# Patient Record
Sex: MALE | Race: WHITE | NOT HISPANIC OR LATINO | Employment: OTHER | URBAN - NONMETROPOLITAN AREA
[De-identification: names, ages, dates, MRNs, and addresses within clinical notes are randomized per-mention and may not be internally consistent; named-entity substitution may affect disease eponyms.]

---

## 2017-01-05 ENCOUNTER — HOSPITAL ENCOUNTER (OUTPATIENT)
Dept: MRI IMAGING | Facility: HOSPITAL | Age: 37
Discharge: HOME | End: 2017-01-05

## 2017-01-05 ENCOUNTER — HOSPITAL ENCOUNTER (OUTPATIENT)
Dept: MRI IMAGING | Facility: HOSPITAL | Age: 37
Discharge: HOME | End: 2017-01-05
Admitting: OPHTHALMOLOGY

## 2017-01-05 DIAGNOSIS — H47.10 OPTIC NERVE EDEMA: ICD-10-CM

## 2017-01-05 DIAGNOSIS — R51.9 GENERALIZED HEADACHES: ICD-10-CM

## 2017-01-05 LAB — CREAT BLDA-MCNC: 0.9 MG/DL (ref 0.6–1.3)

## 2017-01-05 PROCEDURE — 82565 ASSAY OF CREATININE: CPT

## 2017-01-05 PROCEDURE — 70553 MRI BRAIN STEM W/O & W/DYE: CPT

## 2017-01-05 PROCEDURE — 0 GADOBENATE DIMEGLUMINE 529 MG/ML SOLUTION: Performed by: OPHTHALMOLOGY

## 2017-01-05 PROCEDURE — A9577 INJ MULTIHANCE: HCPCS | Performed by: OPHTHALMOLOGY

## 2017-01-05 RX ADMIN — GADOBENATE DIMEGLUMINE 20 ML: 529 INJECTION, SOLUTION INTRAVENOUS at 08:30

## 2017-01-09 ENCOUNTER — TELEPHONE (OUTPATIENT)
Dept: NEUROSURGERY | Facility: CLINIC | Age: 37
End: 2017-01-09

## 2017-01-09 NOTE — TELEPHONE ENCOUNTER
I called to confirm patient's appt for 1/11/17 with Dr Ascencio; however, I didn't get an answer, got his voicemail but it was full and could not accept any new messages.      milagro onofre CMA

## 2017-01-11 ENCOUNTER — OFFICE VISIT (OUTPATIENT)
Dept: NEUROSURGERY | Facility: CLINIC | Age: 37
End: 2017-01-11

## 2017-01-11 VITALS
WEIGHT: 288 LBS | DIASTOLIC BLOOD PRESSURE: 84 MMHG | BODY MASS INDEX: 41.23 KG/M2 | SYSTOLIC BLOOD PRESSURE: 142 MMHG | HEIGHT: 70 IN

## 2017-01-11 DIAGNOSIS — G89.29 CHRONIC INTRACTABLE HEADACHE, UNSPECIFIED HEADACHE TYPE: ICD-10-CM

## 2017-01-11 DIAGNOSIS — H47.10 PAPILLEDEMA: ICD-10-CM

## 2017-01-11 DIAGNOSIS — G93.2 PSEUDOTUMOR CEREBRI: Primary | ICD-10-CM

## 2017-01-11 DIAGNOSIS — E23.7 PITUITARY ABNORMALITY (HCC): ICD-10-CM

## 2017-01-11 DIAGNOSIS — F17.210 CIGARETTE SMOKER: ICD-10-CM

## 2017-01-11 DIAGNOSIS — E66.01 OBESITY, CLASS III, BMI 40-49.9 (MORBID OBESITY) (HCC): ICD-10-CM

## 2017-01-11 DIAGNOSIS — R51.9 CHRONIC INTRACTABLE HEADACHE, UNSPECIFIED HEADACHE TYPE: ICD-10-CM

## 2017-01-11 PROBLEM — E66.813 OBESITY, CLASS III, BMI 40-49.9 (MORBID OBESITY): Status: ACTIVE | Noted: 2017-01-11

## 2017-01-11 PROCEDURE — 99203 OFFICE O/P NEW LOW 30 MIN: CPT | Performed by: NEUROLOGICAL SURGERY

## 2017-01-11 RX ORDER — BUSPIRONE HYDROCHLORIDE 15 MG/1
TABLET ORAL
COMMUNITY
End: 2017-01-11

## 2017-01-11 RX ORDER — BUPRENORPHINE HYDROCHLORIDE, NALOXONE HYDROCHLORIDE 8; 2 MG/1; MG/1
FILM, SOLUBLE BUCCAL; SUBLINGUAL
Refills: 2 | COMMUNITY
Start: 2016-12-12 | End: 2017-03-14

## 2017-01-11 RX ORDER — QUETIAPINE FUMARATE 25 MG/1
TABLET, FILM COATED ORAL
Refills: 2 | COMMUNITY
Start: 2017-01-06 | End: 2017-02-09

## 2017-01-11 RX ORDER — OLANZAPINE 5 MG/1
TABLET ORAL
COMMUNITY
Start: 2016-06-22 | End: 2017-01-11

## 2017-01-11 RX ORDER — LISINOPRIL AND HYDROCHLOROTHIAZIDE 25; 20 MG/1; MG/1
TABLET ORAL
COMMUNITY
End: 2017-01-11

## 2017-01-11 RX ORDER — ACETAZOLAMIDE 250 MG/1
250 TABLET ORAL 2 TIMES DAILY
Refills: 2 | COMMUNITY
Start: 2016-12-15 | End: 2019-03-11 | Stop reason: SDUPTHER

## 2017-01-11 RX ORDER — ALPRAZOLAM 0.5 MG/1
TABLET ORAL
Refills: 1 | COMMUNITY
Start: 2016-12-16 | End: 2017-07-27

## 2017-01-11 RX ORDER — POLYETHYLENE GLYCOL 3350 17 G/17G
17 POWDER, FOR SOLUTION ORAL DAILY PRN
Refills: 0 | COMMUNITY
Start: 2016-12-09 | End: 2019-11-08

## 2017-01-11 RX ORDER — DEXTROAMPHETAMINE SACCHARATE, AMPHETAMINE ASPARTATE, DEXTROAMPHETAMINE SULFATE AND AMPHETAMINE SULFATE 5; 5; 5; 5 MG/1; MG/1; MG/1; MG/1
20 TABLET ORAL 3 TIMES DAILY
Refills: 0 | COMMUNITY
Start: 2016-12-19 | End: 2019-08-30 | Stop reason: DRUGHIGH

## 2017-01-11 NOTE — MR AVS SNAPSHOT
Lincoln Ornelas   1/11/2017 10:00 AM   Office Visit    Dept Phone:  [phone]   Encounter #:  [id number]    Provider:  Zev Ascencio MD   Department:  [facility] NEUROSURGERY                Your Full Care Plan              Today's Medication Changes          These changes are accurate as of: 1/11/17 11:18 AM.  If you have any questions, ask your nurse or doctor.               Medication(s)that have changed:     busPIRone 15 MG tablet   Commonly known as:  BUSPAR   Take 15 mg by mouth 3 times daily   What changed:  Another medication with the same name was removed. Continue taking this medication, and follow the directions you see here.       lisinopril-hydrochlorothiazide 20-25 MG per tablet   Commonly known as:  PRINZIDE,ZESTORETIC   Take 1 tablet by mouth Daily.   What changed:  Another medication with the same name was removed. Continue taking this medication, and follow the directions you see here.       OLANZapine 15 MG tablet   Commonly known as:  zyPREXA   Take 15 mg by mouth every night.   What changed:  Another medication with the same name was removed. Continue taking this medication, and follow the directions you see here.       QUEtiapine 25 MG tablet   Commonly known as:  SEROquel   What changed:  Another medication with the same name was removed. Continue taking this medication, and follow the directions you see here.   Changed by:  Zev Ascencio MD       SUBOXONE 8-2 MG film film   Generic drug:  buprenorphine-naloxone   What changed:  Another medication with the same name was removed. Continue taking this medication, and follow the directions you see here.   Changed by:  Zev Ascencio MD                  Your Updated Medication List          This list is accurate as of: 1/11/17 11:18 AM.  Always use your most recent med list.                acetaZOLAMIDE 250 MG tablet   Commonly known as:  DIAMOX       * ADDERALL PO       *  amphetamine-dextroamphetamine 20 MG tablet   Commonly known as:  ADDERALL       ALPRAZolam 0.5 MG tablet   Commonly known as:  XANAX       busPIRone 15 MG tablet   Commonly known as:  BUSPAR       ketorolac 10 MG tablet   Commonly known as:  TORADOL   Take 1 tablet by mouth Every 6 (Six) Hours As Needed for moderate pain (4-6).       lisinopril-hydrochlorothiazide 20-25 MG per tablet   Commonly known as:  PRINZIDE,ZESTORETIC       OLANZapine 15 MG tablet   Commonly known as:  zyPREXA       polyethylene glycol powder   Commonly known as:  MIRALAX       QUEtiapine 25 MG tablet   Commonly known as:  SEROquel       SUBOXONE 8-2 MG film film   Generic drug:  buprenorphine-naloxone       * Notice:  This list has 2 medication(s) that are the same as other medications prescribed for you. Read the directions carefully, and ask your doctor or other care provider to review them with you.            You Were Diagnosed With        Codes Comments    Pseudotumor cerebri    -  Primary ICD-10-CM: G93.2  ICD-9-CM: 348.2     Papilledema     ICD-10-CM: H47.10  ICD-9-CM: 377.00     Chronic intractable headache, unspecified headache type     ICD-10-CM: R51  ICD-9-CM: 784.0     Pituitary abnormality     ICD-10-CM: E23.7  ICD-9-CM: 253.9     Obesity, Class III, BMI 40-49.9 (morbid obesity)     ICD-10-CM: E66.01  ICD-9-CM: 278.01     Cigarette smoker     ICD-10-CM: F17.210  ICD-9-CM: 305.1       Instructions    Steps to Quit Smoking   Smoking tobacco can be harmful to your health and can affect almost every organ in your body. Smoking puts you, and those around you, at risk for developing many serious chronic diseases. Quitting smoking is difficult, but it is one of the best things that you can do for your health. It is never too late to quit.  WHAT ARE THE BENEFITS OF QUITTING SMOKING?  When you quit smoking, you lower your risk of developing serious diseases and conditions, such as:  · Lung cancer or lung disease, such as COPD.  · Heart  "disease.  · Stroke.  · Heart attack.  · Infertility.  · Osteoporosis and bone fractures.  Additionally, symptoms such as coughing, wheezing, and shortness of breath may get better when you quit. You may also find that you get sick less often because your body is stronger at fighting off colds and infections. If you are pregnant, quitting smoking can help to reduce your chances of having a baby of low birth weight.  HOW DO I GET READY TO QUIT?  When you decide to quit smoking, create a plan to make sure that you are successful. Before you quit:  · Pick a date to quit. Set a date within the next two weeks to give you time to prepare.  · Write down the reasons why you are quitting. Keep this list in places where you will see it often, such as on your bathroom mirror or in your car or wallet.  · Identify the people, places, things, and activities that make you want to smoke (triggers) and avoid them. Make sure to take these actions:    Throw away all cigarettes at home, at work, and in your car.    Throw away smoking accessories, such as ashtrays and lighters.    Clean your car and make sure to empty the ashtray.    Clean your home, including curtains and carpets.  · Tell your family, friends, and coworkers that you are quitting. Support from your loved ones can make quitting easier.  · Talk with your health care provider about your options for quitting smoking.  · Find out what treatment options are covered by your health insurance.  WHAT STRATEGIES CAN I USE TO QUIT SMOKING?   Talk with your healthcare provider about different strategies to quit smoking. Some strategies include:  · Quitting smoking altogether instead of gradually lessening how much you smoke over a period of time. Research shows that quitting \"cold turkey\" is more successful than gradually quitting.  · Attending in-person counseling to help you build problem-solving skills. You are more likely to have success in quitting if you attend several " counseling sessions. Even short sessions of 10 minutes can be effective.  · Finding resources and support systems that can help you to quit smoking and remain smoke-free after you quit. These resources are most helpful when you use them often. They can include:    Online chats with a counselor.    Telephone quitlines.    Printed self-help materials.    Support groups or group counseling.    Text messaging programs.    Mobile phone applications.  · Taking medicines to help you quit smoking. (If you are pregnant or breastfeeding, talk with your health care provider first.) Some medicines contain nicotine and some do not. Both types of medicines help with cravings, but the medicines that include nicotine help to relieve withdrawal symptoms. Your health care provider may recommend:    Nicotine patches, gum, or lozenges.    Nicotine inhalers or sprays.    Non-nicotine medicine that is taken by mouth.  Talk with your health care provider about combining strategies, such as taking medicines while you are also receiving in-person counseling. Using these two strategies together makes you more likely to succeed in quitting than if you used either strategy on its own.  If you are pregnant or breastfeeding, talk with your health care provider about finding counseling or other support strategies to quit smoking. Do not take medicine to help you quit smoking unless told to do so by your health care provider.  WHAT THINGS CAN I DO TO MAKE IT EASIER TO QUIT?  Quitting smoking might feel overwhelming at first, but there is a lot that you can do to make it easier. Take these important actions:  · Reach out to your family and friends and ask that they support and encourage you during this time. Call telephone quitlines, reach out to support groups, or work with a counselor for support.  · Ask people who smoke to avoid smoking around you.  · Avoid places that trigger you to smoke, such as bars, parties, or smoke-break areas at  work.  · Spend time around people who do not smoke.  · Lessen stress in your life, because stress can be a smoking trigger for some people. To lessen stress, try:    Exercising regularly.    Deep-breathing exercises.    Yoga.    Meditating.    Performing a body scan. This involves closing your eyes, scanning your body from head to toe, and noticing which parts of your body are particularly tense. Purposefully relax the muscles in those areas.  · Download or purchase mobile phone or tablet apps (applications) that can help you stick to your quit plan by providing reminders, tips, and encouragement. There are many free apps, such as QuitGuide from the CDC (Centers for Disease Control and Prevention). You can find other support for quitting smoking (smoking cessation) through smokefree.gov and other websites.  HOW WILL I FEEL WHEN I QUIT SMOKING?  Within the first 24 hours of quitting smoking, you may start to feel some withdrawal symptoms. These symptoms are usually most noticeable 2-3 days after quitting, but they usually do not last beyond 2-3 weeks. Changes or symptoms that you might experience include:  · Mood swings.  · Restlessness, anxiety, or irritation.  · Difficulty concentrating.  · Dizziness.  · Strong cravings for sugary foods in addition to nicotine.  · Mild weight gain.  · Constipation.  · Nausea.  · Coughing or a sore throat.  · Changes in how your medicines work in your body.  · A depressed mood.  · Difficulty sleeping (insomnia).  After the first 2-3 weeks of quitting, you may start to notice more positive results, such as:  · Improved sense of smell and taste.  · Decreased coughing and sore throat.  · Slower heart rate.  · Lower blood pressure.  · Clearer skin.  · The ability to breathe more easily.  · Fewer sick days.  Quitting smoking is very challenging for most people. Do not get discouraged if you are not successful the first time. Some people need to make many attempts to quit before they  achieve long-term success. Do your best to stick to your quit plan, and talk with your health care provider if you have any questions or concerns.     This information is not intended to replace advice given to you by your health care provider. Make sure you discuss any questions you have with your health care provider.     Document Released: 12/12/2002 Document Revised: 05/03/2016 Document Reviewed: 05/03/2016  [employer] Interactive Patient Education ©2016 [employer]  BMI for Adults  Body mass index (BMI) is a number that is calculated from a person's weight and height. In most adults, the number is used to find how much of an adult's weight is made up of fat. BMI is not as accurate as a direct measure of body fat.  HOW IS BMI CALCULATED?  BMI is calculated by dividing weight in kilograms by height in meters squared. It can also be calculated by dividing weight in pounds by height in inches squared, then multiplying the resulting number by 703. Charts are available to help you find your BMI quickly and easily without doing this calculation.   HOW IS BMI INTERPRETED?  Health care professionals use BMI charts to identify whether an adult is underweight, at a normal weight, or overweight based on the following guidelines:  · Underweight: BMI less than 18.5.  · Normal weight: BMI between 18.5 and 24.9.  · Overweight: BMI between 25 and 29.9.  · Obese: BMI of 30 and above.  BMI is usually interpreted the same for males and females.  Weight includes both fat and muscle, so someone with a muscular build, such as an athlete, may have a BMI that is higher than 24.9. In cases like these, BMI may not accurately depict body fat. To determine if excess body fat is the cause of a BMI of 25 or higher, further assessments may need to be done by a health care provider.  WHY IS BMI A USEFUL TOOL?  BMI is used to identify a possible weight problem that may be related to a medical problem or may increase the risk for medical  "problems. BMI can also be used to promote changes to reach a healthy weight.     This information is not intended to replace advice given to you by your health care provider. Make sure you discuss any questions you have with your health care provider.     Document Released: 2005 Document Revised: 2016 Document Reviewed: 05/15/2015  [employer] Interactive Patient Education © [employer]       Patient Instructions History      Upcoming Appointments     Visit Type Date Time Department    NEW PATIENT 2017 10:00 AM [facility]    FOLLOW UP 2017  1:45 PM [facility]      MyChart Signup     [facility] [employer] allows you to send messages to your doctor, view your test results, renew your prescriptions, schedule appointments, and more. To sign up, go to [employer] and click on the Sign Up Now link in the New User? box. Enter your [employer] Activation Code exactly as it appears below along with the last four digits of your Social Security Number and your Date of Birth () to complete the sign-up process. If you do not sign up before the expiration date, you must request a new code.    [employer] Activation Code: [email or web address]  Expires: 2017 11:16 AM    If you have questions, you can email [email or web address] or call [phone] to talk to our [employer] staff. Remember, [employer] is NOT to be used for urgent needs. For medical emergencies, dial 911.               Other Info from Your Visit           Your Appointments     2017  1:45 PM CST   Follow Up with Zev Ascencio MD   [facility] (--)    [address] [place] [address] [address]  [facility] [id number] [phone]           Arrive 15 minutes prior to appointment.              Allergies     No Known Allergies      Reason for Visit     Headache IMAGING @ [facility]      Vital Signs     Blood Pressure Height Weight Body Mass Index Smoking Status       142/84 70\" (177.8 cm) 288 lb " (131 kg) 41.32 kg/m2 Current Every Day Smoker       Problems and Diagnoses Noted     Chronic headache    Cigarette smoker    Obesity, Class III, BMI 40-49.9 (morbid obesity)    Blurring of optic disc    Pituitary abnormality    Benign intracranial hypertension

## 2017-01-11 NOTE — PATIENT INSTRUCTIONS
Steps to Quit Smoking   Smoking tobacco can be harmful to your health and can affect almost every organ in your body. Smoking puts you, and those around you, at risk for developing many serious chronic diseases. Quitting smoking is difficult, but it is one of the best things that you can do for your health. It is never too late to quit.  WHAT ARE THE BENEFITS OF QUITTING SMOKING?  When you quit smoking, you lower your risk of developing serious diseases and conditions, such as:  · Lung cancer or lung disease, such as COPD.  · Heart disease.  · Stroke.  · Heart attack.  · Infertility.  · Osteoporosis and bone fractures.  Additionally, symptoms such as coughing, wheezing, and shortness of breath may get better when you quit. You may also find that you get sick less often because your body is stronger at fighting off colds and infections. If you are pregnant, quitting smoking can help to reduce your chances of having a baby of low birth weight.  HOW DO I GET READY TO QUIT?  When you decide to quit smoking, create a plan to make sure that you are successful. Before you quit:  · Pick a date to quit. Set a date within the next two weeks to give you time to prepare.  · Write down the reasons why you are quitting. Keep this list in places where you will see it often, such as on your bathroom mirror or in your car or wallet.  · Identify the people, places, things, and activities that make you want to smoke (triggers) and avoid them. Make sure to take these actions:    Throw away all cigarettes at home, at work, and in your car.    Throw away smoking accessories, such as ashtrays and lighters.    Clean your car and make sure to empty the ashtray.    Clean your home, including curtains and carpets.  · Tell your family, friends, and coworkers that you are quitting. Support from your loved ones can make quitting easier.  · Talk with your health care provider about your options for quitting smoking.  · Find out what treatment  "options are covered by your health insurance.  WHAT STRATEGIES CAN I USE TO QUIT SMOKING?   Talk with your healthcare provider about different strategies to quit smoking. Some strategies include:  · Quitting smoking altogether instead of gradually lessening how much you smoke over a period of time. Research shows that quitting \"cold turkey\" is more successful than gradually quitting.  · Attending in-person counseling to help you build problem-solving skills. You are more likely to have success in quitting if you attend several counseling sessions. Even short sessions of 10 minutes can be effective.  · Finding resources and support systems that can help you to quit smoking and remain smoke-free after you quit. These resources are most helpful when you use them often. They can include:    Online chats with a counselor.    Telephone quitlines.    Printed self-help materials.    Support groups or group counseling.    Text messaging programs.    Mobile phone applications.  · Taking medicines to help you quit smoking. (If you are pregnant or breastfeeding, talk with your health care provider first.) Some medicines contain nicotine and some do not. Both types of medicines help with cravings, but the medicines that include nicotine help to relieve withdrawal symptoms. Your health care provider may recommend:    Nicotine patches, gum, or lozenges.    Nicotine inhalers or sprays.    Non-nicotine medicine that is taken by mouth.  Talk with your health care provider about combining strategies, such as taking medicines while you are also receiving in-person counseling. Using these two strategies together makes you more likely to succeed in quitting than if you used either strategy on its own.  If you are pregnant or breastfeeding, talk with your health care provider about finding counseling or other support strategies to quit smoking. Do not take medicine to help you quit smoking unless told to do so by your health care " provider.  WHAT THINGS CAN I DO TO MAKE IT EASIER TO QUIT?  Quitting smoking might feel overwhelming at first, but there is a lot that you can do to make it easier. Take these important actions:  · Reach out to your family and friends and ask that they support and encourage you during this time. Call telephone quitlines, reach out to support groups, or work with a counselor for support.  · Ask people who smoke to avoid smoking around you.  · Avoid places that trigger you to smoke, such as bars, parties, or smoke-break areas at work.  · Spend time around people who do not smoke.  · Lessen stress in your life, because stress can be a smoking trigger for some people. To lessen stress, try:    Exercising regularly.    Deep-breathing exercises.    Yoga.    Meditating.    Performing a body scan. This involves closing your eyes, scanning your body from head to toe, and noticing which parts of your body are particularly tense. Purposefully relax the muscles in those areas.  · Download or purchase mobile phone or tablet apps (applications) that can help you stick to your quit plan by providing reminders, tips, and encouragement. There are many free apps, such as QuitGuide from the CDC (Centers for Disease Control and Prevention). You can find other support for quitting smoking (smoking cessation) through smokefree.gov and other websites.  HOW WILL I FEEL WHEN I QUIT SMOKING?  Within the first 24 hours of quitting smoking, you may start to feel some withdrawal symptoms. These symptoms are usually most noticeable 2-3 days after quitting, but they usually do not last beyond 2-3 weeks. Changes or symptoms that you might experience include:  · Mood swings.  · Restlessness, anxiety, or irritation.  · Difficulty concentrating.  · Dizziness.  · Strong cravings for sugary foods in addition to nicotine.  · Mild weight gain.  · Constipation.  · Nausea.  · Coughing or a sore throat.  · Changes in how your medicines work in your  body.  · A depressed mood.  · Difficulty sleeping (insomnia).  After the first 2-3 weeks of quitting, you may start to notice more positive results, such as:  · Improved sense of smell and taste.  · Decreased coughing and sore throat.  · Slower heart rate.  · Lower blood pressure.  · Clearer skin.  · The ability to breathe more easily.  · Fewer sick days.  Quitting smoking is very challenging for most people. Do not get discouraged if you are not successful the first time. Some people need to make many attempts to quit before they achieve long-term success. Do your best to stick to your quit plan, and talk with your health care provider if you have any questions or concerns.     This information is not intended to replace advice given to you by your health care provider. Make sure you discuss any questions you have with your health care provider.     Document Released: 12/12/2002 Document Revised: 05/03/2016 Document Reviewed: 05/03/2016  [employer] Interactive Patient Education ©2016 [employer]  BMI for Adults  Body mass index (BMI) is a number that is calculated from a person's weight and height. In most adults, the number is used to find how much of an adult's weight is made up of fat. BMI is not as accurate as a direct measure of body fat.  HOW IS BMI CALCULATED?  BMI is calculated by dividing weight in kilograms by height in meters squared. It can also be calculated by dividing weight in pounds by height in inches squared, then multiplying the resulting number by 703. Charts are available to help you find your BMI quickly and easily without doing this calculation.   HOW IS BMI INTERPRETED?  Health care professionals use BMI charts to identify whether an adult is underweight, at a normal weight, or overweight based on the following guidelines:  · Underweight: BMI less than 18.5.  · Normal weight: BMI between 18.5 and 24.9.  · Overweight: BMI between 25 and 29.9.  · Obese: BMI of 30 and above.  BMI is usually  interpreted the same for males and females.  Weight includes both fat and muscle, so someone with a muscular build, such as an athlete, may have a BMI that is higher than 24.9. In cases like these, BMI may not accurately depict body fat. To determine if excess body fat is the cause of a BMI of 25 or higher, further assessments may need to be done by a health care provider.  WHY IS BMI A USEFUL TOOL?  BMI is used to identify a possible weight problem that may be related to a medical problem or may increase the risk for medical problems. BMI can also be used to promote changes to reach a healthy weight.     This information is not intended to replace advice given to you by your health care provider. Make sure you discuss any questions you have with your health care provider.     Document Released: 08/29/2005 Document Revised: 01/08/2016 Document Reviewed: 05/15/2015  [employer] Interactive Patient Education ©2016 [employer]

## 2017-01-11 NOTE — LETTER
2017     Guy Zimmer MD  [address] [facility] [address]  [facility] [address]    Patient: Lincoln Ornelas   YOB: 1980   Date of Visit: 2017       Dear Dr. Goran MD:    Lincoln Ornelas was in my office today. Below is a copy of my note.    If you have questions, please do not hesitate to call me. I look forward to following Lincoln along with you.         Sincerely,        Zev Ascencio MD        CC: Zhen Miller MD    Patient: Lincoln Ornelas  : 1980    Primary Care Provider: Guy Zimmer MD    Requesting Provider: No ref. provider found        History    Chief Complaint: Headache  Chief Complaint   Patient presents with   • Headache     IMAGING @ [facility]       History of Present Illness: 36-year-old gentleman who developed daily constant headaches about 8 weeks ago and then also started to develop transient vision loss is were he says a black veil   Would pass over his eyes last seconds to minutes and then would be relieved but it was a daily problem.  Worse in the mornings.  He never really had any meaningful headache problems before this or any major vision changes.  He has been working with Dr. Darren Miller who is started him on an escalating doses of Diamox.  He says that his headaches and vision changes are 75% better since starting the Diamox.  He did have a lumbar puncture with an opening pressure of 23 which was taken under a common reasonable conditions.  And per report he does have papilledema and visual field deficits although it they are improving with reevaluation after starting Diamox.    Review of Systems   Constitutional: Positive for unexpected weight change.   Eyes: Positive for visual disturbance.   Neurological: Positive for headaches.   All other systems reviewed and are negative.      Past Medical History:   Past Medical History   Diagnosis Date   • Anxiety    • Depression    • Hypertension    • Mood disorder    • Patient denies medical  problems    • Spinal headache    • Stroke      tia        Past Surgical History:   Past Surgical History   Procedure Laterality Date   • No past surgeries         Family History: family history is not on file.    Social History:  reports that he has been smoking Cigarettes.  He has a 50.00 pack-year smoking history. He does not have any smokeless tobacco history on file. He reports that he does not drink alcohol or use illicit drugs.    Medications:    (Not in a hospital admission)    Allergies:  Review of patient's allergies indicates no known allergies.    Physical Exam:     Physical Exam   Constitutional: He is oriented to person, place, and time. He appears well-developed.   Cardiovascular: Normal rate, regular rhythm and normal heart sounds.    Pulmonary/Chest: Effort normal and breath sounds normal. No respiratory distress.   Abdominal: Soft. Bowel sounds are normal. He exhibits no distension.   Neurological: He is oriented to person, place, and time. He has normal strength. He has a normal Finger-Nose-Finger Test, a normal Romberg Test and a normal Tandem Gait Test. Gait normal.   Reflex Scores:       Tricep reflexes are 2+ on the right side and 2+ on the left side.       Bicep reflexes are 2+ on the right side and 2+ on the left side.       Brachioradialis reflexes are 2+ on the right side and 2+ on the left side.       Patellar reflexes are 2+ on the right side and 2+ on the left side.       Achilles reflexes are 2+ on the right side and 2+ on the left side.  Psychiatric: His speech is normal.       Neurologic Exam     Mental Status   Oriented to person, place, and time.   Attention: normal.   Speech: speech is normal   Level of consciousness: alert  Knowledge: good.     Cranial Nerves   Cranial nerves II through XII intact.        Bilateral papilledema     Motor Exam   Muscle bulk: normal  Overall muscle tone: normal  Right arm pronator drift: absent  Left arm pronator drift: absent    Strength   Strength  5/5 throughout.     Sensory Exam   Light touch normal.   Vibration normal.   Proprioception normal.   Pinprick normal.     Gait, Coordination, and Reflexes     Gait  Gait: normal    Coordination   Romberg: negative  Finger to nose coordination: normal  Tandem walking coordination: normal    Tremor   Resting tremor: absent  Action tremor: absent    Reflexes   Right brachioradialis: 2+  Left brachioradialis: 2+  Right biceps: 2+  Left biceps: 2+  Right triceps: 2+  Left triceps: 2+  Right patellar: 2+  Left patellar: 2+  Right achilles: 2+  Left achilles: 2+        Independent Review of Radiographic Studies:   MRI of the brain and MRI of the pituitary does suggest a hypointense area in the posterior portion of the pituitary.  However there is some tortuosity to the cavernous carotid which may be responsible for the appearance of the pituitary.  The overall quality of the image is poor    ASSESSMENT/PLAN: 1. Pseudotumor: Lincoln has papilledema vision changes and headaches consistent with pseudotumor clinically his opening pressure on the lumbar puncture was 23 which is not extremely high.  However his symptoms have responded very nicely to Diamox 500 mg 3 times a day.  Certainly at this point he is does not require surgical intervention as he has been managed medically very well.  We did discuss the importance of weight loss in this diagnosis as he is almost 100 pounds overweight.  2.  Pituitary abnormality: Mr. Ornelas does have a unexplained weight gain which may be related to a pituitary abnormality.  I am going to reimage his pituitary do the poor image quality and the tortuous Carotid artery with aour closed high quality MRI that we image tumors with.  This will provide us with a much more detailed imaging of this area of the brain in order to determine whether he has an adenoma or not.  If he does have a pituitary had an adenoma we will workup his pituitary axis for abnormalities.  1. Pseudotumor cerebri    2.  Papilledema    3. Chronic intractable headache, unspecified headache type    4. Pituitary abnormality    5. Obesity, Class III, BMI 40-49.9 (morbid obesity)    6. Cigarette smoker          Return in about 3 weeks (around 2/1/2017) for follow up w/scan - DR FATIMA.      Zev Fatima MD

## 2017-01-11 NOTE — PROGRESS NOTES
Patient: Lincoln Ornelas  : 1980    Primary Care Provider: Guy Zimmer MD    Requesting Provider: No ref. provider found        History    Chief Complaint: Headache  Chief Complaint   Patient presents with   • Headache     IMAGING @ [facility]       History of Present Illness: 36-year-old gentleman who developed daily constant headaches about 8 weeks ago and then also started to develop transient vision loss is were he says a black veil   Would pass over his eyes last seconds to minutes and then would be relieved but it was a daily problem.  Worse in the mornings.  He never really had any meaningful headache problems before this or any major vision changes.  He has been working with Dr. Darren Miller who is started him on an escalating doses of Diamox.  He says that his headaches and vision changes are 75% better since starting the Diamox.  He did have a lumbar puncture with an opening pressure of 23 which was taken under a common reasonable conditions.  And per report he does have papilledema and visual field deficits although it they are improving with reevaluation after starting Diamox.    Review of Systems   Constitutional: Positive for unexpected weight change.   Eyes: Positive for visual disturbance.   Neurological: Positive for headaches.   All other systems reviewed and are negative.      Past Medical History:   Past Medical History   Diagnosis Date   • Anxiety    • Depression    • Hypertension    • Mood disorder    • Patient denies medical problems    • Spinal headache    • Stroke      tia        Past Surgical History:   Past Surgical History   Procedure Laterality Date   • No past surgeries         Family History: family history is not on file.    Social History:  reports that he has been smoking Cigarettes.  He has a 50.00 pack-year smoking history. He does not have any smokeless tobacco history on file. He reports that he does not drink alcohol or use illicit drugs.    Medications:    (Not in a  hospital admission)    Allergies:  Review of patient's allergies indicates no known allergies.    Physical Exam:     Physical Exam   Constitutional: He is oriented to person, place, and time. He appears well-developed.   Cardiovascular: Normal rate, regular rhythm and normal heart sounds.    Pulmonary/Chest: Effort normal and breath sounds normal. No respiratory distress.   Abdominal: Soft. Bowel sounds are normal. He exhibits no distension.   Neurological: He is oriented to person, place, and time. He has normal strength. He has a normal Finger-Nose-Finger Test, a normal Romberg Test and a normal Tandem Gait Test. Gait normal.   Reflex Scores:       Tricep reflexes are 2+ on the right side and 2+ on the left side.       Bicep reflexes are 2+ on the right side and 2+ on the left side.       Brachioradialis reflexes are 2+ on the right side and 2+ on the left side.       Patellar reflexes are 2+ on the right side and 2+ on the left side.       Achilles reflexes are 2+ on the right side and 2+ on the left side.  Psychiatric: His speech is normal.       Neurologic Exam     Mental Status   Oriented to person, place, and time.   Attention: normal.   Speech: speech is normal   Level of consciousness: alert  Knowledge: good.     Cranial Nerves   Cranial nerves II through XII intact.        Bilateral papilledema     Motor Exam   Muscle bulk: normal  Overall muscle tone: normal  Right arm pronator drift: absent  Left arm pronator drift: absent    Strength   Strength 5/5 throughout.     Sensory Exam   Light touch normal.   Vibration normal.   Proprioception normal.   Pinprick normal.     Gait, Coordination, and Reflexes     Gait  Gait: normal    Coordination   Romberg: negative  Finger to nose coordination: normal  Tandem walking coordination: normal    Tremor   Resting tremor: absent  Action tremor: absent    Reflexes   Right brachioradialis: 2+  Left brachioradialis: 2+  Right biceps: 2+  Left biceps: 2+  Right triceps:  2+  Left triceps: 2+  Right patellar: 2+  Left patellar: 2+  Right achilles: 2+  Left achilles: 2+        Independent Review of Radiographic Studies:   MRI of the brain and MRI of the pituitary does suggest a hypointense area in the posterior portion of the pituitary.  However there is some tortuosity to the cavernous carotid which may be responsible for the appearance of the pituitary.  The overall quality of the image is poor    ASSESSMENT/PLAN: 1. Pseudotumor: Lincoln has papilledema vision changes and headaches consistent with pseudotumor clinically his opening pressure on the lumbar puncture was 23 which is not extremely high.  However his symptoms have responded very nicely to Diamox 500 mg 3 times a day.  Certainly at this point he is does not require surgical intervention as he has been managed medically very well.  We did discuss the importance of weight loss in this diagnosis as he is almost 100 pounds overweight.  2.  Pituitary abnormality: Mr. Ornelas does have a unexplained weight gain which may be related to a pituitary abnormality.  I am going to reimage his pituitary do the poor image quality and the tortuous Carotid artery with aour closed high quality MRI that we image tumors with.  This will provide us with a much more detailed imaging of this area of the brain in order to determine whether he has an adenoma or not.  If he does have a pituitary had an adenoma we will workup his pituitary axis for abnormalities.  1. Pseudotumor cerebri    2. Papilledema    3. Chronic intractable headache, unspecified headache type    4. Pituitary abnormality    5. Obesity, Class III, BMI 40-49.9 (morbid obesity)    6. Cigarette smoker          Return in about 3 weeks (around 2/1/2017) for follow up w/scan - DR FATIMA.      Zev Fatima MD

## 2017-01-18 ENCOUNTER — APPOINTMENT (OUTPATIENT)
Dept: MRI IMAGING | Facility: HOSPITAL | Age: 37
End: 2017-01-18
Attending: NEUROLOGICAL SURGERY

## 2017-01-26 ENCOUNTER — HOSPITAL ENCOUNTER (OUTPATIENT)
Dept: MRI IMAGING | Facility: HOSPITAL | Age: 37
Discharge: HOME | End: 2017-01-26
Attending: NEUROLOGICAL SURGERY | Admitting: NEUROLOGICAL SURGERY

## 2017-01-26 PROCEDURE — 70553 MRI BRAIN STEM W/O & W/DYE: CPT

## 2017-01-26 PROCEDURE — 0 GADOBENATE DIMEGLUMINE 529 MG/ML SOLUTION: Performed by: NEUROLOGICAL SURGERY

## 2017-01-26 PROCEDURE — A9577 INJ MULTIHANCE: HCPCS | Performed by: NEUROLOGICAL SURGERY

## 2017-01-26 RX ADMIN — GADOBENATE DIMEGLUMINE 20 ML: 529 INJECTION, SOLUTION INTRAVENOUS at 07:55

## 2017-02-09 ENCOUNTER — OFFICE VISIT (OUTPATIENT)
Dept: NEUROSURGERY | Facility: CLINIC | Age: 37
End: 2017-02-09

## 2017-02-09 ENCOUNTER — APPOINTMENT (OUTPATIENT)
Dept: MRI IMAGING | Facility: HOSPITAL | Age: 37
End: 2017-02-09
Attending: NEUROLOGICAL SURGERY

## 2017-02-09 VITALS
SYSTOLIC BLOOD PRESSURE: 124 MMHG | DIASTOLIC BLOOD PRESSURE: 80 MMHG | HEIGHT: 70 IN | BODY MASS INDEX: 40.09 KG/M2 | WEIGHT: 280 LBS

## 2017-02-09 DIAGNOSIS — D35.2 PITUITARY MICROADENOMA (HCC): Primary | ICD-10-CM

## 2017-02-09 DIAGNOSIS — F17.210 CIGARETTE SMOKER: ICD-10-CM

## 2017-02-09 DIAGNOSIS — H47.10 PAPILLEDEMA: ICD-10-CM

## 2017-02-09 DIAGNOSIS — E66.01 OBESITY, CLASS III, BMI 40-49.9 (MORBID OBESITY) (HCC): ICD-10-CM

## 2017-02-09 DIAGNOSIS — G93.2 PSEUDOTUMOR CEREBRI: ICD-10-CM

## 2017-02-09 PROCEDURE — 99213 OFFICE O/P EST LOW 20 MIN: CPT | Performed by: NEUROLOGICAL SURGERY

## 2017-02-09 RX ORDER — LURASIDONE HYDROCHLORIDE 20 MG/1
TABLET, FILM COATED ORAL
Refills: 2 | COMMUNITY
Start: 2017-01-19 | End: 2017-03-14

## 2017-02-09 NOTE — PATIENT INSTRUCTIONS
BMI for Adults  Body mass index (BMI) is a number that is calculated from a person's weight and height. In most adults, the number is used to find how much of an adult's weight is made up of fat. BMI is not as accurate as a direct measure of body fat.  HOW IS BMI CALCULATED?  BMI is calculated by dividing weight in kilograms by height in meters squared. It can also be calculated by dividing weight in pounds by height in inches squared, then multiplying the resulting number by 703. Charts are available to help you find your BMI quickly and easily without doing this calculation.   HOW IS BMI INTERPRETED?  Health care professionals use BMI charts to identify whether an adult is underweight, at a normal weight, or overweight based on the following guidelines:  · Underweight: BMI less than 18.5.  · Normal weight: BMI between 18.5 and 24.9.  · Overweight: BMI between 25 and 29.9.  · Obese: BMI of 30 and above.  BMI is usually interpreted the same for males and females.  Weight includes both fat and muscle, so someone with a muscular build, such as an athlete, may have a BMI that is higher than 24.9. In cases like these, BMI may not accurately depict body fat. To determine if excess body fat is the cause of a BMI of 25 or higher, further assessments may need to be done by a health care provider.  WHY IS BMI A USEFUL TOOL?  BMI is used to identify a possible weight problem that may be related to a medical problem or may increase the risk for medical problems. BMI can also be used to promote changes to reach a healthy weight.     This information is not intended to replace advice given to you by your health care provider. Make sure you discuss any questions you have with your health care provider.     Document Released: 08/29/2005 Document Revised: 01/08/2016 Document Reviewed: 05/15/2015  [employer] Interactive Patient Education ©2016 [employer]  Steps to Quit Smoking   Smoking tobacco can be harmful to your health and can  affect almost every organ in your body. Smoking puts you, and those around you, at risk for developing many serious chronic diseases. Quitting smoking is difficult, but it is one of the best things that you can do for your health. It is never too late to quit.  WHAT ARE THE BENEFITS OF QUITTING SMOKING?  When you quit smoking, you lower your risk of developing serious diseases and conditions, such as:  · Lung cancer or lung disease, such as COPD.  · Heart disease.  · Stroke.  · Heart attack.  · Infertility.  · Osteoporosis and bone fractures.  Additionally, symptoms such as coughing, wheezing, and shortness of breath may get better when you quit. You may also find that you get sick less often because your body is stronger at fighting off colds and infections. If you are pregnant, quitting smoking can help to reduce your chances of having a baby of low birth weight.  HOW DO I GET READY TO QUIT?  When you decide to quit smoking, create a plan to make sure that you are successful. Before you quit:  · Pick a date to quit. Set a date within the next two weeks to give you time to prepare.  · Write down the reasons why you are quitting. Keep this list in places where you will see it often, such as on your bathroom mirror or in your car or wallet.  · Identify the people, places, things, and activities that make you want to smoke (triggers) and avoid them. Make sure to take these actions:    Throw away all cigarettes at home, at work, and in your car.    Throw away smoking accessories, such as ashtrays and lighters.    Clean your car and make sure to empty the ashtray.    Clean your home, including curtains and carpets.  · Tell your family, friends, and coworkers that you are quitting. Support from your loved ones can make quitting easier.  · Talk with your health care provider about your options for quitting smoking.  · Find out what treatment options are covered by your health insurance.  WHAT STRATEGIES CAN I USE TO QUIT  "SMOKING?   Talk with your healthcare provider about different strategies to quit smoking. Some strategies include:  · Quitting smoking altogether instead of gradually lessening how much you smoke over a period of time. Research shows that quitting \"cold turkey\" is more successful than gradually quitting.  · Attending in-person counseling to help you build problem-solving skills. You are more likely to have success in quitting if you attend several counseling sessions. Even short sessions of 10 minutes can be effective.  · Finding resources and support systems that can help you to quit smoking and remain smoke-free after you quit. These resources are most helpful when you use them often. They can include:    Online chats with a counselor.    Telephone quitlines.    Printed self-help materials.    Support groups or group counseling.    Text messaging programs.    Mobile phone applications.  · Taking medicines to help you quit smoking. (If you are pregnant or breastfeeding, talk with your health care provider first.) Some medicines contain nicotine and some do not. Both types of medicines help with cravings, but the medicines that include nicotine help to relieve withdrawal symptoms. Your health care provider may recommend:    Nicotine patches, gum, or lozenges.    Nicotine inhalers or sprays.    Non-nicotine medicine that is taken by mouth.  Talk with your health care provider about combining strategies, such as taking medicines while you are also receiving in-person counseling. Using these two strategies together makes you more likely to succeed in quitting than if you used either strategy on its own.  If you are pregnant or breastfeeding, talk with your health care provider about finding counseling or other support strategies to quit smoking. Do not take medicine to help you quit smoking unless told to do so by your health care provider.  WHAT THINGS CAN I DO TO MAKE IT EASIER TO QUIT?  Quitting smoking might feel " overwhelming at first, but there is a lot that you can do to make it easier. Take these important actions:  · Reach out to your family and friends and ask that they support and encourage you during this time. Call telephone quitlines, reach out to support groups, or work with a counselor for support.  · Ask people who smoke to avoid smoking around you.  · Avoid places that trigger you to smoke, such as bars, parties, or smoke-break areas at work.  · Spend time around people who do not smoke.  · Lessen stress in your life, because stress can be a smoking trigger for some people. To lessen stress, try:    Exercising regularly.    Deep-breathing exercises.    Yoga.    Meditating.    Performing a body scan. This involves closing your eyes, scanning your body from head to toe, and noticing which parts of your body are particularly tense. Purposefully relax the muscles in those areas.  · Download or purchase mobile phone or tablet apps (applications) that can help you stick to your quit plan by providing reminders, tips, and encouragement. There are many free apps, such as QuitGuide from the CDC (Centers for Disease Control and Prevention). You can find other support for quitting smoking (smoking cessation) through smokefree.gov and other websites.  HOW WILL I FEEL WHEN I QUIT SMOKING?  Within the first 24 hours of quitting smoking, you may start to feel some withdrawal symptoms. These symptoms are usually most noticeable 2-3 days after quitting, but they usually do not last beyond 2-3 weeks. Changes or symptoms that you might experience include:  · Mood swings.  · Restlessness, anxiety, or irritation.  · Difficulty concentrating.  · Dizziness.  · Strong cravings for sugary foods in addition to nicotine.  · Mild weight gain.  · Constipation.  · Nausea.  · Coughing or a sore throat.  · Changes in how your medicines work in your body.  · A depressed mood.  · Difficulty sleeping (insomnia).  After the first 2-3 weeks of  quitting, you may start to notice more positive results, such as:  · Improved sense of smell and taste.  · Decreased coughing and sore throat.  · Slower heart rate.  · Lower blood pressure.  · Clearer skin.  · The ability to breathe more easily.  · Fewer sick days.  Quitting smoking is very challenging for most people. Do not get discouraged if you are not successful the first time. Some people need to make many attempts to quit before they achieve long-term success. Do your best to stick to your quit plan, and talk with your health care provider if you have any questions or concerns.     This information is not intended to replace advice given to you by your health care provider. Make sure you discuss any questions you have with your health care provider.     Document Released: 12/12/2002 Document Revised: 05/03/2016 Document Reviewed: 05/03/2016  [employer] Interactive Patient Education ©2016 [employer]

## 2017-02-09 NOTE — PROGRESS NOTES
SUBJECTIVE:  Patient ID: Lincoln Ornelas is a 36 y.o. male is here today for follow-up.    Chief Complaint: Vision changes  Chief Complaint   Patient presents with   • Results     MRI today @ [facility]       HPI  36-year-old male we are seeing for pseudotumor cerebri.  He has headaches and vision changes and papilledema which right now are being managed but very well by Juan and Dr. Miller.  He also had an incidental finding of a pituitary lesion we reimaged his pituitary with a more appropriate studies here to discuss the results.  The only notable finding on his pituitary review of systems was an unexpected recent weight gain    The following portions of the patient's history were reviewed and updated as appropriate: allergies, current medications, past family history, past medical history, past social history, past surgical history and problem list.    OBJECTIVE:    Review of Systems   Constitutional: Positive for unexpected weight change.          Physical Exam   Constitutional: He is oriented to person, place, and time. He appears well-developed and well-nourished.   HENT:   Head: Normocephalic and atraumatic.   Right Ear: Hearing normal.   Left Ear: Hearing normal.   Eyes: EOM are normal. Pupils are equal, round, and reactive to light.   Neck: Normal range of motion.   Neurological: He is alert and oriented to person, place, and time. He has normal strength and normal reflexes. No cranial nerve deficit or sensory deficit. He displays a negative Romberg sign. GCS eye subscore is 4. GCS verbal subscore is 5. GCS motor subscore is 6. He displays no Babinski's sign on the right side. He displays no Babinski's sign on the left side.   Psychiatric: His speech is normal. Judgment normal. Cognition and memory are normal.       Neurologic Exam     Mental Status   Oriented to person, place, and time.   Speech: speech is normal     Cranial Nerves     CN III, IV, VI   Pupils are equal, round, and reactive to  light.  Extraocular motions are normal.     Motor Exam     Strength   Strength 5/5 throughout.     bILateral papilledema  Independent Review of Radiographic Studies:   MRI the pituitary with and without contrast shows a area of hypointensity in the posterior portion of the pituitary gland consistent with either a microadenoma or a Rathke's cleft cyst    ASSESSMENT/PLAN:  Mr. Ornelas's pseudotumor is under good control he is to follow-up with Dr. Miller and certainly does not require shunting at this point.  Mr. Ornelas does have a small hypointensity in his posterior pituitary gland it would be appropriate at this point to check a pituitary function panel if that proves to be negative and we would watch this is serial imaging.      1. Pituitary microadenoma    2. Papilledema    3. Pseudotumor cerebri    4. Obesity, Class III, BMI 40-49.9 (morbid obesity)    5. Cigarette smoker            Return in about 4 weeks (around 3/9/2017) for test results w/DR FATIMA.      Zev Fatima MD

## 2017-02-14 ENCOUNTER — RESULTS ENCOUNTER (OUTPATIENT)
Dept: NEUROSURGERY | Facility: CLINIC | Age: 37
End: 2017-02-14

## 2017-02-14 DIAGNOSIS — D35.2 PITUITARY MICROADENOMA (HCC): ICD-10-CM

## 2017-03-12 ENCOUNTER — OFFICE VISIT (OUTPATIENT)
Dept: URGENT CARE | Age: 37
End: 2017-03-12
Payer: COMMERCIAL

## 2017-03-12 VITALS
TEMPERATURE: 98 F | HEART RATE: 109 BPM | OXYGEN SATURATION: 95 % | SYSTOLIC BLOOD PRESSURE: 101 MMHG | BODY MASS INDEX: 41.29 KG/M2 | RESPIRATION RATE: 20 BRPM | DIASTOLIC BLOOD PRESSURE: 67 MMHG | HEIGHT: 70 IN | WEIGHT: 288.4 LBS

## 2017-03-12 DIAGNOSIS — J02.9 SORE THROAT: Primary | ICD-10-CM

## 2017-03-12 DIAGNOSIS — K12.2 UVULITIS: ICD-10-CM

## 2017-03-12 DIAGNOSIS — J02.0 STREP THROAT: ICD-10-CM

## 2017-03-12 LAB — S PYO AG THROAT QL: POSITIVE

## 2017-03-12 PROCEDURE — 96372 THER/PROPH/DIAG INJ SC/IM: CPT | Performed by: FAMILY MEDICINE

## 2017-03-12 PROCEDURE — 87880 STREP A ASSAY W/OPTIC: CPT | Performed by: FAMILY MEDICINE

## 2017-03-12 PROCEDURE — 99213 OFFICE O/P EST LOW 20 MIN: CPT | Performed by: FAMILY MEDICINE

## 2017-03-12 RX ORDER — POLYETHYLENE GLYCOL 3350 17 G/17G
17 POWDER, FOR SOLUTION ORAL DAILY PRN
Refills: 0 | COMMUNITY
Start: 2016-12-09

## 2017-03-12 RX ORDER — OLANZAPINE 15 MG/1
TABLET ORAL
Refills: 1 | Status: ON HOLD | COMMUNITY
Start: 2017-01-19 | End: 2021-03-26 | Stop reason: HOSPADM

## 2017-03-12 RX ORDER — CEFTRIAXONE 500 MG/1
500 INJECTION, POWDER, FOR SOLUTION INTRAMUSCULAR; INTRAVENOUS ONCE
Status: COMPLETED | OUTPATIENT
Start: 2017-03-12 | End: 2017-03-12

## 2017-03-12 RX ORDER — ALPRAZOLAM 0.5 MG/1
2 TABLET ORAL 4 TIMES DAILY
Refills: 1 | COMMUNITY
Start: 2017-02-13

## 2017-03-12 RX ORDER — ACETAZOLAMIDE 250 MG/1
500 TABLET ORAL 3 TIMES DAILY
Refills: 4 | COMMUNITY
Start: 2017-03-09

## 2017-03-12 RX ORDER — AMOXICILLIN 875 MG/1
875 TABLET, COATED ORAL 2 TIMES DAILY
Qty: 20 TABLET | Refills: 0 | Status: SHIPPED | OUTPATIENT
Start: 2017-03-12 | End: 2017-03-22

## 2017-03-12 RX ORDER — LURASIDONE HYDROCHLORIDE 40 MG/1
120 TABLET, FILM COATED ORAL NIGHTLY
Refills: 0 | COMMUNITY
Start: 2017-03-09

## 2017-03-12 RX ORDER — KETOROLAC TROMETHAMINE 10 MG/1
10 TABLET, FILM COATED ORAL EVERY 6 HOURS PRN
Refills: 0 | COMMUNITY
Start: 2016-12-15

## 2017-03-12 RX ORDER — METHYLPREDNISOLONE 4 MG/1
TABLET ORAL
Qty: 1 KIT | Refills: 0 | Status: SHIPPED | OUTPATIENT
Start: 2017-03-12 | End: 2017-03-18

## 2017-03-12 RX ORDER — DEXAMETHASONE SODIUM PHOSPHATE 100 MG/10ML
10 INJECTION INTRAMUSCULAR; INTRAVENOUS ONCE
Status: COMPLETED | OUTPATIENT
Start: 2017-03-12 | End: 2017-03-12

## 2017-03-12 RX ADMIN — DEXAMETHASONE SODIUM PHOSPHATE 10 MG: 100 INJECTION INTRAMUSCULAR; INTRAVENOUS at 11:49

## 2017-03-12 RX ADMIN — CEFTRIAXONE 500 MG: 500 INJECTION, POWDER, FOR SOLUTION INTRAMUSCULAR; INTRAVENOUS at 11:48

## 2017-03-14 ENCOUNTER — OFFICE VISIT (OUTPATIENT)
Dept: NEUROSURGERY | Facility: CLINIC | Age: 37
End: 2017-03-14

## 2017-03-14 VITALS
BODY MASS INDEX: 40.09 KG/M2 | WEIGHT: 280 LBS | HEIGHT: 70 IN | DIASTOLIC BLOOD PRESSURE: 80 MMHG | SYSTOLIC BLOOD PRESSURE: 124 MMHG

## 2017-03-14 DIAGNOSIS — F17.210 CIGARETTE SMOKER: ICD-10-CM

## 2017-03-14 DIAGNOSIS — G89.29 CHRONIC INTRACTABLE HEADACHE, UNSPECIFIED HEADACHE TYPE: ICD-10-CM

## 2017-03-14 DIAGNOSIS — R51.9 CHRONIC INTRACTABLE HEADACHE, UNSPECIFIED HEADACHE TYPE: ICD-10-CM

## 2017-03-14 DIAGNOSIS — E66.01 OBESITY, CLASS III, BMI 40-49.9 (MORBID OBESITY) (HCC): ICD-10-CM

## 2017-03-14 DIAGNOSIS — G93.2 PSEUDOTUMOR CEREBRI: ICD-10-CM

## 2017-03-14 DIAGNOSIS — D35.2 PITUITARY MICROADENOMA (HCC): Primary | ICD-10-CM

## 2017-03-14 PROCEDURE — 99213 OFFICE O/P EST LOW 20 MIN: CPT | Performed by: NEUROLOGICAL SURGERY

## 2017-03-14 RX ORDER — BUPRENORPHINE HYDROCHLORIDE AND NALOXONE HYDROCHLORIDE DIHYDRATE 8; 2 MG/1; MG/1
1 TABLET SUBLINGUAL 3 TIMES DAILY PRN
Refills: 0 | COMMUNITY
Start: 2017-03-13 | End: 2022-02-08 | Stop reason: ALTCHOICE

## 2017-03-14 RX ORDER — METHYLPREDNISOLONE 4 MG/1
TABLET ORAL
Refills: 0 | COMMUNITY
Start: 2017-03-12 | End: 2017-07-27

## 2017-03-14 RX ORDER — AMOXICILLIN 875 MG/1
TABLET, COATED ORAL
Refills: 0 | COMMUNITY
Start: 2017-03-12 | End: 2018-05-01

## 2017-03-14 RX ORDER — OLANZAPINE 15 MG/1
TABLET ORAL
COMMUNITY
Start: 2017-01-19 | End: 2017-03-14

## 2017-03-14 RX ORDER — KETOROLAC TROMETHAMINE 10 MG/1
TABLET, FILM COATED ORAL
COMMUNITY
Start: 2016-12-15 | End: 2018-05-01

## 2017-03-14 RX ORDER — LURASIDONE HYDROCHLORIDE 40 MG/1
TABLET, FILM COATED ORAL
Refills: 1 | COMMUNITY
Start: 2017-03-09 | End: 2017-07-27

## 2017-03-14 NOTE — PROGRESS NOTES
SUBJECTIVE:  Patient ID: Lincoln Ornelas is a 36 y.o. male is here today for follow-up.    Chief Complaint: Headache  Chief Complaint   Patient presents with   • Results     lab results       HPI    This is a 36-year-old gentleman that was referred to us for a pituitary microadenoma versus Rathke's cleft cyst.  He was clinically asymptomatic but we sent him for a pituitary panel he is here to discuss the results.  Incidentally the patient also has pseudotumor cerebri which is managed by [facility] by the ophthalmology group.    The following portions of the patient's history were reviewed and updated as appropriate: allergies, current medications, past family history, past medical history, past social history, past surgical history and problem list.    OBJECTIVE:    Review of Systems   Neurological: Positive for headaches.          Physical Exam   Constitutional: He is oriented to person, place, and time. He appears well-developed and well-nourished.   HENT:   Head: Normocephalic and atraumatic.   Right Ear: Hearing normal.   Left Ear: Hearing normal.   Eyes: EOM are normal. Pupils are equal, round, and reactive to light.   Neck: Normal range of motion.   Neurological: He is alert and oriented to person, place, and time. He has normal strength and normal reflexes. No cranial nerve deficit or sensory deficit. He displays a negative Romberg sign. GCS eye subscore is 4. GCS verbal subscore is 5. GCS motor subscore is 6. He displays no Babinski's sign on the right side. He displays no Babinski's sign on the left side.   Psychiatric: His speech is normal. Judgment normal. Cognition and memory are normal.       Neurologic Exam     Mental Status   Oriented to person, place, and time.   Speech: speech is normal     Cranial Nerves     CN III, IV, VI   Pupils are equal, round, and reactive to light.  Extraocular motions are normal.     Motor Exam     Strength   Strength 5/5 throughout.       Independent Review of Radiographic  Studies:       ASSESSMENT/PLAN:  Lincoln's pituitary panel was notable for an elevated prolactin level at 38 with normal being 15.  We are and refer him to Dr. Gagnon from the endocrinology service to see if he feels that this is likely a prolactin producing tumor and if it needs medical treatment.  See him in follow-up in about 2 months and set up a repeat MRI of the pituitary for later this year      1. Pituitary microadenoma    2. Pseudotumor cerebri    3. Chronic intractable headache, unspecified headache type    4. Cigarette smoker    5. Obesity, Class III, BMI 40-49.9 (morbid obesity)            Return in about 10 weeks (around 5/23/2017) for follow up w/DR ASCENCIO.      Zev Ascencio MD

## 2017-03-14 NOTE — PATIENT INSTRUCTIONS
Steps to Quit Smoking   Smoking tobacco can be harmful to your health and can affect almost every organ in your body. Smoking puts you, and those around you, at risk for developing many serious chronic diseases. Quitting smoking is difficult, but it is one of the best things that you can do for your health. It is never too late to quit.  WHAT ARE THE BENEFITS OF QUITTING SMOKING?  When you quit smoking, you lower your risk of developing serious diseases and conditions, such as:  · Lung cancer or lung disease, such as COPD.  · Heart disease.  · Stroke.  · Heart attack.  · Infertility.  · Osteoporosis and bone fractures.  Additionally, symptoms such as coughing, wheezing, and shortness of breath may get better when you quit. You may also find that you get sick less often because your body is stronger at fighting off colds and infections. If you are pregnant, quitting smoking can help to reduce your chances of having a baby of low birth weight.  HOW DO I GET READY TO QUIT?  When you decide to quit smoking, create a plan to make sure that you are successful. Before you quit:  · Pick a date to quit. Set a date within the next two weeks to give you time to prepare.  · Write down the reasons why you are quitting. Keep this list in places where you will see it often, such as on your bathroom mirror or in your car or wallet.  · Identify the people, places, things, and activities that make you want to smoke (triggers) and avoid them. Make sure to take these actions:    Throw away all cigarettes at home, at work, and in your car.    Throw away smoking accessories, such as ashtrays and lighters.    Clean your car and make sure to empty the ashtray.    Clean your home, including curtains and carpets.  · Tell your family, friends, and coworkers that you are quitting. Support from your loved ones can make quitting easier.  · Talk with your health care provider about your options for quitting smoking.  · Find out what treatment  "options are covered by your health insurance.  WHAT STRATEGIES CAN I USE TO QUIT SMOKING?   Talk with your healthcare provider about different strategies to quit smoking. Some strategies include:  · Quitting smoking altogether instead of gradually lessening how much you smoke over a period of time. Research shows that quitting \"cold turkey\" is more successful than gradually quitting.  · Attending in-person counseling to help you build problem-solving skills. You are more likely to have success in quitting if you attend several counseling sessions. Even short sessions of 10 minutes can be effective.  · Finding resources and support systems that can help you to quit smoking and remain smoke-free after you quit. These resources are most helpful when you use them often. They can include:    Online chats with a counselor.    Telephone quitlines.    Printed self-help materials.    Support groups or group counseling.    Text messaging programs.    Mobile phone applications.  · Taking medicines to help you quit smoking. (If you are pregnant or breastfeeding, talk with your health care provider first.) Some medicines contain nicotine and some do not. Both types of medicines help with cravings, but the medicines that include nicotine help to relieve withdrawal symptoms. Your health care provider may recommend:    Nicotine patches, gum, or lozenges.    Nicotine inhalers or sprays.    Non-nicotine medicine that is taken by mouth.  Talk with your health care provider about combining strategies, such as taking medicines while you are also receiving in-person counseling. Using these two strategies together makes you more likely to succeed in quitting than if you used either strategy on its own.  If you are pregnant or breastfeeding, talk with your health care provider about finding counseling or other support strategies to quit smoking. Do not take medicine to help you quit smoking unless told to do so by your health care " provider.  WHAT THINGS CAN I DO TO MAKE IT EASIER TO QUIT?  Quitting smoking might feel overwhelming at first, but there is a lot that you can do to make it easier. Take these important actions:  · Reach out to your family and friends and ask that they support and encourage you during this time. Call telephone quitlines, reach out to support groups, or work with a counselor for support.  · Ask people who smoke to avoid smoking around you.  · Avoid places that trigger you to smoke, such as bars, parties, or smoke-break areas at work.  · Spend time around people who do not smoke.  · Lessen stress in your life, because stress can be a smoking trigger for some people. To lessen stress, try:    Exercising regularly.    Deep-breathing exercises.    Yoga.    Meditating.    Performing a body scan. This involves closing your eyes, scanning your body from head to toe, and noticing which parts of your body are particularly tense. Purposefully relax the muscles in those areas.  · Download or purchase mobile phone or tablet apps (applications) that can help you stick to your quit plan by providing reminders, tips, and encouragement. There are many free apps, such as QuitGuide from the CDC (Centers for Disease Control and Prevention). You can find other support for quitting smoking (smoking cessation) through smokefree.gov and other websites.  HOW WILL I FEEL WHEN I QUIT SMOKING?  Within the first 24 hours of quitting smoking, you may start to feel some withdrawal symptoms. These symptoms are usually most noticeable 2-3 days after quitting, but they usually do not last beyond 2-3 weeks. Changes or symptoms that you might experience include:  · Mood swings.  · Restlessness, anxiety, or irritation.  · Difficulty concentrating.  · Dizziness.  · Strong cravings for sugary foods in addition to nicotine.  · Mild weight gain.  · Constipation.  · Nausea.  · Coughing or a sore throat.  · Changes in how your medicines work in your  body.  · A depressed mood.  · Difficulty sleeping (insomnia).  After the first 2-3 weeks of quitting, you may start to notice more positive results, such as:  · Improved sense of smell and taste.  · Decreased coughing and sore throat.  · Slower heart rate.  · Lower blood pressure.  · Clearer skin.  · The ability to breathe more easily.  · Fewer sick days.  Quitting smoking is very challenging for most people. Do not get discouraged if you are not successful the first time. Some people need to make many attempts to quit before they achieve long-term success. Do your best to stick to your quit plan, and talk with your health care provider if you have any questions or concerns.     This information is not intended to replace advice given to you by your health care provider. Make sure you discuss any questions you have with your health care provider.     Document Released: 12/12/2002 Document Revised: 05/03/2016 Document Reviewed: 05/03/2016  [employer] Interactive Patient Education ©2016 [employer]  BMI for Adults  Body mass index (BMI) is a number that is calculated from a person's weight and height. In most adults, the number is used to find how much of an adult's weight is made up of fat. BMI is not as accurate as a direct measure of body fat.  HOW IS BMI CALCULATED?  BMI is calculated by dividing weight in kilograms by height in meters squared. It can also be calculated by dividing weight in pounds by height in inches squared, then multiplying the resulting number by 703. Charts are available to help you find your BMI quickly and easily without doing this calculation.   HOW IS BMI INTERPRETED?  Health care professionals use BMI charts to identify whether an adult is underweight, at a normal weight, or overweight based on the following guidelines:  · Underweight: BMI less than 18.5.  · Normal weight: BMI between 18.5 and 24.9.  · Overweight: BMI between 25 and 29.9.  · Obese: BMI of 30 and above.  BMI is usually  interpreted the same for males and females.  Weight includes both fat and muscle, so someone with a muscular build, such as an athlete, may have a BMI that is higher than 24.9. In cases like these, BMI may not accurately depict body fat. To determine if excess body fat is the cause of a BMI of 25 or higher, further assessments may need to be done by a health care provider.  WHY IS BMI A USEFUL TOOL?  BMI is used to identify a possible weight problem that may be related to a medical problem or may increase the risk for medical problems. BMI can also be used to promote changes to reach a healthy weight.     This information is not intended to replace advice given to you by your health care provider. Make sure you discuss any questions you have with your health care provider.     Document Released: 08/29/2005 Document Revised: 01/08/2016 Document Reviewed: 05/15/2015  [employer] Interactive Patient Education ©2016 [employer]

## 2017-06-05 ENCOUNTER — TELEPHONE (OUTPATIENT)
Dept: NEUROSURGERY | Facility: CLINIC | Age: 37
End: 2017-06-05

## 2017-06-05 NOTE — TELEPHONE ENCOUNTER
Tried calling the patient 3 times to let him know Dr Ascencio would not be in tomorrow morning due to helping Dr Jordan with surgery.  However, he doesn't answer and his voicemail is full so I cannot leave him a message.  I called his wife's # and left a message asking her to either call me with new day/time or have the patient call me.    milagro onofre CMA    **patient called back & appt moved to 6/9/17**    milagro onofre CMA

## 2017-07-27 ENCOUNTER — OFFICE VISIT (OUTPATIENT)
Dept: NEUROSURGERY | Facility: CLINIC | Age: 37
End: 2017-07-27

## 2017-07-27 VITALS
SYSTOLIC BLOOD PRESSURE: 150 MMHG | HEIGHT: 70 IN | BODY MASS INDEX: 40.09 KG/M2 | WEIGHT: 280 LBS | DIASTOLIC BLOOD PRESSURE: 86 MMHG

## 2017-07-27 DIAGNOSIS — D35.2 PITUITARY MICROADENOMA (HCC): Primary | ICD-10-CM

## 2017-07-27 DIAGNOSIS — F17.210 CIGARETTE SMOKER: ICD-10-CM

## 2017-07-27 DIAGNOSIS — G93.2 PSEUDOTUMOR CEREBRI: ICD-10-CM

## 2017-07-27 PROCEDURE — 99213 OFFICE O/P EST LOW 20 MIN: CPT | Performed by: NEUROLOGICAL SURGERY

## 2017-07-27 RX ORDER — LURASIDONE HYDROCHLORIDE 80 MG/1
TABLET, FILM COATED ORAL DAILY
Refills: 1 | COMMUNITY
Start: 2017-07-03 | End: 2019-08-30 | Stop reason: DRUGHIGH

## 2017-07-27 RX ORDER — BUPRENORPHINE HYDROCHLORIDE, NALOXONE HYDROCHLORIDE 8; 2 MG/1; MG/1
FILM, SOLUBLE BUCCAL; SUBLINGUAL
Refills: 0 | Status: ON HOLD | COMMUNITY
Start: 2017-05-08 | End: 2018-05-18

## 2017-07-27 RX ORDER — ALPRAZOLAM 1 MG/1
1 TABLET ORAL 2 TIMES DAILY
Refills: 1 | COMMUNITY
Start: 2017-07-03 | End: 2019-03-11 | Stop reason: SDUPTHER

## 2017-12-13 RX ORDER — ALPRAZOLAM 1 MG/1
TABLET ORAL
Qty: 2 TABLET | Refills: 0 | OUTPATIENT
Start: 2017-12-13 | End: 2018-05-01

## 2017-12-15 ENCOUNTER — HOSPITAL ENCOUNTER (OUTPATIENT)
Dept: MRI IMAGING | Facility: HOSPITAL | Age: 37
Discharge: HOME | End: 2017-12-15
Attending: NEUROLOGICAL SURGERY | Admitting: NEUROLOGICAL SURGERY

## 2017-12-15 DIAGNOSIS — D35.2 PITUITARY MICROADENOMA (HCC): ICD-10-CM

## 2017-12-15 DIAGNOSIS — G93.2 PSEUDOTUMOR CEREBRI: ICD-10-CM

## 2017-12-15 PROCEDURE — 70553 MRI BRAIN STEM W/O & W/DYE: CPT

## 2017-12-15 PROCEDURE — A9577 INJ MULTIHANCE: HCPCS | Performed by: NEUROLOGICAL SURGERY

## 2017-12-15 PROCEDURE — 0 GADOBENATE DIMEGLUMINE 529 MG/ML SOLUTION: Performed by: NEUROLOGICAL SURGERY

## 2017-12-15 RX ADMIN — GADOBENATE DIMEGLUMINE 15 ML: 529 INJECTION, SOLUTION INTRAVENOUS at 10:00

## 2017-12-19 ENCOUNTER — APPOINTMENT (OUTPATIENT)
Dept: MRI IMAGING | Facility: HOSPITAL | Age: 37
End: 2017-12-19
Attending: NEUROLOGICAL SURGERY

## 2017-12-21 ENCOUNTER — TELEPHONE (OUTPATIENT)
Dept: NEUROSURGERY | Facility: CLINIC | Age: 37
End: 2017-12-21

## 2017-12-21 DIAGNOSIS — D35.2 PITUITARY MICROADENOMA (HCC): Primary | ICD-10-CM

## 2017-12-22 NOTE — TELEPHONE ENCOUNTER
Patient had an appt on 12/19/17 for a follow up with MRI pituitary.  However, he changed insurances and his copay is now $100 and he just didn't have it to pay.  Dr Ascencio reviewed the MRI and said stable and wouldn't need to repeat until Dec 2018.  I have notified the patient by phone and the appt was made for Dec 2018.  I will place an order for repeat MRI to be done same day as the appt.   Patient agreed that he would call sooner if he started having symptoms and needed to be seen sooner.    milagro onofre CMA

## 2018-05-01 ENCOUNTER — OFFICE VISIT (OUTPATIENT)
Dept: UROLOGY | Facility: CLINIC | Age: 38
End: 2018-05-01

## 2018-05-01 VITALS
TEMPERATURE: 97.8 F | BODY MASS INDEX: 38.31 KG/M2 | SYSTOLIC BLOOD PRESSURE: 90 MMHG | WEIGHT: 267.6 LBS | HEIGHT: 70 IN | DIASTOLIC BLOOD PRESSURE: 52 MMHG

## 2018-05-01 DIAGNOSIS — Z30.09 VASECTOMY EVALUATION: Primary | ICD-10-CM

## 2018-05-01 PROCEDURE — 99213 OFFICE O/P EST LOW 20 MIN: CPT | Performed by: UROLOGY

## 2018-05-01 NOTE — PATIENT INSTRUCTIONS

## 2018-05-01 NOTE — PROGRESS NOTES
Mr. Ornelas is 37 y.o. male    Chief Complaint   Patient presents with   • Sterilization       History of Present Illness  The patient has been pondering the option of a vasectomy for Several years. With regard to context of the decision, he presently has 2 children. He is . Associated/Relevant symptoms/signs include None. He voices no additional questions about birth control options.     The following portions of the patient's history were reviewed and updated as appropriate: allergies, current medications, past family history, past medical history, past social history, past surgical history and problem list.    Review of Systems   Constitutional: Negative for chills and fever.   HENT: Negative for congestion and sore throat.    Eyes: Positive for pain and itching.   Respiratory: Negative for cough and shortness of breath.    Cardiovascular: Negative for chest pain.   Gastrointestinal: Negative for abdominal pain, anal bleeding, blood in stool and constipation.   Endocrine: Negative for cold intolerance and heat intolerance.   Genitourinary: Negative for difficulty urinating, dysuria, flank pain, frequency, hematuria and urgency.   Musculoskeletal: Negative for back pain and neck pain.   Skin: Negative for color change and rash.   Allergic/Immunologic: Negative for food allergies.   Neurological: Negative for dizziness and headaches.   Hematological: Does not bruise/bleed easily.   Psychiatric/Behavioral: Negative for confusion and sleep disturbance.         Current Outpatient Prescriptions:   •  acetaZOLAMIDE (DIAMOX) 250 MG tablet, , Disp: , Rfl: 2  •  ALPRAZolam (XANAX) 1 MG tablet, , Disp: , Rfl: 1  •  amphetamine-dextroamphetamine (ADDERALL) 20 MG tablet, , Disp: , Rfl: 0  •  buprenorphine-naloxone (SUBOXONE) 8-2 MG per SL tablet, , Disp: , Rfl: 0  •  LATUDA 80 MG tablet, , Disp: , Rfl: 1  •  lisinopril-hydrochlorothiazide (PRINZIDE,ZESTORETIC) 20-25 MG per tablet, Take 1 tablet by mouth Daily., Disp:  ", Rfl:   •  polyethylene glycol (MIRALAX) powder, , Disp: , Rfl: 0  •  SUBOXONE 8-2 MG film film, , Disp: , Rfl: 0    Past Medical History:   Diagnosis Date   • Anxiety    • Depression    • Hypertension    • Mood disorder    • Patient denies medical problems    • Spinal headache    • Stroke     tia        Past Surgical History:   Procedure Laterality Date   • NO PAST SURGERIES         Social History     Social History   • Marital status:      Social History Main Topics   • Smoking status: Current Every Day Smoker     Packs/day: 2.00     Years: 25.00     Types: Cigarettes   • Smokeless tobacco: Current User     Types: Chew   • Alcohol use No   • Drug use: No   • Sexual activity: Defer     Other Topics Concern   • Not on file       History reviewed. No pertinent family history.    Objective    BP 90/52   Temp 97.8 °F (36.6 °C)   Ht 177.8 cm (70\")   Wt 121 kg (267 lb 9.6 oz)   BMI 38.40 kg/m²     Physical Exam  Constitutional: Well nourished, Well developed; No apparent distress  Psychiatric: Appropriate affect; Alert and oriented  Eyes: Unremarkable  Musculoskeletal: Normal gait and station  GI: Abdomen is soft, non-tender  Respiratory: No distress; Unlabored movement; No accessory musculature needed with symmetric movements  Skin: No pallor or diaphoresis  ; scrotum and a large suprapubic fat pad.  Testicles appear normal, but I am unable to easily find the vas bilaterally.        Hospital Outpatient Visit on 01/05/2017   Component Date Value Ref Range Status   • Creatinine 01/05/2017 0.90  0.60 - 1.30 mg/dL Final       Results for orders placed or performed during the hospital encounter of 01/05/17   POC Creatinine   Result Value Ref Range    Creatinine 0.90 0.60 - 1.30 mg/dL     Assessment and Plan    Lincoln was seen today for sterilization.    Diagnoses and all orders for this visit:    Vasectomy evaluation  -     Case Request; Standing  -     ceFAZolin (ANCEF) 2 g in sodium chloride 0.9 % 100 mL " IVPB; Infuse 2 g into a venous catheter 1 (One) Time.  -     Case Request    Other orders  -     Follow Anesthesia Guidelines / Standing Orders; Future  -     Provide instructions to patient on NPO status  -     Obtain informed consent  -     Follow Anesthesia Guidelines / Standing Orders; Standing  -     Verify NPO Status; Standing  -     ZULMA hose- To be placed on patient in pre-op; Standing    Patient stands risk and benefits of vasectomy as outlined below.  This will need be done in the operating room due to his body habitus and difficulty finding the vas.  I will plan on doing this at his convenience.  He is on Suboxone and therefore has told me that he cannot get pain scripts afterwards.    Vasectomy Consult  Patient here for pre-vasectomy consultation. He denies previous genitourinary surgery. He was given the consent form, pre-vasectomy instruction sheet, and vasectomy booklet. I extensively reviewed with him the likely postoperative recuperative period as well as the need to continue to use contraception until he is notified by us of his sterility as confirmed with two negative post procedure semen analyses.  He understands the potential side effects of anesthesia, bleeding, scrotal hematoma, wound infection, epididymal orchitis, epididymal congestion, chronic testicular pain requiring further surgery, sperm granuloma, antisperm antibodies, early recanalization, spontaneous recanalization with pregnancy after demonstration of azoospermia. He is aware of alternatives to vasectomy. He has given this careful consideration and wishes to proceed with a vasectomy.

## 2018-05-11 ENCOUNTER — APPOINTMENT (OUTPATIENT)
Dept: PREADMISSION TESTING | Facility: HOSPITAL | Age: 38
End: 2018-05-11

## 2018-05-11 VITALS
OXYGEN SATURATION: 96 % | DIASTOLIC BLOOD PRESSURE: 59 MMHG | HEIGHT: 70 IN | WEIGHT: 270.06 LBS | RESPIRATION RATE: 16 BRPM | BODY MASS INDEX: 38.66 KG/M2 | SYSTOLIC BLOOD PRESSURE: 103 MMHG | HEART RATE: 105 BPM

## 2018-05-11 LAB
ANION GAP SERPL CALCULATED.3IONS-SCNC: 11 MMOL/L (ref 4–13)
BUN BLD-MCNC: 17 MG/DL (ref 5–21)
BUN/CREAT SERPL: 15.5 (ref 7–25)
CALCIUM SPEC-SCNC: 9.5 MG/DL (ref 8.4–10.4)
CHLORIDE SERPL-SCNC: 100 MMOL/L (ref 98–110)
CO2 SERPL-SCNC: 31 MMOL/L (ref 24–31)
CREAT BLD-MCNC: 1.1 MG/DL (ref 0.5–1.4)
DEPRECATED RDW RBC AUTO: 38.6 FL (ref 40–54)
ERYTHROCYTE [DISTWIDTH] IN BLOOD BY AUTOMATED COUNT: 12.1 % (ref 12–15)
GFR SERPL CREATININE-BSD FRML MDRD: 75 ML/MIN/1.73
GLUCOSE BLD-MCNC: 97 MG/DL (ref 70–100)
HCT VFR BLD AUTO: 42.6 % (ref 40–52)
HGB BLD-MCNC: 14.1 G/DL (ref 14–18)
MCH RBC QN AUTO: 28.8 PG (ref 28–32)
MCHC RBC AUTO-ENTMCNC: 33.1 G/DL (ref 33–36)
MCV RBC AUTO: 87.1 FL (ref 82–95)
PLATELET # BLD AUTO: 224 10*3/MM3 (ref 130–400)
PMV BLD AUTO: 11.8 FL (ref 6–12)
POTASSIUM BLD-SCNC: 2.9 MMOL/L (ref 3.5–5.3)
RBC # BLD AUTO: 4.89 10*6/MM3 (ref 4.8–5.9)
SODIUM BLD-SCNC: 142 MMOL/L (ref 135–145)
WBC NRBC COR # BLD: 7.71 10*3/MM3 (ref 4.8–10.8)

## 2018-05-11 PROCEDURE — 93005 ELECTROCARDIOGRAM TRACING: CPT

## 2018-05-11 PROCEDURE — 93010 ELECTROCARDIOGRAM REPORT: CPT | Performed by: INTERNAL MEDICINE

## 2018-05-11 PROCEDURE — 85027 COMPLETE CBC AUTOMATED: CPT | Performed by: PEDIATRICS

## 2018-05-11 PROCEDURE — 80048 BASIC METABOLIC PNL TOTAL CA: CPT | Performed by: PEDIATRICS

## 2018-05-11 PROCEDURE — 36415 COLL VENOUS BLD VENIPUNCTURE: CPT

## 2018-05-11 NOTE — DISCHARGE INSTRUCTIONS
DAY OF SURGERY INSTRUCTIONS        YOUR SURGEON: ***BENNY EPSTEIN    PROCEDURE: ***VASECTOMY    DATE OF SURGERY: ***MAY 18,2018    ARRIVAL TIME: AS DIRECTED BY OFFICE    DAY OF SURGERY TAKE ONLY THESE MEDICATIONS UNLESS OTHERWISE INSTRUCTED BY YOUR PHYSICIAN: ***SUBOXONE          MANAGING PAIN AFTER SURGERY    We know you are probably wondering what your pain will be like after surgery.  Following surgery it is unrealistic to expect you will not have pain.   Pain is how our bodies let us know that something is wrong or cautions us to be careful.  That said, our goal is to make your pain tolerable.    Methods we may use to treat your pain include (oral or IV medications, PCAs, epidurals, nerve blocks, etc.)   While some procedures require IV pain medications for a short time after surgery, transitioning to pain medications by mouth allows for better management of pain.   Your nurse will encourage you to take oral pain medications whenever possible.  IV medications work almost immediately, but only last a short while.  Taking medications by mouth allows for a more constant level of medication in your blood stream for a longer period of time.      Once your pain is out of control it is harder to get back under control.  It is important you are aware when your next dose of pain medication is due.  If you are admitted, your nurse may write the time of your next dose on the white board in your room to help you remember.      We are interested in your pain and encourage you to inform us about aggravating factors during your visit.   Many times a simple repositioning every few hours can make a big difference.    If your physician says it is okay, do not let your pain prevent you from getting out of bed. Be sure to call your nurse for assistance prior to getting up so you do not fall.      Before surgery, please decide your tolerable pain goal.  These faces help describe the pain ratings we use on a 0-10 scale.   Be prepared  to tell us your goal and whether or not you take pain or anxiety medications at home.            BEFORE YOU COME TO THE HOSPITAL  (Pre-op instructions)  • Do not eat, drink, smoke or chew gum after midnight the night before surgery.  This also includes no mints.  • Morning of surgery take only the medicines you have been instructed with a sip of water unless otherwise instructed  by your physician.  • Do not shave, wear makeup or dark nail polish.  • Remove all jewelry including rings.  • Leave anything you consider valuable at home.  • Leave your suitcase in the car until after your surgery.  • Bring the following with you if applicable:  o Picture ID and insurance, Medicare or Medicaid cards  o Co-pay/deductible required by insurance (cash, check, credit card)  o Copy of advance directive, living will or power-of- documents if not brought to PAT  o CPAP or BIPAP mask and tubing  o Relaxation aids (MP3 player, book, magazine)  • On the day of surgery check in at registration located at the main entrance of the hospital.       Outpatient Surgery Guidelines, Adult  Outpatient procedures are those for which the person having the procedure is allowed to go home the same day as the procedure. Various procedures are done on an outpatient basis. You should follow some general guidelines if you will be having an outpatient procedure.  LET YOUR HEALTH CARE PROVIDER KNOW ABOUT:  · Any allergies you have.  · All medicines you are taking, including vitamins, herbs, eye drops, creams, and over-the-counter medicines.  · Previous problems you or members of your family have had with the use of anesthetics.  · Any blood disorders you have.  · Previous surgeries you have had.  · Medical conditions you have.  RISKS AND COMPLICATIONS  Your health care provider will discuss possible risks and complications with you before surgery. Common risks and complications include:    · Problems due to the use of anesthetics.  · Blood loss  and replacement (does not apply to minor surgical procedures).  · Temporary increase in pain due to surgery.  · Uncorrected pain or problems that the surgery was meant to correct.  · Infection.  · New damage.  BEFORE THE PROCEDURE  · Ask your health care provider about changing or stopping your regular medicines. You may need to stop taking certain medicines in the days or weeks before the procedure.  · Stop smoking at least 2 weeks before surgery. This lowers your risk for complications during and after surgery. Ask your health care provider for help with this if needed.  · Eat your usual meals and a light supper the day before surgery. Continue fluid intake. Do not drink alcohol.  · Do not eat or drink after midnight the night before your surgery.   · Arrange for someone to take you home and to stay with you for 24 hours after the procedure. Medicine given for your procedure may affect your ability to drive or to care for yourself.  · Call your health care provider's office if you develop an illness or problem that may prevent you from safely having your procedure.  AFTER THE PROCEDURE  After surgery, you will be taken to a recovery area, where your progress will be monitored. If there are no complications, you will be allowed to go home when you are awake, stable, and taking fluids well. You may have numbness around the surgical site. Healing will take some time. You will have tenderness at the surgical site and may have some swelling and bruising. You may also have some nausea.  HOME CARE INSTRUCTIONS  · Do not drive for 24 hours, or as directed by your health care provider. Do not drive while taking prescription pain medicines.  · Do not drink alcohol for 24 hours.  · Do not make important decisions or sign legal documents for 24 hours.  · You may resume a normal diet and activities as directed.  · Do not lift anything heavier than 10 pounds (4.5 kg) or play contact sports until your health care provider says  it is okay.  · Change your bandages (dressings) as directed.  · Only take over-the-counter or prescription medicines as directed by your health care provider.  · Follow up with your health care provider as directed.  SEEK MEDICAL CARE IF:  · You have increased bleeding (more than a small spot) from the surgical site.  · You have redness, swelling, or increasing pain in the wound.  · You see pus coming from the wound.  · You have a fever.  · You notice a bad smell coming from the wound or dressing.  · You feel lightheaded or faint.  · You develop a rash.  · You have trouble breathing.  · You develop allergies.  MAKE SURE YOU:  · Understand these instructions.  · Will watch your condition.  · Will get help right away if you are not doing well or get worse.     This information is not intended to replace advice given to you by your health care provider. Make sure you discuss any questions you have with your health care provider.     Document Released: 09/12/2002 Document Revised: 05/03/2016 Document Reviewed: 05/22/2014  [employer] Interactive Patient Education ©2016 [employer]       Fall Prevention in Hospitals, Adult  As a hospital patient, your condition and the treatments you receive can increase your risk for falls. Some additional risk factors for falls in a hospital include:  · Being in an unfamiliar environment.  · Being on bed rest.  · Your surgery.  · Taking certain medicines.  · Your tubing requirements, such as intravenous (IV) therapy or catheters.  It is important that you learn how to decrease fall risks while at the hospital. Below are important tips that can help prevent falls.  SAFETY TIPS FOR PREVENTING FALLS  Talk about your risk of falling.  · Ask your health care provider why you are at risk for falling. Is it your medicine, illness, tubing placement, or something else?  · Make a plan with your health care provider to keep you safe from falls.  · Ask your health care provider or pharmacist about  side effects of your medicines. Some medicines can make you dizzy or affect your coordination.  Ask for help.  · Ask for help before getting out of bed. You may need to press your call button.  · Ask for assistance in getting safely to the toilet.  · Ask for a walker or cane to be put at your bedside. Ask that most of the side rails on your bed be placed up before your health care provider leaves the room.  · Ask family or friends to sit with you.  · Ask for things that are out of your reach, such as your glasses, hearing aids, telephone, bedside table, or call button.  Follow these tips to avoid falling:  · Stay lying or seated, rather than standing, while waiting for help.  · Wear rubber-soled slippers or shoes whenever you walk in the hospital.  · Avoid quick, sudden movements.  ¨ Change positions slowly.  ¨ Sit on the side of your bed before standing.  ¨ Stand up slowly and wait before you start to walk.  · Let your health care provider know if there is a spill on the floor.  · Pay careful attention to the medical equipment, electrical cords, and tubes around you.  · When you need help, use your call button by your bed or in the bathroom. Wait for one of your health care providers to help you.  · If you feel dizzy or unsure of your footing, return to bed and wait for assistance.  · Avoid being distracted by the TV, telephone, or another person in your room.  · Do not lean or support yourself on rolling objects, such as IV poles or bedside tables.     This information is not intended to replace advice given to you by your health care provider. Make sure you discuss any questions you have with your health care provider.     Document Released: 12/15/2001 Document Revised: 01/08/2016 Document Reviewed: 08/25/2013  [employer] Interactive Patient Education ©2016 [employer]       Surgical Site Infections FAQs  What is a Surgical Site Infection (SSI)?  A surgical site infection is an infection that occurs after surgery  in the part of the body where the surgery took place. Most patients who have surgery do not develop an infection. However, infections develop in about 1 to 3 out of every 100 patients who have surgery.  Some of the common symptoms of a surgical site infection are:  · Redness and pain around the area where you had surgery  · Drainage of cloudy fluid from your surgical wound  · Fever  Can SSIs be treated?  Yes. Most surgical site infections can be treated with antibiotics. The antibiotic given to you depends on the bacteria (germs) causing the infection. Sometimes patients with SSIs also need another surgery to treat the infection.  What are some of the things that hospitals are doing to prevent SSIs?  To prevent SSIs, doctors, nurses, and other healthcare providers:  · Clean their hands and arms up to their elbows with an antiseptic agent just before the surgery.  · Clean their hands with soap and water or an alcohol-based hand rub before and after caring for each patient.  · May remove some of your hair immediately before your surgery using electric clippers if the hair is in the same area where the procedure will occur. They should not shave you with a razor.  · Wear special hair covers, masks, gowns, and gloves during surgery to keep the surgery area clean.  · Give you antibiotics before your surgery starts. In most cases, you should get antibiotics within 60 minutes before the surgery starts and the antibiotics should be stopped within 24 hours after surgery.  · Clean the skin at the site of your surgery with a special soap that kills germs.  What can I do to help prevent SSIs?  Before your surgery:  · Tell your doctor about other medical problems you may have. Health problems such as allergies, diabetes, and obesity could affect your surgery and your treatment.  · Quit smoking. Patients who smoke get more infections. Talk to your doctor about how you can quit before your surgery.  · Do not shave near where you  will have surgery. Shaving with a razor can irritate your skin and make it easier to develop an infection.  At the time of your surgery:  · Speak up if someone tries to shave you with a razor before surgery. Ask why you need to be shaved and talk with your surgeon if you have any concerns.  · Ask if you will get antibiotics before surgery.  After your surgery:  · Make sure that your healthcare providers clean their hands before examining you, either with soap and water or an alcohol-based hand rub.  · If you do not see your providers clean their hands, please ask them to do so.  · Family and friends who visit you should not touch the surgical wound or dressings.  · Family and friends should clean their hands with soap and water or an alcohol-based hand rub before and after visiting you. If you do not see them clean their hands, ask them to clean their hands.  What do I need to do when I go home from the hospital?  · Before you go home, your doctor or nurse should explain everything you need to know about taking care of your wound. Make sure you understand how to care for your wound before you leave the hospital.  · Always clean your hands before and after caring for your wound.  · Before you go home, make sure you know who to contact if you have questions or problems after you get home.  · If you have any symptoms of an infection, such as redness and pain at the surgery site, drainage, or fever, call your doctor immediately.  If you have additional questions, please ask your doctor or nurse.  Developed and co-sponsored by The Society for Healthcare Epidemiology of [place] (SHEA); Infectious Diseases Society of [place] (IDSA); American Hospital Association; Association for Professionals in Infection Control and Epidemiology (APIC); Centers for Disease Control and Prevention (CDC); and The Joint Commission.     This information is not intended to replace advice given to you by your health care provider. Make sure you  discuss any questions you have with your health care provider.     Document Released: 12/23/2014 Document Revised: 01/08/2016 Document Reviewed: 03/02/2016  [employer] Interactive Patient Education ©2016 [employer]     PATIENT/FAMILY/RESPONSIBLE PARTY VERBALIZES UNDERSTANDING OF ABOVE EDUCATION.  COPY OF PAIN SCALE GIVEN AND REVIEWED WITH VERBALIZED UNDERSTANDING.

## 2018-05-14 ENCOUNTER — TELEPHONE (OUTPATIENT)
Dept: UROLOGY | Facility: CLINIC | Age: 38
End: 2018-05-14

## 2018-05-14 NOTE — TELEPHONE ENCOUNTER
----- Message from Ion Yanez MD sent at 5/13/2018  2:10 PM CDT -----  Please let him know that his potassium was low on his pre op work and that he needs to see his PCP prior to surgery.  Also, please forward a copy to his PCP

## 2018-05-14 NOTE — TELEPHONE ENCOUNTER
Called to let patient know that his potassium was abnormal on pre-op. He will need to follow up on this with his PCP, I asked him to call out office with the his PCP. (the one we have listed currently is a psychiatrist) Pt will have to see PCP prior to surgery in order to keep the surgery schd per Dr. Yanez.

## 2018-05-15 NOTE — TELEPHONE ENCOUNTER
Spoke with patient, I let him know that he will need to be seen by his PCP or a walk-in clinic to be evaluated for his low potassium. I told him he would need to be seen today or tomro in order to keep surgery on Friday. Pt confirmed understanding all info.

## 2018-05-17 ENCOUNTER — TELEPHONE (OUTPATIENT)
Dept: UROLOGY | Facility: CLINIC | Age: 38
End: 2018-05-17

## 2018-05-18 ENCOUNTER — HOSPITAL ENCOUNTER (OUTPATIENT)
Facility: HOSPITAL | Age: 38
Setting detail: HOSPITAL OUTPATIENT SURGERY
Discharge: HOME | End: 2018-05-18
Attending: UROLOGY | Admitting: UROLOGY

## 2018-05-18 ENCOUNTER — ANESTHESIA EVENT (OUTPATIENT)
Dept: PERIOP | Facility: HOSPITAL | Age: 38
End: 2018-05-18

## 2018-05-18 ENCOUNTER — ANESTHESIA (OUTPATIENT)
Dept: PERIOP | Facility: HOSPITAL | Age: 38
End: 2018-05-18

## 2018-05-18 VITALS
SYSTOLIC BLOOD PRESSURE: 98 MMHG | HEART RATE: 85 BPM | OXYGEN SATURATION: 92 % | DIASTOLIC BLOOD PRESSURE: 56 MMHG | TEMPERATURE: 98.2 F | RESPIRATION RATE: 16 BRPM

## 2018-05-18 DIAGNOSIS — Z30.09 VASECTOMY EVALUATION: ICD-10-CM

## 2018-05-18 LAB — POTASSIUM BLD-SCNC: 3.8 MMOL/L (ref 3.5–5.3)

## 2018-05-18 PROCEDURE — 25010000002 DEXAMETHASONE PER 1 MG: Performed by: NURSE ANESTHETIST, CERTIFIED REGISTERED

## 2018-05-18 PROCEDURE — 25010000002 ONDANSETRON PER 1 MG: Performed by: NURSE ANESTHETIST, CERTIFIED REGISTERED

## 2018-05-18 PROCEDURE — 25010000002 SUCCINYLCHOLINE PER 20 MG: Performed by: NURSE ANESTHETIST, CERTIFIED REGISTERED

## 2018-05-18 PROCEDURE — 84132 ASSAY OF SERUM POTASSIUM: CPT | Performed by: ANESTHESIOLOGY

## 2018-05-18 PROCEDURE — 25010000002 PROPOFOL 10 MG/ML EMULSION: Performed by: NURSE ANESTHETIST, CERTIFIED REGISTERED

## 2018-05-18 PROCEDURE — 88302 TISSUE EXAM BY PATHOLOGIST: CPT | Performed by: UROLOGY

## 2018-05-18 PROCEDURE — 55250 REMOVAL OF SPERM DUCT(S): CPT | Performed by: UROLOGY

## 2018-05-18 RX ORDER — LIDOCAINE HYDROCHLORIDE 40 MG/ML
SOLUTION TOPICAL AS NEEDED
Status: DISCONTINUED | OUTPATIENT
Start: 2018-05-18 | End: 2018-05-18 | Stop reason: SURG

## 2018-05-18 RX ORDER — MIDAZOLAM HYDROCHLORIDE 1 MG/ML
2 INJECTION INTRAMUSCULAR; INTRAVENOUS
Status: DISCONTINUED | OUTPATIENT
Start: 2018-05-18 | End: 2018-05-18 | Stop reason: HOSPADM

## 2018-05-18 RX ORDER — IBUPROFEN 600 MG/1
600 TABLET ORAL ONCE AS NEEDED
Status: DISCONTINUED | OUTPATIENT
Start: 2018-05-18 | End: 2018-05-18 | Stop reason: HOSPADM

## 2018-05-18 RX ORDER — SODIUM CHLORIDE, SODIUM LACTATE, POTASSIUM CHLORIDE, CALCIUM CHLORIDE 600; 310; 30; 20 MG/100ML; MG/100ML; MG/100ML; MG/100ML
1000 INJECTION, SOLUTION INTRAVENOUS CONTINUOUS
Status: DISCONTINUED | OUTPATIENT
Start: 2018-05-18 | End: 2018-05-18 | Stop reason: HOSPADM

## 2018-05-18 RX ORDER — ONDANSETRON 2 MG/ML
4 INJECTION INTRAMUSCULAR; INTRAVENOUS ONCE AS NEEDED
Status: DISCONTINUED | OUTPATIENT
Start: 2018-05-18 | End: 2018-05-18 | Stop reason: SDUPTHER

## 2018-05-18 RX ORDER — MEPERIDINE HYDROCHLORIDE 50 MG/ML
12.5 INJECTION INTRAMUSCULAR; INTRAVENOUS; SUBCUTANEOUS
Status: DISCONTINUED | OUTPATIENT
Start: 2018-05-18 | End: 2018-05-18 | Stop reason: HOSPADM

## 2018-05-18 RX ORDER — LABETALOL HYDROCHLORIDE 5 MG/ML
5 INJECTION, SOLUTION INTRAVENOUS
Status: DISCONTINUED | OUTPATIENT
Start: 2018-05-18 | End: 2018-05-18 | Stop reason: HOSPADM

## 2018-05-18 RX ORDER — FENTANYL CITRATE 50 UG/ML
25 INJECTION, SOLUTION INTRAMUSCULAR; INTRAVENOUS AS NEEDED
Status: DISCONTINUED | OUTPATIENT
Start: 2018-05-18 | End: 2018-05-18 | Stop reason: HOSPADM

## 2018-05-18 RX ORDER — DEXAMETHASONE SODIUM PHOSPHATE 4 MG/ML
INJECTION, SOLUTION INTRA-ARTICULAR; INTRALESIONAL; INTRAMUSCULAR; INTRAVENOUS; SOFT TISSUE AS NEEDED
Status: DISCONTINUED | OUTPATIENT
Start: 2018-05-18 | End: 2018-05-18 | Stop reason: SURG

## 2018-05-18 RX ORDER — SODIUM CHLORIDE 0.9 % (FLUSH) 0.9 %
3 SYRINGE (ML) INJECTION AS NEEDED
Status: DISCONTINUED | OUTPATIENT
Start: 2018-05-18 | End: 2018-05-18 | Stop reason: HOSPADM

## 2018-05-18 RX ORDER — METOCLOPRAMIDE HYDROCHLORIDE 5 MG/ML
5 INJECTION INTRAMUSCULAR; INTRAVENOUS
Status: DISCONTINUED | OUTPATIENT
Start: 2018-05-18 | End: 2018-05-18 | Stop reason: HOSPADM

## 2018-05-18 RX ORDER — ACETAMINOPHEN 500 MG
1000 TABLET ORAL ONCE
Status: COMPLETED | OUTPATIENT
Start: 2018-05-18 | End: 2018-05-18

## 2018-05-18 RX ORDER — PHENYLEPHRINE HCL IN 0.9% NACL 0.8MG/10ML
SYRINGE (ML) INTRAVENOUS AS NEEDED
Status: DISCONTINUED | OUTPATIENT
Start: 2018-05-18 | End: 2018-05-18 | Stop reason: SURG

## 2018-05-18 RX ORDER — MIDAZOLAM HYDROCHLORIDE 1 MG/ML
1 INJECTION INTRAMUSCULAR; INTRAVENOUS
Status: DISCONTINUED | OUTPATIENT
Start: 2018-05-18 | End: 2018-05-18 | Stop reason: HOSPADM

## 2018-05-18 RX ORDER — SODIUM CHLORIDE, SODIUM LACTATE, POTASSIUM CHLORIDE, CALCIUM CHLORIDE 600; 310; 30; 20 MG/100ML; MG/100ML; MG/100ML; MG/100ML
100 INJECTION, SOLUTION INTRAVENOUS CONTINUOUS
Status: DISCONTINUED | OUTPATIENT
Start: 2018-05-18 | End: 2018-05-18 | Stop reason: HOSPADM

## 2018-05-18 RX ORDER — ROCURONIUM BROMIDE 10 MG/ML
INJECTION, SOLUTION INTRAVENOUS AS NEEDED
Status: DISCONTINUED | OUTPATIENT
Start: 2018-05-18 | End: 2018-05-18 | Stop reason: SURG

## 2018-05-18 RX ORDER — SODIUM CHLORIDE 0.9 % (FLUSH) 0.9 %
1-10 SYRINGE (ML) INJECTION AS NEEDED
Status: DISCONTINUED | OUTPATIENT
Start: 2018-05-18 | End: 2018-05-18 | Stop reason: HOSPADM

## 2018-05-18 RX ORDER — POTASSIUM CHLORIDE 20 MEQ/1
20 TABLET, EXTENDED RELEASE ORAL 2 TIMES DAILY
COMMUNITY
End: 2019-08-30 | Stop reason: DRUGHIGH

## 2018-05-18 RX ORDER — NALOXONE HCL 0.4 MG/ML
0.4 VIAL (ML) INJECTION AS NEEDED
Status: DISCONTINUED | OUTPATIENT
Start: 2018-05-18 | End: 2018-05-18 | Stop reason: HOSPADM

## 2018-05-18 RX ORDER — SODIUM CHLORIDE 9 MG/ML
INJECTION, SOLUTION INTRAVENOUS AS NEEDED
Status: DISCONTINUED | OUTPATIENT
Start: 2018-05-18 | End: 2018-05-18 | Stop reason: HOSPADM

## 2018-05-18 RX ORDER — OXYCODONE HYDROCHLORIDE AND ACETAMINOPHEN 5; 325 MG/1; MG/1
1 TABLET ORAL ONCE AS NEEDED
Status: DISCONTINUED | OUTPATIENT
Start: 2018-05-18 | End: 2018-05-18 | Stop reason: HOSPADM

## 2018-05-18 RX ORDER — OXYCODONE AND ACETAMINOPHEN 10; 325 MG/1; MG/1
1 TABLET ORAL ONCE AS NEEDED
Status: DISCONTINUED | OUTPATIENT
Start: 2018-05-18 | End: 2018-05-18 | Stop reason: HOSPADM

## 2018-05-18 RX ORDER — PROPOFOL 10 MG/ML
VIAL (ML) INTRAVENOUS AS NEEDED
Status: DISCONTINUED | OUTPATIENT
Start: 2018-05-18 | End: 2018-05-18 | Stop reason: SURG

## 2018-05-18 RX ORDER — IPRATROPIUM BROMIDE AND ALBUTEROL SULFATE 2.5; .5 MG/3ML; MG/3ML
3 SOLUTION RESPIRATORY (INHALATION) ONCE AS NEEDED
Status: DISCONTINUED | OUTPATIENT
Start: 2018-05-18 | End: 2018-05-18 | Stop reason: HOSPADM

## 2018-05-18 RX ORDER — LIDOCAINE HYDROCHLORIDE 20 MG/ML
INJECTION, SOLUTION INFILTRATION; PERINEURAL AS NEEDED
Status: DISCONTINUED | OUTPATIENT
Start: 2018-05-18 | End: 2018-05-18 | Stop reason: SURG

## 2018-05-18 RX ORDER — ONDANSETRON 2 MG/ML
INJECTION INTRAMUSCULAR; INTRAVENOUS AS NEEDED
Status: DISCONTINUED | OUTPATIENT
Start: 2018-05-18 | End: 2018-05-18 | Stop reason: SURG

## 2018-05-18 RX ORDER — OXYCODONE AND ACETAMINOPHEN 7.5; 325 MG/1; MG/1
2 TABLET ORAL ONCE AS NEEDED
Status: DISCONTINUED | OUTPATIENT
Start: 2018-05-18 | End: 2018-05-18 | Stop reason: HOSPADM

## 2018-05-18 RX ORDER — SUCCINYLCHOLINE CHLORIDE 20 MG/ML
INJECTION INTRAMUSCULAR; INTRAVENOUS AS NEEDED
Status: DISCONTINUED | OUTPATIENT
Start: 2018-05-18 | End: 2018-05-18 | Stop reason: SURG

## 2018-05-18 RX ORDER — BACITRACIN ZINC 500 [USP'U]/G
OINTMENT TOPICAL AS NEEDED
Status: DISCONTINUED | OUTPATIENT
Start: 2018-05-18 | End: 2018-05-18 | Stop reason: HOSPADM

## 2018-05-18 RX ORDER — ONDANSETRON 2 MG/ML
4 INJECTION INTRAMUSCULAR; INTRAVENOUS ONCE AS NEEDED
Status: DISCONTINUED | OUTPATIENT
Start: 2018-05-18 | End: 2018-05-18 | Stop reason: HOSPADM

## 2018-05-18 RX ADMIN — PROPOFOL 200 MG: 10 INJECTION, EMULSION INTRAVENOUS at 07:24

## 2018-05-18 RX ADMIN — SUCCINYLCHOLINE CHLORIDE 140 MG: 20 INJECTION, SOLUTION INTRAMUSCULAR; INTRAVENOUS at 07:25

## 2018-05-18 RX ADMIN — LIDOCAINE HYDROCHLORIDE 100 MG: 20 INJECTION, SOLUTION INFILTRATION; PERINEURAL at 07:24

## 2018-05-18 RX ADMIN — PROPOFOL 50 MG: 10 INJECTION, EMULSION INTRAVENOUS at 07:26

## 2018-05-18 RX ADMIN — Medication 2 G: at 07:34

## 2018-05-18 RX ADMIN — ONDANSETRON HYDROCHLORIDE 4 MG: 2 SOLUTION INTRAMUSCULAR; INTRAVENOUS at 07:41

## 2018-05-18 RX ADMIN — DEXAMETHASONE SODIUM PHOSPHATE 4 MG: 4 INJECTION, SOLUTION INTRAMUSCULAR; INTRAVENOUS at 07:41

## 2018-05-18 RX ADMIN — ROCURONIUM BROMIDE 5 MG: 10 INJECTION INTRAVENOUS at 07:24

## 2018-05-18 RX ADMIN — LIDOCAINE HYDROCHLORIDE 0.5 ML: 10 INJECTION, SOLUTION EPIDURAL; INFILTRATION; INTRACAUDAL; PERINEURAL at 06:30

## 2018-05-18 RX ADMIN — ACETAMINOPHEN 1000 MG: 500 TABLET ORAL at 07:11

## 2018-05-18 RX ADMIN — Medication 80 MCG: at 07:39

## 2018-05-18 RX ADMIN — SODIUM CHLORIDE, POTASSIUM CHLORIDE, SODIUM LACTATE AND CALCIUM CHLORIDE 1000 ML: 600; 310; 30; 20 INJECTION, SOLUTION INTRAVENOUS at 06:30

## 2018-05-18 RX ADMIN — LIDOCAINE HYDROCHLORIDE 1 EACH: 40 SOLUTION TOPICAL at 07:26

## 2018-05-18 NOTE — OP NOTE
VASECTOMY  Procedure Note    Lincoln Ornelas  5/18/2018    Pre-op Diagnosis:   Vasectomy evaluation [Z30.09]    Post-op Diagnosis:     Post-Op Diagnosis Codes:     * Vasectomy evaluation [Z30.09]    Procedure/CPT® Codes:      Procedure(s):  VASECTOMY    Surgeon(s):  Ion Yanez MD    Anesthesia: General    Staff:   Circulator: Adán Jarvis RN  Scrub Person: Jean Claude ROBLES Moises; Giovanni Ambriz    Indications for procedure:  Requests sterility, difficult office exam    Procedure details:   His scrotum was prepped and sterilely draped in the normal fashion.  Anesthesia with 1% lidocaine.        The same technique was used on the right and left vas.  The vas was isolated with the thumb and forefinger.  Lidocaine was then infiltrated below the epithelium and around the vas.  A persagital incision was made using a scalpel and sharp hemostats were used to dissect the parivasal tissue away.  Vas clamps were then used to grab the vas deferens and pull this out through the incision.  Further isolation of the vas from the perivasal tissue was then performed using sharp dissection until a segment of vas was exposed.  Titanium clips were then placed on both the proximal and distal portions of the vas and a 3-0 chromic suture was placed under each clip.  The vas was then divided sharply, and the cut ends were cauterized.  Hemostasis was achieved using electrocautery.  The cut ends of the vas were then placed back into the scrotum and the incision was closed using an interrupted 3-0 chronic suture.  The vas appeared grossly normal and was sent for pathology.   The same technique was then repeated on the left side.             Estimated Blood Loss: minimal    Specimens:                ID Type Source Tests Collected by Time   A : Left Vas Deferens for permanent Tissue Vas Deferens, Left TISSUE PATHOLOGY EXAM Ion Yanez MD 5/18/2018 0752   B : Right Vas Deferens for permanent Tissue Vas Deferens, Right  TISSUE PATHOLOGY EXAM Ion Yanez MD 5/18/2018 0752         Drains:      Complications: none    Follow up:     Discharge instructions:  Patient advised to proceed with light activity for the next week.   No sit down baths, swimming pools or hot tubs for 2 weeks.  He was advised to continue with scrotal support for the next 24 hours.  He was advised to continue with current birth control methods until he has given a sample in 8 weeks confirmed by post vasectomy semen analysis.    Ion Yanez MD     Date: 5/18/2018  Time: 8:18 AM

## 2018-05-18 NOTE — ANESTHESIA PROCEDURE NOTES
Airway  Urgency: elective    Airway not difficult    General Information and Staff    Patient location during procedure: OR  CRNA: JEFF BORGES    Indications and Patient Condition  Indications for airway management: airway protection    Preoxygenated: yes  Mask difficulty assessment: 2 - vent by mask + OA or adjuvant +/- NMBA    Final Airway Details  Final airway type: endotracheal airway      Successful airway: ETT  Cuffed: yes   Successful intubation technique: direct laryngoscopy  Facilitating devices/methods: intubating stylet  Endotracheal tube insertion site: oral  Blade: Melia  Blade size: 3.5.  ETT size: 8.0 mm  Cormack-Lehane Classification: grade IIa - partial view of glottis  Placement verified by: chest auscultation and capnometry   Cuff volume (mL): 6  Measured from: lips  ETT to lips (cm): 23  Number of attempts at approach: 1    Additional Comments  Easy MV with OA. Easy, atraumatic intubation. Teeth same as preop exam.

## 2018-05-18 NOTE — DISCHARGE INSTRUCTIONS

## 2018-05-18 NOTE — ANESTHESIA POSTPROCEDURE EVALUATION
Patient: Lincoln Ornelas    Procedure Summary     Date:  05/18/18 Room / Location:   [facility] OR  /  [facility]    Anesthesia Start:  0720 Anesthesia Stop:  0810    Procedure:  VASECTOMY (N/A Scrotum) Diagnosis:       Vasectomy evaluation      (Vasectomy evaluation [Z30.09])    Surgeon:  Ion Yanez MD Provider:  Alycia Lan CRNA    Anesthesia Type:  general ASA Status:  3          Anesthesia Type: general  Last vitals  BP   100/57 (05/18/18 0904)   Temp   98.2 °F (36.8 °C) (05/18/18 0830)   Pulse   90 (05/18/18 0904)   Resp   16 (05/18/18 0904)     SpO2   93 % (05/18/18 0904)     Post Anesthesia Care and Evaluation    PONV Status: none  Comments: Patient d/c from PACU prior to anes eval based on Debora score.  Please see RN notes for details of d/c criteria.    Blood pressure 100/57, pulse 90, temperature 98.2 °F (36.8 °C), temperature source Temporal Artery , resp. rate 16, SpO2 93 %.

## 2018-05-18 NOTE — ANESTHESIA PREPROCEDURE EVALUATION
Anesthesia Evaluation     NPO Solid Status: > 8 hours  NPO Liquid Status: > 4 hours           Airway   Mallampati: II  TM distance: >3 FB  Neck ROM: full  No difficulty expected  Dental      Comment: Multiple chipped teeth      Pulmonary - normal exam   (+) a smoker Current,   Cardiovascular - normal exam    ECG reviewed    (+) hypertension,       Neuro/Psych  (+) CVA, psychiatric history Anxiety,       ROS Comment: Drug OD cleaned since 2010  Cerebri pseudotumor  Pituitary microadenoma  GI/Hepatic/Renal/Endo    (+) obesity,       Musculoskeletal (-) negative ROS    Abdominal  - normal exam   Substance History - negative use     OB/GYN negative ob/gyn ROS         Other          Other Comment: Pt drowsy states he took his suboxone and 2mg xanax                Anesthesia Plan    ASA 3     general     intravenous induction   Anesthetic plan and risks discussed with patient.

## 2018-05-21 LAB
CYTO UR: NORMAL
LAB AP CASE REPORT: NORMAL
Lab: NORMAL
PATH REPORT.FINAL DX SPEC: NORMAL
PATH REPORT.GROSS SPEC: NORMAL

## 2018-07-17 DIAGNOSIS — Z98.52 STATUS POST VASECTOMY: Primary | ICD-10-CM

## 2018-11-05 ENCOUNTER — TELEPHONE (OUTPATIENT)
Dept: NEUROSURGERY | Facility: CLINIC | Age: 38
End: 2018-11-05

## 2018-11-05 NOTE — TELEPHONE ENCOUNTER
Patient hasn't been seen since July 2017, has a follow up appt w/MRI in Dec (pituitary), and he calls today stating he needs Diamox.  He has been getting this from Dr Darren Miller but was told they wouldn't give it to him any more.  I explained to him that I would talk with Dr Miller' office and let him know what I find out.  Will discuss with Dr Ascencio.      milagro onofre CMA    Per Ketty with Dr Miller:  They gave him a script on 10/15/18 with 2 refills so he should have enough to get him through to his appt with us in December.    milagro onofre CMA

## 2018-12-20 ENCOUNTER — APPOINTMENT (OUTPATIENT)
Dept: MRI IMAGING | Facility: HOSPITAL | Age: 38
End: 2018-12-20
Attending: NEUROLOGICAL SURGERY

## 2019-03-11 ENCOUNTER — OFFICE VISIT (OUTPATIENT)
Dept: NEUROSURGERY | Facility: CLINIC | Age: 39
End: 2019-03-11

## 2019-03-11 ENCOUNTER — HOSPITAL ENCOUNTER (OUTPATIENT)
Dept: MRI IMAGING | Facility: HOSPITAL | Age: 39
Discharge: HOME | End: 2019-03-11
Attending: NEUROLOGICAL SURGERY | Admitting: NEUROLOGICAL SURGERY

## 2019-03-11 VITALS
DIASTOLIC BLOOD PRESSURE: 68 MMHG | WEIGHT: 270.6 LBS | SYSTOLIC BLOOD PRESSURE: 120 MMHG | BODY MASS INDEX: 38.74 KG/M2 | HEIGHT: 70 IN

## 2019-03-11 DIAGNOSIS — G89.29 CHRONIC INTRACTABLE HEADACHE, UNSPECIFIED HEADACHE TYPE: ICD-10-CM

## 2019-03-11 DIAGNOSIS — D35.2 PITUITARY MICROADENOMA (HCC): Primary | ICD-10-CM

## 2019-03-11 DIAGNOSIS — D35.2 PITUITARY MICROADENOMA (HCC): ICD-10-CM

## 2019-03-11 DIAGNOSIS — F17.210 CIGARETTE SMOKER: ICD-10-CM

## 2019-03-11 DIAGNOSIS — G93.2 PSEUDOTUMOR CEREBRI: ICD-10-CM

## 2019-03-11 DIAGNOSIS — R51.9 CHRONIC INTRACTABLE HEADACHE, UNSPECIFIED HEADACHE TYPE: ICD-10-CM

## 2019-03-11 LAB — CREAT BLDA-MCNC: 1.4 MG/DL (ref 0.6–1.3)

## 2019-03-11 PROCEDURE — 99213 OFFICE O/P EST LOW 20 MIN: CPT | Performed by: NEUROLOGICAL SURGERY

## 2019-03-11 PROCEDURE — 0 GADOBENATE DIMEGLUMINE 529 MG/ML SOLUTION: Performed by: NEUROLOGICAL SURGERY

## 2019-03-11 PROCEDURE — A9577 INJ MULTIHANCE: HCPCS | Performed by: NEUROLOGICAL SURGERY

## 2019-03-11 PROCEDURE — 82565 ASSAY OF CREATININE: CPT

## 2019-03-11 PROCEDURE — 70553 MRI BRAIN STEM W/O & W/DYE: CPT

## 2019-03-11 RX ORDER — OXCARBAZEPINE 150 MG/1
TABLET, FILM COATED ORAL
Refills: 0 | COMMUNITY
Start: 2019-03-07 | End: 2019-08-30 | Stop reason: DRUGHIGH

## 2019-03-11 RX ORDER — ALPRAZOLAM 2 MG/1
TABLET ORAL
Refills: 0 | COMMUNITY
Start: 2019-03-07 | End: 2023-01-13

## 2019-03-11 RX ORDER — ACETAZOLAMIDE 250 MG/1
250 TABLET ORAL 2 TIMES DAILY
Qty: 60 TABLET | Refills: 2 | Status: SHIPPED | OUTPATIENT
Start: 2019-03-11 | End: 2019-08-30 | Stop reason: SDUPTHER

## 2019-03-11 RX ADMIN — GADOBENATE DIMEGLUMINE 20 ML: 529 INJECTION, SOLUTION INTRAVENOUS at 13:23

## 2019-03-11 NOTE — PATIENT INSTRUCTIONS
PATIENT TO CONTINUE TO FOLLOW UP WITH HIS PRIMARY CARE PROVIDER FOR YEARLY PHYSICAL EXAMS TO ENSURE COMPLETE HEALTH MAINTENANCE      BMI for Adults  Body mass index (BMI) is a number that is calculated from a person's weight and height. In most adults, the number is used to find how much of an adult's weight is made up of fat. BMI is not as accurate as a direct measure of body fat.  How is BMI calculated?  BMI is calculated by dividing weight in kilograms by height in meters squared. It can also be calculated by dividing weight in pounds by height in inches squared, then multiplying the resulting number by 703. Charts are available to help you find your BMI quickly and easily without doing this calculation.  How is BMI interpreted?  Health care professionals use BMI charts to identify whether an adult is underweight, at a normal weight, or overweight based on the following guidelines:  · Underweight: BMI less than 18.5.  · Normal weight: BMI between 18.5 and 24.9.  · Overweight: BMI between 25 and 29.9.  · Obese: BMI of 30 and above.    BMI is usually interpreted the same for males and females.  Weight includes both fat and muscle, so someone with a muscular build, such as an athlete, may have a BMI that is higher than 24.9. In cases like these, BMI may not accurately depict body fat. To determine if excess body fat is the cause of a BMI of 25 or higher, further assessments may need to be done by a health care provider.  Why is BMI a useful tool?  BMI is used to identify a possible weight problem that may be related to a medical problem or may increase the risk for medical problems. BMI can also be used to promote changes to reach a healthy weight.  This information is not intended to replace advice given to you by your health care provider. Make sure you discuss any questions you have with your health care provider.  Document Released: 08/29/2005 Document Revised: 04/27/2017 Document Reviewed: 05/15/2015  Мар[employer]  Interactive Patient Education © 2018 [employer]

## 2019-04-16 ENCOUNTER — HOSPITAL ENCOUNTER (OUTPATIENT)
Dept: HOSPITAL 58 - LAB | Age: 39
Discharge: HOME | End: 2019-04-16
Attending: PSYCHIATRY & NEUROLOGY

## 2019-04-16 VITALS — BODY MASS INDEX: 41.1 KG/M2

## 2019-04-16 DIAGNOSIS — I10: ICD-10-CM

## 2019-04-16 DIAGNOSIS — E66.9: ICD-10-CM

## 2019-04-16 DIAGNOSIS — F31.9: Primary | ICD-10-CM

## 2019-04-16 DIAGNOSIS — F41.9: ICD-10-CM

## 2019-04-16 PROCEDURE — 84403 ASSAY OF TOTAL TESTOSTERONE: CPT

## 2019-04-16 PROCEDURE — 80048 BASIC METABOLIC PNL TOTAL CA: CPT

## 2019-04-16 PROCEDURE — 80178 ASSAY OF LITHIUM: CPT

## 2019-04-16 PROCEDURE — 82306 VITAMIN D 25 HYDROXY: CPT

## 2019-04-16 PROCEDURE — 84480 ASSAY TRIIODOTHYRONINE (T3): CPT

## 2019-04-16 PROCEDURE — 86140 C-REACTIVE PROTEIN: CPT

## 2019-04-16 PROCEDURE — 83036 HEMOGLOBIN GLYCOSYLATED A1C: CPT

## 2019-04-16 PROCEDURE — 82746 ASSAY OF FOLIC ACID SERUM: CPT

## 2019-04-16 PROCEDURE — 85025 COMPLETE CBC W/AUTO DIFF WBC: CPT

## 2019-04-16 PROCEDURE — 84479 ASSAY OF THYROID (T3 OR T4): CPT

## 2019-04-16 PROCEDURE — 84439 ASSAY OF FREE THYROXINE: CPT

## 2019-04-16 PROCEDURE — 84436 ASSAY OF TOTAL THYROXINE: CPT

## 2019-04-16 PROCEDURE — 82607 VITAMIN B-12: CPT

## 2019-04-16 PROCEDURE — 36415 COLL VENOUS BLD VENIPUNCTURE: CPT

## 2019-04-16 PROCEDURE — 83735 ASSAY OF MAGNESIUM: CPT

## 2019-08-30 ENCOUNTER — OFFICE VISIT (OUTPATIENT)
Dept: FAMILY MEDICINE CLINIC | Facility: CLINIC | Age: 39
End: 2019-08-30

## 2019-08-30 VITALS
DIASTOLIC BLOOD PRESSURE: 58 MMHG | RESPIRATION RATE: 18 BRPM | BODY MASS INDEX: 23.62 KG/M2 | HEART RATE: 122 BPM | HEIGHT: 70 IN | WEIGHT: 165 LBS | TEMPERATURE: 98.8 F | SYSTOLIC BLOOD PRESSURE: 96 MMHG | OXYGEN SATURATION: 95 %

## 2019-08-30 DIAGNOSIS — R51.9 CHRONIC INTRACTABLE HEADACHE, UNSPECIFIED HEADACHE TYPE: ICD-10-CM

## 2019-08-30 DIAGNOSIS — D35.2 PITUITARY MICROADENOMA (HCC): ICD-10-CM

## 2019-08-30 DIAGNOSIS — G89.29 CHRONIC INTRACTABLE HEADACHE, UNSPECIFIED HEADACHE TYPE: ICD-10-CM

## 2019-08-30 DIAGNOSIS — R79.89 LOW TESTOSTERONE: ICD-10-CM

## 2019-08-30 DIAGNOSIS — E66.01 OBESITY, CLASS III, BMI 40-49.9 (MORBID OBESITY) (HCC): Primary | ICD-10-CM

## 2019-08-30 DIAGNOSIS — I10 HYPERTENSION, UNSPECIFIED TYPE: ICD-10-CM

## 2019-08-30 DIAGNOSIS — N52.9 ERECTILE DYSFUNCTION, UNSPECIFIED ERECTILE DYSFUNCTION TYPE: ICD-10-CM

## 2019-08-30 DIAGNOSIS — F99 PSYCHIATRIC ILLNESS: ICD-10-CM

## 2019-08-30 DIAGNOSIS — F32.A DEPRESSION, UNSPECIFIED DEPRESSION TYPE: ICD-10-CM

## 2019-08-30 DIAGNOSIS — G93.2 PSEUDOTUMOR CEREBRI: ICD-10-CM

## 2019-08-30 DIAGNOSIS — E87.6 HYPOPOTASSEMIA: ICD-10-CM

## 2019-08-30 PROBLEM — Z00.00 WELLNESS EXAMINATION: Status: ACTIVE | Noted: 2019-08-30

## 2019-08-30 PROBLEM — Z01.89 LABORATORY TEST: Status: ACTIVE | Noted: 2019-08-30

## 2019-08-30 PROCEDURE — 99203 OFFICE O/P NEW LOW 30 MIN: CPT | Performed by: FAMILY MEDICINE

## 2019-08-30 RX ORDER — DEXTROAMPHETAMINE SACCHARATE, AMPHETAMINE ASPARTATE, DEXTROAMPHETAMINE SULFATE AND AMPHETAMINE SULFATE 2.5; 2.5; 2.5; 2.5 MG/1; MG/1; MG/1; MG/1
20 TABLET ORAL 3 TIMES DAILY
Refills: 0 | COMMUNITY
Start: 2019-08-14 | End: 2020-10-28 | Stop reason: DRUGHIGH

## 2019-08-30 RX ORDER — LURASIDONE HYDROCHLORIDE 120 MG/1
1 TABLET, FILM COATED ORAL NIGHTLY
Refills: 0 | COMMUNITY
Start: 2019-08-14 | End: 2019-11-08

## 2019-08-30 RX ORDER — LISINOPRIL AND HYDROCHLOROTHIAZIDE 25; 20 MG/1; MG/1
1 TABLET ORAL DAILY
Qty: 30 TABLET | Refills: 5 | Status: SHIPPED | OUTPATIENT
Start: 2019-08-30 | End: 2019-11-08

## 2019-08-30 RX ORDER — ACETAZOLAMIDE 250 MG/1
250 TABLET ORAL 2 TIMES DAILY
Qty: 60 TABLET | Refills: 5 | Status: SHIPPED | OUTPATIENT
Start: 2019-08-30 | End: 2019-09-04 | Stop reason: SDUPTHER

## 2019-08-30 RX ORDER — LAMOTRIGINE 25 MG/1
75 TABLET ORAL NIGHTLY
Refills: 0 | COMMUNITY
Start: 2019-08-14 | End: 2019-11-08 | Stop reason: SDDI

## 2019-08-30 RX ORDER — LITHIUM CARBONATE 450 MG
1 TABLET, EXTENDED RELEASE ORAL 2 TIMES DAILY
Refills: 0 | COMMUNITY
Start: 2019-08-14 | End: 2019-11-08 | Stop reason: SDDI

## 2019-08-30 RX ORDER — POTASSIUM CHLORIDE 600 MG/1
8 CAPSULE, EXTENDED RELEASE ORAL DAILY
Qty: 30 CAPSULE | Refills: 5 | Status: SHIPPED | OUTPATIENT
Start: 2019-08-30 | End: 2019-11-08

## 2019-08-30 RX ORDER — POTASSIUM CHLORIDE 600 MG/1
8 CAPSULE, EXTENDED RELEASE ORAL DAILY
COMMUNITY
End: 2019-08-30 | Stop reason: SDUPTHER

## 2019-09-03 DIAGNOSIS — G89.29 CHRONIC INTRACTABLE HEADACHE, UNSPECIFIED HEADACHE TYPE: ICD-10-CM

## 2019-09-03 DIAGNOSIS — E66.01 OBESITY, CLASS III, BMI 40-49.9 (MORBID OBESITY) (HCC): ICD-10-CM

## 2019-09-03 DIAGNOSIS — E87.6 HYPOPOTASSEMIA: ICD-10-CM

## 2019-09-03 DIAGNOSIS — F99 PSYCHIATRIC ILLNESS: ICD-10-CM

## 2019-09-03 DIAGNOSIS — E23.7 PITUITARY ABNORMALITY (HCC): ICD-10-CM

## 2019-09-03 DIAGNOSIS — F32.A DEPRESSION, UNSPECIFIED DEPRESSION TYPE: ICD-10-CM

## 2019-09-03 DIAGNOSIS — H47.10 PAPILLEDEMA: ICD-10-CM

## 2019-09-03 DIAGNOSIS — N52.9 ERECTILE DYSFUNCTION, UNSPECIFIED ERECTILE DYSFUNCTION TYPE: ICD-10-CM

## 2019-09-03 DIAGNOSIS — F17.210 CIGARETTE SMOKER: ICD-10-CM

## 2019-09-03 DIAGNOSIS — Z00.00 WELLNESS EXAMINATION: Primary | ICD-10-CM

## 2019-09-03 DIAGNOSIS — I10 HYPERTENSION, UNSPECIFIED TYPE: ICD-10-CM

## 2019-09-03 DIAGNOSIS — D35.2 PITUITARY MICROADENOMA (HCC): ICD-10-CM

## 2019-09-03 DIAGNOSIS — R79.89 LOW TESTOSTERONE: ICD-10-CM

## 2019-09-03 DIAGNOSIS — R51.9 CHRONIC INTRACTABLE HEADACHE, UNSPECIFIED HEADACHE TYPE: ICD-10-CM

## 2019-09-03 DIAGNOSIS — G93.2 PSEUDOTUMOR CEREBRI: ICD-10-CM

## 2019-09-04 ENCOUNTER — TELEPHONE (OUTPATIENT)
Dept: FAMILY MEDICINE CLINIC | Facility: CLINIC | Age: 39
End: 2019-09-04

## 2019-09-04 DIAGNOSIS — G89.29 CHRONIC INTRACTABLE HEADACHE, UNSPECIFIED HEADACHE TYPE: ICD-10-CM

## 2019-09-04 DIAGNOSIS — D35.2 PITUITARY MICROADENOMA (HCC): ICD-10-CM

## 2019-09-04 DIAGNOSIS — G93.2 PSEUDOTUMOR CEREBRI: ICD-10-CM

## 2019-09-04 DIAGNOSIS — R51.9 CHRONIC INTRACTABLE HEADACHE, UNSPECIFIED HEADACHE TYPE: ICD-10-CM

## 2019-09-04 DIAGNOSIS — F99 PSYCHIATRIC ILLNESS: ICD-10-CM

## 2019-09-04 RX ORDER — ACETAZOLAMIDE 250 MG/1
500 TABLET ORAL 3 TIMES DAILY
Qty: 180 TABLET | Refills: 5 | Status: SHIPPED | OUTPATIENT
Start: 2019-09-04 | End: 2020-03-03 | Stop reason: SDUPTHER

## 2019-09-04 NOTE — TELEPHONE ENCOUNTER
"Per patient \"I take 500 mg diamox three times a day and need sent to pharmacy\"  order corrected and clarified with Dr Ya chart notes, corrected Rx to pharmacy  "

## 2019-09-05 PROBLEM — R73.01 ELEVATED FASTING GLUCOSE: Status: ACTIVE | Noted: 2019-09-05

## 2019-09-05 PROBLEM — E22.1 HYPERPROLACTINEMIA (HCC): Status: ACTIVE | Noted: 2019-09-05

## 2019-09-05 PROBLEM — E55.9 VITAMIN D DEFICIENCY: Status: ACTIVE | Noted: 2019-09-05

## 2019-09-05 LAB
25(OH)D3+25(OH)D2 SERPL-MCNC: 16.9 NG/ML (ref 30–100)
ALBUMIN SERPL-MCNC: 4.4 G/DL (ref 3.5–5.5)
ALBUMIN/GLOB SERPL: 1.8 {RATIO} (ref 1.2–2.2)
ALP SERPL-CCNC: 58 IU/L (ref 39–117)
ALT SERPL-CCNC: 14 IU/L (ref 0–44)
AST SERPL-CCNC: 10 IU/L (ref 0–40)
BASOPHILS # BLD AUTO: 0 X10E3/UL (ref 0–0.2)
BASOPHILS NFR BLD AUTO: 1 %
BILIRUB DIRECT SERPL-MCNC: 0.08 MG/DL (ref 0–0.4)
BILIRUB SERPL-MCNC: <0.2 MG/DL (ref 0–1.2)
BUN SERPL-MCNC: 17 MG/DL (ref 6–20)
BUN/CREAT SERPL: 14 (ref 9–20)
CALCIUM SERPL-MCNC: 9.3 MG/DL (ref 8.7–10.2)
CHLORIDE SERPL-SCNC: 102 MMOL/L (ref 96–106)
CHOLEST SERPL-MCNC: 185 MG/DL (ref 100–199)
CO2 SERPL-SCNC: 23 MMOL/L (ref 20–29)
CREAT SERPL-MCNC: 1.19 MG/DL (ref 0.76–1.27)
EOSINOPHIL # BLD AUTO: 0.2 X10E3/UL (ref 0–0.4)
EOSINOPHIL NFR BLD AUTO: 3 %
ERYTHROCYTE [DISTWIDTH] IN BLOOD BY AUTOMATED COUNT: 12.6 % (ref 12.3–15.4)
FERRITIN SERPL-MCNC: 154 NG/ML (ref 30–400)
FSH SERPL-ACNC: 1.3 MIU/ML (ref 1.5–12.4)
GGT SERPL-CCNC: 15 IU/L (ref 0–65)
GLOBULIN SER CALC-MCNC: 2.5 G/DL (ref 1.5–4.5)
GLUCOSE SERPL-MCNC: 103 MG/DL (ref 65–99)
HAV IGM SERPL QL IA: NEGATIVE
HBA1C MFR BLD: 5.6 % (ref 4.8–5.6)
HBV CORE AB SERPL QL IA: NEGATIVE
HBV CORE IGM SERPL QL IA: NEGATIVE
HBV E AB SERPL QL IA: NEGATIVE
HBV E AG SERPL QL IA: NEGATIVE
HBV SURFACE AB SER QL: NON REACTIVE
HBV SURFACE AG SERPL QL IA: NEGATIVE
HCT VFR BLD AUTO: 42.5 % (ref 37.5–51)
HCV AB S/CO SERPL IA: <0.1 S/CO RATIO (ref 0–0.9)
HCV AB S/CO SERPL IA: <0.1 S/CO RATIO (ref 0–0.9)
HDLC SERPL-MCNC: 26 MG/DL
HGB BLD-MCNC: 14.6 G/DL (ref 13–17.7)
IMM GRANULOCYTES # BLD AUTO: 0 X10E3/UL (ref 0–0.1)
IMM GRANULOCYTES NFR BLD AUTO: 0 %
IRON SATN MFR SERPL: 37 % (ref 15–55)
IRON SERPL-MCNC: 90 UG/DL (ref 38–169)
LABORATORY COMMENT REPORT: NORMAL
LDLC SERPL CALC-MCNC: ABNORMAL MG/DL (ref 0–99)
LH SERPL-ACNC: 1.4 MIU/ML (ref 1.7–8.6)
LYMPHOCYTES # BLD AUTO: 3.2 X10E3/UL (ref 0.7–3.1)
LYMPHOCYTES NFR BLD AUTO: 48 %
MCH RBC QN AUTO: 29.5 PG (ref 26.6–33)
MCHC RBC AUTO-ENTMCNC: 34.4 G/DL (ref 31.5–35.7)
MCV RBC AUTO: 86 FL (ref 79–97)
MONOCYTES # BLD AUTO: 0.6 X10E3/UL (ref 0.1–0.9)
MONOCYTES NFR BLD AUTO: 9 %
NEUTROPHILS # BLD AUTO: 2.6 X10E3/UL (ref 1.4–7)
NEUTROPHILS NFR BLD AUTO: 39 %
PLATELET # BLD AUTO: 220 X10E3/UL (ref 150–450)
POTASSIUM SERPL-SCNC: 3.5 MMOL/L (ref 3.5–5.2)
PROLACTIN SERPL-MCNC: 25.4 NG/ML (ref 4–15.2)
PROT SERPL-MCNC: 6.9 G/DL (ref 6–8.5)
RBC # BLD AUTO: 4.95 X10E6/UL (ref 4.14–5.8)
SODIUM SERPL-SCNC: 141 MMOL/L (ref 134–144)
T4 FREE SERPL-MCNC: 1.18 NG/DL (ref 0.82–1.77)
TESTOST FREE SERPL-MCNC: 1.1 PG/ML (ref 8.7–25.1)
TESTOST SERPL-MCNC: 18 NG/DL (ref 264–916)
TESTOST SERPL-MCNC: 20.1 NG/DL (ref 264–916)
TIBC SERPL-MCNC: 241 UG/DL (ref 250–450)
TRIGL SERPL-MCNC: 430 MG/DL (ref 0–149)
TSH SERPL DL<=0.005 MIU/L-ACNC: 3.6 UIU/ML (ref 0.45–4.5)
UIBC SERPL-MCNC: 151 UG/DL (ref 111–343)
VLDLC SERPL CALC-MCNC: ABNORMAL MG/DL (ref 5–40)
WBC # BLD AUTO: 6.5 X10E3/UL (ref 3.4–10.8)

## 2019-09-10 ENCOUNTER — TELEPHONE (OUTPATIENT)
Dept: FAMILY MEDICINE CLINIC | Facility: CLINIC | Age: 39
End: 2019-09-10

## 2019-09-10 DIAGNOSIS — R79.89 LOW TESTOSTERONE: ICD-10-CM

## 2019-09-10 DIAGNOSIS — E22.1 HYPERPROLACTINEMIA (HCC): Primary | ICD-10-CM

## 2019-09-10 NOTE — TELEPHONE ENCOUNTER
Josafat Ascencio MD  [facility]             He needs to see Dr Ascencio as planned in December; status of pititutary tumor important   He needs referral to Sarabjit/srinivas (review of chart shows [facility] review 2017 and he said testosterone replacement no indicated) and his testosterone is low/prolactin is high   Needs low fat diet/weight loss (willing to refer to dietary, to weight loss clinic if he would allow); his triglycerides are too high   BS is prediabetic; needs diet, weight loss and expect it to get worse with time; labs at least 6m right now; start Metformin 500 XR HS   Vitamin D is low; 1000 IU bid

## 2019-09-10 NOTE — TELEPHONE ENCOUNTER
Patient did agree to going to [place], [place] for Endocrinologist. He wants to get this all taken care of.

## 2019-09-26 ENCOUNTER — OFFICE VISIT (OUTPATIENT)
Dept: FAMILY MEDICINE CLINIC | Facility: CLINIC | Age: 39
End: 2019-09-26

## 2019-09-26 VITALS
HEART RATE: 104 BPM | TEMPERATURE: 98 F | BODY MASS INDEX: 38.37 KG/M2 | RESPIRATION RATE: 18 BRPM | DIASTOLIC BLOOD PRESSURE: 84 MMHG | SYSTOLIC BLOOD PRESSURE: 128 MMHG | OXYGEN SATURATION: 99 % | HEIGHT: 70 IN | WEIGHT: 268 LBS

## 2019-09-26 DIAGNOSIS — L40.9 PSORIASIS: Primary | ICD-10-CM

## 2019-09-26 DIAGNOSIS — E29.1 HYPOGONADISM MALE: ICD-10-CM

## 2019-09-26 DIAGNOSIS — E23.7 PITUITARY ABNORMALITY (HCC): ICD-10-CM

## 2019-09-26 PROCEDURE — 99213 OFFICE O/P EST LOW 20 MIN: CPT | Performed by: NURSE PRACTITIONER

## 2019-09-26 RX ORDER — TRIAMCINOLONE ACETONIDE 5 MG/G
OINTMENT TOPICAL 2 TIMES DAILY
Qty: 15 G | Refills: 2 | Status: SHIPPED | OUTPATIENT
Start: 2019-09-26 | End: 2019-10-06

## 2019-09-26 NOTE — PROGRESS NOTES
SUBJECTIVE    Chief Complaint:  Talk about low testosterone, red spots    History of Present Illness:  This 38 y.o. male was seen in the office today for spots on bilateral hands and right ankle.  Also was talked about low testosterone results.  He has red flaky areas classic of psoriasis to both hands into the right ankle.  Reviewed results from 9/3/2019 low testosterone.  He already has an endocrinologist appointment set up.  Discussed that it would be in his best interest to go ahead with especially consultation prior to starting any kind of replacement due to the history of pituitary adenoma and the large side effect profile of testosterone.    Past Medical, Surgical, Social, and Family History:  Past Medical History:   Diagnosis Date   • Anxiety    • Depression    • Hypertension    • Mood disorder (CMS/HCC)    • Patient denies medical problems    • Pituitary mass (CMS/HCC)    • PTSD (post-traumatic stress disorder)    • Spinal headache    • Stroke (CMS/HCC)     tia      Past Surgical History:   Procedure Laterality Date   • NO PAST SURGERIES     • VASECTOMY N/A 5/18/2018    Procedure: VASECTOMY;  Surgeon: Ion Yanez MD;  Location: [facility];  Service: Urology     Social History     Socioeconomic History   • Marital status:      Spouse name: Keely   • Number of children: 2   • Years of education: 12   • Highest education level: Not on file   Occupational History   • Occupation: disabled Marine     Comment: working with [employer]   Tobacco Use   • Smoking status: Current Every Day Smoker     Packs/day: 2.00     Years: 25.00     Pack years: 50.00     Types: Cigarettes     Start date: 11/30/1989   • Smokeless tobacco: Current User     Types: Chew   Substance and Sexual Activity   • Alcohol use: No   • Drug use: No   • Sexual activity: Not Currently     Partners: Male     No family history on file.  Review of Systems   Constitutional: Positive for fatigue. Negative for chills and diaphoresis.   HENT:  "Negative for congestion, ear discharge, ear pain, rhinorrhea and sinus pressure.    Eyes: Negative for pain, discharge and itching.   Respiratory: Negative for cough, chest tightness, shortness of breath and wheezing.    Cardiovascular: Negative for chest pain and palpitations.   Gastrointestinal: Negative for abdominal distention and abdominal pain.   Endocrine: Negative for cold intolerance and heat intolerance.   Genitourinary: Negative for dysuria, flank pain, hematuria and urgency.   Musculoskeletal: Negative for arthralgias and myalgias.   Skin: Positive for dry skin and rash. Negative for skin lesions and bruise.        Positive for plaques bilateral hands and right ankle   Allergic/Immunologic: Negative for environmental allergies and food allergies.   Neurological: Negative for dizziness, speech difficulty and numbness.   Hematological: Negative for adenopathy. Does not bruise/bleed easily.   Psychiatric/Behavioral: Negative for agitation, behavioral problems, self-injury, suicidal ideas, depressed mood and stress. The patient is not nervous/anxious.      OBJECTIVE    Vital Signs:  /84   Pulse 104   Temp 98 °F (36.7 °C) (Oral)   Resp 18   Ht 176.5 cm (69.5\")   Wt 122 kg (268 lb)   SpO2 99%   BMI 39.01 kg/m²   Physical Exam   Constitutional: He is oriented to person, place, and time. No distress.   HENT:   Head: Normocephalic and atraumatic.   Mouth/Throat: No oropharyngeal exudate.   Eyes: Conjunctivae and EOM are normal. Pupils are equal, round, and reactive to light.   Neck: Normal range of motion. Neck supple. No JVD present. No tracheal deviation present. No thyromegaly present.   Cardiovascular: Normal rate, regular rhythm, normal heart sounds and intact distal pulses. Exam reveals no gallop and no friction rub.   No murmur heard.  Pulmonary/Chest: Effort normal and breath sounds normal. No respiratory distress. He has no rales.   Abdominal: Soft. Bowel sounds are normal. He exhibits no " distension and no mass. There is no tenderness. There is no rebound and no guarding.   Musculoskeletal: Normal range of motion. He exhibits no edema, tenderness or deformity.   Lymphadenopathy:     He has no cervical adenopathy.   Neurological: He is alert and oriented to person, place, and time.   Skin: Skin is warm and dry. Capillary refill takes less than 2 seconds. No rash noted. He is not diaphoretic.   Psoriasis plaques bilateral palms of the hand, right ankle.   Psychiatric: He has a normal mood and affect. His behavior is normal. Judgment and thought content normal.   Nursing note and vitals reviewed.    ASSESSMENT/PLAN    Diagnoses and all orders for this visit:    1. Psoriasis (Primary)  -     triamcinolone (KENALOG) 0.5 % ointment; Apply  topically to the appropriate area as directed 2 (Two) Times a Day for 10 days. May use daily after 10 days to maintain  Dispense: 15 g; Refill: 2    2. Pituitary abnormality (CMS/HCC)    3. Hypogonadism male    Discussion:  Advised and educated plan of care.  Advised it is in his best interest to talk to the specialist as planned in December about testosterone replacement due to the complications and contributing factors including the pituitary adenoma, prolactinoma, obesity and the large side effect profile of the medication itself.  Advised how to use steroid ointment and maintain for flareups.    Note e-Signed by HI Bird on 09/26/2019 at 4:32 PM

## 2019-11-08 ENCOUNTER — OFFICE VISIT (OUTPATIENT)
Dept: ENDOCRINOLOGY | Facility: CLINIC | Age: 39
End: 2019-11-08

## 2019-11-08 ENCOUNTER — APPOINTMENT (OUTPATIENT)
Dept: LAB | Facility: HOSPITAL | Age: 39
End: 2019-11-08

## 2019-11-08 VITALS
DIASTOLIC BLOOD PRESSURE: 78 MMHG | SYSTOLIC BLOOD PRESSURE: 126 MMHG | BODY MASS INDEX: 38.98 KG/M2 | WEIGHT: 272.3 LBS | HEIGHT: 70 IN | HEART RATE: 120 BPM | OXYGEN SATURATION: 98 %

## 2019-11-08 DIAGNOSIS — E22.1 HYPERPROLACTINEMIA (HCC): ICD-10-CM

## 2019-11-08 DIAGNOSIS — E23.0 HYPOGONADOTROPIC HYPOGONADISM (HCC): ICD-10-CM

## 2019-11-08 DIAGNOSIS — D35.2 PITUITARY MICROADENOMA (HCC): Primary | ICD-10-CM

## 2019-11-08 DIAGNOSIS — M81.8 OTHER OSTEOPOROSIS WITHOUT CURRENT PATHOLOGICAL FRACTURE: ICD-10-CM

## 2019-11-08 LAB
CORTIS AM PEAK SERPL-MCNC: 6.77 MCG/DL
FSH SERPL-ACNC: 2.58 MIU/ML
LH SERPL-ACNC: 2.4 MIU/ML
PROLACTIN SERPL-MCNC: 25.3 NG/ML (ref 4.04–15.2)
T4 FREE SERPL-MCNC: 1.06 NG/DL (ref 0.93–1.7)
TESTOST SERPL-MCNC: 84 NG/DL (ref 249–836)
TSH SERPL DL<=0.05 MIU/L-ACNC: 3.41 UIU/ML (ref 0.27–4.2)

## 2019-11-08 PROCEDURE — 99205 OFFICE O/P NEW HI 60 MIN: CPT | Performed by: INTERNAL MEDICINE

## 2019-11-08 PROCEDURE — 83002 ASSAY OF GONADOTROPIN (LH): CPT | Performed by: INTERNAL MEDICINE

## 2019-11-08 PROCEDURE — 84410 TESTOSTERONE BIOAVAILABLE: CPT | Performed by: INTERNAL MEDICINE

## 2019-11-08 PROCEDURE — 84439 ASSAY OF FREE THYROXINE: CPT | Performed by: INTERNAL MEDICINE

## 2019-11-08 PROCEDURE — 82024 ASSAY OF ACTH: CPT | Performed by: INTERNAL MEDICINE

## 2019-11-08 PROCEDURE — 84403 ASSAY OF TOTAL TESTOSTERONE: CPT | Performed by: INTERNAL MEDICINE

## 2019-11-08 PROCEDURE — 84146 ASSAY OF PROLACTIN: CPT | Performed by: INTERNAL MEDICINE

## 2019-11-08 PROCEDURE — 83001 ASSAY OF GONADOTROPIN (FSH): CPT | Performed by: INTERNAL MEDICINE

## 2019-11-08 PROCEDURE — 84305 ASSAY OF SOMATOMEDIN: CPT | Performed by: INTERNAL MEDICINE

## 2019-11-08 PROCEDURE — 36415 COLL VENOUS BLD VENIPUNCTURE: CPT | Performed by: INTERNAL MEDICINE

## 2019-11-08 PROCEDURE — 82627 DEHYDROEPIANDROSTERONE: CPT | Performed by: INTERNAL MEDICINE

## 2019-11-08 PROCEDURE — 82533 TOTAL CORTISOL: CPT | Performed by: INTERNAL MEDICINE

## 2019-11-08 PROCEDURE — 84443 ASSAY THYROID STIM HORMONE: CPT | Performed by: INTERNAL MEDICINE

## 2019-11-08 PROCEDURE — 84270 ASSAY OF SEX HORMONE GLOBUL: CPT | Performed by: INTERNAL MEDICINE

## 2019-11-08 RX ORDER — OXCARBAZEPINE 150 MG/1
TABLET, FILM COATED ORAL
Refills: 0 | COMMUNITY
Start: 2019-11-06 | End: 2019-11-08

## 2019-11-08 RX ORDER — DEXAMETHASONE 1 MG
1 TABLET ORAL ONCE
Qty: 1 TABLET | Refills: 0 | Status: SHIPPED | OUTPATIENT
Start: 2019-11-08 | End: 2019-11-08

## 2019-11-08 RX ORDER — SYRINGE W-NEEDLE,DISPOSAB,3 ML 25GX5/8"
SYRINGE, EMPTY DISPOSABLE MISCELLANEOUS
Qty: 4 EACH | Refills: 11 | Status: SHIPPED | OUTPATIENT
Start: 2019-11-08 | End: 2020-03-03 | Stop reason: SDUPTHER

## 2019-11-08 RX ORDER — TESTOSTERONE CYPIONATE 200 MG/ML
INJECTION, SOLUTION INTRAMUSCULAR
Qty: 2 ML | Refills: 5 | Status: SHIPPED | OUTPATIENT
Start: 2019-11-08 | End: 2020-03-03 | Stop reason: SDUPTHER

## 2019-11-08 RX ORDER — BUPROPION HYDROCHLORIDE 75 MG/1
TABLET ORAL
Refills: 0 | COMMUNITY
Start: 2019-11-06 | End: 2019-11-08

## 2019-11-08 NOTE — PATIENT INSTRUCTIONS
To replace testosterone    You will do half a ml ( half a vial ) once weekly in the muscle of the gluteal area    Draw it out from the vial with the 18 g needle and then switch to the 21 g needle and apply     -------------------------    Do labs for all the hormones today     ---------------------------    The adrenal requires special testing    A. Collect saliva cortisol at 8 am and 11 pm on 2 separate day ( Saturday Morning and Night, Sunday Morning and Night )    B. Sunday Night take 1 mg of decadron at 11 pm     C. Monday morning go to [facility] in [place] and drop off the saliva kit and do bloodwork one more time to check cortisol in the blood     =============    2 months please come back again to see Masood  with labs the week before

## 2019-11-08 NOTE — PROGRESS NOTES
Lincoln Ornelas is a 38 y.o. male who presents for  evaluation of   Chief Complaint   Patient presents with   • Low Testosterone       Referring provider    Josafat Ascencio MD  [address] [place] [address]    Primary Care Provider    Josafat Ascencio MD    39 yo male comes for consultation    Reason , Pituitary Microadenoma    Duration , since Dec 2016    Timing, Constant - stable -- Repeat MRI from 3-19 is stable    Quality, - patient has associated hypogonadism ( central ) and mild prolactin elevation     Aggravating - In addition to hyperprolactinemia patient also chronic pain - on suboxone. History of drug use but hasn't used since age 29 years.   Also Intracranial Hypertension on Diamox     Symptoms - Fatigue / weakness / pain     Important context - patient has 2 children one conceived at age 28 and one at age 33           Past Medical History:   Diagnosis Date   • Anxiety    • Depression    • Hypertension    • Intracranial hypertension    • Mood disorder (CMS/HCC)    • Pituitary mass (CMS/HCC)    • PTSD (post-traumatic stress disorder)    • Spinal headache      Family History   Problem Relation Age of Onset   • Gonadal disorder Neg Hx      Social History     Tobacco Use   • Smoking status: Current Every Day Smoker     Packs/day: 2.00     Years: 25.00     Pack years: 50.00     Types: Cigarettes     Start date: 11/30/1989   • Smokeless tobacco: Current User     Types: Chew   Substance Use Topics   • Alcohol use: No   • Drug use: No         Current Outpatient Medications:   •  acetaZOLAMIDE (DIAMOX) 250 MG tablet, Take 2 tablets by mouth 3 (Three) Times a Day., Disp: 180 tablet, Rfl: 5  •  ALPRAZolam (XANAX) 2 MG tablet, TK 1 T PO  TID, Disp: , Rfl: 0  •  amphetamine-dextroamphetamine (ADDERALL) 10 MG tablet, Take 20 mg by mouth 3 (Three) Times a Day., Disp: , Rfl: 0  •  buprenorphine-naloxone (SUBOXONE) 8-2 MG per SL tablet, Place 1 tablet under the tongue 4 (Four) Times a Day., Disp: ,  "Rfl: 0  •  Cholecalciferol (VITAMIN D) 1000 units tablet, Take 1 tablet by mouth 2 (Two) Times a Day., Disp: 60 tablet, Rfl: 5  •  lisinopril 10 MG tablet 10 mg, hydrochlorothiazide 12.5 MG 12.5 mg, Take 1 dose by mouth Daily., Disp: , Rfl:   •  Needle, Disp, 18G X 1\" misc, Use weekly as indicated, Disp: 4 each, Rfl: 11  •  Syringe 21G X 1\" 3 ML misc, Use weekly, Disp: 4 each, Rfl: 11  •  Testosterone Cypionate (DEPOTESTOTERONE CYPIONATE) 200 MG/ML injection, 100mg weekly, Provide(#4)18G,(#4)21Gneedles (#4)3mlsyringes q month, Disp: 2 mL, Rfl: 5    Review of Systems    Review of Systems   Constitutional: Positive for fatigue and unexpected weight change. Negative for activity change, appetite change, chills, diaphoresis and fever.   HENT: Negative for congestion, dental problem, drooling, ear discharge, ear pain, facial swelling, mouth sores, postnasal drip, rhinorrhea, sinus pressure, sore throat, tinnitus, trouble swallowing and voice change.    Eyes: Negative for photophobia, pain, discharge, redness, itching and visual disturbance.   Respiratory: Negative for apnea, cough, choking, chest tightness, shortness of breath, wheezing and stridor.    Cardiovascular: Negative for chest pain, palpitations and leg swelling.   Gastrointestinal: Negative for abdominal distention, abdominal pain, constipation, diarrhea, nausea and vomiting.   Endocrine: Negative for cold intolerance, heat intolerance, polydipsia, polyphagia and polyuria.   Genitourinary: Negative for decreased urine volume, difficulty urinating, dysuria, flank pain, frequency, hematuria and urgency.   Musculoskeletal: Positive for arthralgias and myalgias. Negative for back pain, gait problem, joint swelling, neck pain and neck stiffness.   Skin: Negative for color change, pallor, rash and wound.   Allergic/Immunologic: Negative for immunocompromised state.   Neurological: Positive for weakness. Negative for dizziness, tremors, seizures, syncope, facial " "asymmetry, speech difficulty, light-headedness, numbness and headaches.   Hematological: Negative for adenopathy.   Psychiatric/Behavioral: Negative for agitation, behavioral problems, confusion, decreased concentration, dysphoric mood, hallucinations, self-injury, sleep disturbance and suicidal ideas. The patient is not nervous/anxious and is not hyperactive.         Objective:   /78   Pulse 120   Ht 176.5 cm (69.5\")   Wt 124 kg (272 lb 4.8 oz)   SpO2 98%   BMI 39.64 kg/m²     Physical Exam   Constitutional: He is oriented to person, place, and time. He is cooperative.   Obese constitution   Facial fullness    Pseudogynecomastia     Increase abdominal fat     Decreased muscular Mass   HENT:   Head: Normocephalic and atraumatic.   Right Ear: External ear normal.   Left Ear: External ear normal.   Nose: Nose normal.   Mouth/Throat: Oropharynx is clear and moist. No oropharyngeal exudate.   Eyes: Conjunctivae and EOM are normal. Pupils are equal, round, and reactive to light. No scleral icterus. Right eye exhibits normal extraocular motion. Left eye exhibits normal extraocular motion.   Neck: Neck supple. No JVD present. No muscular tenderness present. No tracheal deviation, no edema and no erythema present. No thyromegaly present.   Cardiovascular: Normal rate, regular rhythm, normal heart sounds and intact distal pulses. Exam reveals no gallop and no friction rub.   No murmur heard.  Pulmonary/Chest: Effort normal and breath sounds normal. No stridor. No respiratory distress. He has no decreased breath sounds. He has no wheezes. He has no rhonchi. He has no rales. He exhibits no tenderness.   Abdominal: Soft. Bowel sounds are normal. He exhibits no distension and no mass. There is no hepatomegaly. There is no tenderness. There is no rebound and no guarding. No hernia.   Genitourinary:   Genitourinary Comments: Testes palpable , approx 10 cc    Musculoskeletal: Normal range of motion. He exhibits no edema, " tenderness or deformity.   Decreased muscular mass   Lymphadenopathy:     He has no cervical adenopathy.   Neurological: He is alert and oriented to person, place, and time. He has normal reflexes. No cranial nerve deficit. He exhibits normal muscle tone. Coordination normal.   Skin: Skin is warm. No rash noted. No erythema. No pallor.   Psychiatric: He has a normal mood and affect. His behavior is normal. Judgment and thought content normal.   Nursing note and vitals reviewed.      Lab Review    Results for orders placed or performed in visit on 11/08/19   Cortisol - AM   Result Value Ref Range    Cortisol - AM 6.77 mcg/dL   DHEA-Sulfate   Result Value Ref Range    DHEA-Sulfate 340.7 102.6 - 416.3 ug/dL   FSH & LH   Result Value Ref Range    FSH 2.58 mIU/mL    LH 2.40 mIU/mL   Insulin-like Growth Factor   Result Value Ref Range    Insulin-Like Growth Factor-1 129 83 - 233 ng/mL   Prolactin   Result Value Ref Range    Prolactin 25.30 (H) 4.04 - 15.20 ng/mL   T4, Free   Result Value Ref Range    Free T4 1.06 0.93 - 1.70 ng/dL   TSH   Result Value Ref Range    TSH 3.410 0.270 - 4.200 uIU/mL   Testosterone   Result Value Ref Range    Testosterone, Total 84.00 (L) 249.00 - 836.00 ng/dL   Sex Horm Binding Globulin   Result Value Ref Range    Sex Hormone Binding Globulin 31.2 16.5 - 55.9 nmol/L         Assessment/Plan       ICD-10-CM ICD-9-CM   1. Pituitary microadenoma (CMS/[facility]) D35.2 227.3   2. Hyperprolactinemia (CMS/[facility]) E22.1 253.1   3. Hypogonadotropic hypogonadism (CMS/[facility]) E23.0 253.4         I reviewed and summarized records from Josafat Ascencio MD from current year  and I reviewed / ordered labs.   From review of records :    Patient has Male Hypogonadism - Hyperprolactinemia     Significant Hypogonadism ( Tot Testosterone = 80 from Nov 2019 with low Free Androgen Index of 9.3 ( normal is more than 30 ), central ( low FSH and LH )  Growth Axis - normal ( Nov 2019 )  Random am cortisol - normal ( I have  requested am and pm salivar cortisol and will do LDDST ) . Also normal DHEAS  Thyroid Axis - nl TSH and Free T4 ( last Nov 2019 )  Prolactin - mild elevation x 2  ( 20s / 30 s) too mild to be causing this profound hypogonadism so I am not giving dostinex    I believe hypogonadism is due to chronic pain and suboxone but I do need to rule out Cushing's     Testicular volume is approx 10 ml and he fathered 2 children with last one at age 33     Pituitary tumor 4 mm ( stable between 2016 and 2019 ) Repeat in 2020 around March     Start Testosterone 100 mg IM weekly or subcutaneously     Obtain DXA, ensure nl vit D and calcium intake    Orders Placed This Encounter   Procedures   • ACTH   • Cortisol - AM   • DHEA-Sulfate   • FSH & LH   • Insulin-like Growth Factor   • Prolactin   • T4, Free   • TSH   • Testosterone, Bioavailable (M)   • Testosterone   • Sex Horm Binding Globulin   • Salivary Cortisol X4, Timed - Saliva, Oral Cavity     Scheduling Instructions:      Collect at 8 a.m. And 11 p.m. On 2 consecutive days   • ACTH     Measure after taking dexamethasone 1 mg at 11 p.m. The night before      Standing Status:   Future     Standing Expiration Date:   11/8/2020   • Cortisol - AM     Measure after taking dexamethasone 1 mg at 11 p.m. The night before      Standing Status:   Future     Standing Expiration Date:   11/8/2020   • Dexamethasone, Serum     Measure after taking dexamethasone 1 mg at 11 p.m. The night before      Standing Status:   Future     Standing Expiration Date:   11/7/2020         A copy of my note was sent to Josafat Ascencio MD    Please see my above opinion and suggestions.

## 2019-11-09 LAB
DHEA-S SERPL-MCNC: 340.7 UG/DL (ref 102.6–416.3)
SHBG SERPL-SCNC: 31.2 NMOL/L (ref 16.5–55.9)

## 2019-11-10 PROBLEM — E87.6 HYPOPOTASSEMIA: Status: RESOLVED | Noted: 2019-08-30 | Resolved: 2019-11-10

## 2019-11-10 PROBLEM — H47.10 PAPILLEDEMA: Status: RESOLVED | Noted: 2017-01-11 | Resolved: 2019-11-10

## 2019-11-10 PROBLEM — E66.813 OBESITY, CLASS III, BMI 40-49.9 (MORBID OBESITY): Status: RESOLVED | Noted: 2017-01-11 | Resolved: 2019-11-10

## 2019-11-10 PROBLEM — E23.7 PITUITARY ABNORMALITY: Status: RESOLVED | Noted: 2017-01-11 | Resolved: 2019-11-10

## 2019-11-10 PROBLEM — Z00.00 WELLNESS EXAMINATION: Status: RESOLVED | Noted: 2019-08-30 | Resolved: 2019-11-10

## 2019-11-10 PROBLEM — R79.89 LOW TESTOSTERONE: Status: RESOLVED | Noted: 2019-08-30 | Resolved: 2019-11-10

## 2019-11-10 PROBLEM — Z01.89 LABORATORY TEST: Status: RESOLVED | Noted: 2019-08-30 | Resolved: 2019-11-10

## 2019-11-10 PROBLEM — E66.01 OBESITY, CLASS III, BMI 40-49.9 (MORBID OBESITY) (HCC): Status: RESOLVED | Noted: 2017-01-11 | Resolved: 2019-11-10

## 2019-11-10 LAB — IGF-I SERPL-MCNC: 129 NG/ML (ref 83–233)

## 2019-11-11 LAB — ACTH PLAS-MCNC: 44.4 PG/ML (ref 7.2–63.3)

## 2019-11-14 LAB
BIOAVAILABLE TESTOSTERONE, %: 40.8 %
BIOAVAILABLE TESTOSTERONE, S: 45 NG/DL
TESTOST SERPL-MCNC: 111 NG/DL

## 2019-12-02 ENCOUNTER — HOSPITAL ENCOUNTER (OUTPATIENT)
Dept: BONE DENSITY | Facility: HOSPITAL | Age: 39
Discharge: HOME | End: 2019-12-02
Admitting: INTERNAL MEDICINE

## 2019-12-02 PROCEDURE — 77080 DXA BONE DENSITY AXIAL: CPT

## 2020-01-03 ENCOUNTER — TELEPHONE (OUTPATIENT)
Dept: FAMILY MEDICINE CLINIC | Facility: CLINIC | Age: 40
End: 2020-01-03

## 2020-01-03 NOTE — TELEPHONE ENCOUNTER
PT CALLED TO GET LABS SENT TO [facility] IN [place]. HE STATES HE DOES NOT HAVE A FAX NUMBER BUT THE PHONE NUMBER TO THIS HOSPITAL -*[phone]    PLEASE CALL PATIENT WHEN SENT SO HE KNOWS WHEN TO GET LABS DONE FOR HIS APPT

## 2020-01-07 DIAGNOSIS — E23.0 HYPOGONADOTROPIC HYPOGONADISM (HCC): ICD-10-CM

## 2020-01-07 DIAGNOSIS — D35.2 PITUITARY MICROADENOMA (HCC): ICD-10-CM

## 2020-01-08 ENCOUNTER — OFFICE VISIT (OUTPATIENT)
Dept: ENDOCRINOLOGY | Facility: CLINIC | Age: 40
End: 2020-01-08

## 2020-01-08 VITALS
HEART RATE: 109 BPM | DIASTOLIC BLOOD PRESSURE: 74 MMHG | OXYGEN SATURATION: 98 % | HEIGHT: 70 IN | WEIGHT: 279.7 LBS | BODY MASS INDEX: 40.04 KG/M2 | SYSTOLIC BLOOD PRESSURE: 130 MMHG

## 2020-01-08 DIAGNOSIS — E23.0 HYPOGONADOTROPIC HYPOGONADISM (HCC): ICD-10-CM

## 2020-01-08 DIAGNOSIS — D35.2 PITUITARY MICROADENOMA (HCC): Primary | ICD-10-CM

## 2020-01-08 DIAGNOSIS — D35.2 PITUITARY MICROADENOMA (HCC): ICD-10-CM

## 2020-01-08 DIAGNOSIS — E55.9 VITAMIN D DEFICIENCY: ICD-10-CM

## 2020-01-08 PROCEDURE — 99214 OFFICE O/P EST MOD 30 MIN: CPT | Performed by: NURSE PRACTITIONER

## 2020-01-08 RX ORDER — DEXAMETHASONE 1 MG
1 TABLET ORAL 2 TIMES DAILY WITH MEALS
Qty: 1 TABLET | Refills: 0 | Status: SHIPPED | OUTPATIENT
Start: 2020-01-08 | End: 2020-03-03

## 2020-01-08 NOTE — PROGRESS NOTES
Subjective    Lincoln Ornelas is a 39 y.o. male. he is here today for follow-up.    History of Present Illness         Primary Care Provider     Josafat Ascencio MD     37 yo male comes for follow up      Reason , Pituitary Microadenoma., hypogonadism      Duration , since Dec 2016     Timing, Constant - stable -- Repeat MRI from 3-19 is stable     Quality, - patient has associated hypogonadism ( central ) and mild prolactin elevation      Aggravating - In addition to hyperprolactinemia patient also chronic pain - on suboxone.    History of drug use but hasn't used since age 29 years.   Also Intracranial Hypertension on Diamox      Symptoms - Fatigue / weakness / pain      Important context - patient has 2 children one conceived at age 29 and one at age 34       Jan. 2020     Prolactin 14                     The following portions of the patient's history were reviewed and updated as appropriate:   Past Medical History:   Diagnosis Date   • Anxiety    • Depression    • Hypertension    • Intracranial hypertension    • Mood disorder (CMS/HCC)    • Pituitary mass (CMS/HCC)    • PTSD (post-traumatic stress disorder)    • Spinal headache      Past Surgical History:   Procedure Laterality Date   • NO PAST SURGERIES     • VASECTOMY N/A 5/18/2018    Procedure: VASECTOMY;  Surgeon: Ion Yanez MD;  Location: [facility];  Service: Urology     Family History   Problem Relation Age of Onset   • Gonadal disorder Neg Hx        Current Outpatient Medications   Medication Sig Dispense Refill   • acetaZOLAMIDE (DIAMOX) 250 MG tablet Take 2 tablets by mouth 3 (Three) Times a Day. 180 tablet 5   • ALPRAZolam (XANAX) 2 MG tablet TK 1 T PO  TID  0   • amphetamine-dextroamphetamine (ADDERALL) 10 MG tablet Take 20 mg by mouth 3 (Three) Times a Day.  0   • buprenorphine-naloxone (SUBOXONE) 8-2 MG per SL tablet Place 1 tablet under the tongue 4 (Four) Times a Day.  0   • Cholecalciferol (VITAMIN D) 1000 units tablet Take 1  "tablet by mouth 2 (Two) Times a Day. 60 tablet 5   • lisinopril 10 MG tablet 10 mg, hydrochlorothiazide 12.5 MG 12.5 mg Take 1 dose by mouth Daily.     • Needle, Disp, 18G X 1\" misc Use weekly as indicated 4 each 11   • Syringe 21G X 1\" 3 ML misc Use weekly 4 each 11   • Testosterone Cypionate (DEPOTESTOTERONE CYPIONATE) 200 MG/ML injection 100mg weekly, Provide(#4)18G,(#4)21Gneedles (#4)3mlsyringes q month 2 mL 5   • dexamethasone (DECADRON) 1 MG tablet Take 1 tablet by mouth 2 (Two) Times a Day With Meals. Take at 11 pm and do lab at 8 am 1 tablet 0     No current facility-administered medications for this visit.      No Known Allergies  Social History     Socioeconomic History   • Marital status:      Spouse name: Keely   • Number of children: 2   • Years of education: 12   • Highest education level: Not on file   Occupational History   • Occupation: disabled Marine     Comment: working with [employer]   Tobacco Use   • Smoking status: Current Every Day Smoker     Packs/day: 2.00     Years: 25.00     Pack years: 50.00     Types: Cigarettes     Start date: 11/30/1989   • Smokeless tobacco: Current User     Types: Chew   Substance and Sexual Activity   • Alcohol use: No   • Drug use: No   • Sexual activity: Not Currently     Partners: Male       Review of Systems  Review of Systems   Constitutional: Negative for activity change, appetite change, diaphoresis and fatigue.   HENT: Negative for facial swelling, sneezing, sore throat, tinnitus, trouble swallowing and voice change.    Eyes: Negative for photophobia, pain, discharge, redness, itching and visual disturbance.   Respiratory: Negative for apnea, cough, choking, chest tightness and shortness of breath.    Cardiovascular: Negative for chest pain, palpitations and leg swelling.   Gastrointestinal: Negative for abdominal distention, abdominal pain, constipation, diarrhea, nausea and vomiting.   Endocrine: Negative for cold intolerance, heat intolerance, " "polydipsia, polyphagia and polyuria.   Genitourinary: Negative for difficulty urinating, dysuria, frequency, hematuria and urgency.   Musculoskeletal: Negative for arthralgias, back pain, gait problem, joint swelling, myalgias, neck pain and neck stiffness.   Skin: Negative for color change, pallor, rash and wound.   Neurological: Negative for dizziness, tremors, weakness, light-headedness, numbness and headaches.   Hematological: Negative for adenopathy. Does not bruise/bleed easily.   Psychiatric/Behavioral: Negative for behavioral problems, confusion and sleep disturbance.        Objective    /74   Pulse 109   Ht 176.5 cm (69.5\")   Wt 127 kg (279 lb 11.2 oz)   SpO2 98%   BMI 40.71 kg/m²   Physical Exam   Constitutional: He is oriented to person, place, and time. He appears well-developed and well-nourished. No distress.   HENT:   Head: Normocephalic and atraumatic.   Right Ear: External ear normal.   Left Ear: External ear normal.   Nose: Nose normal.   Eyes: Pupils are equal, round, and reactive to light. Conjunctivae and EOM are normal.   Neck: Normal range of motion. Neck supple. No tracheal deviation present. No thyromegaly present.   Cardiovascular: Normal rate, regular rhythm and normal heart sounds.   No murmur heard.  Pulmonary/Chest: Effort normal and breath sounds normal. No respiratory distress. He has no wheezes.   Abdominal: Soft. Bowel sounds are normal. There is no tenderness. There is no rebound and no guarding.   Musculoskeletal: Normal range of motion. He exhibits no edema, tenderness or deformity.   Neurological: He is alert and oriented to person, place, and time. No cranial nerve deficit.   Skin: Skin is warm and dry. No rash noted.   Psychiatric: He has a normal mood and affect. His behavior is normal. Judgment and thought content normal.       Lab Review  Glucose (mg/dL)   Date Value   05/11/2018 97   09/21/2016 91     Sodium (mmol/L)   Date Value   09/03/2019 141   05/11/2018 " "142   09/21/2016 140     Potassium (mmol/L)   Date Value   09/03/2019 3.5   05/18/2018 3.8   05/11/2018 2.9 (C)   09/21/2016 3.9     Chloride (mmol/L)   Date Value   09/03/2019 102   05/11/2018 100   09/21/2016 99     CO2 (mmol/L)   Date Value   05/11/2018 31.0   09/21/2016 33.0 (H)     Total CO2 (mmol/L)   Date Value   09/03/2019 23     BUN (mg/dL)   Date Value   09/03/2019 17   05/11/2018 17   09/21/2016 17     Creatinine (mg/dL)   Date Value   09/03/2019 1.19   03/11/2019 1.40 (H)   05/11/2018 1.10   01/05/2017 0.90   12/27/2016 0.90     Hemoglobin A1C (%)   Date Value   09/03/2019 5.6     Triglycerides (mg/dL)   Date Value   09/03/2019 430 (H)     LDL Cholesterol  (mg/dL)   Date Value   09/03/2019 Comment       Assessment/Plan      1. Pituitary microadenoma (CMS/[facility])    2. Hypogonadotropic hypogonadism (CMS/[facility])    3. Vitamin D deficiency    .    Medications prescribed:  Outpatient Encounter Medications as of 1/8/2020   Medication Sig Dispense Refill   • acetaZOLAMIDE (DIAMOX) 250 MG tablet Take 2 tablets by mouth 3 (Three) Times a Day. 180 tablet 5   • ALPRAZolam (XANAX) 2 MG tablet TK 1 T PO  TID  0   • amphetamine-dextroamphetamine (ADDERALL) 10 MG tablet Take 20 mg by mouth 3 (Three) Times a Day.  0   • buprenorphine-naloxone (SUBOXONE) 8-2 MG per SL tablet Place 1 tablet under the tongue 4 (Four) Times a Day.  0   • Cholecalciferol (VITAMIN D) 1000 units tablet Take 1 tablet by mouth 2 (Two) Times a Day. 60 tablet 5   • lisinopril 10 MG tablet 10 mg, hydrochlorothiazide 12.5 MG 12.5 mg Take 1 dose by mouth Daily.     • Needle, Disp, 18G X 1\" misc Use weekly as indicated 4 each 11   • Syringe 21G X 1\" 3 ML misc Use weekly 4 each 11   • Testosterone Cypionate (DEPOTESTOTERONE CYPIONATE) 200 MG/ML injection 100mg weekly, Provide(#4)18G,(#4)21Gneedles (#4)3mlsyringes q month 2 mL 5   • dexamethasone (DECADRON) 1 MG tablet Take 1 tablet by mouth 2 (Two) Times a Day With Meals. Take at 11 pm and do lab at 8 am 1 " tablet 0     No facility-administered encounter medications on file as of 1/8/2020.        Orders placed during this encounter include:  Orders Placed This Encounter   Procedures   • Cortisol - AM     Measure after taking dexamethasone 1 mg at 11 p.m. The night before    • Dexamethasone, Serum     Measure after taking dexamethasone 1 mg at 11 p.m. The night before      Male Hypogonadism - Hyperprolactinemia      Significant Hypogonadism ( Tot Testosterone = 80 from Nov 2019 with low Free Androgen Index of 9.3 ( normal is more than 30 ), central ( low FSH and LH )  Growth Axis - normal ( Nov 2019 )  Random am cortisol - normal ( I have requested am and pm salivar cortisol and will do LDDST ) . Also normal DHEAS  Thyroid Axis - nl TSH and Free T4 ( last Nov 2019 )  Prolactin - mild elevation x 2  ( 20s / 30 s) too mild to be causing this profound hypogonadism so I am not giving dostinex      Component      Latest Ref Rng & Units 11/8/2019 11/8/2019          10:37 AM 10:37 AM   Testosterone, Total      249.00 - 836.00 ng/dL 84.00 (L) 111 (L)   Testosterone, Bioavailable      ng/dL  45 (L)   Testosterone, % Bioavailable      %  40.8   FSH      mIU/mL 2.58    LH      mIU/mL 2.40    ACTH      7.2 - 63.3 pg/mL 44.4    Cortisol - AM      mcg/dL 6.77    DHEA-Sulfate      102.6 - 416.3 ug/dL 340.7    Insulin-Like Growth Factor-1      83 - 233 ng/mL 129    Prolactin      4.04 - 15.20 ng/mL 25.30 (H)    Free T4      0.93 - 1.70 ng/dL 1.06    TSH Baseline      0.270 - 4.200 uIU/mL 3.410    Sex Hormone Binding Globulin      16.5 - 55.9 nmol/L 31.2               I believe hypogonadism is due to chronic pain and suboxone but I do need to rule out Cushing's       Salivary cortisol results normal       Need dex. Stim test            Pituitary tumor 4 mm ( stable between 2016 and 2019 ) Repeat in 2020 around March      Start Testosterone 100 mg IM weekly or subcutaneously       Jan. 2020    Testosterone 807     Bone Cactus            Component      Latest Ref Rng & Units 9/3/2019   25 Hydroxy, Vitamin D      30.0 - 100.0 ng/mL 16.9 (L)     Start vitamin d 50,000 units     4. Follow-up: Return in about 2 months (around 3/8/2020) for Recheck.

## 2020-01-08 NOTE — PROGRESS NOTES
SUBJECTIVE:  Patient ID: Lincoln Ornelas is a 38 y.o. male is here today for follow-up.    Chief Complaint: Pituitary adenoma  Chief Complaint   Patient presents with   • Pituitary Adenoma     patient is here for a follow up (yearly, but has rescheduled several times); he had an MRI today @ [facility] and is here to discuss results       HPI  38-year-old gentleman that we follow for a pituitary adenoma found on MRI.  He also has pseudotumor cerebri.  He takes Diamox 500  mg 3 times a day with good effect.  He has no headaches.  He is been following with Dr. Miller from the ophthalmology office is very satisfied with his vision.  The following portions of the patient's history were reviewed and updated as appropriate: allergies, current medications, past family history, past medical history, past social history, past surgical history and problem list.    OBJECTIVE:    Review of Systems   All other systems reviewed and are negative.         Physical Exam   Constitutional: He is oriented to person, place, and time. He appears well-developed and well-nourished.   HENT:   Head: Normocephalic and atraumatic.   Right Ear: Hearing normal.   Left Ear: Hearing normal.   Eyes: EOM are normal. Pupils are equal, round, and reactive to light.   Neck: Normal range of motion.   Neurological: He is alert and oriented to person, place, and time. He has normal strength and normal reflexes. No cranial nerve deficit or sensory deficit. He displays a negative Romberg sign. GCS eye subscore is 4. GCS verbal subscore is 5. GCS motor subscore is 6. He displays no Babinski's sign on the right side. He displays no Babinski's sign on the left side.   Psychiatric: His speech is normal. Judgment normal. Cognition and memory are normal.       Neurologic Exam     Mental Status   Oriented to person, place, and time.   Speech: speech is normal     Cranial Nerves     CN III, IV, VI   Pupils are equal, round, and reactive to light.  Extraocular motions are  normal.     Motor Exam     Strength   Strength 5/5 throughout.       Independent Review of Radiographic Studies:   MRI of the pituitary is stable compared to December 2017.    ASSESSMENT/PLAN:  Lincoln is doing very well.  Radiographically his pituitary abnormality is stable.  We are to renew his Diamox today and make a referral to primary care so they can continue to prescribe this medication to him.  We will see him in follow-up in a year with repeat imaging.  He is scheduled to see Dr. Miller tomorrow      1. Pituitary microadenoma (CMS/HCC)    2. Pseudotumor cerebri    3. Chronic intractable headache, unspecified headache type    4. Cigarette smoker    5. BMI 39.0-39.9,adult            No Follow-up on file.      Zev Ascencio MD           Epidural

## 2020-01-09 ENCOUNTER — TELEPHONE (OUTPATIENT)
Dept: ENDOCRINOLOGY | Facility: CLINIC | Age: 40
End: 2020-01-09

## 2020-01-09 NOTE — TELEPHONE ENCOUNTER
----- Message from HI Elliott sent at 1/8/2020 12:39 PM CST -----  Let him know salivary cortisol did not show Cushing    We don't have the pill test he needs to do it again    Take the pill at 11 and go to [facility] and do lab at 8 am

## 2020-01-13 ENCOUNTER — TELEPHONE (OUTPATIENT)
Dept: ENDOCRINOLOGY | Facility: CLINIC | Age: 40
End: 2020-01-13

## 2020-01-31 ENCOUNTER — TELEPHONE (OUTPATIENT)
Dept: ENDOCRINOLOGY | Facility: CLINIC | Age: 40
End: 2020-01-31

## 2020-02-03 NOTE — TELEPHONE ENCOUNTER
Do you have any test results for him? And the dexamethasone test is the one with the pill correct?

## 2020-02-04 ENCOUNTER — TELEPHONE (OUTPATIENT)
Dept: ENDOCRINOLOGY | Facility: CLINIC | Age: 40
End: 2020-02-04

## 2020-02-04 NOTE — TELEPHONE ENCOUNTER
I called and talked with him and told him we need all those labs done so you can let him know whats going on. He is going tomorrow to his the other test done.

## 2020-02-04 NOTE — TELEPHONE ENCOUNTER
Pt is waiting for his test results and wants to speak to Masood about this said he has done all except the steroid one and said he would do that one this week but wants to know about his other tests results. Says he has questions also [phone]    Wants to know if tests will show if he has cushings disease .      Also he will be unavailable, in a meeting between 2:00 and 3:00 pm today     Thank You

## 2020-02-19 DIAGNOSIS — D35.2 PITUITARY MICROADENOMA (HCC): ICD-10-CM

## 2020-02-19 DIAGNOSIS — E23.0 HYPOGONADOTROPIC HYPOGONADISM (HCC): ICD-10-CM

## 2020-02-19 DIAGNOSIS — E22.1 HYPERPROLACTINEMIA (HCC): ICD-10-CM

## 2020-02-25 DIAGNOSIS — E22.1 HYPERPROLACTINEMIA (HCC): ICD-10-CM

## 2020-02-25 DIAGNOSIS — E23.0 HYPOGONADOTROPIC HYPOGONADISM (HCC): ICD-10-CM

## 2020-02-25 DIAGNOSIS — D35.2 PITUITARY MICROADENOMA (HCC): ICD-10-CM

## 2020-03-03 ENCOUNTER — OFFICE VISIT (OUTPATIENT)
Dept: FAMILY MEDICINE CLINIC | Facility: CLINIC | Age: 40
End: 2020-03-03

## 2020-03-03 VITALS
SYSTOLIC BLOOD PRESSURE: 118 MMHG | OXYGEN SATURATION: 97 % | TEMPERATURE: 97.5 F | RESPIRATION RATE: 16 BRPM | WEIGHT: 274 LBS | BODY MASS INDEX: 39.22 KG/M2 | DIASTOLIC BLOOD PRESSURE: 80 MMHG | HEART RATE: 115 BPM | HEIGHT: 70 IN

## 2020-03-03 DIAGNOSIS — D35.2 PITUITARY MICROADENOMA (HCC): ICD-10-CM

## 2020-03-03 DIAGNOSIS — G93.2 PSEUDOTUMOR CEREBRI: ICD-10-CM

## 2020-03-03 DIAGNOSIS — F32.A DEPRESSION, UNSPECIFIED DEPRESSION TYPE: ICD-10-CM

## 2020-03-03 DIAGNOSIS — R73.01 ELEVATED FASTING GLUCOSE: ICD-10-CM

## 2020-03-03 DIAGNOSIS — R79.89 LOW SERUM TESTOSTERONE: Primary | ICD-10-CM

## 2020-03-03 DIAGNOSIS — E22.1 HYPERPROLACTINEMIA (HCC): ICD-10-CM

## 2020-03-03 DIAGNOSIS — R79.89 LOW TESTOSTERONE: ICD-10-CM

## 2020-03-03 DIAGNOSIS — G89.29 CHRONIC INTRACTABLE HEADACHE, UNSPECIFIED HEADACHE TYPE: ICD-10-CM

## 2020-03-03 DIAGNOSIS — F99 PSYCHIATRIC ILLNESS: ICD-10-CM

## 2020-03-03 DIAGNOSIS — E23.0 HYPOGONADOTROPIC HYPOGONADISM (HCC): ICD-10-CM

## 2020-03-03 DIAGNOSIS — I10 ESSENTIAL HYPERTENSION: ICD-10-CM

## 2020-03-03 DIAGNOSIS — I10 HYPERTENSION, UNSPECIFIED TYPE: ICD-10-CM

## 2020-03-03 DIAGNOSIS — R51.9 CHRONIC INTRACTABLE HEADACHE, UNSPECIFIED HEADACHE TYPE: ICD-10-CM

## 2020-03-03 PROBLEM — Z30.09 VASECTOMY EVALUATION: Status: RESOLVED | Noted: 2018-05-01 | Resolved: 2020-03-03

## 2020-03-03 PROCEDURE — 99396 PREV VISIT EST AGE 40-64: CPT | Performed by: FAMILY MEDICINE

## 2020-03-03 RX ORDER — BUPROPION HYDROCHLORIDE 75 MG/1
150 TABLET ORAL EVERY MORNING
COMMUNITY
End: 2023-02-23 | Stop reason: SDUPTHER

## 2020-03-03 RX ORDER — POTASSIUM CHLORIDE 600 MG/1
8 TABLET, FILM COATED, EXTENDED RELEASE ORAL DAILY
Qty: 30 TABLET | Refills: 3 | Status: SHIPPED | OUTPATIENT
Start: 2020-03-03 | End: 2020-04-17 | Stop reason: DRUGHIGH

## 2020-03-03 RX ORDER — ACETAZOLAMIDE 250 MG/1
500 TABLET ORAL 3 TIMES DAILY
Qty: 180 TABLET | Refills: 3 | Status: SHIPPED | OUTPATIENT
Start: 2020-03-03 | End: 2020-09-22

## 2020-03-03 RX ORDER — POTASSIUM CHLORIDE 600 MG/1
8 TABLET, FILM COATED, EXTENDED RELEASE ORAL 2 TIMES DAILY
COMMUNITY
End: 2020-03-03 | Stop reason: SDUPTHER

## 2020-03-03 RX ORDER — LURASIDONE HYDROCHLORIDE 120 MG/1
1 TABLET, FILM COATED ORAL DAILY
COMMUNITY

## 2020-03-03 RX ORDER — TESTOSTERONE CYPIONATE 200 MG/ML
200 INJECTION, SOLUTION INTRAMUSCULAR WEEKLY
Qty: 4 ML | Refills: 1 | Status: SHIPPED | OUTPATIENT
Start: 2020-03-03 | End: 2020-03-09 | Stop reason: SDUPTHER

## 2020-03-03 RX ORDER — SYRINGE W-NEEDLE,DISPOSAB,3 ML 25GX5/8"
SYRINGE, EMPTY DISPOSABLE MISCELLANEOUS
Qty: 4 EACH | Refills: 6 | Status: SHIPPED | OUTPATIENT
Start: 2020-03-03 | End: 2021-07-12

## 2020-03-03 RX ORDER — LISINOPRIL AND HYDROCHLOROTHIAZIDE 25; 20 MG/1; MG/1
1 TABLET ORAL DAILY
Qty: 30 TABLET | Refills: 3 | Status: SHIPPED | OUTPATIENT
Start: 2020-03-03 | End: 2020-09-22

## 2020-03-03 RX ORDER — ANASTROZOLE 1 MG/1
0.5 TABLET ORAL DAILY
Qty: 15 TABLET | Refills: 3 | Status: SHIPPED | OUTPATIENT
Start: 2020-03-03 | End: 2020-03-09

## 2020-03-03 RX ORDER — ANASTROZOLE 1 MG/1
0.5 TABLET ORAL DAILY
COMMUNITY
End: 2020-03-03 | Stop reason: SDUPTHER

## 2020-03-03 NOTE — PROGRESS NOTES
Subjective   Lincoln Ornelas is a 39 y.o. male presenting with chief complaint of:   Chief Complaint   Patient presents with   • Annual Exam       History of Present Illness :  Alone.       Other MDs  [facility] (suboxone)  Ana-neurosurgery  Paul-eyes  Has been to [facility] (was not aware had apt 3.6 AND then when reminded said he was not sure he could make apt).  Says endo said we were to do Rx.      Pharmacies:   [facility]-psych  [facility]    Has multiple chronic problems to consider that might have a bearing on today's issues; somewhat an interval appointment.       Chronic/acute problems reviewed today:   1. Essential hypertension: Chronic/stable. Stable here/if home blood pressures.  No significant chest pain, SOB, LE edema, orthopnea, near syncope, dizziness/light headness.      2. Elevated fasting glucose: Chronic/stable last checked; no home monitoring.  No problem/pattern hypoglycemia/hyperglycemia manifest by poly- dypsia, phagia, uria, or sweats, diaphoretic episodes, syncope/near.     3. Depression, unspecified depression type: chronic/variable currently not significant  mood swings, down moods, nervousness, difficulty with concentration to function home/work.  Others close have not been concerned.  No suicide ideation/intent.  Rx helps.  Has PTSD.      4. Pituitary microadenoma (CMS/HCC) ana: chronic/has apt with neurosurgery this/next week with MRI.    5. Hypogonadotropic hypogonadism (CMS/HCC): chronic/stable.  Said had vasectomy and testicles are small.  Says out of testosterone and has to have refilled.    6. Hyperprolactinemia (CMS/HCC): chronic/uncertain.  Upcoming re-eval.    7. Pseudotumor cerebri-ana: chronic/uncertain; upcoming re-eval.    8. Low testosterone: chronic/uncertain; see lab below.      Has an/another acute issue today: none.    The following portions of the patient's history were reviewed and updated as appropriate: allergies, current  "medications, past family history, past medical history, past social history, past surgical history and problem list.      Current Outpatient Medications:   •  acetaZOLAMIDE (DIAMOX) 250 MG tablet, Take 2 tablets by mouth 3 (Three) Times a Day., Disp: 180 tablet, Rfl: 5  •  ALPRAZolam (XANAX) 2 MG tablet, TK 1 T PO  TID, Disp: , Rfl: 0  •  amphetamine-dextroamphetamine (ADDERALL) 10 MG tablet, Take 20 mg by mouth 3 (Three) Times a Day. And takes 10mg in evening, Disp: , Rfl: 0  •  anastrozole (ARIMIDEX) 1 MG tablet, Take 0.5 mg by mouth Daily., Disp: , Rfl:   •  buprenorphine-naloxone (SUBOXONE) 8-2 MG per SL tablet, Place 1 tablet under the tongue 3 (Three) Times a Day As Needed., Disp: , Rfl: 0  •  Cholecalciferol (VITAMIN D) 1000 units tablet, Take 1 tablet by mouth 2 (Two) Times a Day., Disp: 60 tablet, Rfl: 5  •  Chorionic Gonadotropin (HCG IJ), Inject 500 Units as directed 2 (Two) Times a Week., Disp: , Rfl:   •  lisinopril 10 MG tablet 10 mg, hydrochlorothiazide 12.5 MG 12.5 mg, Take 1 dose by mouth Daily., Disp: , Rfl:   •  Needle, Disp, 18G X 1\" misc, Use weekly as indicated, Disp: 4 each, Rfl: 11  •  NON FORMULARY, Inject 200 mg as directed 2 (Two) Times a Week. Aromataste Inhibitor, Disp: , Rfl:   •  potassium chloride (KLOR-CON) 8 MEQ CR tablet, Take 8 mEq by mouth 2 (Two) Times a Day., Disp: , Rfl:   •  Syringe 21G X 1\" 3 ML misc, Use weekly, Disp: 4 each, Rfl: 11  •  Testosterone Cypionate (DEPOTESTOTERONE CYPIONATE) 200 MG/ML injection, 100mg weekly, Provide(#4)18G,(#4)21Gneedles (#4)3mlsyringes q month (Patient taking differently: Inject 200 mg into the appropriate muscle as directed by prescriber 1 (One) Time Per Week. 100mg weekly, Provide(#4)18G,(#4)21Gneedles (#4)3mlsyringes q month), Disp: 2 mL, Rfl: 5    No problems with medications.  Refills if needed done    No Known Allergies    Review of Systems  GENERAL:  Inactive/slower with limits, speed, stamina for age and fatigue/desire. Sleep is ok; " apnea denied. No fever now.  SKIN: No rash/skin lesion of concern; tatoos.   ENDO:  No syncope, near or diaphoretic sweaty spells.  BS Ok to this point. .  HEENT: No recent head injury; daily headache.  No vision change.  No significant hearing loss.  Ears without pain/drainage.  No sore throat.  No significant nasal/sinus congestion/drainage. No epistaxis.  CHEST: No chest wall tenderness or mass. No significant cough,  without wheeze.  No SOB; no hemoptysis.  CV: No exertional chest pain, palpitations; mild end of day equal LE/ankle edema.  GI: No heartburn, dysphagia.  No abdominal pain, diarrhea, constipation.  No rectal bleeding, or melena.    :  Voids without dysuria, or incontinence to completion.  ORTHO: No painful/swollen joints but various on /off sore.  Daily some sore neck or back.  No acute neck or back pain without recent injury.  NEURO: No dizziness, weakness of extremities.  No numbness/paresthesias.   PSYCH: No memory loss.  Mood variable; often anxious, depressed but/and not suicidal.  Tries to tolerate stress .   Screening:  Mammogram: NA  Bone density: N  Low dose CT chest:   Tobacco-smoker/age 8/2 ppd/  Tobacco-2nd handed/life  Tobacco-chew/age 8  GI: NA  Prostate: NA  Usual lab order  6m CBC, CMP, A1c, TSH, fT4  12m CBC, CMP, A1c, LIPID, TSH, fT4, Vit D, testosterone    Lab Results:  Results for orders placed or performed in visit on 11/08/19   ACTH   Result Value Ref Range    ACTH 44.4 7.2 - 63.3 pg/mL   Cortisol - AM   Result Value Ref Range    Cortisol - AM 6.77 mcg/dL   DHEA-Sulfate   Result Value Ref Range    DHEA-Sulfate 340.7 102.6 - 416.3 ug/dL   FSH & LH   Result Value Ref Range    FSH 2.58 mIU/mL    LH 2.40 mIU/mL   Insulin-like Growth Factor   Result Value Ref Range    Insulin-Like Growth Factor-1 129 83 - 233 ng/mL   Prolactin   Result Value Ref Range    Prolactin 25.30 (H) 4.04 - 15.20 ng/mL   T4, Free   Result Value Ref Range    Free T4 1.06 0.93 - 1.70 ng/dL   TSH   Result  "Value Ref Range    TSH 3.410 0.270 - 4.200 uIU/mL   Testosterone, Bioavailable (M)   Result Value Ref Range    Testosterone, Total 111 (L) ng/dL    Testosterone, Bioavailable 45 (L) ng/dL    Testosterone, % Bioavailable 40.8 %   Testosterone   Result Value Ref Range    Testosterone, Total 84.00 (L) 249.00 - 836.00 ng/dL   Sex Horm Binding Globulin   Result Value Ref Range    Sex Hormone Binding Globulin 31.2 16.5 - 55.9 nmol/L       A1C:  Lab Results - Last 18 Months   Lab Units 09/03/19  0823   HEMOGLOBIN A1C % 5.6     PSA:No results for input(s): PSA in the last 32255 hours.  CBC:  Lab Results - Last 18 Months   Lab Units 09/03/19  0823   WBC x10E3/uL 6.5   HEMOGLOBIN g/dL 14.6   HEMATOCRIT % 42.5   PLATELETS x10E3/uL 220   IRON ug/dL 90      BMP/CMP:  Lab Results - Last 18 Months   Lab Units 09/03/19  0823 03/11/19  1323   SODIUM mmol/L 141  --    POTASSIUM mmol/L 3.5  --    CHLORIDE mmol/L 102  --    TOTAL CO2 mmol/L 23  --    GLUCOSE mg/dL 103*  --    BUN mg/dL 17  --    CREATININE mg/dL 1.19 1.40*   EGFR IF NONAFRICN AM mL/min/1.73 76  --    EGFR IF AFRICN AM mL/min/1.73 88  --    CALCIUM mg/dL 9.3  --      HEPATIC:  Lab Results - Last 18 Months   Lab Units 09/03/19  0823   ALT (SGPT) IU/L 14   AST (SGOT) IU/L 10   ALK PHOS IU/L 58     THYROID:  Lab Results - Last 18 Months   Lab Units 11/08/19  1037 09/03/19  0823   TSH uIU/mL 3.410 3.600       Objective   /80   Pulse 115   Temp 97.5 °F (36.4 °C)   Resp 16   Ht 176.5 cm (69.5\")   Wt 124 kg (274 lb)   SpO2 97%   BMI 39.88 kg/m²   Body mass index is 39.88 kg/m².    Physical Exam  GENERAL:  Well nourished/developed in no acute distress.   SKIN: Turgor excellent, without wound, rash, lesion.  HEENT: Normal cephalic without trauma.  Pupils equal round reactive to light. Extraocular motions full without nystagmus.   External canals nonobstructive nontender without reddness. Tymphatic membranes vania with talia structures intact.   Oral cavity without " growths, exudates, and moist.  Posterior pharynx without mass, obstruction, redness.  No thyromegaly, mass, tenderness, lymphadenopathy and supple.  CV: Regular rhythm.  No murmur, gallop, edema. Posterior pulses intact.  No carotid bruits.  CHEST: No chest wall tenderness or mass.   LUNGS: Symmetric motion with clear to auscultation.   ABD: Soft, nontender without mass.   ORTHO: Symmetric extremities without swelling/point tenderness.  Full gross range of motion.  NEURO: CN 2-12 grossly intact.  Symmetric facies and UE/LE. 3-4/5 strength throughout. 1/4 x bicep equal reflexes.  Nonfocal use extremities. Speech clear.  Intact light touch with monofilament, vibratory sensation with tuning fork; equal toes/distal feet.    PSYCH: Oriented x 3.  Pleasant calm, well kept.  Purposeful/directed conservation with intact short/long gross memory.      Assessment/Plan     1. Essential hypertension    2. Elevated fasting glucose    3. Depression, unspecified depression type    4. Pituitary microadenoma (CMS/HCC) ana    5. Hypogonadotropic hypogonadism (CMS/HCC)    6. Hyperprolactinemia (CMS/HCC)    7. Pseudotumor cerebri-ana    8. Low testosterone        Rx: reviewed/changes:  No orders of the defined types were placed in this encounter.    LAB/Testing/Referrals: reviewed/orders:   Today:   No orders of the defined types were placed in this encounter.    Chronic/recurrent labs above or change to:   ?     Discussions:   Has to keep apt with endo this week; they are managing endo   Testosterone Rx today considering his perceived urgency; ? Will allow this to be continued  Message to Captain Cook; ? Need additional labs even before visit?   Body mass index is 39.88 kg/m².  Patient's Body mass index is 39.88 kg/m². BMI is above normal parameters. Recommendations include: reminded too heavy.      Tobacco use reviewed:    Smoker  Lincoln Ornelas  reports that he has been smoking cigarettes. He started smoking about 30 years  ago. He has a 50.00 pack-year smoking history. His smokeless tobacco use includes chew.. I have educated him on the risk of diseases from using tobacco products such as cancer, COPD and heart diease.     I advised him to quit and he is not willing to quit.    I spent 3  minutes counseling the patient.       Annual/wellness visit  Annual exam:   Counseling/Anticipatory Guidance: Patient was counseled on:  Nutrition, physical activity, healthy weight, injury prevention, misuse of tobacco, alcohol and drugs, sexual behavior and STDs, contraception, dental health, mental health, immunizations, screenings as appropriate.     There are no Patient Instructions on file for this visit.    Follow up: Return for Dr Ascencio-, 6 m;.  Future Appointments   Date Time Provider Department Center   3/6/2020  2:45 PM Masood Rios APRN [facility] None   3/11/2020 12:30 PM PAD MRI 1 [facility] PAD MRI PAD   3/12/2020  1:00 PM Zev Ascencio MD [facility] PAD   9/1/2020 11:00 AM Josafat Ascencio MD [facility] None

## 2020-03-03 NOTE — TELEPHONE ENCOUNTER
Sonu, MD Sami Box, Trudy PEREZ LPN             Refill Rx needed (if a psych Rx see if being written by [facility])      On check out pt requested refill of   Lisinopril 25/20mg daily (Dr Valdivia)-done  Testosterone 200mg/ml (Dr Ankit jolley)-done  HCG 500U two times weekly ( no pharmacy has filled?)  Diamox 250 mg two tablets TID (Dr Sonu jolley)-done  Potassium 8 meq daily (Dr Sonu jolley)-done  Aromotaste Inhibitor 200 mg(class of drug)   AI(Arimidex 1 mg) filled 0.5 mg samreen    Called [facility]reen spoke to Diane and advised that he has gotten his testosterone filled by Dr Pham 100 mg/ml weekly and advised to call [facility] pharmacy as pt wanted his prescriptions moved from there as they were to expensive.    Called [facility] pharmacy and was advised that he gets medications from Dr Deluca Latuda 120mg daily last filled 2-17-19, Wellbutrin 75 mg two in am filled 1-29-19 and added to med list

## 2020-03-06 ENCOUNTER — OFFICE VISIT (OUTPATIENT)
Dept: ENDOCRINOLOGY | Facility: CLINIC | Age: 40
End: 2020-03-06

## 2020-03-06 VITALS
SYSTOLIC BLOOD PRESSURE: 128 MMHG | WEIGHT: 272.3 LBS | HEART RATE: 112 BPM | HEIGHT: 70 IN | DIASTOLIC BLOOD PRESSURE: 76 MMHG | OXYGEN SATURATION: 98 % | BODY MASS INDEX: 38.98 KG/M2

## 2020-03-06 DIAGNOSIS — R79.89 LOW TESTOSTERONE: ICD-10-CM

## 2020-03-06 DIAGNOSIS — D35.2 PITUITARY MICROADENOMA (HCC): Primary | ICD-10-CM

## 2020-03-06 DIAGNOSIS — D35.2 PITUITARY MICROADENOMA (HCC): ICD-10-CM

## 2020-03-06 DIAGNOSIS — E22.1 HYPERPROLACTINEMIA (HCC): ICD-10-CM

## 2020-03-06 DIAGNOSIS — E23.0 HYPOGONADOTROPIC HYPOGONADISM (HCC): Primary | ICD-10-CM

## 2020-03-06 DIAGNOSIS — E23.0 HYPOGONADOTROPIC HYPOGONADISM (HCC): ICD-10-CM

## 2020-03-06 DIAGNOSIS — E29.1 HYPOGONADISM MALE: ICD-10-CM

## 2020-03-06 DIAGNOSIS — R79.89 LOW SERUM TESTOSTERONE: ICD-10-CM

## 2020-03-06 PROCEDURE — 99212 OFFICE O/P EST SF 10 MIN: CPT | Performed by: NURSE PRACTITIONER

## 2020-03-06 RX ORDER — TESTOSTERONE CYPIONATE 200 MG/ML
200 INJECTION, SOLUTION INTRAMUSCULAR WEEKLY
Qty: 4 ML | Refills: 1 | OUTPATIENT
Start: 2020-03-06

## 2020-03-06 NOTE — PROGRESS NOTES
Subjective    Lincoln Ornelas is a 39 y.o. male. he is here today for follow-up.    History of Present Illness         Primary Care Provider     Josafat Ascencio MD     37 yo male comes for follow up      Reason , Pituitary Microadenoma., hypogonadism      Duration , since Dec 2016     Timing, Constant - stable -- Repeat MRI from 3-19 is stable     Quality, - patient has associated hypogonadism ( central ) and mild prolactin elevation      Aggravating - In addition to hyperprolactinemia patient also has chronic pain and is on suboxone.     History of drug use but hasn't used since age 29 years.   Also Intracranial Hypertension on Diamox     Treatment, now on testosterone  mg weekly      Symptoms - Fatigue / weakness / pain / ED / Low libido that persist despite adequate testosterone levels.           Jan. 2020      Prolactin 14     Initial testosterone was 80 but now on 100 mg of IM weekly of testosterone cypionate he has a testosterone of 807 4 days post injection     PSA of 0.4 and HT 51.5     Feb 2020 , we did a LDDST and cortisol suppressed to 0.5 making the diagnosis of Cushing's less likely     Patient states that he should have his testosterone increased to 200 mg IM weekly plus add anastrazole and HCG                  The following portions of the patient's history were reviewed and updated as appropriate:   Past Medical History:   Diagnosis Date   • Anxiety    • Depression    • Hypertension    • Intracranial hypertension    • Mood disorder (CMS/HCC)    • Pituitary mass (CMS/HCC)    • PTSD (post-traumatic stress disorder)    • Spinal headache      Past Surgical History:   Procedure Laterality Date   • NO PAST SURGERIES     • VASECTOMY N/A 5/18/2018    Procedure: VASECTOMY;  Surgeon: Ion Yanez MD;  Location: [facility];  Service: Urology     Family History   Problem Relation Age of Onset   • Gonadal disorder Neg Hx        Current Outpatient Medications   Medication Sig Dispense  "Refill   • acetaZOLAMIDE (DIAMOX) 250 MG tablet Take 2 tablets by mouth 3 (Three) Times a Day. 180 tablet 3   • ALPRAZolam (XANAX) 2 MG tablet TK 1 T PO  TID  0   • amphetamine-dextroamphetamine (ADDERALL) 10 MG tablet Take 20 mg by mouth 3 (Three) Times a Day. And takes 10mg in evening  0   • anastrozole (ARIMIDEX) 1 MG tablet Take 0.5 tablets by mouth Daily. 15 tablet 3   • buprenorphine-naloxone (SUBOXONE) 8-2 MG per SL tablet Place 1 tablet under the tongue 3 (Three) Times a Day As Needed.  0   • buPROPion (WELLBUTRIN) 75 MG tablet Take 150 mg by mouth Every Morning. Dr Deluca     • Cholecalciferol (VITAMIN D) 1000 units tablet Take 1 tablet by mouth 2 (Two) Times a Day. 60 tablet 5   • Chorionic Gonadotropin (HCG IJ) Inject 500 Units as directed 2 (Two) Times a Week.     • lisinopril-hydrochlorothiazide (PRINZIDE,ZESTORETIC) 20-25 MG per tablet Take 1 tablet by mouth Daily. 30 tablet 3   • Lurasidone HCl 120 MG tablet Take 1 tablet by mouth Daily. Dr Deluca     • Needle, Disp, 18G X 1\" misc Use weekly as indicated 4 each 11   • NON FORMULARY Inject 200 mg as directed 2 (Two) Times a Week. Aromataste Inhibitor     • potassium chloride (KLOR-CON) 8 MEQ CR tablet Take 1 tablet by mouth Daily. 30 tablet 3   • Syringe 21G X 1\" 3 ML misc Use weekly 4 each 6   • Testosterone Cypionate (DEPOTESTOTERONE CYPIONATE) 200 MG/ML injection Inject 1 mL into the appropriate muscle as directed by prescriber 1 (One) Time Per Week. 100mg weekly, Provide(#4)18G,(#4)21Gneedles (#4)3mlsyringes q month 4 mL 1     No current facility-administered medications for this visit.      No Known Allergies  Social History     Socioeconomic History   • Marital status:      Spouse name: Keely   • Number of children: 2   • Years of education: 12   • Highest education level: Not on file   Occupational History   • Occupation: disabled Marine     Comment: working with [employer]   Tobacco Use   • Smoking status: Current Every Day Smoker     Packs/day: " "2.00     Years: 25.00     Pack years: 50.00     Types: Cigarettes     Start date: 11/30/1989   • Smokeless tobacco: Current User     Types: Chew   Substance and Sexual Activity   • Alcohol use: No   • Drug use: No   • Sexual activity: Not Currently     Partners: Male       Review of Systems  Review of Systems     Patient expresses constant fatigue, inability to lose weight, low libido, Erectile Dysfunction     Objective    /76   Pulse 112   Ht 176.5 cm (69.5\")   Wt 124 kg (272 lb 4.8 oz)   SpO2 98%   BMI 39.64 kg/m²   Physical Exam    Couldn't be conducted since patient was argumentative     Jan 2020 labs detailed above         Assessment/Plan      1. Hypogonadotropic hypogonadism (CMS/HCC)    .    Medications prescribed:  Outpatient Encounter Medications as of 3/6/2020   Medication Sig Dispense Refill   • acetaZOLAMIDE (DIAMOX) 250 MG tablet Take 2 tablets by mouth 3 (Three) Times a Day. 180 tablet 3   • ALPRAZolam (XANAX) 2 MG tablet TK 1 T PO  TID  0   • amphetamine-dextroamphetamine (ADDERALL) 10 MG tablet Take 20 mg by mouth 3 (Three) Times a Day. And takes 10mg in evening  0   • anastrozole (ARIMIDEX) 1 MG tablet Take 0.5 tablets by mouth Daily. 15 tablet 3   • buprenorphine-naloxone (SUBOXONE) 8-2 MG per SL tablet Place 1 tablet under the tongue 3 (Three) Times a Day As Needed.  0   • buPROPion (WELLBUTRIN) 75 MG tablet Take 150 mg by mouth Every Morning. Dr Deluca     • Cholecalciferol (VITAMIN D) 1000 units tablet Take 1 tablet by mouth 2 (Two) Times a Day. 60 tablet 5   • Chorionic Gonadotropin (HCG IJ) Inject 500 Units as directed 2 (Two) Times a Week.     • lisinopril-hydrochlorothiazide (PRINZIDE,ZESTORETIC) 20-25 MG per tablet Take 1 tablet by mouth Daily. 30 tablet 3   • Lurasidone HCl 120 MG tablet Take 1 tablet by mouth Daily. Dr Deluca     • Needle, Disp, 18G X 1\" misc Use weekly as indicated 4 each 11   • NON FORMULARY Inject 200 mg as directed 2 (Two) Times a Week. Aromataste Inhibitor   " "  • potassium chloride (KLOR-CON) 8 MEQ CR tablet Take 1 tablet by mouth Daily. 30 tablet 3   • Syringe 21G X 1\" 3 ML misc Use weekly 4 each 6   • Testosterone Cypionate (DEPOTESTOTERONE CYPIONATE) 200 MG/ML injection Inject 1 mL into the appropriate muscle as directed by prescriber 1 (One) Time Per Week. 100mg weekly, Provide(#4)18G,(#4)21Gneedles (#4)3mlsyringes q month 4 mL 1     No facility-administered encounter medications on file as of 3/6/2020.        Orders placed during this encounter include:  No orders of the defined types were placed in this encounter.        ICD-10-CM ICD-9-CM   1. Hypogonadotropic hypogonadism (CMS/[facility]) E23.0 253.4       Patient is presently being treated with testosterone cypionate 100 mg intramuscular weekly.    With this dose he is testosterone levels 4 days post injection were 807 with an upper limit of normal hematocrit of 51.5 and a normal PSA of 0.4.    Patient states that he does not feel any benefit from this treatment and demanded that his dose of testosterone  should be increased to 200 mg of testosterone intramuscular and states I should add armidex and HCG    I explained to patient that according to the endocrine Society guidelines achieving a testosterone of 800 is sufficient for the management of male hypogonadism and increasing the dose would be unreasonable given that if normal levels are not achieving resolution of symptoms then higher than normal levels will most likely not improve his symptoms in addition to introducing the risk of side effects.  Also estrogen is vital for normal libido and bone health so blocking its production is not appropriate   In addition, Higher than normal testosterone level could lead to polycythemia/worsening of obstructive sleep apnea/prostatism/fluid retention and possibly heart failure/possible coronary artery disease.    In addition adding Arimidex is not endorsed by the endocrine Society guidelines.  Arimidex also could lead to a low " HDL which could possibly translate into increased coronary artery disease and in a patient that is has metabolic syndrome, is obese, has dyslipidemia and is hypertensive  this would not be prudent. I want to also point out that hsi baseline HDL is already low.   Also Arimidex has been associated with worsening of depression and irritability as well as gastrointestinal side effects.  All of these side effects are not worth it and this is what I tried to explain to Mr. Fuller but at that point he became extremely argumentative and aggressive stating that I did not care about his treatment.    He also wants hCG therapy which is only approved for infertility.  To be adding hCG to an already normal testosterone level to prevent testicular atrophy wouldn't help his symptoms. There is no evidence based literature of its benefit or safety so  its use is unreasonable.     When I told him that my only suggestion was to continue 100 mg of intramuscular testosterone he insulted me.  I then left the office and he followed me to the next patient's exam room. When I asked him to leave he confronted me and challenged me to get him out of the room. At that point we unfortunately had to rely on calling the police which fortunately escorted him out of the building without any further incident aside from verbal aggression.     Unfortunately he as well as many other patients have been victims of direct consumer advertising in which patients are made to  believe that higher than normal levels of testosterone will lead to resolution of symptoms that most likely are not even related to testosterone deficiency in the first place.  More importantly there is no data of safety of use of Arimidex or hCG or supranormal levels of testosterone.    We will no longer see patient, and my only hope is that no other physician does the mistake of adding these agents.     4. Follow-up: No follow-ups on file.                This document has been  electronically signed by Matteo Dykes MD on March 9, 2020 9:16 AM

## 2020-03-06 NOTE — TELEPHONE ENCOUNTER
Patient called because he had an altercation with Endocrinologist, and he needs a referral to a different endocrinologist and needs a refill on his Testosterone Cypionate (DEPOTESTOTERONE CYPIONATE) 200 MG/ML injection    Please call patient: [phone]    Pharmacy: [id number] [place] [address] - [phone] [facility] [phone]   [phone]

## 2020-03-09 DIAGNOSIS — D35.2 PITUITARY MICROADENOMA (HCC): ICD-10-CM

## 2020-03-09 DIAGNOSIS — R79.89 LOW SERUM TESTOSTERONE: ICD-10-CM

## 2020-03-09 RX ORDER — TESTOSTERONE CYPIONATE 200 MG/ML
100 INJECTION, SOLUTION INTRAMUSCULAR WEEKLY
Qty: 4 ML | Refills: 1
Start: 2020-03-09 | End: 2023-02-08

## 2020-03-09 NOTE — TELEPHONE ENCOUNTER
Per Pharmacy, pt did not  the Testosterone script, but did  the Anastrozole.  They have cancelled all refills of the script and the Testosterone is correct now .5ml once weekly.

## 2020-03-09 NOTE — TELEPHONE ENCOUNTER
----- Message from Josafat Ascencio MD sent at 3/9/2020  9:54 AM CDT -----    Nursing to note  Testosterone is 100  No HCG  No armidex     Demarcus to note  Need to be aware of his apparent ability to become aggressive    ----- Message -----  From: Matteo Herrera MD  Sent: 3/9/2020   9:17 AM CDT  To: Josafat Ascencio MD

## 2020-03-09 NOTE — PROGRESS NOTES
Nursing to note  Testosterone is 100  No HCG  No armidex    Demarcus to note  Need to be aware of his apparent ability to become aggressive

## 2020-03-11 ENCOUNTER — HOSPITAL ENCOUNTER (OUTPATIENT)
Dept: MRI IMAGING | Facility: HOSPITAL | Age: 40
Discharge: HOME | End: 2020-03-11
Admitting: NEUROLOGICAL SURGERY

## 2020-03-11 DIAGNOSIS — G93.2 PSEUDOTUMOR CEREBRI: ICD-10-CM

## 2020-03-11 DIAGNOSIS — R51.9 CHRONIC INTRACTABLE HEADACHE, UNSPECIFIED HEADACHE TYPE: ICD-10-CM

## 2020-03-11 DIAGNOSIS — G89.29 CHRONIC INTRACTABLE HEADACHE, UNSPECIFIED HEADACHE TYPE: ICD-10-CM

## 2020-03-11 DIAGNOSIS — D35.2 PITUITARY MICROADENOMA (HCC): ICD-10-CM

## 2020-03-11 LAB — CREAT BLDA-MCNC: 1.1 MG/DL (ref 0.6–1.3)

## 2020-03-11 PROCEDURE — 0 GADOBENATE DIMEGLUMINE 529 MG/ML SOLUTION: Performed by: NEUROLOGICAL SURGERY

## 2020-03-11 PROCEDURE — 70553 MRI BRAIN STEM W/O & W/DYE: CPT

## 2020-03-11 PROCEDURE — A9577 INJ MULTIHANCE: HCPCS | Performed by: NEUROLOGICAL SURGERY

## 2020-03-11 PROCEDURE — 82565 ASSAY OF CREATININE: CPT

## 2020-03-11 RX ADMIN — GADOBENATE DIMEGLUMINE 20 ML: 529 INJECTION, SOLUTION INTRAVENOUS at 12:50

## 2020-03-12 ENCOUNTER — OFFICE VISIT (OUTPATIENT)
Dept: NEUROSURGERY | Facility: CLINIC | Age: 40
End: 2020-03-12

## 2020-03-12 VITALS
SYSTOLIC BLOOD PRESSURE: 130 MMHG | HEIGHT: 70 IN | BODY MASS INDEX: 38.54 KG/M2 | DIASTOLIC BLOOD PRESSURE: 78 MMHG | WEIGHT: 269.2 LBS

## 2020-03-12 DIAGNOSIS — G89.29 CHRONIC INTRACTABLE HEADACHE, UNSPECIFIED HEADACHE TYPE: ICD-10-CM

## 2020-03-12 DIAGNOSIS — F17.210 CIGARETTE SMOKER: ICD-10-CM

## 2020-03-12 DIAGNOSIS — Z72.0 CHEWING TOBACCO USE: ICD-10-CM

## 2020-03-12 DIAGNOSIS — R51.9 CHRONIC INTRACTABLE HEADACHE, UNSPECIFIED HEADACHE TYPE: ICD-10-CM

## 2020-03-12 DIAGNOSIS — D35.2 PITUITARY MICROADENOMA (HCC): Primary | ICD-10-CM

## 2020-03-12 PROCEDURE — 99213 OFFICE O/P EST LOW 20 MIN: CPT | Performed by: NEUROLOGICAL SURGERY

## 2020-03-12 NOTE — PROGRESS NOTES
SUBJECTIVE:  Patient ID: Lincoln Ornelas is a 39 y.o. male is here today for follow-up.    Chief Complaint:pituitary tumor  Chief Complaint   Patient presents with   • Pituitary Adenoma     patient is here for his yearly follow up; patient had an MRI yesterday @ [facility].  Patient states he hasn't seen Dr Miller in a while.         HPI  39-year-old gentleman that we follow for an incidental pituitary microadenoma.  He has no complaints.  He is still being treated for hypo-testosterone.  He denies any headaches no vision changes.  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    The following portions of the patient's history were reviewed and updated as appropriate: allergies, current medications, past family history, past medical history, past social history, past surgical history and problem list.    OBJECTIVE:    Review of Systems   All other systems reviewed and are negative.         Physical Exam   Constitutional: He is oriented to person, place, and time. He appears well-developed and well-nourished.   HENT:   Head: Normocephalic and atraumatic.   Right Ear: Hearing normal.   Left Ear: Hearing normal.   Eyes: Pupils are equal, round, and reactive to light. EOM are normal.   Neck: Normal range of motion.   Neurological: He is alert and oriented to person, place, and time. He has normal strength and normal reflexes. No cranial nerve deficit or sensory deficit. He displays a negative Romberg sign. GCS eye subscore is 4. GCS verbal subscore is 5. GCS motor subscore is 6. He displays no Babinski's sign on the right side. He displays no Babinski's sign on the left side.   Psychiatric: His speech is normal. Judgment normal. Cognition and memory are normal.       Neurologic Exam     Mental Status   Oriented to person, place, and time.   Speech: speech is normal     Cranial Nerves     CN III, IV, VI   Pupils are equal, round, and reactive to light.  Extraocular motions are normal.     Motor Exam      Strength   Strength 5/5 throughout.       Independent Review of Radiographic Studies:   MRI of the pituitary shows a stable area of hypointensity in the posterior pituitary gland compared to 2017.    ASSESSMENT/PLAN:  The patient's imaging is stable.  At this point I would recommend reimaging in 2 years.  He would like a referral to Dr. Negron for his low testosterone.  We will facilitate that referral.      1. Pituitary microadenoma (CMS/HCC) ana    2. Chronic intractable headache, unspecified headache type    3. Cigarette smoker    4. Chewing tobacco use    5. BMI 39.0-39.9,adult            Return for 2 yr f/u - [place] to take care of.      Zev Ascencio MD

## 2020-03-12 NOTE — PATIENT INSTRUCTIONS
"PATIENT TO CONTINUE TO FOLLOW UP WITH HIS PRIMARY CARE PROVIDER FOR YEARLY PHYSICAL EXAMS TO ENSURE COMPLETE HEALTH MAINTENANCE        BMI for Adults    Body mass index (BMI) is a number that is calculated from a person's weight and height. BMI may help to estimate how much of a person's weight is composed of fat. BMI can help identify those who may be at higher risk for certain medical problems.  How is BMI used with adults?  BMI is used as a screening tool to identify possible weight problems. It is used to check whether a person is obese, overweight, healthy weight, or underweight.  How is BMI calculated?  BMI measures your weight and compares it to your height. This can be done either in English ([place] or metric measurements. Note that charts are available to help you find your BMI quickly and easily without having to do these calculations yourself.  To calculate your BMI in English ([place] measurements, your health care provider will:  1. Measure your weight in pounds (lb).  2. Multiply the number of pounds by 703.  ? For example, for a person who weighs 180 lb, multiply that number by 703, which equals 126,540.  3. Measure your height in inches (in). Then multiply that number by itself to get a measurement called \"inches squared.\"  ? For example, for a person who is 70 in tall, the \"inches squared\" measurement is 70 in x 70 in, which equals 4900 inches squared.  4. Divide the total from Step 2 (number of lb x 703) by the total from Step 3 (inches squared): 126,540 ÷ 4900 = 25.8. This is your BMI.  To calculate your BMI in metric measurements, your health care provider will:  1. Measure your weight in kilograms (kg).  2. Measure your height in meters (m). Then multiply that number by itself to get a measurement called \"meters squared.\"  ? For example, for a person who is 1.75 m tall, the \"meters squared\" measurement is 1.75 m x 1.75 m, which is equal to 3.1 meters squared.  3. Divide the number of kilograms " (your weight) by the meters squared number. In this example: 70 ÷ 3.1 = 22.6. This is your BMI.  How is BMI interpreted?  To interpret your results, your health care provider will use BMI charts to identify whether you are underweight, normal weight, overweight, or obese. The following guidelines will be used:  · Underweight: BMI less than 18.5.  · Normal weight: BMI between 18.5 and 24.9.  · Overweight: BMI between 25 and 29.9.  · Obese: BMI of 30 and above.  Please note:  · Weight includes both fat and muscle, so someone with a muscular build, such as an athlete, may have a BMI that is higher than 24.9. In cases like these, BMI is not an accurate measure of body fat.  · To determine if excess body fat is the cause of a BMI of 25 or higher, further assessments may need to be done by a health care provider.  · BMI is usually interpreted in the same way for men and women.  Why is BMI a useful tool?  BMI is useful in two ways:  · Identifying a weight problem that may be related to a medical condition, or that may increase the risk for medical problems.  · Promoting lifestyle and diet changes in order to reach a healthy weight.  Summary  · Body mass index (BMI) is a number that is calculated from a person's weight and height.  · BMI may help to estimate how much of a person's weight is composed of fat. BMI can help identify those who may be at higher risk for certain medical problems.  · BMI can be measured using English measurements or metric measurements.  · To interpret your results, your health care provider will use BMI charts to identify whether you are underweight, normal weight, overweight, or obese.  This information is not intended to replace advice given to you by your health care provider. Make sure you discuss any questions you have with your health care provider.  Document Released: 08/29/2005 Document Revised: 10/31/2018 Document Reviewed: 10/31/2018  Elsevier Interactive Patient Education © 2020  [employer]        Smokeless Tobacco Information, Adult  Tobacco use is one of the leading causes of cancer and other chronic health problems. Smokeless tobacco is tobacco that is put directly into the mouth instead of being smoked. It may also be called chewing tobacco or snuff. Smokeless tobacco is made from the leaves of tobacco plants and it comes in several forms:  · Loose, dry leaves, plugs, or twists.  · Moist pouches.  · Dissolving lozenges or strips.  Chewing, sucking, or holding the tobacco in your mouth causes your mouth to make more saliva. The saliva mixes with the tobacco to make “tobacco juice” that is swallowed or spit out.  How can smokeless tobacco affect me?  Using smokeless tobacco:  · Increases your risk of developing cancer. Smokeless tobacco contains at least 28 different types of cancer-causing chemicals (carcinogens).  · Increases your chances of developing other long-term health problems, including high blood pressure, heart disease, stroke, and dental problems.  · Can make you become addicted. Nicotine is one of the chemicals in tobacco. When you chew tobacco, you absorb nicotine from the tobacco juice. This can make you feel more alert than usual.  · Can cause problems with pregnancy. Pregnant women who use smokeless tobacco are more likely to miscarry or deliver a baby too early (premature delivery).  · Can affect the appearance and health of your mouth. Using smokeless tobacco may cause bad breath, yellow-brown teeth, mouth sores, cracking and bleeding lips, gum recession, and lesions on the soft tissues of your mouth (leukoplakia).  What are the benefits of not using smokeless tobacco?  The benefits of not using smokeless tobacco include:  · A healthy mind because:  ? You avoid addiction.  · A healthy body because:  ? You avoid dental problems.  ? You promote healthy pregnancy.  ? You avoid long-term health problems.  · A healthy wallet because:  ? You avoid costs of buying  tobacco.  ? You avoid health care costs in the future.  · A healthy family because:  ? You avoid accidental poisoning of children in your household.  What can happen if I continue to use smokeless tobacco?  If you continue to use smokeless tobacco, you will increase your risk for developing certain cancers. These include:  · Tongue.  · Lips, mouth, and gums.  · Throat (esophagus) and voice box (larynx).  · Stomach.  · Pancreas.  · Bladder.  · Colon.  Long-term use of smokeless tobacco can also lead to:  · High blood pressure, heart disease, and stroke.  · Gum disease, gum recession, and bone loss around the teeth.  · Tooth decay.  How do I quit using smokeless tobacco?  Quitting the use of smokeless tobacco can be hard, but it can be done. Follow these steps:  · Pick a date to quit. Set a date within the next two weeks. This gives you time to prepare.  · Write down the reasons why you are quitting. Keep this list in places where you will see it often, such as on your bathroom mirror or in your car or wallet.  · Identify the people, places, things, and activities that make you want to use tobacco (triggers) and avoid them.  · Get rid of any tobacco you have and remove any tobacco smells. To do this:  ? Throw away all containers of tobacco at home, at work, and in your car.  ? Throw away any other items that you use regularly when you chew tobacco.  ? Clean your car and make sure to remove all tobacco-related items.  ? Clean your home, including curtains and carpets.  · Tell your family, friends, and coworkers that you are quitting. This can make quitting easier.  · Ask your health care provider for help quitting smokeless tobacco. This may involve treatment. Find out what treatment options are covered by your health insurance.  · Keep track of how many days have passed since you quit. Remembering how long and hard you have worked to quit can help you avoid using tobacco again.  Where can I get support?  Ask your  health care provider if there is a local support group for quitting smokeless tobacco.  Where can I get more information?  You can learn more about the risks of using smokeless tobacco and the benefits of quitting from these sources:  · National Cancer [place]: www.cancer.gov  · American Cancer Society: www.cancer.org  When should I seek medical care?  Seek medical care if you have:  · White or other discolored patches in your mouth.  · Difficulty swallowing.  · A change in your voice.  · Unexplained weight loss.  · Stomach pain, nausea, or vomiting.  Summary  · Smokeless tobacco contains at least 28 different chemicals that are known to cause cancer (carcinogen).  · Nicotine is an addictive chemical in smokeless tobacco.  · When you quit using smokeless tobacco, you lower your risk of developing cancer.  This information is not intended to replace advice given to you by your health care provider. Make sure you discuss any questions you have with your health care provider.  Document Released: 05/21/2012 Document Revised: 08/03/2018 Document Reviewed: 07/29/2016  [employer] Interactive Patient Education © 2020 [employer]          Steps to Quit Smoking    Smoking tobacco can be harmful to your health and can affect almost every organ in your body. Smoking puts you, and those around you, at risk for developing many serious chronic diseases. Quitting smoking is difficult, but it is one of the best things that you can do for your health. It is never too late to quit.  What are the benefits of quitting smoking?  When you quit smoking, you lower your risk of developing serious diseases and conditions, such as:  · Lung cancer or lung disease, such as COPD.  · Heart disease.  · Stroke.  · Heart attack.  · Infertility.  · Osteoporosis and bone fractures.  Additionally, symptoms such as coughing, wheezing, and shortness of breath may get better when you quit. You may also find that you get sick less often because your body  is stronger at fighting off colds and infections. If you are pregnant, quitting smoking can help to reduce your chances of having a baby of low birth weight.  How do I get ready to quit?  When you decide to quit smoking, create a plan to make sure that you are successful. Before you quit:  · Pick a date to quit. Set a date within the next two weeks to give you time to prepare.  · Write down the reasons why you are quitting. Keep this list in places where you will see it often, such as on your bathroom mirror or in your car or wallet.  · Identify the people, places, things, and activities that make you want to smoke (triggers) and avoid them. Make sure to take these actions:  ? Throw away all cigarettes at home, at work, and in your car.  ? Throw away smoking accessories, such as ashtrays and lighters.  ? Clean your car and make sure to empty the ashtray.  ? Clean your home, including curtains and carpets.  · Tell your family, friends, and coworkers that you are quitting. Support from your loved ones can make quitting easier.  · Talk with your health care provider about your options for quitting smoking.  · Find out what treatment options are covered by your health insurance.  What strategies can I use to quit smoking?  Talk with your healthcare provider about different strategies to quit smoking. Some strategies include:  · Quitting smoking altogether instead of gradually lessening how much you smoke over a period of time. Research shows that quitting “cold turkey” is more successful than gradually quitting.  · Attending in-person counseling to help you build problem-solving skills. You are more likely to have success in quitting if you attend several counseling sessions. Even short sessions of 10 minutes can be effective.  · Finding resources and support systems that can help you to quit smoking and remain smoke-free after you quit. These resources are most helpful when you use them often. They can  include:  ? Online chats with a counselor.  ? Telephone quitlines.  ? Printed self-help materials.  ? Support groups or group counseling.  ? Text messaging programs.  ? Mobile phone applications.  · Taking medicines to help you quit smoking. (If you are pregnant or breastfeeding, talk with your health care provider first.) Some medicines contain nicotine and some do not. Both types of medicines help with cravings, but the medicines that include nicotine help to relieve withdrawal symptoms. Your health care provider may recommend:  ? Nicotine patches, gum, or lozenges.  ? Nicotine inhalers or sprays.  ? Non-nicotine medicine that is taken by mouth.  Talk with your health care provider about combining strategies, such as taking medicines while you are also receiving in-person counseling. Using these two strategies together makes you more likely to succeed in quitting than if you used either strategy on its own.  If you are pregnant or breastfeeding, talk with your health care provider about finding counseling or other support strategies to quit smoking. Do not take medicine to help you quit smoking unless told to do so by your health care provider.  What things can I do to make it easier to quit?  Quitting smoking might feel overwhelming at first, but there is a lot that you can do to make it easier. Take these important actions:  · Reach out to your family and friends and ask that they support and encourage you during this time. Call telephone quitlines, reach out to support groups, or work with a counselor for support.  · Ask people who smoke to avoid smoking around you.  · Avoid places that trigger you to smoke, such as bars, parties, or smoke-break areas at work.  · Spend time around people who do not smoke.  · Lessen stress in your life, because stress can be a smoking trigger for some people. To lessen stress, try:  ? Exercising regularly.  ? Deep-breathing exercises.  ? Yoga.  ? Meditating.  ? Performing a body  scan. This involves closing your eyes, scanning your body from head to toe, and noticing which parts of your body are particularly tense. Purposefully relax the muscles in those areas.  · Download or purchase mobile phone or tablet apps (applications) that can help you stick to your quit plan by providing reminders, tips, and encouragement. There are many free apps, such as QuitGuide from the CDC (Centers for Disease Control and Prevention). You can find other support for quitting smoking (smoking cessation) through smokefree.gov and other websites.  How will I feel when I quit smoking?  Within the first 24 hours of quitting smoking, you may start to feel some withdrawal symptoms. These symptoms are usually most noticeable 2-3 days after quitting, but they usually do not last beyond 2-3 weeks. Changes or symptoms that you might experience include:  · Mood swings.  · Restlessness, anxiety, or irritation.  · Difficulty concentrating.  · Dizziness.  · Strong cravings for sugary foods in addition to nicotine.  · Mild weight gain.  · Constipation.  · Nausea.  · Coughing or a sore throat.  · Changes in how your medicines work in your body.  · A depressed mood.  · Difficulty sleeping (insomnia).  After the first 2-3 weeks of quitting, you may start to notice more positive results, such as:  · Improved sense of smell and taste.  · Decreased coughing and sore throat.  · Slower heart rate.  · Lower blood pressure.  · Clearer skin.  · The ability to breathe more easily.  · Fewer sick days.  Quitting smoking is very challenging for most people. Do not get discouraged if you are not successful the first time. Some people need to make many attempts to quit before they achieve long-term success. Do your best to stick to your quit plan, and talk with your health care provider if you have any questions or concerns.  This information is not intended to replace advice given to you by your health care provider. Make sure you discuss  any questions you have with your health care provider.  Document Released: 12/12/2002 Document Revised: 07/24/2018 Document Reviewed: 05/03/2016  [employer] Interactive Patient Education © 2020 [employer]

## 2020-04-17 DIAGNOSIS — E87.6 HYPOKALEMIA: Primary | ICD-10-CM

## 2020-04-17 RX ORDER — POTASSIUM CHLORIDE 20 MEQ/1
20 TABLET, EXTENDED RELEASE ORAL DAILY
Qty: 90 TABLET | Refills: 1 | Status: SHIPPED | OUTPATIENT
Start: 2020-04-17 | End: 2020-07-23 | Stop reason: SDUPTHER

## 2020-04-24 ENCOUNTER — RESULTS ENCOUNTER (OUTPATIENT)
Dept: FAMILY MEDICINE CLINIC | Facility: CLINIC | Age: 40
End: 2020-04-24

## 2020-04-24 DIAGNOSIS — E87.6 HYPOKALEMIA: ICD-10-CM

## 2020-04-25 LAB
BUN SERPL-MCNC: 14 MG/DL (ref 6–20)
BUN/CREAT SERPL: 12.7 (ref 7–25)
CALCIUM SERPL-MCNC: 9.1 MG/DL (ref 8.6–10.5)
CHLORIDE SERPL-SCNC: 102 MMOL/L (ref 98–107)
CO2 SERPL-SCNC: 26.6 MMOL/L (ref 22–29)
CREAT SERPL-MCNC: 1.1 MG/DL (ref 0.76–1.27)
GLUCOSE SERPL-MCNC: 128 MG/DL (ref 65–99)
POTASSIUM SERPL-SCNC: 3.5 MMOL/L (ref 3.5–5.2)
SODIUM SERPL-SCNC: 140 MMOL/L (ref 136–145)

## 2020-07-23 DIAGNOSIS — E87.6 HYPOPOTASSEMIA: Primary | ICD-10-CM

## 2020-07-23 RX ORDER — POTASSIUM CHLORIDE 20 MEQ/1
20 TABLET, EXTENDED RELEASE ORAL 2 TIMES DAILY
Qty: 180 TABLET | Refills: 1 | Status: SHIPPED | OUTPATIENT
Start: 2020-07-23 | End: 2021-01-15

## 2020-07-28 ENCOUNTER — LAB (OUTPATIENT)
Dept: FAMILY MEDICINE CLINIC | Facility: CLINIC | Age: 40
End: 2020-07-28

## 2020-07-28 ENCOUNTER — RESULTS ENCOUNTER (OUTPATIENT)
Dept: FAMILY MEDICINE CLINIC | Facility: CLINIC | Age: 40
End: 2020-07-28

## 2020-07-28 DIAGNOSIS — E87.6 HYPOPOTASSEMIA: ICD-10-CM

## 2020-07-29 LAB
BUN SERPL-MCNC: 17 MG/DL (ref 6–20)
BUN/CREAT SERPL: 14.4 (ref 7–25)
CALCIUM SERPL-MCNC: 9.4 MG/DL (ref 8.6–10.5)
CHLORIDE SERPL-SCNC: 102 MMOL/L (ref 98–107)
CO2 SERPL-SCNC: 26.7 MMOL/L (ref 22–29)
CREAT SERPL-MCNC: 1.18 MG/DL (ref 0.76–1.27)
GLUCOSE SERPL-MCNC: 112 MG/DL (ref 65–99)
POTASSIUM SERPL-SCNC: 3.5 MMOL/L (ref 3.5–5.2)
SODIUM SERPL-SCNC: 138 MMOL/L (ref 136–145)

## 2020-09-22 DIAGNOSIS — R51.9 CHRONIC INTRACTABLE HEADACHE, UNSPECIFIED HEADACHE TYPE: ICD-10-CM

## 2020-09-22 DIAGNOSIS — I10 HYPERTENSION, UNSPECIFIED TYPE: ICD-10-CM

## 2020-09-22 DIAGNOSIS — G93.2 PSEUDOTUMOR CEREBRI: ICD-10-CM

## 2020-09-22 DIAGNOSIS — G89.29 CHRONIC INTRACTABLE HEADACHE, UNSPECIFIED HEADACHE TYPE: ICD-10-CM

## 2020-09-22 DIAGNOSIS — D35.2 PITUITARY MICROADENOMA (HCC): ICD-10-CM

## 2020-09-22 DIAGNOSIS — F99 PSYCHIATRIC ILLNESS: ICD-10-CM

## 2020-09-22 RX ORDER — LISINOPRIL AND HYDROCHLOROTHIAZIDE 25; 20 MG/1; MG/1
1 TABLET ORAL DAILY
Qty: 90 TABLET | Refills: 1 | Status: SHIPPED | OUTPATIENT
Start: 2020-09-22 | End: 2021-03-16

## 2020-09-22 RX ORDER — ACETAZOLAMIDE 250 MG/1
TABLET ORAL
Qty: 180 TABLET | Refills: 3 | Status: SHIPPED | OUTPATIENT
Start: 2020-09-22 | End: 2021-04-01 | Stop reason: SDUPTHER

## 2020-10-28 ENCOUNTER — OFFICE VISIT (OUTPATIENT)
Dept: FAMILY MEDICINE CLINIC | Facility: CLINIC | Age: 40
End: 2020-10-28

## 2020-10-28 VITALS
BODY MASS INDEX: 35.5 KG/M2 | OXYGEN SATURATION: 98 % | HEART RATE: 98 BPM | WEIGHT: 248 LBS | RESPIRATION RATE: 18 BRPM | TEMPERATURE: 97.8 F | SYSTOLIC BLOOD PRESSURE: 110 MMHG | DIASTOLIC BLOOD PRESSURE: 76 MMHG | HEIGHT: 70 IN

## 2020-10-28 DIAGNOSIS — R73.01 ELEVATED FASTING GLUCOSE: ICD-10-CM

## 2020-10-28 DIAGNOSIS — I10 ESSENTIAL HYPERTENSION: ICD-10-CM

## 2020-10-28 DIAGNOSIS — F99 PSYCHIATRIC ILLNESS: ICD-10-CM

## 2020-10-28 DIAGNOSIS — H01.004 BLEPHARITIS OF LEFT UPPER EYELID, UNSPECIFIED TYPE: ICD-10-CM

## 2020-10-28 PROCEDURE — 99213 OFFICE O/P EST LOW 20 MIN: CPT | Performed by: FAMILY MEDICINE

## 2020-10-28 RX ORDER — OXCARBAZEPINE 300 MG/1
2 TABLET, FILM COATED ORAL 3 TIMES DAILY
COMMUNITY
Start: 2020-10-12 | End: 2023-02-23 | Stop reason: SDUPTHER

## 2020-10-28 RX ORDER — ARIPIPRAZOLE 5 MG/1
1 TABLET ORAL DAILY
COMMUNITY
Start: 2020-10-01 | End: 2022-02-08

## 2020-10-28 RX ORDER — DEXTROAMPHETAMINE SACCHARATE, AMPHETAMINE ASPARTATE, DEXTROAMPHETAMINE SULFATE AND AMPHETAMINE SULFATE 5; 5; 5; 5 MG/1; MG/1; MG/1; MG/1
1 TABLET ORAL 3 TIMES DAILY
COMMUNITY
Start: 2020-10-07

## 2020-10-28 RX ORDER — CARIPRAZINE 3 MG/1
1 CAPSULE, GELATIN COATED ORAL DAILY
COMMUNITY
Start: 2020-10-12 | End: 2022-02-08

## 2020-10-28 RX ORDER — ASENAPINE MALEATE 10 MG/1
1 TABLET SUBLINGUAL NIGHTLY
COMMUNITY
Start: 2020-10-15

## 2020-10-28 NOTE — PROGRESS NOTES
"Subjective   Lincoln Ornelas is a 39 y.o. male presenting with chief complaint of:   Chief Complaint   Patient presents with   • Eye Problem     \"my left eye is swollen\"     History of Present Illness :  Alone.  Here for primarily an acute issue today; 2 days swelling/sore upper L eyelid.  Vision ok.       Has multiple chronic problems to consider that might have a bearing on today's issues; not an interval appointment.       Chronic/acute problems reviewed today:   1. Blepharitis of left upper eyelid, unspecified type Acute/above.     2. Essential hypertension Chronic/stable. Stable here past/no recent home blood pressures.  No significant chest pain, SOB, LE edema, orthopnea, near syncope, dizziness/light headness.   Recent Vitals       3/6/2020 3/12/2020 10/28/2020       BP:  128/76  130/78  110/76     Pulse:  112  --  98     Temp:  --  --  97.8 °F (36.6 °C)     Weight:  124 kg (272 lb 4.8 oz)  122 kg (269 lb 3.2 oz)  112 kg (248 lb)     BMI (Calculated):  39.6  39.2  36.1            3. Psychiatric illness chronic variable psychiatric disorder.  Sees psych.  Components of depression anxiety and may be psychosis.   4. Elevated fasting glucose Chronic/stable past.  No problem/pattern hypoglycemia/hyperglycemia manifest by poly- dypsia, phagia, uria, or sweats, diaphoretic episodes, syncope/near.       Has an/another acute issue today: none.    The following portions of the patient's history were reviewed and updated as appropriate: allergies, current medications, past family history, past medical history, past social history, past surgical history and problem list.      Current Outpatient Medications:   •  acetaZOLAMIDE (DIAMOX) 250 MG tablet, TAKE 2 TABLETS BY MOUTH THREE TIMES DAILY, Disp: 180 tablet, Rfl: 3  •  ALPRAZolam (XANAX) 2 MG tablet, TK 1 T PO  TID, Disp: , Rfl: 0  •  amphetamine-dextroamphetamine (ADDERALL) 20 MG tablet, Take 1 tablet by mouth 3 (Three) Times a Day., Disp: , Rfl:   •  ARIPiprazole " "(ABILIFY) 5 MG tablet, Take 1 tablet by mouth Daily., Disp: , Rfl:   •  buprenorphine-naloxone (SUBOXONE) 8-2 MG per SL tablet, Place 1 tablet under the tongue 3 (Three) Times a Day As Needed., Disp: , Rfl: 0  •  buPROPion (WELLBUTRIN) 75 MG tablet, Take 150 mg by mouth Every Morning. Dr Deluca, Disp: , Rfl:   •  Cholecalciferol (VITAMIN D) 1000 units tablet, Take 1 tablet by mouth 2 (Two) Times a Day., Disp: 60 tablet, Rfl: 5  •  Chorionic Gonadotropin (HCG IJ), Inject 500 Units as directed 2 (Two) Times a Week., Disp: , Rfl:   •  lisinopril-hydrochlorothiazide (PRINZIDE,ZESTORETIC) 20-25 MG per tablet, TAKE 1 TABLET BY MOUTH DAILY, Disp: 90 tablet, Rfl: 1  •  Lurasidone HCl 120 MG tablet, Take 1 tablet by mouth Daily. Dr Deluca, Disp: , Rfl:   •  Needle, Disp, 18G X 1\" misc, Use weekly as indicated, Disp: 4 each, Rfl: 11  •  OXcarbazepine (TRILEPTAL) 300 MG tablet, Take 2 tablets by mouth 3 (Three) Times a Day., Disp: , Rfl:   •  potassium chloride (K-DUR,KLOR-CON) 20 MEQ CR tablet, Take 1 tablet by mouth 2 (Two) Times a Day., Disp: 180 tablet, Rfl: 1  •  Saphris 10 MG sublingual tablet sublingual tablet, Place 1 tablet under the tongue Every Night., Disp: , Rfl:   •  Syringe 21G X 1\" 3 ML misc, Use weekly, Disp: 4 each, Rfl: 6  •  Testosterone Cypionate (DEPOTESTOTERONE CYPIONATE) 200 MG/ML injection, Inject 0.5 mL into the appropriate muscle as directed by prescriber 1 (One) Time Per Week. 100mg weekly, Provide(#4)18G,(#4)21Gneedles (#4)3mlsyringes q month, Disp: 4 mL, Rfl: 1  •  Vraylar 3 MG capsule capsule, Take 1 capsule by mouth Daily., Disp: , Rfl:   •  NON FORMULARY, Inject 200 mg as directed 2 (Two) Times a Week. Aromataste Inhibitor, Disp: , Rfl:     No problems with medications.  Refills if needed done    No Known Allergies    Review of Systems   Constitutional: Negative for activity change, appetite change and fever.   HENT: Negative.    Eyes: Negative for blurred vision, double vision, photophobia, " pain, discharge, redness, itching and visual disturbance.        L:upper eyelid swollen/sore   Respiratory: Negative for cough.    Cardiovascular: Negative for chest pain.   Gastrointestinal: Negative for abdominal pain.   Skin: Negative for rash.   Psychiatric/Behavioral: Negative for agitation, hallucinations and depressed mood. The patient is not nervous/anxious.        Lab Results:  Results for orders placed or performed in visit on 07/28/20   Basic metabolic panel    Specimen: Blood   Result Value Ref Range    Glucose 112 (H) 65 - 99 mg/dL    BUN 17 6 - 20 mg/dL    Creatinine 1.18 0.76 - 1.27 mg/dL    eGFR Non African Am 68 >60 mL/min/1.73    eGFR African Am 83 >60 mL/min/1.73    BUN/Creatinine Ratio 14.4 7.0 - 25.0    Sodium 138 136 - 145 mmol/L    Potassium 3.5 3.5 - 5.2 mmol/L    Chloride 102 98 - 107 mmol/L    Total CO2 26.7 22.0 - 29.0 mmol/L    Calcium 9.4 8.6 - 10.5 mg/dL       A1C:  Lab Results - Last 18 Months   Lab Units 09/03/19  0823   HEMOGLOBIN A1C % 5.6     PSA:No results for input(s): PSA in the last 97353 hours.  CBC:  Lab Results - Last 18 Months   Lab Units 09/03/19  0823   WBC x10E3/uL 6.5   HEMOGLOBIN g/dL 14.6   HEMATOCRIT % 42.5   PLATELETS x10E3/uL 220   IRON ug/dL 90      BMP/CMP:  Lab Results - Last 18 Months   Lab Units 07/28/20  1229 04/24/20  0901 03/11/20  1246 09/03/19  0823   SODIUM mmol/L 138 140  --  141   POTASSIUM mmol/L 3.5 3.5  --  3.5   CHLORIDE mmol/L 102 102  --  102   TOTAL CO2 mmol/L 26.7 26.6  --  23   GLUCOSE mg/dL 112* 128*  --  103*   BUN mg/dL 17 14  --  17   CREATININE mg/dL 1.18 1.10 1.10 1.19   EGFR IF NONAFRICN AM mL/min/1.73 68 74  --  76   EGFR IF AFRICN AM mL/min/1.73 83 90  --  88   CALCIUM mg/dL 9.4 9.1  --  9.3     HEPATIC:  Lab Results - Last 18 Months   Lab Units 09/03/19  0823   ALT (SGPT) IU/L 14   AST (SGOT) IU/L 10   ALK PHOS IU/L 58     THYROID:  Lab Results - Last 18 Months   Lab Units 11/08/19  1037 09/03/19  0823   TSH uIU/mL 3.410 3.600  "      Objective   /76 (BP Location: Left arm, Patient Position: Sitting, Cuff Size: Large Adult)   Pulse 98   Temp 97.8 °F (36.6 °C) (Infrared)   Resp 18   Ht 176.5 cm (69.5\")   Wt 112 kg (248 lb)   SpO2 98%   BMI 36.10 kg/m²   Body mass index is 36.1 kg/m².    Physical Exam  Vitals signs and nursing note reviewed.   Constitutional:       Appearance: Normal appearance. He is obese.   HENT:      Head: Normocephalic and atraumatic.      Nose: Nose normal.      Mouth/Throat:      Mouth: Mucous membranes are moist.   Eyes:      General:         Right eye: No discharge.         Left eye: No discharge.      Extraocular Movements: Extraocular movements intact.      Conjunctiva/sclera: Conjunctivae normal.      Pupils: Pupils are equal, round, and reactive to light.      Comments: Mid L upper eyelid small firmness   Neck:      Musculoskeletal: Normal range of motion and neck supple. No neck rigidity or muscular tenderness.   Cardiovascular:      Rate and Rhythm: Normal rate and regular rhythm.      Heart sounds: Normal heart sounds. No murmur.   Pulmonary:      Effort: Pulmonary effort is normal.      Breath sounds: Normal breath sounds.   Abdominal:      Palpations: Abdomen is soft.   Musculoskeletal: Normal range of motion.   Neurological:      General: No focal deficit present.      Mental Status: He is alert.      Gait: Gait normal.   Psychiatric:         Mood and Affect: Mood normal.      Comments: Flat affect         Assessment/Plan     1. Blepharitis of left upper eyelid, unspecified type    2. Essential hypertension    3. Psychiatric illness    4. Elevated fasting glucose        Rx: reviewed/changes:  New Medications Ordered This Visit   Medications   • neomycin-polymyxin-dexamethasone (MAXITROL) 0.1 % ophthalmic suspension     Sig: Administer 1 drop into the left eye 4 (Four) Times a Day.     Dispense:  5 mL     Refill:  0     Tell patient use only one week; if no better to let us know "     LAB/Testing/Referrals: reviewed/orders:   Today:   No orders of the defined types were placed in this encounter.    Chronic/recurrent labs above or change to:   Same; due here     Discussions:   Try eye drops one week; no better refer to ophthalmology (CURTIS bass)  Body mass index is 36.1 kg/m².  Patient's Body mass index is 36.1 kg/m². BMI is above normal parameters. Recommendations include: exercise counseling, nutrition counseling and reminded.      Tobacco use reviewed:    Lincoln Ornelas  reports that he quit smoking 7 days ago. His smoking use included cigarettes. He started smoking about 30 years ago. He has a 50.00 pack-year smoking history. His smokeless tobacco use includes chew.. I have educated him on the risk of diseases from using tobacco products such as cancer, COPD and heart disease.         There are no Patient Instructions on file for this visit.    Follow up: Return for lab;, Dr Ascencio-at least 6m.  Future Appointments   Date Time Provider Department Center   11/10/2020  8:30 AM LAB [facility] None   4/29/2021  1:00 PM Josafat Ascencio MD [facility] None     none

## 2020-11-10 DIAGNOSIS — I10 ESSENTIAL HYPERTENSION: Primary | ICD-10-CM

## 2020-11-10 DIAGNOSIS — E55.9 VITAMIN D DEFICIENCY: ICD-10-CM

## 2020-11-10 DIAGNOSIS — R73.01 ELEVATED FASTING GLUCOSE: ICD-10-CM

## 2020-11-10 DIAGNOSIS — Z00.00 WELLNESS EXAMINATION: ICD-10-CM

## 2020-12-07 ENCOUNTER — OFFICE VISIT (OUTPATIENT)
Dept: FAMILY MEDICINE CLINIC | Facility: CLINIC | Age: 40
End: 2020-12-07

## 2020-12-07 VITALS
HEIGHT: 70 IN | OXYGEN SATURATION: 99 % | HEART RATE: 78 BPM | SYSTOLIC BLOOD PRESSURE: 112 MMHG | WEIGHT: 243 LBS | DIASTOLIC BLOOD PRESSURE: 78 MMHG | BODY MASS INDEX: 34.79 KG/M2 | RESPIRATION RATE: 16 BRPM | TEMPERATURE: 97.6 F

## 2020-12-07 DIAGNOSIS — K45.8 OTHER SPECIFIED ABDOMINAL HERNIA WITHOUT OBSTRUCTION OR GANGRENE: Primary | ICD-10-CM

## 2020-12-07 PROCEDURE — 99213 OFFICE O/P EST LOW 20 MIN: CPT | Performed by: NURSE PRACTITIONER

## 2020-12-07 NOTE — PROGRESS NOTES
"Subjective   Chief Complaint:  Evaluation abdominal hernia    History of Present Illness:  This 40 y.o. male was seen in the office today. Ports 2 days ago a nonpainful bulge started in his mid abdomen. He presents today with what appears to be an abdominal hernia.    No Known Allergies   Current Outpatient Medications on File Prior to Visit   Medication Sig   • acetaZOLAMIDE (DIAMOX) 250 MG tablet TAKE 2 TABLETS BY MOUTH THREE TIMES DAILY   • ALPRAZolam (XANAX) 2 MG tablet TK 1 T PO  TID   • amphetamine-dextroamphetamine (ADDERALL) 20 MG tablet Take 1 tablet by mouth 3 (Three) Times a Day.   • ARIPiprazole (ABILIFY) 5 MG tablet Take 1 tablet by mouth Daily.   • buprenorphine-naloxone (SUBOXONE) 8-2 MG per SL tablet Place 1 tablet under the tongue 3 (Three) Times a Day As Needed.   • buPROPion (WELLBUTRIN) 75 MG tablet Take 150 mg by mouth Every Morning. Dr Deluca   • Cholecalciferol (VITAMIN D) 1000 units tablet Take 1 tablet by mouth 2 (Two) Times a Day.   • Chorionic Gonadotropin (HCG IJ) Inject 500 Units as directed 2 (Two) Times a Week.   • lisinopril-hydrochlorothiazide (PRINZIDE,ZESTORETIC) 20-25 MG per tablet TAKE 1 TABLET BY MOUTH DAILY   • Lurasidone HCl 120 MG tablet Take 1 tablet by mouth Daily. Dr Deluca   • Needle, Disp, 18G X 1\" misc Use weekly as indicated   • NON FORMULARY Inject 200 mg as directed 2 (Two) Times a Week. Aromataste Inhibitor   • OXcarbazepine (TRILEPTAL) 300 MG tablet Take 2 tablets by mouth 3 (Three) Times a Day.   • potassium chloride (K-DUR,KLOR-CON) 20 MEQ CR tablet Take 1 tablet by mouth 2 (Two) Times a Day.   • Saphris 10 MG sublingual tablet sublingual tablet Place 1 tablet under the tongue Every Night.   • Syringe 21G X 1\" 3 ML misc Use weekly   • Testosterone Cypionate (DEPOTESTOTERONE CYPIONATE) 200 MG/ML injection Inject 0.5 mL into the appropriate muscle as directed by prescriber 1 (One) Time Per Week. 100mg weekly, Provide(#4)18G,(#4)21Gneedles (#4)3mlsyringes q month   • " Vraylar 3 MG capsule capsule Take 1 capsule by mouth Daily.   • [DISCONTINUED] neomycin-polymyxin-dexamethasone (MAXITROL) 0.1 % ophthalmic suspension Administer 1 drop into the left eye 4 (Four) Times a Day.     No current facility-administered medications on file prior to visit.       Past Medical, Surgical, Social, and Family History:  Past Medical History:   Diagnosis Date   • Anxiety    • Depression    • Hypertension    • Intracranial hypertension    • Mood disorder (CMS/HCC)    • Pituitary mass (CMS/HCC)    • PTSD (post-traumatic stress disorder)    • Spinal headache      Past Surgical History:   Procedure Laterality Date   • NO PAST SURGERIES     • VASECTOMY N/A 2018    Procedure: VASECTOMY;  Surgeon: Ion Yanez MD;  Location: [facility];  Service: Urology     Social History     Socioeconomic History   • Marital status:      Spouse name: Keely   • Number of children: 2   • Years of education: 12   • Highest education level: Not on file   Occupational History   • Occupation: disabled Marine     Comment: working with [employer]   Tobacco Use   • Smoking status: Former Smoker     Packs/day: 2.00     Years: 25.00     Pack years: 50.00     Types: Cigarettes     Start date: 1989     Quit date: 10/21/2020     Years since quittin.1   • Smokeless tobacco: Current User     Types: Chew   Substance and Sexual Activity   • Alcohol use: No   • Drug use: No   • Sexual activity: Not Currently     Partners: Male     Family History   Problem Relation Age of Onset   • Gonadal disorder Neg Hx      Review of Systems   Constitutional: Negative for chills and fever.   Respiratory: Negative for cough and shortness of breath.    Cardiovascular: Negative for chest pain and palpitations.   Gastrointestinal: Negative for constipation, diarrhea and vomiting.   Musculoskeletal: Negative for arthralgias and myalgias.     Objective   Physical Exam  Vitals signs and nursing note reviewed.   Constitutional:        "General: He is not in acute distress.     Appearance: He is not diaphoretic.   HENT:      Head: Normocephalic and atraumatic.      Nose: Nose normal.   Eyes:      Conjunctiva/sclera: Conjunctivae normal.      Pupils: Pupils are equal, round, and reactive to light.   Neck:      Musculoskeletal: Normal range of motion and neck supple.      Thyroid: No thyromegaly.      Vascular: No JVD.      Trachea: No tracheal deviation.   Cardiovascular:      Rate and Rhythm: Normal rate and regular rhythm.      Heart sounds: Normal heart sounds. No murmur. No friction rub. No gallop.    Pulmonary:      Effort: Pulmonary effort is normal. No respiratory distress.      Breath sounds: Normal breath sounds. No wheezing.   Abdominal:      General: Bowel sounds are normal.      Palpations: Abdomen is soft.      Tenderness: There is no abdominal tenderness. There is no guarding.      Hernia: A hernia (mid line superior to umbilicus) is present. There is no hernia in the left inguinal area or right inguinal area.   Musculoskeletal: Normal range of motion.         General: No tenderness or deformity.   Lymphadenopathy:      Cervical: No cervical adenopathy.   Skin:     General: Skin is warm and dry.      Capillary Refill: Capillary refill takes less than 2 seconds.   Neurological:      Mental Status: He is alert and oriented to person, place, and time.   Psychiatric:         Behavior: Behavior normal.         Thought Content: Thought content normal.         Judgment: Judgment normal.     /78 (BP Location: Left arm)   Pulse 78   Temp 97.6 °F (36.4 °C) (Temporal)   Resp 16   Ht 176.5 cm (69.5\")   Wt 110 kg (243 lb)   SpO2 99%   BMI 35.37 kg/m²     Assessment/Plan   Diagnoses and all orders for this visit:    1. Other specified abdominal hernia without obstruction or gangrene (Primary)  -     Ambulatory Referral to General Surgery    Discussion:  Advised and educated plan of care. Surgical consultation, advised no lifting over 10 " pounds until seen by the surgeon. I do need to get him back on track with primary care labs and follows. He is agreeable to come in for 6-month appointment.    Follow-up:  Return for Follow-up in 6 Months - Lab + HI Thomas.    Note e-Signed by HI Bird on 12/07/2020 at 11:53 CST

## 2020-12-15 ENCOUNTER — TRANSCRIBE ORDERS (OUTPATIENT)
Dept: PHYSICAL THERAPY | Facility: CLINIC | Age: 40
End: 2020-12-15

## 2020-12-15 DIAGNOSIS — M62.00 SEPARATION OF MUSCLE (NONTRAUMATIC), UNSPECIFIED SITE: Primary | ICD-10-CM

## 2021-01-15 RX ORDER — POTASSIUM CHLORIDE 20 MEQ/1
TABLET, EXTENDED RELEASE ORAL
Qty: 180 TABLET | Refills: 1 | Status: SHIPPED | OUTPATIENT
Start: 2021-01-15 | End: 2022-01-10

## 2021-03-16 DIAGNOSIS — I10 HYPERTENSION, UNSPECIFIED TYPE: ICD-10-CM

## 2021-03-16 RX ORDER — LISINOPRIL AND HYDROCHLOROTHIAZIDE 25; 20 MG/1; MG/1
1 TABLET ORAL DAILY
Qty: 90 TABLET | Refills: 1 | Status: SHIPPED | OUTPATIENT
Start: 2021-03-16 | End: 2021-12-13

## 2021-03-16 NOTE — TELEPHONE ENCOUNTER
Requested Prescriptions     Pending Prescriptions Disp Refills   • lisinopril-hydrochlorothiazide (PRINZIDE,ZESTORETIC) 20-25 MG per tablet [Pharmacy Med Name: LISINOPRIL-HCTZ 20/25MG TABLETS] 90 tablet 1     Sig: TAKE 1 TABLET BY MOUTH DAILY

## 2021-03-23 ENCOUNTER — HOSPITAL ENCOUNTER (INPATIENT)
Age: 41
LOS: 1 days | Discharge: PSYCHIATRIC OR SUBSTANCE USE FACILITY | DRG: 683 | End: 2021-03-24
Attending: EMERGENCY MEDICINE
Payer: COMMERCIAL

## 2021-03-23 DIAGNOSIS — F23 ACUTE PSYCHOSIS (HCC): Primary | ICD-10-CM

## 2021-03-23 DIAGNOSIS — F19.10 POLYSUBSTANCE ABUSE (HCC): ICD-10-CM

## 2021-03-23 DIAGNOSIS — N17.9 AKI (ACUTE KIDNEY INJURY) (HCC): ICD-10-CM

## 2021-03-23 PROBLEM — F31.13 BIPOLAR DISORDER WITH SEVERE MANIA (HCC): Status: ACTIVE | Noted: 2021-03-23

## 2021-03-23 PROBLEM — G93.2 PSEUDOTUMOR CEREBRI: Status: ACTIVE | Noted: 2017-01-11

## 2021-03-23 PROBLEM — I10 HYPERTENSION: Status: ACTIVE | Noted: 2021-03-23

## 2021-03-23 LAB
ACETAMINOPHEN LEVEL: <15 UG/ML
ALBUMIN SERPL-MCNC: 4.6 G/DL (ref 3.5–5.2)
ALP BLD-CCNC: 63 U/L (ref 40–130)
ALT SERPL-CCNC: 16 U/L (ref 5–41)
AMPHETAMINE SCREEN, URINE: POSITIVE
ANION GAP SERPL CALCULATED.3IONS-SCNC: 23 MMOL/L (ref 7–19)
AST SERPL-CCNC: 15 U/L (ref 5–40)
BACTERIA: NEGATIVE /HPF
BARBITURATE SCREEN URINE: NEGATIVE
BASOPHILS ABSOLUTE: 0.1 K/UL (ref 0–0.2)
BASOPHILS RELATIVE PERCENT: 0.6 % (ref 0–1)
BENZODIAZEPINE SCREEN, URINE: POSITIVE
BILIRUB SERPL-MCNC: <0.2 MG/DL (ref 0.2–1.2)
BILIRUBIN URINE: NEGATIVE
BLOOD, URINE: NEGATIVE
BUN BLDV-MCNC: 21 MG/DL (ref 6–20)
CALCIUM SERPL-MCNC: 9.6 MG/DL (ref 8.6–10)
CANNABINOID SCREEN URINE: POSITIVE
CHLORIDE BLD-SCNC: 103 MMOL/L (ref 98–111)
CLARITY: CLEAR
CO2: 18 MMOL/L (ref 22–29)
COCAINE METABOLITE SCREEN URINE: NEGATIVE
COLOR: YELLOW
CREAT SERPL-MCNC: 1.6 MG/DL (ref 0.5–1.2)
CRYSTALS, UA: ABNORMAL /HPF
EOSINOPHILS ABSOLUTE: 0.2 K/UL (ref 0–0.6)
EOSINOPHILS RELATIVE PERCENT: 1 % (ref 0–5)
EPITHELIAL CELLS, UA: 1 /HPF (ref 0–5)
ETHANOL: <10 MG/DL (ref 0–0.08)
GFR AFRICAN AMERICAN: 58
GFR NON-AFRICAN AMERICAN: 48
GLUCOSE BLD-MCNC: 212 MG/DL (ref 74–109)
GLUCOSE URINE: NEGATIVE MG/DL
HCT VFR BLD CALC: 55.1 % (ref 42–52)
HEMOGLOBIN: 17.7 G/DL (ref 14–18)
HYALINE CASTS: 6 /HPF (ref 0–8)
IMMATURE GRANULOCYTES #: 0.1 K/UL
KETONES, URINE: NEGATIVE MG/DL
LEUKOCYTE ESTERASE, URINE: NEGATIVE
LYMPHOCYTES ABSOLUTE: 3.5 K/UL (ref 1.1–4.5)
LYMPHOCYTES RELATIVE PERCENT: 22.4 % (ref 20–40)
Lab: ABNORMAL
MCH RBC QN AUTO: 29.7 PG (ref 27–31)
MCHC RBC AUTO-ENTMCNC: 32.1 G/DL (ref 33–37)
MCV RBC AUTO: 92.6 FL (ref 80–94)
MONOCYTES ABSOLUTE: 1.1 K/UL (ref 0–0.9)
MONOCYTES RELATIVE PERCENT: 7 % (ref 0–10)
NEUTROPHILS ABSOLUTE: 10.6 K/UL (ref 1.5–7.5)
NEUTROPHILS RELATIVE PERCENT: 68.3 % (ref 50–65)
NITRITE, URINE: NEGATIVE
OPIATE SCREEN URINE: NEGATIVE
PDW BLD-RTO: 12.3 % (ref 11.5–14.5)
PH UA: 6.5 (ref 5–8)
PLATELET # BLD: 263 K/UL (ref 130–400)
PMV BLD AUTO: 11.4 FL (ref 9.4–12.4)
POTASSIUM SERPL-SCNC: 4.2 MMOL/L (ref 3.5–5)
PROTEIN UA: NEGATIVE MG/DL
RBC # BLD: 5.95 M/UL (ref 4.7–6.1)
RBC UA: 6 /HPF (ref 0–4)
SALICYLATE, SERUM: <3 MG/DL (ref 3–10)
SARS-COV-2, NAAT: NOT DETECTED
SODIUM BLD-SCNC: 144 MMOL/L (ref 136–145)
SPECIFIC GRAVITY UA: 1.01 (ref 1–1.03)
TOTAL CK: 197 U/L (ref 39–308)
TOTAL PROTEIN: 7 G/DL (ref 6.6–8.7)
UROBILINOGEN, URINE: 0.2 E.U./DL
WBC # BLD: 15.5 K/UL (ref 4.8–10.8)
WBC UA: 3 /HPF (ref 0–5)

## 2021-03-23 PROCEDURE — 1210000000 HC MED SURG R&B

## 2021-03-23 PROCEDURE — 80143 DRUG ASSAY ACETAMINOPHEN: CPT

## 2021-03-23 PROCEDURE — 81001 URINALYSIS AUTO W/SCOPE: CPT

## 2021-03-23 PROCEDURE — 99283 EMERGENCY DEPT VISIT LOW MDM: CPT

## 2021-03-23 PROCEDURE — 87635 SARS-COV-2 COVID-19 AMP PRB: CPT

## 2021-03-23 PROCEDURE — 2500000003 HC RX 250 WO HCPCS: Performed by: PHYSICIAN ASSISTANT

## 2021-03-23 PROCEDURE — 80053 COMPREHEN METABOLIC PANEL: CPT

## 2021-03-23 PROCEDURE — 85025 COMPLETE CBC W/AUTO DIFF WBC: CPT

## 2021-03-23 PROCEDURE — 6360000002 HC RX W HCPCS: Performed by: EMERGENCY MEDICINE

## 2021-03-23 PROCEDURE — 96372 THER/PROPH/DIAG INJ SC/IM: CPT

## 2021-03-23 PROCEDURE — 82077 ASSAY SPEC XCP UR&BREATH IA: CPT

## 2021-03-23 PROCEDURE — 82550 ASSAY OF CK (CPK): CPT

## 2021-03-23 PROCEDURE — 2580000003 HC RX 258: Performed by: EMERGENCY MEDICINE

## 2021-03-23 PROCEDURE — 80307 DRUG TEST PRSMV CHEM ANLYZR: CPT

## 2021-03-23 PROCEDURE — 80179 DRUG ASSAY SALICYLATE: CPT

## 2021-03-23 PROCEDURE — 2580000003 HC RX 258: Performed by: PHYSICIAN ASSISTANT

## 2021-03-23 PROCEDURE — 36415 COLL VENOUS BLD VENIPUNCTURE: CPT

## 2021-03-23 RX ORDER — ONDANSETRON 2 MG/ML
4 INJECTION INTRAMUSCULAR; INTRAVENOUS EVERY 6 HOURS PRN
Status: DISCONTINUED | OUTPATIENT
Start: 2021-03-23 | End: 2021-03-24 | Stop reason: HOSPADM

## 2021-03-23 RX ORDER — 0.9 % SODIUM CHLORIDE 0.9 %
2000 INTRAVENOUS SOLUTION INTRAVENOUS ONCE
Status: COMPLETED | OUTPATIENT
Start: 2021-03-23 | End: 2021-03-23

## 2021-03-23 RX ORDER — OXCARBAZEPINE 300 MG/1
300 TABLET, FILM COATED ORAL 3 TIMES DAILY
Status: ON HOLD | COMMUNITY
End: 2021-03-26 | Stop reason: HOSPADM

## 2021-03-23 RX ORDER — ACETAMINOPHEN 325 MG/1
650 TABLET ORAL EVERY 6 HOURS PRN
Status: DISCONTINUED | OUTPATIENT
Start: 2021-03-23 | End: 2021-03-24 | Stop reason: HOSPADM

## 2021-03-23 RX ORDER — ZIPRASIDONE MESYLATE 20 MG/ML
20 INJECTION, POWDER, LYOPHILIZED, FOR SOLUTION INTRAMUSCULAR ONCE
Status: COMPLETED | OUTPATIENT
Start: 2021-03-23 | End: 2021-03-23

## 2021-03-23 RX ORDER — ASENAPINE 10 MG/1
20 TABLET SUBLINGUAL NIGHTLY
COMMUNITY

## 2021-03-23 RX ORDER — OXCARBAZEPINE 600 MG/1
600 TABLET, FILM COATED ORAL NIGHTLY
Status: ON HOLD | COMMUNITY
End: 2021-03-26 | Stop reason: HOSPADM

## 2021-03-23 RX ORDER — PROMETHAZINE HYDROCHLORIDE 12.5 MG/1
12.5 TABLET ORAL EVERY 6 HOURS PRN
Status: DISCONTINUED | OUTPATIENT
Start: 2021-03-23 | End: 2021-03-24 | Stop reason: HOSPADM

## 2021-03-23 RX ORDER — SODIUM CHLORIDE 0.9 % (FLUSH) 0.9 %
10 SYRINGE (ML) INJECTION EVERY 12 HOURS SCHEDULED
Status: DISCONTINUED | OUTPATIENT
Start: 2021-03-23 | End: 2021-03-24 | Stop reason: HOSPADM

## 2021-03-23 RX ORDER — ACETAMINOPHEN 650 MG/1
650 SUPPOSITORY RECTAL EVERY 6 HOURS PRN
Status: DISCONTINUED | OUTPATIENT
Start: 2021-03-23 | End: 2021-03-24 | Stop reason: HOSPADM

## 2021-03-23 RX ORDER — 0.9 % SODIUM CHLORIDE 0.9 %
1000 INTRAVENOUS SOLUTION INTRAVENOUS ONCE
Status: DISCONTINUED | OUTPATIENT
Start: 2021-03-23 | End: 2021-03-23

## 2021-03-23 RX ORDER — HEPARIN SODIUM 5000 [USP'U]/ML
5000 INJECTION, SOLUTION INTRAVENOUS; SUBCUTANEOUS EVERY 8 HOURS SCHEDULED
Status: DISCONTINUED | OUTPATIENT
Start: 2021-03-23 | End: 2021-03-24 | Stop reason: HOSPADM

## 2021-03-23 RX ORDER — SODIUM CHLORIDE 0.9 % (FLUSH) 0.9 %
10 SYRINGE (ML) INJECTION PRN
Status: DISCONTINUED | OUTPATIENT
Start: 2021-03-23 | End: 2021-03-24 | Stop reason: HOSPADM

## 2021-03-23 RX ORDER — HYDRALAZINE HYDROCHLORIDE 25 MG/1
25 TABLET, FILM COATED ORAL EVERY 8 HOURS PRN
Status: DISCONTINUED | OUTPATIENT
Start: 2021-03-23 | End: 2021-03-24 | Stop reason: HOSPADM

## 2021-03-23 RX ORDER — TESTOSTERONE CYPIONATE 200 MG/ML
100 INJECTION INTRAMUSCULAR WEEKLY
COMMUNITY

## 2021-03-23 RX ADMIN — ZIPRASIDONE MESYLATE 20 MG: 20 INJECTION, POWDER, LYOPHILIZED, FOR SOLUTION INTRAMUSCULAR at 11:55

## 2021-03-23 RX ADMIN — SODIUM CHLORIDE 2000 ML: 9 INJECTION, SOLUTION INTRAVENOUS at 13:29

## 2021-03-23 RX ADMIN — SODIUM CHLORIDE, PRESERVATIVE FREE 10 ML: 5 INJECTION INTRAVENOUS at 21:17

## 2021-03-23 RX ADMIN — SODIUM BICARBONATE: 84 INJECTION INTRAVENOUS at 17:23

## 2021-03-23 NOTE — ED NOTES
Bed: 16  Expected date:   Expected time:   Means of arrival:   Comments:  [place] combative patient     Cisco Miller RN  03/23/21 [id number]

## 2021-03-23 NOTE — H&P
[facility]  Hospitalist - History & Physical      PCP: Jessica Maciel MD    Date of Admission: 3/23/2021    Date of Service: 3/23/2021    Chief Complaint:  Manic episode     History Of Present Illness: The patient is a 38 y.o. male with PMH bipolar disorder, HTN, PTSD, antisocial personality disorder who presented to [facility] ED via police and in handcuffs. He was found running from police near a school, reported to have taken his clothes off and ran from authorities. Patient's spouse states he has had increased mood swings over the past few weeks. Has taken his normal medications with exception of Haldol. Patient reported to have been agitated in ED and received Geodon. Currently he is drowsy, no complaints and cooperative. Past Medical History:        Diagnosis Date    Acute kidney injury ([facility] [address] 3/23/2021    Adult ADHD     Antisocial personality disorder (CODE)     Bipolar 1 disorder, depressed ([facility] [address]     Hypertension     PTSD (post-traumatic stress disorder)      Past Surgical History:    No past surgical history on file. Home Medications:  Prior to Admission medications    Medication Sig Start Date End Date Taking? Authorizing Provider   OLANZapine (ZYPREXA) 15 MG tablet  1/19/17   Historical Provider, MD   polyethylene glycol (GLYCOLAX) powder  12/9/16   Historical Provider, MD   acetaZOLAMIDE (DIAMOX) 250 MG tablet  3/9/17   Historical Provider, MD   ALPRAZolam Kalli Senior) 0.5 MG tablet  2/13/17   Historical Provider, MD   LATUDA 40 MG TABS tablet  3/9/17   Historical Provider, MD   ketorolac (TORADOL) 10 MG tablet  12/15/16   Historical Provider, MD   busPIRone (BUSPAR) 15 MG tablet Take 15 mg by mouth 3 times daily    Historical Provider, MD   amphetamine-dextroamphetamine (ADDERALL) 20 MG tablet Take 20 mg by mouth daily .     Historical Provider, MD   buprenorphine-naloxone (SUBOXONE) 8-2 MG SUBL SL tablet Place 1 tablet under the tongue 2 times daily    Historical Provider, MD intact. Ears: normal external ears and nose, throat without exudate  Neck: no adenopathy, no carotid bruit, no JVD, supple, symmetrical, trachea midline  Lungs: clear to auscultation bilaterally,no rales or wheezes   Heart: regular rate and rhythm, S1, S2 normal, no murmur  Abdomen:soft, non-tender; non-distended, normal bowel sounds no masses, no organomegaly  Extremities:No lower extremity edema,  No erythema, no tenderness to palpation  Skin: Scattered abrasions/scratches to head, neck, torso, upper extremities  Lymphatic: No palpable lymph node enlargment  Neurologic: Awake, oriented x 3, normal strength and tone. Normal symmetric reflexes. Mental status: Alert, oriented, thought content appropriate  Cranial nerves:II-XII Grossly intact Sensory: normal Motor:grossly normal  Psychiatric: Drowsy, flat affect, calm     Diagnostic Data:  CBC:  Recent Labs     03/23/21  1208   WBC 15.5*   HGB 17.7   HCT 55.1*        BMP:  Recent Labs     03/23/21  1208      K 4.2      CO2 18*   BUN 21*   CREATININE 1.6*   CALCIUM 9.6     Recent Labs     03/23/21  1208   AST 15   ALT 16   BILITOT <0.2   ALKPHOS 63     Cardiac Enzymes:   Recent Labs     03/23/21  1208   CKTOTAL 197     Assessment/Plan:  Principal Problem:    Bipolar disorder with severe giacomo (HCC)-received Geodon in ED, Psychiatry consulted. Patient spouse states he has been taking medications as prescribed. Has not had to use IM Haldol recently.  Hold oral meds for now and verify dosages with patient's pharmacy    Active Problems:    Acute kidney injury (HCC)-suspect 2/2 dehydration, hold Lisinopril/HCTZ, continue IVF resuscitation      Pseudotumor cerebri-noted, hold Diamox for now      Hypertension-monitor, prn hydralazine, hold Lisinopril/HTCZ    Further orders per clinical course/attending    Signed:  Cliff Perry PA-C

## 2021-03-23 NOTE — ED PROVIDER NOTES
daily .    BUPRENORPHINE-NALOXONE (SUBOXONE) 8-2 MG SUBL SL TABLET    Place 1 tablet under the tongue 2 times daily    BUSPIRONE (BUSPAR) 15 MG TABLET    Take 15 mg by mouth 3 times daily    KETOROLAC (TORADOL) 10 MG TABLET        LATUDA 40 MG TABS TABLET        LISINOPRIL-HYDROCHLOROTHIAZIDE (PRINZIDE;ZESTORETIC) 20-25 MG PER TABLET    Take 1 tablet by mouth daily    OLANZAPINE (ZYPREXA) 15 MG TABLET        POLYETHYLENE GLYCOL (GLYCOLAX) POWDER           ALLERGIES     Patient has no known allergies. FAMILY HISTORY     No family history on file.        SOCIAL HISTORY       Social History     Socioeconomic History    Marital status:      Spouse name: Not on file    Number of children: Not on file    Years of education: Not on file    Highest education level: Not on file   Occupational History    Not on file   Social Needs    Financial resource strain: Not on file    Food insecurity     Worry: Not on file     Inability: Not on file    Transportation needs     Medical: Not on file     Non-medical: Not on file   Tobacco Use    Smoking status: Current Every Day Smoker     Packs/day: 2.00     Types: Cigarettes   Substance and Sexual Activity    Alcohol use: No    Drug use: Yes     Comment: May 4th used meth; first time in 5 1/2 years    Sexual activity: Not on file   Lifestyle    Physical activity     Days per week: Not on file     Minutes per session: Not on file    Stress: Not on file   Relationships    Social connections     Talks on phone: Not on file     Gets together: Not on file     Attends Sikhism service: Not on file     Active member of club or organization: Not on file     Attends meetings of clubs or organizations: Not on file     Relationship status: Not on file    Intimate partner violence     Fear of current or ex partner: Not on file     Emotionally abused: Not on file     Physically abused: Not on file     Forced sexual activity: Not on file   Other Topics Concern    Not on Neutrophils Absolute 10.6 (*)     Monocytes Absolute 1.10 (*)     All other components within normal limits   COMPREHENSIVE METABOLIC PANEL - Abnormal; Notable for the following components:    CO2 18 (*)     Anion Gap 23 (*)     Glucose 212 (*)     BUN 21 (*)     CREATININE 1.6 (*)     GFR Non- 48 (*)     GFR  58 (*)     All other components within normal limits   URINE DRUG SCREEN - Abnormal; Notable for the following components:    Amphetamine Screen, Urine Positive (*)     Benzodiazepine Screen, Urine Positive (*)     Cannabinoid Scrn, Ur Positive (*)     All other components within normal limits   COVID-19, RAPID   ACETAMINOPHEN LEVEL   ETHANOL   SALICYLATE LEVEL   CK       All other labs were within normal range or not returned as of this dictation. EMERGENCY DEPARTMENT COURSE and DIFFERENTIAL DIAGNOSIS/MDM:   Vitals:    Vitals:    03/23/21 1215   BP: 90/64   Pulse: 118   Resp: 20   Temp: 97.9 °F (36.6 °C)   TempSrc: Oral   SpO2: 96%   Weight: 250 lb (113.4 kg)   Height: 5' 10\" (1.778 m)       MDM  Number of Diagnoses or Management Options     Amount and/or Complexity of Data Reviewed  Clinical lab tests: ordered and reviewed  Discuss the patient with other providers: yes    Critical Care  Total time providing critical care: 30-74 minutes      Pt diaphoretic, paranoid, psychotic, likely drug induced given his hx, given geodon 20mg as handcuffed and was combative with police somewhat, has no sign of any trauma though, handcuffs have been removed, placed in restraints for now, police still present monitoring as well 91 21 06    Pt calm and cooperative out of restraints now, TEMO otherwise labs ok, polysubstance abuse, will d/w hospitalist for admission 1253    Pt remains calm, aox3 now, denies si/hi, d/w Dr. Rhonda Mcclendon for admission        CRITICAL CARE TIME   Total Critical Care time was 35 minutes, excluding separately reportable procedures.   There was a high probability of clinically significant/life threatening deterioration in the patient's condition which required my urgent intervention. CONSULTS:  IP CONSULT TO PSYCHIATRY    PROCEDURES:  Unless otherwise noted below, none     Procedures    FINAL IMPRESSION      1. Acute psychosis ([facility]    2. TEMO (acute kidney injury) ([facility]    3. Polysubstance abuse ([facility]          DISPOSITION/PLAN   DISPOSITION        PATIENT REFERRED TO:  No follow-up provider specified.     DISCHARGE MEDICATIONS:  New Prescriptions    No medications on file          (Please note that portions of this note were completed with a voice recognition program.  Efforts were made to edit thedictations but occasionally words are mis-transcribed.)    Tomasz Shannon MD (electronically signed)  Attending Emergency Physician        Hakan Harman MD  03/23/21 6784

## 2021-03-23 NOTE — PROGRESS NOTES
Pt. states in conversation he relapsed today on drugs. When asked what he had taken, he admitted to taking \"methanfetamines and other types of stuff. \" Will continue to monitor.

## 2021-03-24 ENCOUNTER — HOSPITAL ENCOUNTER (INPATIENT)
Age: 41
LOS: 2 days | Discharge: HOME | DRG: 897 | End: 2021-03-26
Attending: PSYCHIATRY & NEUROLOGY | Admitting: PSYCHIATRY & NEUROLOGY
Payer: COMMERCIAL

## 2021-03-24 VITALS
BODY MASS INDEX: 35.79 KG/M2 | RESPIRATION RATE: 20 BRPM | TEMPERATURE: 97.2 F | WEIGHT: 250 LBS | HEIGHT: 70 IN | DIASTOLIC BLOOD PRESSURE: 72 MMHG | OXYGEN SATURATION: 96 % | HEART RATE: 84 BPM | SYSTOLIC BLOOD PRESSURE: 110 MMHG

## 2021-03-24 PROBLEM — F39 PSYCHOSIS, AFFECTIVE (HCC): Status: ACTIVE | Noted: 2021-03-24

## 2021-03-24 LAB
ALBUMIN SERPL-MCNC: 4.2 G/DL (ref 3.5–5.2)
ALP BLD-CCNC: 57 U/L (ref 40–130)
ALT SERPL-CCNC: 13 U/L (ref 5–41)
ANION GAP SERPL CALCULATED.3IONS-SCNC: 11 MMOL/L (ref 7–19)
AST SERPL-CCNC: 15 U/L (ref 5–40)
BILIRUB SERPL-MCNC: 0.3 MG/DL (ref 0.2–1.2)
BUN BLDV-MCNC: 12 MG/DL (ref 6–20)
CALCIUM SERPL-MCNC: 9 MG/DL (ref 8.6–10)
CHLORIDE BLD-SCNC: 103 MMOL/L (ref 98–111)
CO2: 24 MMOL/L (ref 22–29)
CREAT SERPL-MCNC: 1 MG/DL (ref 0.5–1.2)
GFR AFRICAN AMERICAN: >59
GFR NON-AFRICAN AMERICAN: >60
GLUCOSE BLD-MCNC: 105 MG/DL (ref 74–109)
HCT VFR BLD CALC: 50 % (ref 42–52)
HEMOGLOBIN: 16.2 G/DL (ref 14–18)
MCH RBC QN AUTO: 29.2 PG (ref 27–31)
MCHC RBC AUTO-ENTMCNC: 32.4 G/DL (ref 33–37)
MCV RBC AUTO: 90.3 FL (ref 80–94)
PDW BLD-RTO: 12.6 % (ref 11.5–14.5)
PLATELET # BLD: 189 K/UL (ref 130–400)
PMV BLD AUTO: 11 FL (ref 9.4–12.4)
POTASSIUM REFLEX MAGNESIUM: 3.6 MMOL/L (ref 3.5–5)
RBC # BLD: 5.54 M/UL (ref 4.7–6.1)
SODIUM BLD-SCNC: 138 MMOL/L (ref 136–145)
TOTAL PROTEIN: 6 G/DL (ref 6.6–8.7)
WBC # BLD: 9.9 K/UL (ref 4.8–10.8)

## 2021-03-24 PROCEDURE — 80053 COMPREHEN METABOLIC PANEL: CPT

## 2021-03-24 PROCEDURE — 1240000000 HC EMOTIONAL WELLNESS R&B

## 2021-03-24 PROCEDURE — 2580000003 HC RX 258: Performed by: PHYSICIAN ASSISTANT

## 2021-03-24 PROCEDURE — 6360000002 HC RX W HCPCS

## 2021-03-24 PROCEDURE — 6370000000 HC RX 637 (ALT 250 FOR IP): Performed by: NURSE PRACTITIONER

## 2021-03-24 PROCEDURE — 6360000002 HC RX W HCPCS: Performed by: PSYCHIATRY & NEUROLOGY

## 2021-03-24 PROCEDURE — 85027 COMPLETE CBC AUTOMATED: CPT

## 2021-03-24 PROCEDURE — 36415 COLL VENOUS BLD VENIPUNCTURE: CPT

## 2021-03-24 PROCEDURE — 6370000000 HC RX 637 (ALT 250 FOR IP): Performed by: HOSPITALIST

## 2021-03-24 PROCEDURE — 2500000003 HC RX 250 WO HCPCS: Performed by: PHYSICIAN ASSISTANT

## 2021-03-24 PROCEDURE — 99252 IP/OBS CONSLTJ NEW/EST SF 35: CPT | Performed by: PSYCHIATRY & NEUROLOGY

## 2021-03-24 RX ORDER — HYDRALAZINE HYDROCHLORIDE 25 MG/1
25 TABLET, FILM COATED ORAL EVERY 8 HOURS PRN
Status: CANCELLED | OUTPATIENT
Start: 2021-03-24

## 2021-03-24 RX ORDER — LORAZEPAM 2 MG/ML
2 INJECTION INTRAMUSCULAR EVERY 6 HOURS PRN
Status: DISCONTINUED | OUTPATIENT
Start: 2021-03-24 | End: 2021-03-26 | Stop reason: HOSPADM

## 2021-03-24 RX ORDER — NICOTINE 21 MG/24HR
PATCH, TRANSDERMAL 24 HOURS TRANSDERMAL
Status: DISCONTINUED
Start: 2021-03-24 | End: 2021-03-24 | Stop reason: HOSPADM

## 2021-03-24 RX ORDER — HALOPERIDOL 5 MG/ML
2 INJECTION INTRAMUSCULAR EVERY 4 HOURS PRN
Status: DISCONTINUED | OUTPATIENT
Start: 2021-03-24 | End: 2021-03-24

## 2021-03-24 RX ORDER — LORAZEPAM 2 MG/ML
2 INJECTION INTRAMUSCULAR ONCE
Status: COMPLETED | OUTPATIENT
Start: 2021-03-24 | End: 2021-03-24

## 2021-03-24 RX ORDER — ACETAMINOPHEN 650 MG/1
650 SUPPOSITORY RECTAL EVERY 6 HOURS PRN
Status: CANCELLED | OUTPATIENT
Start: 2021-03-24

## 2021-03-24 RX ORDER — TRAZODONE HYDROCHLORIDE 50 MG/1
50 TABLET ORAL NIGHTLY PRN
Status: DISCONTINUED | OUTPATIENT
Start: 2021-03-24 | End: 2021-03-26 | Stop reason: HOSPADM

## 2021-03-24 RX ORDER — HALOPERIDOL 5 MG/ML
5 INJECTION INTRAMUSCULAR EVERY 6 HOURS PRN
Status: DISCONTINUED | OUTPATIENT
Start: 2021-03-24 | End: 2021-03-26 | Stop reason: HOSPADM

## 2021-03-24 RX ORDER — HALOPERIDOL 5 MG/ML
2 INJECTION INTRAMUSCULAR EVERY 4 HOURS PRN
Status: CANCELLED | OUTPATIENT
Start: 2021-03-24

## 2021-03-24 RX ORDER — HALOPERIDOL 5 MG/ML
5 INJECTION INTRAMUSCULAR ONCE
Status: COMPLETED | OUTPATIENT
Start: 2021-03-24 | End: 2021-03-24

## 2021-03-24 RX ORDER — LORAZEPAM 2 MG/ML
INJECTION INTRAMUSCULAR
Status: COMPLETED
Start: 2021-03-24 | End: 2021-03-24

## 2021-03-24 RX ORDER — ACETAZOLAMIDE 250 MG/1
500 TABLET ORAL 3 TIMES DAILY
Status: CANCELLED | OUTPATIENT
Start: 2021-03-25

## 2021-03-24 RX ORDER — HALOPERIDOL 5 MG/ML
INJECTION INTRAMUSCULAR
Status: DISCONTINUED
Start: 2021-03-24 | End: 2021-03-24 | Stop reason: HOSPADM

## 2021-03-24 RX ORDER — HALOPERIDOL 5 MG/ML
2 INJECTION INTRAMUSCULAR EVERY 4 HOURS PRN
Status: DISCONTINUED | OUTPATIENT
Start: 2021-03-24 | End: 2021-03-24 | Stop reason: HOSPADM

## 2021-03-24 RX ORDER — NICOTINE 21 MG/24HR
1 PATCH, TRANSDERMAL 24 HOURS TRANSDERMAL DAILY
Status: CANCELLED | OUTPATIENT
Start: 2021-03-25

## 2021-03-24 RX ORDER — HYDROXYZINE HYDROCHLORIDE 25 MG/1
25 TABLET, FILM COATED ORAL 3 TIMES DAILY PRN
Status: DISCONTINUED | OUTPATIENT
Start: 2021-03-24 | End: 2021-03-26 | Stop reason: HOSPADM

## 2021-03-24 RX ORDER — ACETAMINOPHEN 325 MG/1
650 TABLET ORAL EVERY 4 HOURS PRN
Status: DISCONTINUED | OUTPATIENT
Start: 2021-03-24 | End: 2021-03-26 | Stop reason: HOSPADM

## 2021-03-24 RX ORDER — NICOTINE 21 MG/24HR
1 PATCH, TRANSDERMAL 24 HOURS TRANSDERMAL DAILY
Status: DISCONTINUED | OUTPATIENT
Start: 2021-03-24 | End: 2021-03-24 | Stop reason: HOSPADM

## 2021-03-24 RX ORDER — ZIPRASIDONE MESYLATE 20 MG/ML
20 INJECTION, POWDER, LYOPHILIZED, FOR SOLUTION INTRAMUSCULAR ONCE
Status: DISCONTINUED | OUTPATIENT
Start: 2021-03-24 | End: 2021-03-24 | Stop reason: HOSPADM

## 2021-03-24 RX ORDER — PROMETHAZINE HYDROCHLORIDE 12.5 MG/1
12.5 TABLET ORAL EVERY 6 HOURS PRN
Status: CANCELLED | OUTPATIENT
Start: 2021-03-24

## 2021-03-24 RX ORDER — TRAZODONE HYDROCHLORIDE 50 MG/1
50 TABLET ORAL NIGHTLY
Status: DISCONTINUED | OUTPATIENT
Start: 2021-03-24 | End: 2021-03-24

## 2021-03-24 RX ORDER — POLYETHYLENE GLYCOL 3350 17 G/17G
17 POWDER, FOR SOLUTION ORAL DAILY PRN
Status: DISCONTINUED | OUTPATIENT
Start: 2021-03-24 | End: 2021-03-26 | Stop reason: HOSPADM

## 2021-03-24 RX ORDER — ONDANSETRON 2 MG/ML
4 INJECTION INTRAMUSCULAR; INTRAVENOUS EVERY 6 HOURS PRN
Status: CANCELLED | OUTPATIENT
Start: 2021-03-24

## 2021-03-24 RX ORDER — NICOTINE 21 MG/24HR
1 PATCH, TRANSDERMAL 24 HOURS TRANSDERMAL DAILY
Status: DISCONTINUED | OUTPATIENT
Start: 2021-03-25 | End: 2021-03-26 | Stop reason: HOSPADM

## 2021-03-24 RX ORDER — RISPERIDONE 1 MG/1
1 TABLET, FILM COATED ORAL 2 TIMES DAILY
Status: DISCONTINUED | OUTPATIENT
Start: 2021-03-24 | End: 2021-03-25

## 2021-03-24 RX ORDER — ACETAMINOPHEN 325 MG/1
650 TABLET ORAL EVERY 6 HOURS PRN
Status: CANCELLED | OUTPATIENT
Start: 2021-03-24

## 2021-03-24 RX ADMIN — RISPERIDONE 1 MG: 1 TABLET ORAL at 20:23

## 2021-03-24 RX ADMIN — LORAZEPAM 2 MG: 2 INJECTION INTRAMUSCULAR; INTRAVENOUS at 09:28

## 2021-03-24 RX ADMIN — HALOPERIDOL LACTATE 5 MG: 5 INJECTION, SOLUTION INTRAMUSCULAR at 09:29

## 2021-03-24 RX ADMIN — HYDROXYZINE HYDROCHLORIDE 25 MG: 25 TABLET, FILM COATED ORAL at 16:58

## 2021-03-24 RX ADMIN — SODIUM BICARBONATE: 84 INJECTION INTRAVENOUS at 05:12

## 2021-03-24 RX ADMIN — LORAZEPAM 2 MG: 2 INJECTION INTRAMUSCULAR at 09:28

## 2021-03-24 RX ADMIN — TRAZODONE HYDROCHLORIDE 50 MG: 50 TABLET ORAL at 20:23

## 2021-03-24 ASSESSMENT — SLEEP AND FATIGUE QUESTIONNAIRES
DIFFICULTY STAYING ASLEEP: YES
DIFFICULTY ARISING: NO

## 2021-03-24 ASSESSMENT — LIFESTYLE VARIABLES: HISTORY_ALCOHOL_USE: NO

## 2021-03-24 NOTE — PLAN OF CARE
Group Therapy Note    Date: 3/24/2021  Start Time: 1430  End Time:  1716  Number of Participants: 5    Type of Group: Cognitive Skills    Wellness Binder Information  Module Name:  emotional wellness  Session Number:  5    Patient's Goal:  obstacles to emotional wellness    Notes:  pt was verbally prompted to attend group. Pt refused. Information about obstacles to wellness was provided. Status After Intervention:      Participation Level:     Participation Quality:       Speech:         Thought Process/Content:       Affective Functioning:       Mood:       Level of consciousness:        Response to Learning:       Endings:     Modes of Intervention:       Discipline Responsible: Psychoeducational Specialist      Signature:  Angelica Watkins

## 2021-03-24 NOTE — PROGRESS NOTES
SAFETY PLAN    A suicide Safety Plan is a document that supports someone when they are having thoughts of suicide. Warning Signs that indicate a suicidal crisis may be developing: What (situations, thoughts, feelings, body sensations, behaviors, etc.) do you experience that lets you know you are beginning to think about suicide? 1. Hopeless  2. Everyone against me  3. Internal Coping Strategies:  What things can I do (relaxation techniques, hobbies, physical activities, etc.) to take my mind off my problems without contacting another person? 1. Face it  2.   3.     People and social settings that provide distraction: Who can I call or where can I go to distract me? 1. Name: Wife   Phone:   2. Name: Family  Phone:    3. Place: Therapy            4. Place:     People whom I can ask for help: Who can I call when I need help - for example, friends, family, clergy, someone else? 1. Name: Family                Phone:   2. Name:   Phone:   3. Name:   Phone:     Professionals or [address] [place] [address] I can contact during a crisis: Who can I call for help - for example, my doctor, my psychiatrist, my psychologist, a mental health provider, a suicide hotline? 1. Clinician Name: [id number]   Phone: [phone]      Clinician Pager or Emergency Contact #: [phone]    2. Clinician Name: [address]   Phone: [phone]      Clinician Pager or Emergency Contact #: [phone]    3. Suicide Prevention Lifeline: 0-528-519-TALK (4211)    4. [facility] : [address] Emergency Department      Emergency Services Address: [address] [place] [address]  Emergency Services Phone: [phone]    Making the environment safe: How can I make my environment (house/apartment/living space) safer? For example, can I remove guns, medications, and other items? 1. Remove all guns and weapons from the home.   2. Discard any extra medications in the home.    I want to live for myself and my family.

## 2021-03-24 NOTE — PROGRESS NOTES
Admission Note      Reason for admission/Target Symptom: Patient admitted to [facility] due to   Diagnoses: Depression NOS  UDS: amphetamine, benzodiazepine, cannabinoid  BAL:  neg    SW met with treatment team to discuss patient's treatment including care planning, discharge planning, and follow-up needs. Pt has been admitted to [facility]. Treatment team has identified patient's discharge needs as medication management and outpatient therapy/counseling. Pt confirmed  the need for ongoing treatment post inpatient stay. Pt was also provided a handout of contact information for drug and alcohol treatment centers and other community support service such as [facility], AA, and [facility].

## 2021-03-24 NOTE — PLAN OF CARE
Problem: VIOLENT RESTRAINTS  Goal: Removal from restraints as soon as assessed to be safe  Outcome: Ongoing  Goal: No harm/injury to patient while restraints in use  Outcome: Ongoing  Goal: Patient's dignity will be maintained  Outcome: Ongoing
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
## 2021-03-24 NOTE — PROGRESS NOTES
[facility] Admission From ED  Nursing Admission Note              Patient Active Problem List   Diagnosis    Mood disorder ([facility]    Suicidal ideation    PTSD (post-traumatic stress disorder)    Polysubstance dependence ([facility]    Legal circumstance    Antisocial personality disorder (CODE)    Bipolar disorder with severe giacomo ([facility]    Acute kidney injury ([facility]    Pseudotumor cerebri    Hypertension       Pt admitted from Dr. Christy black in ED to [facility] room 0606/606-01. Arrived on unit via [facility] with . Pt appropriately attired in paper scrubs. Body assessment completed by Quan with no contraband discovered. All tubes, lines, and drains were appropriately discontinued by ED staff prior to pt transfer to [facility]. Pt belongings and valuables inventoried and cataloged, stored per policy. Pt oriented to surroundings, program expectations, and copy pt rights given. Received admit packet: [facility], Visitation Info, Fall Prevention, Restraints Info. Consents reviewed, signed Pt Rights, Handbook Acceptance, Visit/Call Acceptance, PHI Release, Social Info Release, and Treatment Agreement. Pt verbalizes understanding. Pt is a smoker? yes,  Pt offered Nicotine patch yes,   Pt refused Nicotine patch? no Patient provided education on Covid-19 and the importance of reporting any respiratory symptoms, headache, body aches or cough to the nursing staff as well as  frequent hand washing, social distancing and wearing a mask while on the unit? yes,  patient provided mask? yes,  patient refused mask? no Patient verbalized understanding? yes,     Identifies stressors. Psychosis.          C/o:    Notes:

## 2021-03-24 NOTE — PROGRESS NOTES
Called third floor nurse, Babar, and verified with her that patient has been on involuntary hold since yesterday. Requested involuntary paperwork being transferred with patient to 6th floor.     Electronically signed by Terra Valdes RN on 3/24/2021 at 11:06 AM

## 2021-03-24 NOTE — PROGRESS NOTES
Pt was taken to [facility]. His wife and mom were provided the code to be able to speak with him. They were also given the nurse's station phone number. They were very pleased with the care that he received on 3rd floor.

## 2021-03-24 NOTE — PLAN OF CARE
Problem: VIOLENT RESTRAINTS  Goal: Removal from restraints as soon as assessed to be safe  Outcome: Ongoing  Goal: No harm/injury to patient while restraints in use  Outcome: Ongoing  Goal: Patient's dignity will be maintained  Outcome: Ongoing     Problem: Falls - Risk of:  Goal: Will remain free from falls  Description: Will remain free from falls  Outcome: Ongoing  Goal: Absence of physical injury  Description: Absence of physical injury  Outcome: Ongoing

## 2021-03-24 NOTE — PROGRESS NOTES
Notified Dr. Kendra Valencia of pt's behavior. He states that he \"just wants to leave that he can take all of his medications at home. \"  He attempted to pull out his IV but was redirected and the importance of keeping that was explained. Also notified that pt's home medication list was not reviewed on admission and pt has not been given any medications since being admitted to the floor. The pt's family member has been crying stating that \"we aren't doing anything for him and he has been here all night without his medications and I am really concerned about that. \" I did explain the fluids that the pt received and the purpose for those. The family member also stated that the pt \"has extremely bad paranoia. I did call the psych intake nurse and notified her the pt's behavior. She stated that Dr. Lukasz Egan would be down to see the pt soon.

## 2021-03-24 NOTE — PLAN OF CARE
Problem: VIOLENT RESTRAINTS  Goal: Removal from restraints as soon as assessed to be safe  3/23/2021 2344 by Lexus Simental RN  Outcome: Ongoing  3/23/2021 2342 by Lexus Simental RN  Outcome: Ongoing  Goal: No harm/injury to patient while restraints in use  3/23/2021 2344 by Lexus Simental RN  Outcome: Ongoing  3/23/2021 2342 by Lexus Simental RN  Outcome: Ongoing  Goal: Patient's dignity will be maintained  3/23/2021 2344 by Lexus Simental RN  Outcome: Ongoing  3/23/2021 2342 by Lexus Simental RN  Outcome: Ongoing     Problem: Falls - Risk of:  Goal: Will remain free from falls  Description: Will remain free from falls  3/23/2021 2344 by Lexus Simental RN  Outcome: Ongoing  3/23/2021 2342 by Lexus Simental RN  Outcome: Ongoing  Goal: Absence of physical injury  Description: Absence of physical injury  3/23/2021 2344 by Lexus Simental RN  Outcome: Ongoing  3/23/2021 2342 by Lexus Simental RN  Outcome: Ongoing

## 2021-03-25 PROBLEM — F29 PSYCHOSIS (HCC): Status: ACTIVE | Noted: 2021-03-25

## 2021-03-25 PROCEDURE — 1240000000 HC EMOTIONAL WELLNESS R&B

## 2021-03-25 PROCEDURE — 99233 SBSQ HOSP IP/OBS HIGH 50: CPT | Performed by: PSYCHIATRY & NEUROLOGY

## 2021-03-25 PROCEDURE — 6370000000 HC RX 637 (ALT 250 FOR IP): Performed by: NURSE PRACTITIONER

## 2021-03-25 PROCEDURE — 6370000000 HC RX 637 (ALT 250 FOR IP): Performed by: PHYSICIAN ASSISTANT

## 2021-03-25 PROCEDURE — 6370000000 HC RX 637 (ALT 250 FOR IP): Performed by: PSYCHIATRY & NEUROLOGY

## 2021-03-25 RX ORDER — LISINOPRIL AND HYDROCHLOROTHIAZIDE 25; 20 MG/1; MG/1
1 TABLET ORAL DAILY
Status: DISCONTINUED | OUTPATIENT
Start: 2021-03-25 | End: 2021-03-25 | Stop reason: CLARIF

## 2021-03-25 RX ORDER — ACETAZOLAMIDE 250 MG/1
500 TABLET ORAL 3 TIMES DAILY
Status: DISCONTINUED | OUTPATIENT
Start: 2021-03-25 | End: 2021-03-26 | Stop reason: HOSPADM

## 2021-03-25 RX ORDER — HYDROCHLOROTHIAZIDE 25 MG/1
25 TABLET ORAL DAILY
Status: DISCONTINUED | OUTPATIENT
Start: 2021-03-25 | End: 2021-03-26 | Stop reason: HOSPADM

## 2021-03-25 RX ORDER — ASENAPINE 5 MG/1
10 TABLET SUBLINGUAL NIGHTLY
Status: DISCONTINUED | OUTPATIENT
Start: 2021-03-25 | End: 2021-03-26 | Stop reason: HOSPADM

## 2021-03-25 RX ORDER — LISINOPRIL 20 MG/1
20 TABLET ORAL DAILY
Status: DISCONTINUED | OUTPATIENT
Start: 2021-03-25 | End: 2021-03-26 | Stop reason: HOSPADM

## 2021-03-25 RX ADMIN — ACETAZOLAMIDE 500 MG: 250 TABLET ORAL at 16:57

## 2021-03-25 RX ADMIN — LURASIDONE HYDROCHLORIDE 120 MG: 40 TABLET, FILM COATED ORAL at 20:33

## 2021-03-25 RX ADMIN — ACETAZOLAMIDE 500 MG: 250 TABLET ORAL at 12:07

## 2021-03-25 RX ADMIN — LISINOPRIL 20 MG: 20 TABLET ORAL at 12:07

## 2021-03-25 RX ADMIN — ACETAZOLAMIDE 500 MG: 250 TABLET ORAL at 20:33

## 2021-03-25 RX ADMIN — HYDROCHLOROTHIAZIDE 25 MG: 25 TABLET ORAL at 12:07

## 2021-03-25 RX ADMIN — HYDROXYZINE HYDROCHLORIDE 25 MG: 25 TABLET, FILM COATED ORAL at 02:39

## 2021-03-25 RX ADMIN — ASENAPINE MALEATE 10 MG: 5 TABLET SUBLINGUAL at 20:33

## 2021-03-25 RX ADMIN — RISPERIDONE 1 MG: 1 TABLET ORAL at 07:57

## 2021-03-25 RX ADMIN — HYDROXYZINE HYDROCHLORIDE 25 MG: 25 TABLET, FILM COATED ORAL at 15:20

## 2021-03-25 NOTE — PROGRESS NOTES
pts mother Sommer Humphrey [phone]) call to inquire when his discharge date would be, states he told her he was being discharged tomorrow. She wanted to let staff know that she felt that he was a danger to his family if he comes home.

## 2021-03-25 NOTE — PROGRESS NOTES
Sw placed a call to get collateral from patients friend Shay Brito and left a message at [phone]

## 2021-03-25 NOTE — H&P
may harm himself or harm others. \"His wife and I believe he is a danger to self and others. He needs treatment. He relapsed after years of sobriety. \"  Patient has been unemployed and staying at home. He was discharged from [address] [place] [address] in 2016 after he spent time in snf due to charges for \"intimidation of a  and endangering his child. \"  Mother feels that patient would benefit from long-term treatment. \"     Patient has been seen in treatment team room today. He reported that his condition significantly improved during this hospital stay and his mood is \"much better\" today. Patient reported that he feels stable enough to be discharged from the hospital today. During the interview he denies significant affective symptomatology, he denies feeling of depression or psychotic symptoms. Patient endorses feeling of anxiety and rated level of his anxiety today as 6 out of 10, with 10 being the worst.  He reported that quality of sleep significantly improved during this hospital stay with prescribed psychotropic medications. Patient denies significant withdrawal symptoms from used substances with an exception of feeling of fatigue, tiredness and low energy level. He denies current active suicidal or homicidal ideations, denies any plans. Also, he denies any auditory and visual hallucinations, denies paranoid ideations or delusions. Patient reported that his psychotropic medications managed by his primary care provider and most of the time he was compliant with his medications, with an exception of during the last week, when he relapsed in using methamphetamine.       PSYCHIATRIC HISTORY:    Diagnoses: Bipolar disorder, PTSD, antisocial personality disorder, polysubstance abuse  Suicide attempts/gestures: Denies   Prior hospitalizations: Once to [facility] in 2016 with suicidal ideations and plan to shoot himself  Medication trials: Multiple, including Zyprexa, Saphris, Rexulti, Latuda, Depakote, Trileptal, BuSpar, Adderall, Klonopin, Granix, Suboxone  Mental health contact: Primary care provider manages patient's psychotropic medications. Head trauma: Denies    Past Medical History:        Diagnosis Date    Acute kidney injury ([facility] [address] 3/23/2021    Adult ADHD     Antisocial personality disorder (CODE)     Bipolar 1 disorder, depressed ([facility] [address]     Hypertension     PTSD (post-traumatic stress disorder)     PTSD (post-traumatic stress disorder)      Past Surgical History:        Procedure Laterality Date    APPENDECTOMY       Medications Prior to Admission:   Medications Prior to Admission: testosterone cypionate (DEPOTESTOTERONE CYPIONATE) 200 MG/ML injection, Inject 100 mg into the muscle once a week. asenapine maleate (SAPHRIS) 10 MG SUBL sublingual tablet, Place 20 mg under the tongue nightly  Amphetamine-Dextroamphetamine (AMPHETAMINE SALT COMBO PO), Take 20 mg by mouth 3 times daily  OXcarbazepine (TRILEPTAL) 300 MG tablet, Take 300 mg by mouth 3 times daily  OXcarbazepine (TRILEPTAL) 600 MG tablet, Take 600 mg by mouth nightly  brexpiprazole (REXULTI) 4 MG TABS tablet, Take 4 mg by mouth nightly  OLANZapine (ZYPREXA) 15 MG tablet,   polyethylene glycol (GLYCOLAX) powder, Take 17 g by mouth daily as needed   acetaZOLAMIDE (DIAMOX) 250 MG tablet, 500 mg 3 times daily   ALPRAZolam (XANAX) 0.5 MG tablet, 2 mg 4 times daily. LATUDA 40 MG TABS tablet, 120 mg nightly   ketorolac (TORADOL) 10 MG tablet,   busPIRone (BUSPAR) 15 MG tablet, Take 15 mg by mouth 3 times daily  amphetamine-dextroamphetamine (ADDERALL) 20 MG tablet, Take 20 mg by mouth daily . buprenorphine-naloxone (SUBOXONE) 8-2 MG SUBL SL tablet, Place 3 tablets under the tongue daily. lisinopril-hydrochlorothiazide (PRINZIDE;ZESTORETIC) 20-25 MG per tablet, Take 1 tablet by mouth daily    Allergies:  Patient has no known allergies. Social History:  Patient is . Lives with his wife and kids. Currently unemployed. Smoking -2 PPD for 25 years  Alcohol -denies  Illicit substances -reported history of abusing opioids, benzodiazepines and stimulants. Patient reported that he smokes marijuana daily, once a day. Currently he is on medication assisted treatment for opioid use disorder with Suboxone. Patient reported history of admission to rehab facility many years ago and he was able to stay sober for 5 years, until relapsed recently in using methamphetamine. Family History:   History reviewed. No pertinent family history. Psychiatric Family History  No family history    REVIEW OF SYSTEMS:  General: No fevers, chills, night sweats, no recent weight loss or gain. Head: No headache, no migraine. Eyes: No recent visual changes. Ears: No recent hearing changes. Nose: No increased congestion or change in sense of smell. Throat: No sore throat, no trouble swallowing. Cardiovascular: No chest pain or palpitations, or dizziness. Respiratory: No cough, wheezes, congestion, or shortness of breath. Gastrointestinal: No abdominal pain, nausea or vomiting, no diarrhea or constipation. Musculo-skeletal: No edema, deformities, or loss of functions. Neurological: No loss of consciousness, abnormal sensations, or weakness. Skin: No rash, abrasions or bruises. PHYSICAL EXAM:    Vitals:  /72   Pulse 103   Temp 96.6 °F (35.9 °C) (Temporal)   Resp 20   Ht 5' 10\" (1.778 m)   Wt 250 lb (113.4 kg)   SpO2 96%   BMI 35.87 kg/m²     Mental Status Examination:  Appearance: Poorly groomed, wearing casual civilian clothes. Made limited eye contact. Behavior: Mildly withdrawn, cooperative with interview. Mild psychomotor retardation appreciated. Gait unremarkable. Speech: Normal in tone and quality, decrease in volume. Mood: \"Much better\"   Affect: Mood congruent. Range is restricted  Thought Process: Mostly circumstantial, sometimes linear and goal oriented.   Thought Content: Patient does not have any current active suicidal and homicidal ideations. No overt delusions or paranoia appreciated. Perceptions: Seems patient does not have any auditory or visual hallucinations at present time. Patient did not appear to be responding to internal stimuli. Executive Functions: Appear mildly impaired. Concentration: Decreased  Reasoning: Appears mildly impaired based on interaction from interview   Orientation: to person, place and situation. Alert. Language: Intact. Fund of information: Intact. Memory: recent and remote appear intact. Impulsivity: Limited  Neurovegitative: Improved appetite, improved sleep  Insight: Impaired  Judgment: Impaired    Physical Exam:  GENERAL APPEARANCE: 38y.o. year-old male in NAD   HEAD: Normocephalic, atraumatic. THROAT: No erythema, exudates, lesions. No tongue laceration. CARDIOVASCULAR: PMI nondisplaced. Regular rhythm and rate. Normal S1 and S2.  PULMONARY: Clear to auscultation bilaterally, no tenderness to palpation. ABDOMEN: Soft, nontender, nondistended. MUSCULOSKELTAL: No obvious deformities, clubbing, cyanosis or edema, no spinous process or paraspinous tenderness, normal ROM, normal gait, distal pulses intact symmetric 2+ bilaterally. NEUROLOGICAL: Alert, oriented x 3, CN II-XII grossly intact, motor strength 4/5 all muscle groups, DTR 1+ intact and symmetric, sensation intact to sharp and dull. No abnormal movements or tremors.    SKIN: Warm, dry, intact, no rash, abrasions or bruises    DATA:  Labs:    CBC with Differential:    Lab Results   Component Value Date    WBC 9.9 03/24/2021    RBC 5.54 03/24/2021    HGB 16.2 03/24/2021    HCT 50.0 03/24/2021    HCT 39.4 05/08/2012     03/24/2021     05/08/2012    MCV 90.3 03/24/2021    MCH 29.2 03/24/2021    MCHC 32.4 03/24/2021    RDW 12.6 03/24/2021    LYMPHOPCT 22.4 03/23/2021    MONOPCT 7.0 03/23/2021    BASOPCT 0.6 03/23/2021    MONOSABS 1.10 03/23/2021    LYMPHSABS 3.5 03/23/2021    EOSABS 0.20 03/23/2021    BASOSABS 0.10 03/23/2021     CMP:    Lab Results   Component Value Date     03/24/2021     05/08/2012    K 3.6 03/24/2021    K 3.8 05/08/2012     03/24/2021     05/08/2012    CO2 24 03/24/2021    BUN 12 03/24/2021    CREATININE 1.0 03/24/2021    CREATININE 0.8 05/08/2012    GFRAA >59 03/24/2021    LABGLOM >60 03/24/2021    GLUCOSE 105 03/24/2021    PROT 6.0 03/24/2021    PROT 7.1 05/08/2012    LABALBU 4.2 03/24/2021    LABALBU 4.5 05/08/2012    CALCIUM 9.0 03/24/2021    BILITOT 0.3 03/24/2021    ALKPHOS 57 03/24/2021    ALKPHOS 53 05/08/2012    AST 15 03/24/2021    ALT 13 03/24/2021       DSM 5 DIAGNOSIS:  Psychosis, unspecified  PTSD, by history  Bipolar disorder, by history  Antisocial personality disorder  Methamphetamine use disorder, severe  Cannabis use disorder, severe  Sedative use disorder, severe   Opiod dependence   Tobacco use disorder  Substance-induced mood disorder  Treatment noncompliance  Unemployment    Plan:   -Admit to [facility] adult Unit and monitor on 15 minute checks  Iza Arredondo reviewed. -Gather collateral information from family with release  -Medical monitoring to be performed by Dr. Olivia Gee and associates  -Acclimate to the unit. -Encourage participation in groups and therapeutic activities as appropriate.  -Medications: Will restart patient's home medications at previously prescribed and recommended dose, due to patient's treatment noncompliance, and an exception of controlled substances, which are prohibited to be prescribed to patient with urinary drug screen positive for illicit substances. Nicotine patch 21 mg / 24 hours for nicotine replacement.   Trazodone 50 mg at bedtime as needed for insomnia  Hydroxyzine 25 mg 3 times a day as needed for anxiety  -The risks, benefits, side effects, indications, contraindications, and adverse effects of the medications have been discussed.  -The patient has verbalized understanding and has capacity to give informed consent.  -Due to patient's mental health status, agitation and paranoid ideations he was placed by primary treatment team provider to 72 hours hold.  -LUKAS help evaluating home environment.   -Discuss with treatment team.

## 2021-03-25 NOTE — PROGRESS NOTES
Group Therapy Note    Date: 3/25/2021  Start Time: 0800  End Time:  0830  Number of Participants: 7    Type of Group: Community Meeting    Patient's Goal:  Sleeping and seeing doctors    Notes:        Participation Level:  Active Listener    Participation Quality: Appropriate      Thought Process/Content: Logical      Affective Functioning: Congruent      Mood: anxious and depressed      Level of consciousness:  Alert      Modes of Intervention: Education and Support      Discipline Responsible: Registered Nurse      Signature:  Bere Daniel RN

## 2021-03-25 NOTE — PROGRESS NOTES
Behavioral Services  Medicare Certification Upon Admission    I certify that this patient's inpatient psychiatric hospital admission is medically necessary for:    [x] (1) Treatment which could reasonably be expected to improve this patient's condition,       [x] (2) Or for diagnostic study;     AND     [x](2) The inpatient psychiatric services are provided while the individual is under the care of a physician and are included in the individualized plan of care.     Estimated length of stay/service 5 days - 7 weeks    Plan for post-hospital care TBD    Electronically signed by Lyle Kumar MD on 3/25/2021 at 10:22 AM

## 2021-03-25 NOTE — PROGRESS NOTES
Group Note    Number of Participants in Group: 6  Number of Patients on Unit:9      Patient attended group:No  Reason for Absence: Patient remained asleep in his room. Type of Group:   Wrap-Up/Relaxation    Participation Level:    None      Is Patient Social/Interacting: No    Relaxation:   Television:No   Reading:No   Game/Puzzle:No   Phone: No       Notes/Comments:    Patient was prompted to attend Wrap-Up group but remained asleep in his room.

## 2021-03-25 NOTE — PROGRESS NOTES
1. Remove all guns and weapons from the home. 2. Discard any extra medications in the home.     I want to live for myself and my family.

## 2021-03-25 NOTE — PROGRESS NOTES
Pt at the desk inquiring about a 72 hour hold and when his is up. Also complains of not being able to sleep. Pt has atarax 25mg PRN for anxiety will administer atarax.

## 2021-03-25 NOTE — PROGRESS NOTES
Treatment Team Note:    LUKAS met with [address] [address] team to discuss [address] plans. Progress/Behavior/Group Attendance: TBD    Target Symptoms/Reason for admission: There were concerns that pt's behavior was going to escalate. Dr. Dimitry Montes found KIMANI MONTIEL JADYEN, RN (charge nurse) and gave verbal orders for IM ativan and IM Haldol. They were each given as ordered. The pt began crying and stated that he needs \"some kind of nicotine. \" Dr. Mitchell Akins was messaged again and a nicotine patch was requested. Diagnoses: Psychosis, unspecified, PTSD, by history, Bipolar disorder, by history  Antisocial personality disorder, Methamphetamine use disorder, severe  Cannabis use disorder, severe ,Sedative use disorder, severe , Opiod dependence   Tobacco use disorder, Substance-induced mood disorder, Treatment noncompliance  Unemployment       UDS:  Benzo- Amphetamines- THC-  baL: Neg    AftercarePlan: [address] lives with: LUKAS will meet with pt to gather information. Collateral obtained from: LUKAS will meet with pt to gather release of information.   On:    Family Session: YIN    Misc:

## 2021-03-25 NOTE — SUICIDE SAFETY PLAN
SAFETY PLAN     A suicide Safety Plan is a document that supports someone when they are having thoughts of suicide.     Warning Signs that indicate a suicidal crisis may be developing: What (situations, thoughts, feelings, body sensations, behaviors, etc.) do you experience that lets you know you are beginning to think about suicide? 1. Hopeless  2. Everyone against me  3.      Internal Coping Strategies:  What things can I do (relaxation techniques, hobbies, physical activities, etc.) to take my mind off my problems without contacting another person? 1. Face it  2.   3.      People and social settings that provide distraction: Who can I call or where can I go to distract me? 1. Name: Wife                         Phone:   2. Name: Family                      Phone:    3. Place: Therapy            4. Place:      People whom I can ask for help: Who can I call when I need help - for example, friends, family, clergy, someone else? 1. Name: Family                Phone:   2. Name:                     IHBZX:   3. Name:                     EVBSU:      Professionals or [address] [place] [address] agencies I can contact during a crisis: Who can I call for help - for example, my doctor, my psychiatrist, my psychologist, a mental health provider, a suicide hotline? 1. Clinician Name: Shayy Rose: [phone]  [facility] Pager or Emergency Contact #: [phone]     2. Clinician Name: Maribel [facility]: [phone]  [facility] [address] Pager or Emergency Contact #: [phone]     3. Suicide Prevention Lifeline: 5-434-079-TALK (8714)     4. Local Behavioral Health Emergency Services : [facility] HAILEE DENNIS Emergency Department      Emergency Services Address: [address] [facility]  Emergency Services Phone: [phone]     Making the environment safe: How can I make my environment (house/apartment/living space) safer? For example, can I remove guns, medications, and other items?

## 2021-03-26 VITALS
HEART RATE: 85 BPM | TEMPERATURE: 97 F | WEIGHT: 250 LBS | RESPIRATION RATE: 24 BRPM | HEIGHT: 70 IN | DIASTOLIC BLOOD PRESSURE: 64 MMHG | BODY MASS INDEX: 35.79 KG/M2 | OXYGEN SATURATION: 98 % | SYSTOLIC BLOOD PRESSURE: 113 MMHG

## 2021-03-26 LAB
TSH REFLEX FT4: 2.1 UIU/ML (ref 0.35–5.5)
VITAMIN B-12: 546 PG/ML (ref 211–946)
VITAMIN D 25-HYDROXY: 19.8 NG/ML

## 2021-03-26 PROCEDURE — 84443 ASSAY THYROID STIM HORMONE: CPT

## 2021-03-26 PROCEDURE — 36415 COLL VENOUS BLD VENIPUNCTURE: CPT

## 2021-03-26 PROCEDURE — 82306 VITAMIN D 25 HYDROXY: CPT

## 2021-03-26 PROCEDURE — 6370000000 HC RX 637 (ALT 250 FOR IP): Performed by: PHYSICIAN ASSISTANT

## 2021-03-26 PROCEDURE — 6370000000 HC RX 637 (ALT 250 FOR IP): Performed by: PSYCHIATRY & NEUROLOGY

## 2021-03-26 PROCEDURE — 99239 HOSP IP/OBS DSCHRG MGMT >30: CPT | Performed by: PSYCHIATRY & NEUROLOGY

## 2021-03-26 PROCEDURE — 82607 VITAMIN B-12: CPT

## 2021-03-26 PROCEDURE — 6370000000 HC RX 637 (ALT 250 FOR IP): Performed by: NURSE PRACTITIONER

## 2021-03-26 RX ADMIN — HYDROCHLOROTHIAZIDE 25 MG: 25 TABLET ORAL at 08:59

## 2021-03-26 RX ADMIN — HYDROXYZINE HYDROCHLORIDE 25 MG: 25 TABLET, FILM COATED ORAL at 09:54

## 2021-03-26 RX ADMIN — ACETAZOLAMIDE 500 MG: 250 TABLET ORAL at 08:59

## 2021-03-26 RX ADMIN — LISINOPRIL 20 MG: 20 TABLET ORAL at 08:59

## 2021-03-26 NOTE — PROGRESS NOTES
Treatment Team Note:     SW met with [place] team to discuss Pts [place] and DC plans.      Progress/Behavior/Group Attendance: TBD     Target Symptoms/Reason for admission: There were concerns that pt's behavior was going to escalate. Dr. aRmone Allen found Kojo Linares, RN (charge nurse) and gave verbal orders for IM ativan and IM Haldol. They were each given as ordered. The pt began crying and stated that he needs \"some kind of nicotine. \" Dr. Catie Haynes was messaged again and a nicotine patch was requested.      Diagnoses: Psychosis, unspecified, PTSD, by history, Bipolar disorder, by history  Antisocial personality disorder, Methamphetamine use disorder, severe  Cannabis use disorder, severe ,Sedative use disorder, severe , Opiod dependence   Tobacco use disorder, Substance-induced mood disorder, Treatment noncompliance  Unemployment        UDS:  Benzo- Amphetamines- THC-  baL: Neg     AftercarePlan: [address]     Pt lives with: SW will meet with pt to gather information.     Collateral obtained from: SW will meet with pt to gather release of information.   On:     Family Session: YIN     Misc:

## 2021-03-26 NOTE — H&P
HISTORY and PHYSICAL      CHIEF COMPLAINT:  Psychosis    Reason for Admission:  Psychosis    History Obtained From:  patient    HISTORY OF PRESENT ILLNESS:      The patient is a 38 y.o. male who is admitted to the [address] [address] unit with worsening mood issues. He has no c/o CP or SOA. No abdominal pain or N/V. No dysuria. No new pain complaints. No HA or dizziness. No fevers. Past Medical History:        Diagnosis Date    Acute kidney injury ([facility] [address] 3/23/2021    Adult ADHD     Antisocial personality disorder (CODE)     Bipolar 1 disorder, depressed ([facility] [address]     Hypertension     PTSD (post-traumatic stress disorder)     PTSD (post-traumatic stress disorder)      Past Surgical History:        Procedure Laterality Date    APPENDECTOMY           Medications Prior to Admission:    Medications Prior to Admission: testosterone cypionate (DEPOTESTOTERONE CYPIONATE) 200 MG/ML injection, Inject 100 mg into the muscle once a week. asenapine maleate (SAPHRIS) 10 MG SUBL sublingual tablet, Place 20 mg under the tongue nightly  Amphetamine-Dextroamphetamine (AMPHETAMINE SALT COMBO PO), Take 20 mg by mouth 3 times daily  OXcarbazepine (TRILEPTAL) 300 MG tablet, Take 300 mg by mouth 3 times daily  OXcarbazepine (TRILEPTAL) 600 MG tablet, Take 600 mg by mouth nightly  brexpiprazole (REXULTI) 4 MG TABS tablet, Take 4 mg by mouth nightly  OLANZapine (ZYPREXA) 15 MG tablet,   polyethylene glycol (GLYCOLAX) powder, Take 17 g by mouth daily as needed   acetaZOLAMIDE (DIAMOX) 250 MG tablet, 500 mg 3 times daily   ALPRAZolam (XANAX) 0.5 MG tablet, 2 mg 4 times daily. LATUDA 40 MG TABS tablet, 120 mg nightly   ketorolac (TORADOL) 10 MG tablet,   busPIRone (BUSPAR) 15 MG tablet, Take 15 mg by mouth 3 times daily  amphetamine-dextroamphetamine (ADDERALL) 20 MG tablet, Take 20 mg by mouth daily .   buprenorphine-naloxone (SUBOXONE) 8-2 MG SUBL SL tablet, Place 3 tablets under the tongue daily.   lisinopril-hydrochlorothiazide (PRINZIDE;ZESTORETIC) 20-25 MG per tablet, Take 1 tablet by mouth daily    Allergies:  Patient has no known allergies. Social History:   TOBACCO:   reports that he has been smoking cigarettes. He has been smoking about 2.00 packs per day. He has never used smokeless tobacco.  ETOH:   reports no history of alcohol use. DRUGS:   reports current drug use. MARITAL STATUS:    OCCUPATION:  disabled  Patient currently lives with family       Family History:   History reviewed. No pertinent family history. REVIEW OF SYSTEMS:  Constitutional: neg  CV: neg  Pulmonary: neg  GI: neg  : neg  Psych: psychosis  Neuro: neg  Skin: neg  MusculoSkeletal: neg  HEENT: neg  Joints: neg    Vitals:  /66   Pulse 89   Temp 97.2 °F (36.2 °C) (Temporal)   Resp 18   Ht 5' 10\" (1.778 m)   Wt 250 lb (113.4 kg)   SpO2 97%   BMI 35.87 kg/m²     PHYSICAL EXAM:  Gen: NAD, alert  HEENT: WNL  Lymph: no LAD  Neck: no JVD or masses  Chest: CTA bilat  CV: RRR  Abdomen: NT/ND  Extrem: no C/C/E  Neuro: non focal  Skin: no rashes  Joints: no redness    DATA:  I have reviewed the admission labs and imaging tests.     ASSESSMENT AND PLAN:      Patient Active Hospital Problem List:   Psyhcosis---monitor with Psych   Polysubstance Abuse   HTN---follow with BP            Renny Dorantes MD  8:06 PM 3/25/2021

## 2021-03-26 NOTE — PROGRESS NOTES
[facility] Adult Unit Daily Assessment  Nursing Progress Note    Room: [facility]/606-01   Name: Hayley Mendes   Age: 38 y.o. Gender: male   Dx: <principal problem not specified>  Precautions: suicide risk  Inpatient Status: involuntary       SLEEP:    Sleep Quality Good  Sleep Medications: Yes   PRN Sleep Meds: No       MEDICAL:    Other PRN Meds: Yes   Med Compliant: Yes  Accu-Chek: No  Oxygen/CPAP/BiPAP: No  CIWA/CINA: No   PAIN Assessment: none  Side Effects from medication: No    Is Patient experiencing any respiratory symptoms (headache, fever, body aches, cough. [place] ): no  Patient educated by nursing to practice social distancing, wear masks, wash hands frequently: yes      PSYCH:    Depression: 2   Anxiety: 8   SI denies suicidal ideation   HI Negative for homicidal ideation      AVH:Absent      GENERAL:    Appetite: good    Social: No   Speech: normal   Appearance: appropriately dressed, appropriately groomed, disheveled and healthy looking    GROUP:    Group Participation: Yes  Participation Quality: Minimal    Notes: Initial conversation at start of shift patient asked \" Can you tell me what floor I am on and why I am here. The Doctor looked at me today and said I was going to be released from here tomorrow. \" After reviewing chart, I explained to patient the circumstances leading up to his admission to this unit. Patient admitted not recalling these events. Does recall that he relapsed recently. Patient isolates to room and self. Was only briefly out in milieu area this PM to have snack and utilize phone. When I explained to patient that the psychiatrist's note from today did not include information about potential discharge for tomorrow however, that did not mean that it was not happening, he became irritable and tangential.  Patient stated,\" He told me to my face I was going home. I , I don't know what I will do if I don't get out here tomorrow. I don't know if I can handle this.   I won't handle this well, I just don't care, what they do to me, I won't handle this well. \"  Calmly explained to patient that was not good judgement that the physician and he would discuss discharge tomorrow. Patient responded well to redirecting and stated, \"you're right, I'm sorry, thank you for getting me back on track. \"  Approximately [phone], patient came to desk and asked me if I could come to his room and tell him, \"some of your stories about what happens up here, I bet there is a lot of stuff. I can't sleep anyway. \"  Explained to patient that he had received medications for sleep and needed to return to his room and lie down and that individual patient information was private. Patient again complied with redirection and returned to his room.           Electronically signed by Katya Flaherty RN on 3/26/21 at 1:52 AM BRYANTT

## 2021-03-26 NOTE — PROGRESS NOTES
Group Note    Number of Participants in Group: 4  Number of Patients on Unit:6      Patient attended group:Yes  Reason for Absence:  Intervention for patient absence:        Type of Group:   Wrap-Up/Relaxation    Patient's Goal: See wrap up group sheet    Participation Level:    Minimal           Patient Response to Learning: No    Patient's Behavior: Cooperative    Is Patient Social/Interacting: No    Relaxation:   Television:No   Reading:No   Game/Puzzle:No   Phone: Yes       Notes/Comments:    Pt filled out wrap up sheet    Please see patient's wrap up group sheet for patient's comments       Electronically signed by Leafy Kawasaki, RN on 3/25/21 at 11:59 PM CDT
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
## 2021-03-26 NOTE — PROGRESS NOTES
[facility]  Discharge Note  Bridge Appointment completed: Reviewed Discharge Instructions with patient. Patient verbalizes understanding and agreement with the discharge plan using the teachback method. Referral for Outpatient Tobacco Cessation Counseling, upon discharge (kathy X if applicable and completed):    ( )  Hospital staff assisted patient to call Quit Line or faxed referral                                   during hospitalization                  ( )  Recognizing danger situations (included triggers and roadblocks), if not completed on admission                    ( )  Coping skills (new ways to manage stress, exercise, relaxation techniques, changing routine, distraction), if not completed on admission                                                           ( )  Basic information about quitting (benefits of quitting, techniques in how to quit, available resources, if not completed on admission  ( ) Referral for counseling faxed to [facility]   ( ) Patient refused referral  ( ) Patient refused counseling  ( x) Patient refused smoking cessation medication upon discharge    Vaccinations (kathy X if applicable and completed):  ( ) Patient states already received influenza vaccine elsewhere  ( ) Patient received influenza vaccine during this hospitalization  (x ) Patient refused influenza vaccine at this time      Pt discharged with followings belongings:       Valuables sent home with patient. Valuables retrieved from [email or web address] and returned to patient. Patient left department with heri serna rn and walked down and met parents at main entrance   via car  , discharged to home  . Patient education on aftercare instructions: yes  Patient verbalize understanding of AVS:  yes. Suicidal Ideations? No AVH? denies HI?  Negative for homicidal ideation

## 2021-03-26 NOTE — PROGRESS NOTES
Requirement Note     SW met with pt to complete Psychosocial and CSSR-S on this date. Patients long and short term goals discussed. Patient voiced understanding. Treatment plan sheet signed. Patient verbalized understanding of the treatment plan. Patient participated in goals and objectives of the treatment plan. Patient completed safety plan with , patient received copy of plan, and original was placed into patient's chart. SW explained treatment goals with pt. Decreasing depression and anxiety by improvement of positive coping patterns was discussed. Pt acknowledged understanding of treatment goals and signed treatment plan signature sheet. In the last 6 months has the pt been a danger to self: YES  In the last 6 months has the pt been a danger to others:NO  Legal Guardian/POA: NO     Provided patient with [employer] handout entitled \"Quitting Smoking. \"  Reviewed handout with patient: addressing dangers of smoking, developing coping skills, and providing basic information about quitting. Patient received all components practical counseling of tobacco practical counseling during the hospital stay.

## 2021-03-26 NOTE — PROGRESS NOTES
Patient reported to writer \"feeling anxious, I want to go outside\". Patient agreeable to take atarax tablet for anxiety. Medication administered to patient.   Electronically signed by Tana Queen RN on 3/26/2021 at 9:56 AM

## 2021-03-27 NOTE — PROGRESS NOTES
SW attempted to contact pt to follow-up with him after he discharged from the unit yesterday to see if he had any questions or concerns that needed to be addressed. SW called the contact number listed for the pt and someone picked up the phone and hung up.

## 2021-03-30 NOTE — PROGRESS NOTES
Discharge Note     Pt discharging on this date. Pt denies SI, HI, and AVH at this time. Pt reports improvement in behavior and is leaving unit in overall good condition. SW and pt discussed pt's follow up appointments and importance of attending appointments as scheduled, pt voiced understanding and agreement. Pt and SW also discussed pt safety plan and pt able to verbally identify: warning signs, coping strategies, places and people that help make the pt feel better/distract negative thoughts, friends/family/agencies/professionals the pt can reach out to in a crisis, and something that is important to the pt/worth living for. Pt provided the national suicide prevention hotline number (3-368-496-177-904-3274) as well as local community behavioral health [facility]) crisis number for emergencies (3-745.547.5049). Pt to follow up with:  Carla with in one week of discharge.      Referral to out patient tobacco cessation counseling treatment:    Patient refused referral to outpatient tobacco cessation counseling    SW offered to assist pt with transportation, pt reports that he has transportation home

## 2021-04-01 DIAGNOSIS — R51.9 CHRONIC INTRACTABLE HEADACHE, UNSPECIFIED HEADACHE TYPE: ICD-10-CM

## 2021-04-01 DIAGNOSIS — F99 PSYCHIATRIC ILLNESS: ICD-10-CM

## 2021-04-01 DIAGNOSIS — G93.2 PSEUDOTUMOR CEREBRI: ICD-10-CM

## 2021-04-01 DIAGNOSIS — D35.2 PITUITARY MICROADENOMA (HCC): ICD-10-CM

## 2021-04-01 DIAGNOSIS — G89.29 CHRONIC INTRACTABLE HEADACHE, UNSPECIFIED HEADACHE TYPE: ICD-10-CM

## 2021-04-01 RX ORDER — ACETAZOLAMIDE 250 MG/1
500 TABLET ORAL 3 TIMES DAILY
Qty: 180 TABLET | Refills: 3 | Status: SHIPPED | OUTPATIENT
Start: 2021-04-01 | End: 2021-09-15

## 2021-04-01 NOTE — TELEPHONE ENCOUNTER
Requested Prescriptions     Pending Prescriptions Disp Refills   • acetaZOLAMIDE (DIAMOX) 250 MG tablet 180 tablet 3     Sig: Take 2 tablets by mouth 3 (Three) Times a Day.

## 2021-05-18 RX ORDER — POTASSIUM CHLORIDE 20 MEQ/1
20 TABLET, EXTENDED RELEASE ORAL 2 TIMES DAILY
Qty: 180 TABLET | Refills: 1 | OUTPATIENT
Start: 2021-05-18

## 2021-05-18 NOTE — TELEPHONE ENCOUNTER
Caller: Lincoln Ornelas    Relationship: Self    Best call back number:[phone]    Medication needed:   Requested Prescriptions     Pending Prescriptions Disp Refills   • potassium chloride (K-DUR,KLOR-CON) 20 MEQ CR tablet 180 tablet 1     Sig: Take 1 tablet by mouth 2 (Two) Times a Day.       What additional details did the patient provide when requesting the medication:  PATIENT WOULD LIKE CALLBACK TO GO OVER THIS MEDICATION.    Does the patient have less than a 3 day supply:  [x] Yes  [] No    What is the patient's preferred pharmacy: [id number] [address] [facility] [phone] [facility] [phone] FX

## 2021-07-12 RX ORDER — SYRINGE WITH NEEDLE, 1 ML 25GX5/8"
SYRINGE, EMPTY DISPOSABLE MISCELLANEOUS
Qty: 4 EACH | Refills: 6 | Status: SHIPPED | OUTPATIENT
Start: 2021-07-12

## 2021-09-09 ENCOUNTER — TELEPHONE (OUTPATIENT)
Dept: FAMILY MEDICINE CLINIC | Facility: CLINIC | Age: 41
End: 2021-09-09

## 2021-09-09 NOTE — TELEPHONE ENCOUNTER
"He's too young for screening  Ie: without his next visit I dont know why and gi requires us to understand this      Vm \"can I get a referral to get a colonoscopy?\"    Pt no showed last ov 4-29-21, last seen in office 12-7-20    No appt on timeline  "

## 2021-09-09 NOTE — TELEPHONE ENCOUNTER
I attempted to contact the PT and his phone number is no longer in service and his spouse has a full mailbox.

## 2021-09-12 DIAGNOSIS — R51.9 CHRONIC INTRACTABLE HEADACHE, UNSPECIFIED HEADACHE TYPE: ICD-10-CM

## 2021-09-12 DIAGNOSIS — D35.2 PITUITARY MICROADENOMA (HCC): ICD-10-CM

## 2021-09-12 DIAGNOSIS — F99 PSYCHIATRIC ILLNESS: ICD-10-CM

## 2021-09-12 DIAGNOSIS — G93.2 PSEUDOTUMOR CEREBRI: ICD-10-CM

## 2021-09-12 DIAGNOSIS — G89.29 CHRONIC INTRACTABLE HEADACHE, UNSPECIFIED HEADACHE TYPE: ICD-10-CM

## 2021-09-13 ENCOUNTER — OFFICE VISIT (OUTPATIENT)
Dept: FAMILY MEDICINE CLINIC | Facility: CLINIC | Age: 41
End: 2021-09-13

## 2021-09-13 VITALS
BODY MASS INDEX: 34.07 KG/M2 | WEIGHT: 238 LBS | HEIGHT: 70 IN | HEART RATE: 127 BPM | RESPIRATION RATE: 16 BRPM | SYSTOLIC BLOOD PRESSURE: 124 MMHG | OXYGEN SATURATION: 98 % | DIASTOLIC BLOOD PRESSURE: 64 MMHG | TEMPERATURE: 97.1 F

## 2021-09-13 DIAGNOSIS — F99 PSYCHIATRIC ILLNESS: ICD-10-CM

## 2021-09-13 DIAGNOSIS — M54.50 CHRONIC LOW BACK PAIN, UNSPECIFIED BACK PAIN LATERALITY, UNSPECIFIED WHETHER SCIATICA PRESENT: ICD-10-CM

## 2021-09-13 DIAGNOSIS — G89.29 CHRONIC LOW BACK PAIN, UNSPECIFIED BACK PAIN LATERALITY, UNSPECIFIED WHETHER SCIATICA PRESENT: ICD-10-CM

## 2021-09-13 DIAGNOSIS — R73.01 ELEVATED FASTING GLUCOSE: ICD-10-CM

## 2021-09-13 DIAGNOSIS — D35.2 PITUITARY MICROADENOMA (HCC): Primary | ICD-10-CM

## 2021-09-13 DIAGNOSIS — D35.2 PITUITARY MICROADENOMA (HCC): ICD-10-CM

## 2021-09-13 DIAGNOSIS — E23.0 HYPOGONADOTROPIC HYPOGONADISM (HCC): ICD-10-CM

## 2021-09-13 DIAGNOSIS — I10 ESSENTIAL HYPERTENSION: ICD-10-CM

## 2021-09-13 DIAGNOSIS — E29.1 HYPOGONADISM MALE: ICD-10-CM

## 2021-09-13 DIAGNOSIS — T07.XXXA MULTIPLE CONTUSIONS: ICD-10-CM

## 2021-09-13 DIAGNOSIS — Z00.00 ANNUAL PHYSICAL EXAM: ICD-10-CM

## 2021-09-13 DIAGNOSIS — N28.9 RENAL INSUFFICIENCY: ICD-10-CM

## 2021-09-13 PROCEDURE — 99213 OFFICE O/P EST LOW 20 MIN: CPT | Performed by: FAMILY MEDICINE

## 2021-09-13 PROCEDURE — 99396 PREV VISIT EST AGE 40-64: CPT | Performed by: FAMILY MEDICINE

## 2021-09-13 NOTE — PROGRESS NOTES
Subjective   Lincoln Ornelas is a 40 y.o. male presenting with chief complaint of:   Chief Complaint   Patient presents with   • Annual Exam   • Back Pain       History of Present Illness :  Alone.      Has multiple chronic problems to consider that might have a bearing on today's issues;  an interval appointment.       Chronic/acute problems reviewed today:   1. Hypogonadotropic hypogonadism (CMS/HCC) chronic stable by review of charting from neurosurgery-Dr. Calzada   2. Hypogonadism male same   3. Pituitary microadenoma (CMS/HCC) ana same   4. Essential hypertension Chronic/stable. Stable here past/no recent home blood pressures.  No significant chest pain, SOB, LE edema, orthopnea, near syncope, dizziness/light headness.   Recent Vitals       10/28/2020 12/7/2020 9/13/2021       BP:  110/76  112/78  124/64     Pulse:  98  78  (!) 127     Temp:  97.8 °F (36.6 °C)  97.6 °F (36.4 °C)  97.1 °F (36.2 °C)     Weight:  112 kg (248 lb)  110 kg (243 lb)  108 kg (238 lb)     BMI (Calculated):  36.1  35.4  34.7            5. Elevated fasting glucose Chronic/stable past so far.  No problem/pattern hypoglycemia/hyperglycemia manifest by poly- dypsia, phagia, uria, or sweats, diaphoretic episodes, syncope/near.     6. Psychiatric illness chronic continued care from Dr. Del[facility] for posttraumatic stress, bipolar, and ADHD   7. Chronic low back pain, unspecified back pain laterality, unspecified whether sciatica present chronic variable; recent altercation with the police leading to a fall and various abrasions.  He says his chronic back pain is worse.   8. Renal insufficiency acute or chronic issues; last labs showed a drop in GFR.  Advised follow-up but did not get it yet   9. Annual physical exam Chronic ongoing over time screening or advice for general health care/wellness.      10. Multiple contusions acute with above.  He played a video showing the start of please talking to him laying on the floor     Has  "an/another acute issue today: none.    The following portions of the patient's history were reviewed and updated as appropriate: allergies, current medications, past family history, past medical history, past social history, past surgical history and problem list.      Current Outpatient Medications:   •  acetaZOLAMIDE (DIAMOX) 250 MG tablet, Take 2 tablets by mouth 3 (Three) Times a Day., Disp: 180 tablet, Rfl: 3  •  ALPRAZolam (XANAX) 2 MG tablet, TK 1 T PO  TID, Disp: , Rfl: 0  •  amphetamine-dextroamphetamine (ADDERALL) 20 MG tablet, Take 1 tablet by mouth 3 (Three) Times a Day., Disp: , Rfl:   •  ARIPiprazole (ABILIFY) 5 MG tablet, Take 1 tablet by mouth Daily., Disp: , Rfl:   •  buprenorphine-naloxone (SUBOXONE) 8-2 MG per SL tablet, Place 1 tablet under the tongue 3 (Three) Times a Day As Needed., Disp: , Rfl: 0  •  buPROPion (WELLBUTRIN) 75 MG tablet, Take 150 mg by mouth Every Morning. Dr Deluca, Disp: , Rfl:   •  Cholecalciferol (VITAMIN D) 1000 units tablet, Take 1 tablet by mouth 2 (Two) Times a Day., Disp: 60 tablet, Rfl: 5  •  Chorionic Gonadotropin (HCG IJ), Inject 500 Units as directed 2 (Two) Times a Week., Disp: , Rfl:   •  lisinopril-hydrochlorothiazide (PRINZIDE,ZESTORETIC) 20-25 MG per tablet, TAKE 1 TABLET BY MOUTH DAILY, Disp: 90 tablet, Rfl: 1  •  Lurasidone HCl 120 MG tablet, Take 1 tablet by mouth Daily. Dr Deluca, Disp: , Rfl:   •  NON FORMULARY, Inject 200 mg as directed 2 (Two) Times a Week. Aromataste Inhibitor, Disp: , Rfl:   •  OXcarbazepine (TRILEPTAL) 300 MG tablet, Take 2 tablets by mouth 3 (Three) Times a Day., Disp: , Rfl:   •  potassium chloride (K-DUR,KLOR-CON) 20 MEQ CR tablet, TAKE 1 TABLET BY MOUTH TWICE DAILY, Disp: 180 tablet, Rfl: 1  •  Saphris 10 MG sublingual tablet sublingual tablet, Place 1 tablet under the tongue Every Night., Disp: , Rfl:   •  Syringe/Needle, Disp, (B-D 3CC LUER-COBY SYR 21GX1\") 21G X 1\" 3 ML misc, USE ONCE WEEKLY AS DIRECTED, Disp: 4 each, Rfl: 6  •  " "Testosterone Cypionate (DEPOTESTOTERONE CYPIONATE) 200 MG/ML injection, Inject 0.5 mL into the appropriate muscle as directed by prescriber 1 (One) Time Per Week. 100mg weekly, Provide(#4)18G,(#4)21Gneedles (#4)3mlsyringes q month, Disp: 4 mL, Rfl: 1  •  Vraylar 3 MG capsule capsule, Take 1 capsule by mouth Daily., Disp: , Rfl:   •  Brexpiprazole 4 MG tablet, Take 4 mg by mouth., Disp: , Rfl:   •  Needle, Disp, 18G X 1\" misc, Use weekly as indicated, Disp: 4 each, Rfl: 11    No problems with medications.    No Known Allergies    Review of Systems  GENERAL:  Inactive/slower with limits, speed, stamina for age and fatigue/desire. Sleep is ok; apnea denied. No fever now.  SKIN: No rash/skin lesion of concern; tatoos.   ENDO:  No syncope, near or diaphoretic sweaty spells.  BS Ok to this point. .  HEENT: No recent head injury; daily headache.  No vision change.  No significant hearing loss.  Ears without pain/drainage.  No sore throat.  No significant nasal/sinus congestion/drainage. No epistaxis.  CHEST: No chest wall tenderness or mass. No significant cough,  without wheeze.  No SOB; no hemoptysis.  CV: No exertional chest pain, palpitations; mild end of day equal LE/ankle edema.  GI: No heartburn, dysphagia.  No abdominal pain, diarrhea, constipation.  No rectal bleeding, or melena.    :  Voids without dysuria, or incontinence to completion.  ORTHO: No painful/swollen joints but various on /off sore.  Daily some sore neck or back.  No acute neck or back pain without recent injury.  NEURO: No dizziness, weakness of extremities.  No numbness/paresthesias.   PSYCH: No memory loss.  Mood variable; often anxious, depressed but/and not suicidal.  Tries to tolerate stress .   Screening:  Mammogram: NA  Bone density: N  Low dose CT chest:   Tobacco-smoker/age 9/2 ppd/  Tobacco-2nd handed/life  Tobacco-chew/age 8  GI: NA  Prostate: NA  Usual lab order  6m CBC, CMP, A1c, TSH, fT4  12m CBC, CMP, A1c, LIPID, TSH, fT4, Vit D, " testosterone    Data reviewed:   Recent admit/ER/MD visits: ana visit  Last cardiac testing:   Echo:   Results for orders placed during the hospital encounter of 09/23/16    Adult Stress Echo Only    Interpretation Summary  · Below average functional capacity.  · Clinically and electrically negative for ischemia.  · There is normal contractility at rest with appropriate increase with stress    Exercise stress echocardiogram is low risk for ischemia.    Lab Results:  Results for orders placed or performed in visit on 07/28/20   Basic metabolic panel    Specimen: Blood   Result Value Ref Range    Glucose 112 (H) 65 - 99 mg/dL    BUN 17 6 - 20 mg/dL    Creatinine 1.18 0.76 - 1.27 mg/dL    eGFR Non African Am 68 >60 mL/min/1.73    eGFR African Am 83 >60 mL/min/1.73    BUN/Creatinine Ratio 14.4 7.0 - 25.0    Sodium 138 136 - 145 mmol/L    Potassium 3.5 3.5 - 5.2 mmol/L    Chloride 102 98 - 107 mmol/L    Total CO2 26.7 22.0 - 29.0 mmol/L    Calcium 9.4 8.6 - 10.5 mg/dL       A1C:No results for input(s): HGBA1C in the last 83652 hours.  LIPID:No results for input(s): CHLPL, LDL, HDL, TRIG in the last 33132 hours.  PSA:No results for input(s): PSA in the last 63248 hours.  CBC:  Lab Results - Last 18 Months   Lab Units 03/24/21  0311 03/23/21  1208   WBC K/uL 9.9 15.5*   HEMOGLOBIN g/dL 16.2 17.7   HEMATOCRIT % 50.0 55.1*   PLATELETS K/uL 189 263      BMP/CMP:  Lab Results - Last 18 Months   Lab Units 03/23/21  1208 07/28/20  1229 04/24/20  0901   SODIUM mmol/L 144 138 140   POTASSIUM mmol/L 4.2 3.5 3.5   CHLORIDE mmol/L 103 102 102   TOTAL CO2 mmol/L 18* 26.7 26.6   GLUCOSE mg/dL  --  112* 128*   BUN mg/dL 21* 17 14   CREATININE mg/dL 1.6* 1.18 1.10   EGFR IF NONAFRICN AM  48* 68 74   EGFR IF AFRICN AM  58* 83 90   CALCIUM mg/dL 9.6 9.4 9.1     HEPATIC:  Lab Results - Last 18 Months   Lab Units 03/23/21  1208   ALT (SGPT) U/L 16   AST (SGOT) U/L 15   ALK PHOS U/L 63     THYROID:No results for input(s): TSH, FREET4, FTI  "in the last 82071 hours.    Invalid input(s): FREET3, T3, T4, TEUP,  TOTALT4    Objective   /64   Pulse (!) 127   Temp 97.1 °F (36.2 °C) (Infrared)   Resp 16   Ht 176.5 cm (69.5\")   Wt 108 kg (238 lb)   SpO2 98%   BMI 34.64 kg/m²   Body mass index is 34.64 kg/m².    Recent Vitals       10/28/2020 12/7/2020 9/13/2021       BP:  110/76  112/78  124/64     Pulse:  98  78  (!) 127     Temp:  97.8 °F (36.6 °C)  97.6 °F (36.4 °C)  97.1 °F (36.2 °C)     Weight:  112 kg (248 lb)  110 kg (243 lb)  108 kg (238 lb)     BMI (Calculated):  36.1  35.4  34.7           Physical Exam  GENERAL:  Older state age; AMEYA-developed in no acute distress. Obese.   SKIN: Turgor excellent, without wound, rash, lesion other than UE/LE multiple 10c/smaller scabs..  HEENT: Normal cephalic without trauma.  Pupils equal round reactive to light. Extraocular motions full without nystagmus.   External canals nonobstructive nontender without reddness. Tymphatic membranes vania with talia structures intact.   Oral cavity without growths, exudates, and moist.  Posterior pharynx without mass, obstruction, redness.  No thyromegaly, mass, tenderness, lymphadenopathy and supple.  CV: Regular rhythm.  No murmur, gallop, edema. Posterior pulses intact.  No carotid bruits.  CHEST: No chest wall tenderness or mass.   LUNGS: Symmetric motion with clear to auscultation.   ABD: Soft, nontender without mass.   ORTHO: Symmetric extremities without swelling/point tenderness.  Full gross range of motion.  NEURO: CN 2-12 grossly intact.  Symmetric facies and UE/LE. 3-4/5 strength throughout. 1/4 x bicep equal reflexes.  Nonfocal use extremities. Speech clear.  Intact light touch with monofilament, vibratory sensation with tuning fork; equal toes/distal feet.    PSYCH: Oriented x 3.  Pleasant calm, well kept.  Purposeful/directed conservation with intact short/long gross memory but somewhat shallow.      Assessment/Plan     1. Hypogonadotropic hypogonadism " (CMS/[facility])    2. Hypogonadism male    3. Pituitary microadenoma (CMS/[facility]) ana    4. Essential hypertension    5. Elevated fasting glucose    6. Psychiatric illness    7. Chronic low back pain, unspecified back pain laterality, unspecified whether sciatica present    8. Renal insufficiency    9. Annual physical exam    10. Multiple contusions      Discussion:  Start labs; he is behind and hard to know where we stand with BS/renal/others  Work with Dr Deluca  Labs for Dr Deluca ordered today  Local wound care; report if wounds change  Xray; see if ana if needed would review back     Medical decision issues:   Data review above:   Rx: reviewed and decisions:   Same Rx for now     Visit today involved chronic significant medical problems or differentials and/or intensive drug monitoring: ie potential to cause serious morbidity or death more long term.   No orders of the defined types were placed in this encounter.      Orders placed:   LAB/Testing/Referrals: reviewed/orders:   Today:   Orders Placed This Encounter   Procedures   • MRI Brain With & Without Contrast   • MRI Lumbar Spine Without Contrast   • Lithium level   • T4, free   • T4   • T3, free   • T3, Uptake   • TSH   • T4, Free   • TSH   • T3, Free   • Hemoglobin A1c   • Comprehensive Metabolic Panel   • T4   • T3   • T3, Uptake   • Lithium level   • CBC & Differential     Chronic/recurrent labs above or change to:   Same    Health maintenance:   Body mass index is 34.64 kg/m².  Patient's Body mass index is 34.64 kg/m². indicating that he is obese (BMI >30). Obesity-related health conditions include the following: hypertension. Obesity is unchanged. BMI is is above average; BMI management plan is completed. We discussed portion control and increasing exercise..    Tobacco use reviewed:   Lincoln Ornelas  reports that he quit smoking about 10 months ago. His smoking use included cigarettes. He started smoking about 31 years ago. He has a 50.00 pack-year  smoking history. His smokeless tobacco use includes chew..     Annual/wellness visit: also/today: Counseling/Anticipatory Guidance: Patient was counseled on:  Nutrition, physical activity, healthy weight, injury prevention, misuse of tobacco, alcohol and drugs, sexual behavior and STDs, contraception, dental health, mental health, immunizations, screenings as appropriate.       There are no Patient Instructions on file for this visit.    Follow up: Return for lab today/, THEN, lab;, Dr Ascencio-, 6 m;.  Future Appointments   Date Time Provider Department Center   9/16/2021  8:25 AM LAB [facility] [place] [facility]   3/15/2022 11:00 AM Zev Ascencio MD [facility]

## 2021-09-15 RX ORDER — ACETAZOLAMIDE 250 MG/1
TABLET ORAL
Qty: 180 TABLET | Refills: 3 | Status: SHIPPED | OUTPATIENT
Start: 2021-09-15 | End: 2022-04-11

## 2021-09-21 ENCOUNTER — TELEPHONE (OUTPATIENT)
Dept: FAMILY MEDICINE CLINIC | Facility: CLINIC | Age: 41
End: 2021-09-21

## 2021-09-21 ENCOUNTER — TELEPHONE (OUTPATIENT)
Dept: NEUROLOGY | Age: 41
End: 2021-09-21

## 2021-09-21 NOTE — TELEPHONE ENCOUNTER
Caller: Lincoln Ornelas    Relationship: Self    Best call back number: [phone]    What orders are you requesting (i.e. lab or imaging): IMAGING    In what timeframe would the patient need to come in: SCHEDULED OCT 1ST    Where will you receive your lab/imaging services: [facility]    Additional notes: NEEDED XRAY OF NOSE

## 2021-10-01 ENCOUNTER — HOSPITAL ENCOUNTER (OUTPATIENT)
Dept: MRI IMAGING | Facility: HOSPITAL | Age: 41
Discharge: HOME | End: 2021-10-01

## 2021-10-01 DIAGNOSIS — E29.1 HYPOGONADISM MALE: ICD-10-CM

## 2021-10-01 DIAGNOSIS — G89.29 CHRONIC LOW BACK PAIN, UNSPECIFIED BACK PAIN LATERALITY, UNSPECIFIED WHETHER SCIATICA PRESENT: ICD-10-CM

## 2021-10-01 DIAGNOSIS — D35.2 PITUITARY MICROADENOMA (HCC): ICD-10-CM

## 2021-10-01 DIAGNOSIS — N28.9 RENAL INSUFFICIENCY: ICD-10-CM

## 2021-10-01 DIAGNOSIS — M54.50 CHRONIC LOW BACK PAIN, UNSPECIFIED BACK PAIN LATERALITY, UNSPECIFIED WHETHER SCIATICA PRESENT: ICD-10-CM

## 2021-10-01 DIAGNOSIS — E23.0 HYPOGONADOTROPIC HYPOGONADISM (HCC): ICD-10-CM

## 2021-10-01 DIAGNOSIS — R73.01 ELEVATED FASTING GLUCOSE: ICD-10-CM

## 2021-10-01 DIAGNOSIS — F99 PSYCHIATRIC ILLNESS: ICD-10-CM

## 2021-10-01 DIAGNOSIS — I10 ESSENTIAL HYPERTENSION: ICD-10-CM

## 2021-10-01 LAB — CREAT BLDA-MCNC: 1.2 MG/DL (ref 0.6–1.3)

## 2021-10-01 PROCEDURE — 82565 ASSAY OF CREATININE: CPT

## 2021-10-01 PROCEDURE — A9577 INJ MULTIHANCE: HCPCS | Performed by: FAMILY MEDICINE

## 2021-10-01 PROCEDURE — 72148 MRI LUMBAR SPINE W/O DYE: CPT

## 2021-10-01 PROCEDURE — 70553 MRI BRAIN STEM W/O & W/DYE: CPT

## 2021-10-01 PROCEDURE — 0 GADOBENATE DIMEGLUMINE 529 MG/ML SOLUTION: Performed by: FAMILY MEDICINE

## 2021-10-01 RX ADMIN — GADOBENATE DIMEGLUMINE 20 ML: 529 INJECTION, SOLUTION INTRAVENOUS at 15:15

## 2021-10-07 NOTE — TELEPHONE ENCOUNTER
"Peds Outpatient BP  1) Rested for 5 minutes, BP taken on bare arm, patient sitting (or supine for infants) w/ legs uncrossed?   Yes  2) Right arm used?      Yes  3) Arm circumference of largest part of upper arm (in cm): 26.4 cm  4) BP cuff sized used: Adult (25-32cm)   If used different size cuff then what was recommended why? N/A  5) First BP reading:machine   BP Readings from Last 1 Encounters:   03/05/20 104/64 (31 %, Z = -0.51 /  41 %, Z = -0.23)*     *BP percentiles are based on the 2017 AAP Clinical Practice Guideline for girls      Is reading >90%?No   (90% for <1 years is 90/50)  (90% for >18 years is 140/90)  *If a machine BP is at or above 90% take manual BP  6) Manual BP reading: N/A  7) Other comments: None    Kristy Melendez [facility].    173 cm, 173.3 cm, 173.5 cm, Ave: 173.3cm      [facility] [[id number]]  Chief Complaint   Patient presents with     RECHECK     Follow-up on Thyroid.     Initial /66 (BP Location: Right arm, Patient Position: Sitting, Cuff Size: Adult Regular)   Pulse 97   Ht 5' 8.23\" (173.3 cm)   Wt 126 lb 1.7 oz (57.2 kg)   BMI 19.05 kg/m   Estimated body mass index is 19.05 kg/m  as calculated from the following:    Height as of this encounter: 5' 8.23\" (173.3 cm).    Weight as of this encounter: 126 lb 1.7 oz (57.2 kg).  Medication Reconciliation: complete    " Called [facility] and advised they have a prescription from Jan and rx refused

## 2021-10-15 ENCOUNTER — TELEPHONE (OUTPATIENT)
Dept: FAMILY MEDICINE CLINIC | Facility: CLINIC | Age: 41
End: 2021-10-15

## 2021-10-15 NOTE — TELEPHONE ENCOUNTER
Patient would like a callback from Trudy. This is regarding lab results.    Callback: [phone]

## 2021-11-11 ENCOUNTER — OFFICE VISIT (OUTPATIENT)
Dept: FAMILY MEDICINE CLINIC | Facility: CLINIC | Age: 41
End: 2021-11-11

## 2021-11-11 VITALS
WEIGHT: 243 LBS | SYSTOLIC BLOOD PRESSURE: 148 MMHG | HEART RATE: 114 BPM | OXYGEN SATURATION: 96 % | BODY MASS INDEX: 34.79 KG/M2 | DIASTOLIC BLOOD PRESSURE: 84 MMHG | HEIGHT: 70 IN | TEMPERATURE: 96.4 F | RESPIRATION RATE: 16 BRPM

## 2021-11-11 DIAGNOSIS — R10.9 LEFT FLANK PAIN: Primary | ICD-10-CM

## 2021-11-11 DIAGNOSIS — E66.09 CLASS 2 OBESITY DUE TO EXCESS CALORIES WITH BODY MASS INDEX (BMI) OF 35.0 TO 35.9 IN ADULT, UNSPECIFIED WHETHER SERIOUS COMORBIDITY PRESENT: ICD-10-CM

## 2021-11-11 PROCEDURE — 99213 OFFICE O/P EST LOW 20 MIN: CPT | Performed by: NURSE PRACTITIONER

## 2021-11-11 RX ORDER — KETOROLAC TROMETHAMINE 10 MG/1
10 TABLET, FILM COATED ORAL EVERY 6 HOURS PRN
Qty: 20 TABLET | Refills: 0 | Status: SHIPPED | OUTPATIENT
Start: 2021-11-11 | End: 2022-02-08

## 2021-11-11 RX ORDER — HYDROCODONE BITARTRATE AND ACETAMINOPHEN 5; 325 MG/1; MG/1
1 TABLET ORAL EVERY 6 HOURS PRN
Qty: 20 TABLET | Refills: 0 | Status: SHIPPED | OUTPATIENT
Start: 2021-11-11 | End: 2022-02-08

## 2021-11-12 ENCOUNTER — HOSPITAL ENCOUNTER (OUTPATIENT)
Dept: CT IMAGING | Facility: HOSPITAL | Age: 41
Discharge: HOME | End: 2021-11-12
Admitting: NURSE PRACTITIONER

## 2021-11-12 DIAGNOSIS — R10.9 LEFT FLANK PAIN: ICD-10-CM

## 2021-11-12 PROCEDURE — 74176 CT ABD & PELVIS W/O CONTRAST: CPT

## 2021-11-12 RX ORDER — TAMSULOSIN HYDROCHLORIDE 0.4 MG/1
1 CAPSULE ORAL DAILY
Qty: 5 CAPSULE | Refills: 0 | Status: SHIPPED | OUTPATIENT
Start: 2021-11-12 | End: 2022-02-08

## 2021-11-12 NOTE — PROGRESS NOTES
Please call the patient - looks like he is trying to pass a 2 mm stone.  Either still in ureter or has made it to bladder.  Increase fluids today, flomax rx is sent, take a dose soon.  To ED if worse or acute  hemorrhage over weekend.  Follow-up Monday if no better and will refer.    Electronically signed by HI Bird, 11/12/21, 7:58 AM CST.

## 2021-11-13 LAB
ALBUMIN SERPL-MCNC: 4.8 G/DL (ref 3.5–5.2)
ALBUMIN/GLOB SERPL: 1.9 G/DL
ALP SERPL-CCNC: 68 U/L (ref 39–117)
ALT SERPL-CCNC: 37 U/L (ref 1–41)
APPEARANCE UR: CLEAR
AST SERPL-CCNC: 28 U/L (ref 1–40)
BACTERIA #/AREA URNS HPF: ABNORMAL /HPF
BACTERIA UR CULT: NORMAL
BACTERIA UR CULT: NORMAL
BASOPHILS # BLD AUTO: 0.1 10*3/MM3 (ref 0–0.2)
BASOPHILS NFR BLD AUTO: 0.7 % (ref 0–1.5)
BILIRUB SERPL-MCNC: 0.3 MG/DL (ref 0–1.2)
BILIRUB UR QL STRIP: NEGATIVE
BUN SERPL-MCNC: 13 MG/DL (ref 6–20)
BUN/CREAT SERPL: 8 (ref 7–25)
CALCIUM SERPL-MCNC: 10.1 MG/DL (ref 8.6–10.5)
CASTS URNS QL MICRO: ABNORMAL /LPF
CHLORIDE SERPL-SCNC: 101 MMOL/L (ref 98–107)
CO2 SERPL-SCNC: 27.8 MMOL/L (ref 22–29)
COLOR UR: YELLOW
CREAT SERPL-MCNC: 1.62 MG/DL (ref 0.76–1.27)
CRYSTALS URNS MICRO: ABNORMAL
EOSINOPHIL # BLD AUTO: 0.17 10*3/MM3 (ref 0–0.4)
EOSINOPHIL NFR BLD AUTO: 1.2 % (ref 0.3–6.2)
EPI CELLS #/AREA URNS HPF: ABNORMAL /HPF (ref 0–10)
ERYTHROCYTE [DISTWIDTH] IN BLOOD BY AUTOMATED COUNT: 12.3 % (ref 12.3–15.4)
GLOBULIN SER CALC-MCNC: 2.5 GM/DL
GLUCOSE SERPL-MCNC: 124 MG/DL (ref 65–99)
GLUCOSE UR QL: NEGATIVE
HCT VFR BLD AUTO: 52.8 % (ref 37.5–51)
HGB BLD-MCNC: 17.6 G/DL (ref 13–17.7)
HGB UR QL STRIP: NEGATIVE
IMM GRANULOCYTES # BLD AUTO: 0.12 10*3/MM3 (ref 0–0.05)
IMM GRANULOCYTES NFR BLD AUTO: 0.8 % (ref 0–0.5)
KETONES UR QL STRIP: ABNORMAL
LEUKOCYTE ESTERASE UR QL STRIP: ABNORMAL
LYMPHOCYTES # BLD AUTO: 2.97 10*3/MM3 (ref 0.7–3.1)
LYMPHOCYTES NFR BLD AUTO: 20.6 % (ref 19.6–45.3)
MCH RBC QN AUTO: 29 PG (ref 26.6–33)
MCHC RBC AUTO-ENTMCNC: 33.3 G/DL (ref 31.5–35.7)
MCV RBC AUTO: 87.1 FL (ref 79–97)
MICRO URNS: ABNORMAL
MONOCYTES # BLD AUTO: 1.22 10*3/MM3 (ref 0.1–0.9)
MONOCYTES NFR BLD AUTO: 8.4 % (ref 5–12)
NEUTROPHILS # BLD AUTO: 9.86 10*3/MM3 (ref 1.7–7)
NEUTROPHILS NFR BLD AUTO: 68.3 % (ref 42.7–76)
NITRITE UR QL STRIP: NEGATIVE
NRBC BLD AUTO-RTO: 0 /100 WBC (ref 0–0.2)
PH UR STRIP: 5 [PH] (ref 5–7.5)
PLATELET # BLD AUTO: 231 10*3/MM3 (ref 140–450)
POTASSIUM SERPL-SCNC: 3.8 MMOL/L (ref 3.5–5.2)
PROT SERPL-MCNC: 7.3 G/DL (ref 6–8.5)
PROT UR QL STRIP: ABNORMAL
RBC # BLD AUTO: 6.06 10*6/MM3 (ref 4.14–5.8)
RBC #/AREA URNS HPF: ABNORMAL /HPF (ref 0–2)
SODIUM SERPL-SCNC: 139 MMOL/L (ref 136–145)
SP GR UR: >=1.03 (ref 1–1.03)
UNIDENT CRYS URNS QL MICRO: PRESENT /LPF
URINALYSIS REFLEX: ABNORMAL
UROBILINOGEN UR STRIP-MCNC: 1 MG/DL (ref 0.2–1)
WBC # BLD AUTO: 14.44 10*3/MM3 (ref 3.4–10.8)
WBC #/AREA URNS HPF: ABNORMAL /HPF (ref 0–5)

## 2021-11-15 NOTE — PROGRESS NOTES
Please call the patient - Reviewed lab - Renals decreased, on Testosterone - increased HCT, need to have a conversation.  See if patient can f/u this week.    Electronically signed by HI Bird, 11/15/21, 7:00 AM CST.

## 2021-11-15 NOTE — PROGRESS NOTES
Pt is aware of his test results and will be checking his schedule and contacting the office for an appointment this week.

## 2021-11-16 ENCOUNTER — TELEPHONE (OUTPATIENT)
Dept: NEUROLOGY | Age: 41
End: 2021-11-16

## 2021-11-16 NOTE — TELEPHONE ENCOUNTER
Natalia Berg called in to advise that he recently had a CT scan completed at [facility]. Patient didn't know if we could contact them for record of that not before his new patient appt. Advised patient to call back for our fax number so he could give it to [facility] patient was driving at the time of call. A good call back time is anytime.   Thank you

## 2021-11-17 NOTE — TELEPHONE ENCOUNTER
Tried to call patient, he needs to bring the disc of MRI images to the upcoming appt. The CT was just of his abdomen, we need the MRI Brain on a disc for Dr Thelma Torres to review, we do have the report. Patient did not answer and I was unable to leave a message due to his voicemail box being full.

## 2021-11-17 NOTE — TELEPHONE ENCOUNTER
Tried to contact the patient again, phone goes straight to a recording that the voicemail box is full and I cannot leave another message. I tried calling the number listed for patient's wife, I was able to leave a voicemail instructing that he will need to bring a disc of the MRI images to his upcoming appt w/Dr Marcela Nolasco and that w/out the disc the appointment will have to be rescheduled. I instructed to go by [facility] imaging and request this disc prior to coming to his upcoming new patient appt as he will be rescheduled without this disc for Dr Marcela Nolasco to review. I stated to call us back at [phone] with any further questions.

## 2021-12-11 DIAGNOSIS — I10 HYPERTENSION, UNSPECIFIED TYPE: ICD-10-CM

## 2021-12-13 RX ORDER — LISINOPRIL AND HYDROCHLOROTHIAZIDE 25; 20 MG/1; MG/1
1 TABLET ORAL DAILY
Qty: 90 TABLET | Refills: 2 | Status: SHIPPED | OUTPATIENT
Start: 2021-12-13 | End: 2023-01-13

## 2021-12-13 NOTE — TELEPHONE ENCOUNTER
Rx Refill Note  Requested Prescriptions     Pending Prescriptions Disp Refills   • lisinopril-hydrochlorothiazide (PRINZIDE,ZESTORETIC) 20-25 MG per tablet [Pharmacy Med Name: LISINOPRIL-HCTZ 20/25MG TABLETS] 90 tablet 1     Sig: TAKE 1 TABLET BY MOUTH DAILY      Last office visit with prescribing clinician: 9/13/2021      Next office visit with prescribing clinician: Visit date not found            Chitra Karimi MA  12/13/21, 09:11 CST

## 2021-12-15 ENCOUNTER — APPOINTMENT (OUTPATIENT)
Dept: GENERAL RADIOLOGY | Age: 41
End: 2021-12-15
Attending: ORTHOPAEDIC SURGERY
Payer: COMMERCIAL

## 2021-12-15 ENCOUNTER — ANESTHESIA EVENT (OUTPATIENT)
Dept: OPERATING ROOM | Age: 41
End: 2021-12-15
Payer: COMMERCIAL

## 2021-12-15 ENCOUNTER — ANESTHESIA (OUTPATIENT)
Dept: OPERATING ROOM | Age: 41
End: 2021-12-15
Payer: COMMERCIAL

## 2021-12-15 ENCOUNTER — HOSPITAL ENCOUNTER (OUTPATIENT)
Age: 41
Setting detail: OUTPATIENT SURGERY
Discharge: HOME | End: 2021-12-15
Attending: ORTHOPAEDIC SURGERY | Admitting: ORTHOPAEDIC SURGERY
Payer: COMMERCIAL

## 2021-12-15 VITALS
SYSTOLIC BLOOD PRESSURE: 107 MMHG | DIASTOLIC BLOOD PRESSURE: 72 MMHG | WEIGHT: 240 LBS | TEMPERATURE: 97.2 F | HEART RATE: 82 BPM | RESPIRATION RATE: 16 BRPM | HEIGHT: 70 IN | OXYGEN SATURATION: 98 % | BODY MASS INDEX: 34.36 KG/M2

## 2021-12-15 VITALS — DIASTOLIC BLOOD PRESSURE: 56 MMHG | SYSTOLIC BLOOD PRESSURE: 100 MMHG | TEMPERATURE: 97.5 F | OXYGEN SATURATION: 95 %

## 2021-12-15 LAB
EKG P AXIS: 48 DEGREES
EKG P-R INTERVAL: 148 MS
EKG Q-T INTERVAL: 324 MS
EKG QRS DURATION: 86 MS
EKG QTC CALCULATION (BAZETT): 371 MS
EKG T AXIS: 55 DEGREES
SARS-COV-2, NAAT: NOT DETECTED

## 2021-12-15 PROCEDURE — C1769 GUIDE WIRE: HCPCS | Performed by: ORTHOPAEDIC SURGERY

## 2021-12-15 PROCEDURE — 2500000003 HC RX 250 WO HCPCS: Performed by: ANESTHESIOLOGY

## 2021-12-15 PROCEDURE — 6360000002 HC RX W HCPCS: Performed by: ORTHOPAEDIC SURGERY

## 2021-12-15 PROCEDURE — 3700000001 HC ADD 15 MINUTES (ANESTHESIA): Performed by: ORTHOPAEDIC SURGERY

## 2021-12-15 PROCEDURE — 6360000002 HC RX W HCPCS: Performed by: ANESTHESIOLOGY

## 2021-12-15 PROCEDURE — 87635 SARS-COV-2 COVID-19 AMP PRB: CPT

## 2021-12-15 PROCEDURE — 7100000001 HC PACU RECOVERY - ADDTL 15 MIN: Performed by: ORTHOPAEDIC SURGERY

## 2021-12-15 PROCEDURE — 2580000003 HC RX 258: Performed by: ORTHOPAEDIC SURGERY

## 2021-12-15 PROCEDURE — 2709999900 HC NON-CHARGEABLE SUPPLY: Performed by: ORTHOPAEDIC SURGERY

## 2021-12-15 PROCEDURE — 7100000011 HC PHASE II RECOVERY - ADDTL 15 MIN: Performed by: ORTHOPAEDIC SURGERY

## 2021-12-15 PROCEDURE — 7100000010 HC PHASE II RECOVERY - FIRST 15 MIN: Performed by: ORTHOPAEDIC SURGERY

## 2021-12-15 PROCEDURE — 3700000000 HC ANESTHESIA ATTENDED CARE: Performed by: ORTHOPAEDIC SURGERY

## 2021-12-15 PROCEDURE — 3600000004 HC SURGERY LEVEL 4 BASE: Performed by: ORTHOPAEDIC SURGERY

## 2021-12-15 PROCEDURE — 7100000000 HC PACU RECOVERY - FIRST 15 MIN: Performed by: ORTHOPAEDIC SURGERY

## 2021-12-15 PROCEDURE — 64447 NJX AA&/STRD FEMORAL NRV IMG: CPT

## 2021-12-15 PROCEDURE — 6360000002 HC RX W HCPCS

## 2021-12-15 PROCEDURE — 76942 ECHO GUIDE FOR BIOPSY: CPT

## 2021-12-15 PROCEDURE — 73610 X-RAY EXAM OF ANKLE: CPT

## 2021-12-15 PROCEDURE — 3600000014 HC SURGERY LEVEL 4 ADDTL 15MIN: Performed by: ORTHOPAEDIC SURGERY

## 2021-12-15 PROCEDURE — 2500000003 HC RX 250 WO HCPCS

## 2021-12-15 PROCEDURE — 3209999900 FLUORO FOR SURGICAL PROCEDURES

## 2021-12-15 PROCEDURE — 2720000010 HC SURG SUPPLY STERILE: Performed by: ORTHOPAEDIC SURGERY

## 2021-12-15 PROCEDURE — 93005 ELECTROCARDIOGRAM TRACING: CPT | Performed by: ORTHOPAEDIC SURGERY

## 2021-12-15 PROCEDURE — C1713 ANCHOR/SCREW BN/BN,TIS/BN: HCPCS | Performed by: ORTHOPAEDIC SURGERY

## 2021-12-15 DEVICE — IMPLANTABLE DEVICE: Type: IMPLANTABLE DEVICE | Site: ANKLE | Status: FUNCTIONAL

## 2021-12-15 RX ORDER — SODIUM CHLORIDE, SODIUM LACTATE, POTASSIUM CHLORIDE, CALCIUM CHLORIDE 600; 310; 30; 20 MG/100ML; MG/100ML; MG/100ML; MG/100ML
INJECTION, SOLUTION INTRAVENOUS CONTINUOUS
Status: DISCONTINUED | OUTPATIENT
Start: 2021-12-15 | End: 2021-12-15 | Stop reason: HOSPADM

## 2021-12-15 RX ORDER — CEFAZOLIN SODIUM 2 G/100ML
2000 INJECTION, SOLUTION INTRAVENOUS ONCE
Status: COMPLETED | OUTPATIENT
Start: 2021-12-15 | End: 2021-12-15

## 2021-12-15 RX ORDER — DEXAMETHASONE SODIUM PHOSPHATE 10 MG/ML
INJECTION, SOLUTION INTRAMUSCULAR; INTRAVENOUS PRN
Status: DISCONTINUED | OUTPATIENT
Start: 2021-12-15 | End: 2021-12-15 | Stop reason: SDUPTHER

## 2021-12-15 RX ORDER — PROPOFOL 10 MG/ML
INJECTION, EMULSION INTRAVENOUS PRN
Status: DISCONTINUED | OUTPATIENT
Start: 2021-12-15 | End: 2021-12-15 | Stop reason: SDUPTHER

## 2021-12-15 RX ORDER — ROPIVACAINE HYDROCHLORIDE 5 MG/ML
INJECTION, SOLUTION EPIDURAL; INFILTRATION; PERINEURAL
Status: COMPLETED
Start: 2021-12-15 | End: 2021-12-15

## 2021-12-15 RX ORDER — ONDANSETRON 2 MG/ML
INJECTION INTRAMUSCULAR; INTRAVENOUS PRN
Status: DISCONTINUED | OUTPATIENT
Start: 2021-12-15 | End: 2021-12-15 | Stop reason: SDUPTHER

## 2021-12-15 RX ORDER — FENTANYL CITRATE 50 UG/ML
INJECTION, SOLUTION INTRAMUSCULAR; INTRAVENOUS PRN
Status: DISCONTINUED | OUTPATIENT
Start: 2021-12-15 | End: 2021-12-15 | Stop reason: SDUPTHER

## 2021-12-15 RX ORDER — SODIUM CHLORIDE 0.9 % (FLUSH) 0.9 %
10 SYRINGE (ML) INJECTION PRN
Status: DISCONTINUED | OUTPATIENT
Start: 2021-12-15 | End: 2021-12-15 | Stop reason: HOSPADM

## 2021-12-15 RX ORDER — SUCCINYLCHOLINE CHLORIDE 20 MG/ML
INJECTION INTRAMUSCULAR; INTRAVENOUS PRN
Status: DISCONTINUED | OUTPATIENT
Start: 2021-12-15 | End: 2021-12-15 | Stop reason: SDUPTHER

## 2021-12-15 RX ORDER — ONDANSETRON 2 MG/ML
4 INJECTION INTRAMUSCULAR; INTRAVENOUS
Status: DISCONTINUED | OUTPATIENT
Start: 2021-12-15 | End: 2021-12-15 | Stop reason: HOSPADM

## 2021-12-15 RX ORDER — DEXAMETHASONE SODIUM PHOSPHATE 4 MG/ML
4 INJECTION, SOLUTION INTRA-ARTICULAR; INTRALESIONAL; INTRAMUSCULAR; INTRAVENOUS; SOFT TISSUE ONCE
Status: COMPLETED | OUTPATIENT
Start: 2021-12-15 | End: 2021-12-15

## 2021-12-15 RX ORDER — DEXMEDETOMIDINE HYDROCHLORIDE 100 UG/ML
INJECTION, SOLUTION INTRAVENOUS PRN
Status: DISCONTINUED | OUTPATIENT
Start: 2021-12-15 | End: 2021-12-15 | Stop reason: SDUPTHER

## 2021-12-15 RX ORDER — ROCURONIUM BROMIDE 10 MG/ML
INJECTION, SOLUTION INTRAVENOUS PRN
Status: DISCONTINUED | OUTPATIENT
Start: 2021-12-15 | End: 2021-12-15 | Stop reason: SDUPTHER

## 2021-12-15 RX ORDER — HYDROMORPHONE HYDROCHLORIDE 1 MG/ML
0.25 INJECTION, SOLUTION INTRAMUSCULAR; INTRAVENOUS; SUBCUTANEOUS EVERY 5 MIN PRN
Status: DISCONTINUED | OUTPATIENT
Start: 2021-12-15 | End: 2021-12-15 | Stop reason: HOSPADM

## 2021-12-15 RX ORDER — SODIUM CHLORIDE 0.9 % (FLUSH) 0.9 %
10 SYRINGE (ML) INJECTION EVERY 12 HOURS SCHEDULED
Status: DISCONTINUED | OUTPATIENT
Start: 2021-12-15 | End: 2021-12-15 | Stop reason: HOSPADM

## 2021-12-15 RX ORDER — ROPIVACAINE HYDROCHLORIDE 5 MG/ML
INJECTION, SOLUTION EPIDURAL; INFILTRATION; PERINEURAL
Status: COMPLETED | OUTPATIENT
Start: 2021-12-15 | End: 2021-12-15

## 2021-12-15 RX ORDER — HYDROMORPHONE HYDROCHLORIDE 1 MG/ML
0.5 INJECTION, SOLUTION INTRAMUSCULAR; INTRAVENOUS; SUBCUTANEOUS EVERY 5 MIN PRN
Status: DISCONTINUED | OUTPATIENT
Start: 2021-12-15 | End: 2021-12-15 | Stop reason: HOSPADM

## 2021-12-15 RX ORDER — MIDAZOLAM HYDROCHLORIDE 1 MG/ML
2 INJECTION INTRAMUSCULAR; INTRAVENOUS ONCE
Status: COMPLETED | OUTPATIENT
Start: 2021-12-15 | End: 2021-12-15

## 2021-12-15 RX ORDER — LIDOCAINE HYDROCHLORIDE 10 MG/ML
INJECTION, SOLUTION EPIDURAL; INFILTRATION; INTRACAUDAL; PERINEURAL PRN
Status: DISCONTINUED | OUTPATIENT
Start: 2021-12-15 | End: 2021-12-15 | Stop reason: SDUPTHER

## 2021-12-15 RX ORDER — SODIUM CHLORIDE 9 MG/ML
25 INJECTION, SOLUTION INTRAVENOUS PRN
Status: DISCONTINUED | OUTPATIENT
Start: 2021-12-15 | End: 2021-12-15 | Stop reason: HOSPADM

## 2021-12-15 RX ORDER — FENTANYL CITRATE 50 UG/ML
50 INJECTION, SOLUTION INTRAMUSCULAR; INTRAVENOUS EVERY 5 MIN PRN
Status: DISCONTINUED | OUTPATIENT
Start: 2021-12-15 | End: 2021-12-15 | Stop reason: HOSPADM

## 2021-12-15 RX ORDER — FENTANYL CITRATE 50 UG/ML
100 INJECTION, SOLUTION INTRAMUSCULAR; INTRAVENOUS ONCE
Status: DISCONTINUED | OUTPATIENT
Start: 2021-12-15 | End: 2021-12-15 | Stop reason: HOSPADM

## 2021-12-15 RX ADMIN — SUCCINYLCHOLINE CHLORIDE 100 MG: 20 INJECTION, SOLUTION INTRAMUSCULAR; INTRAVENOUS at 12:43

## 2021-12-15 RX ADMIN — CEFAZOLIN SODIUM 2000 MG: 2 INJECTION, SOLUTION INTRAVENOUS at 12:55

## 2021-12-15 RX ADMIN — ROCURONIUM BROMIDE 10 MG: 10 INJECTION, SOLUTION INTRAVENOUS at 12:43

## 2021-12-15 RX ADMIN — PHENYLEPHRINE HYDROCHLORIDE 100 MCG: 10 INJECTION INTRAVENOUS at 13:21

## 2021-12-15 RX ADMIN — FAMOTIDINE 20 MG: 10 INJECTION, SOLUTION INTRAVENOUS at 11:20

## 2021-12-15 RX ADMIN — DEXAMETHASONE SODIUM PHOSPHATE 4 MG: 4 INJECTION, SOLUTION INTRAMUSCULAR; INTRAVENOUS at 11:20

## 2021-12-15 RX ADMIN — SODIUM CHLORIDE, SODIUM LACTATE, POTASSIUM CHLORIDE, AND CALCIUM CHLORIDE: 600; 310; 30; 20 INJECTION, SOLUTION INTRAVENOUS at 11:20

## 2021-12-15 RX ADMIN — ROPIVACAINE HYDROCHLORIDE 20 ML: 5 INJECTION, SOLUTION EPIDURAL; INFILTRATION; PERINEURAL at 11:56

## 2021-12-15 RX ADMIN — SODIUM CHLORIDE, SODIUM LACTATE, POTASSIUM CHLORIDE, AND CALCIUM CHLORIDE: 600; 310; 30; 20 INJECTION, SOLUTION INTRAVENOUS at 13:45

## 2021-12-15 RX ADMIN — SUGAMMADEX 100 MG: 100 INJECTION, SOLUTION INTRAVENOUS at 13:34

## 2021-12-15 RX ADMIN — PHENYLEPHRINE HYDROCHLORIDE 100 MCG: 10 INJECTION INTRAVENOUS at 13:28

## 2021-12-15 RX ADMIN — DEXAMETHASONE SODIUM PHOSPHATE 10 MG: 10 INJECTION, SOLUTION INTRAMUSCULAR; INTRAVENOUS at 13:00

## 2021-12-15 RX ADMIN — FENTANYL CITRATE 100 MCG: 50 INJECTION, SOLUTION INTRAMUSCULAR; INTRAVENOUS at 12:43

## 2021-12-15 RX ADMIN — MIDAZOLAM 2 MG: 1 INJECTION INTRAMUSCULAR; INTRAVENOUS at 11:53

## 2021-12-15 RX ADMIN — PROPOFOL 200 MG: 10 INJECTION, EMULSION INTRAVENOUS at 12:43

## 2021-12-15 RX ADMIN — SUGAMMADEX 100 MG: 100 INJECTION, SOLUTION INTRAVENOUS at 13:32

## 2021-12-15 RX ADMIN — PHENYLEPHRINE HYDROCHLORIDE 100 MCG: 10 INJECTION INTRAVENOUS at 13:30

## 2021-12-15 RX ADMIN — LIDOCAINE HYDROCHLORIDE 5 ML: 10 INJECTION, SOLUTION EPIDURAL; INFILTRATION; INTRACAUDAL; PERINEURAL at 13:49

## 2021-12-15 RX ADMIN — ONDANSETRON 4 MG: 2 INJECTION INTRAMUSCULAR; INTRAVENOUS at 13:31

## 2021-12-15 RX ADMIN — PHENYLEPHRINE HYDROCHLORIDE 100 MCG: 10 INJECTION INTRAVENOUS at 13:00

## 2021-12-15 RX ADMIN — LIDOCAINE HYDROCHLORIDE 5 ML: 10 INJECTION, SOLUTION EPIDURAL; INFILTRATION; INTRACAUDAL; PERINEURAL at 12:43

## 2021-12-15 RX ADMIN — DEXMEDETOMIDINE HYDROCHLORIDE 20 MCG: 100 INJECTION, SOLUTION, CONCENTRATE INTRAVENOUS at 12:40

## 2021-12-15 RX ADMIN — ROPIVACAINE HYDROCHLORIDE 20 ML: 5 INJECTION, SOLUTION EPIDURAL; INFILTRATION; PERINEURAL at 11:59

## 2021-12-15 RX ADMIN — ROCURONIUM BROMIDE 40 MG: 10 INJECTION, SOLUTION INTRAVENOUS at 12:53

## 2021-12-15 ASSESSMENT — LIFESTYLE VARIABLES: SMOKING_STATUS: 1

## 2021-12-15 ASSESSMENT — PAIN SCALES - GENERAL
PAINLEVEL_OUTOF10: 0
PAINLEVEL_OUTOF10: 0

## 2021-12-15 NOTE — ANESTHESIA PROCEDURE NOTES
Peripheral Block    Patient location during procedure: holding area  Start time: 12/15/2021 11:58 AM  End time: 12/15/2021 12:00 PM  Staffing  Performed: anesthesiologist   Anesthesiologist: Belkis Castro MD  Preanesthetic Checklist  Completed: patient identified, IV checked, site marked, risks and benefits discussed, surgical consent, monitors and equipment checked, pre-op evaluation, timeout performed, anesthesia consent given, oxygen available and patient being monitored  Peripheral Block  Patient position: supine  Prep: ChloraPrep  Patient monitoring: continuous pulse ox and frequent blood pressure checks  Block type: Femoral  Laterality: left  Injection technique: single-shot  Guidance: ultrasound guided  Local infiltration: lidocaine  Adductor canal  Provider prep: sterile gloves and mask  Local infiltration: lidocaine  Needle  Needle type: Quincke   Needle gauge: 20 G  Needle length: 10 cm  Needle localization: ultrasound guidance  Assessment  Injection assessment: negative aspiration for heme, no paresthesia on injection and local visualized surrounding nerve on ultrasound  Paresthesia pain: none  Slow fractionated injection: yes  Hemodynamics: stable  Medications Administered  Ropivacaine (NAROPIN) injection 0.5%, 20 mL  Reason for block: post-op pain management

## 2021-12-15 NOTE — H&P
Pt Name: Paul Henderson  MRN: [id number]  YOB: 1980  Date: 12/15/2021      HPI: 50-year-old male presents today for open reduction internal fixation of a left displaced medial malleolus fracture. The patient fell 1 week ago when coming down a flight of steps and landed on his ankle. Past Medical/Surgical History:   Past Medical History:   Diagnosis Date    Acute kidney injury ([facility] 3/23/2021    Adult ADHD     Antisocial personality disorder (CODE)     Apnea     Bipolar 1 disorder, depressed ([facility]     Hypertension     PTSD (post-traumatic stress disorder)     PTSD (post-traumatic stress disorder)      Past Surgical History:   Procedure Laterality Date    APPENDECTOMY           Social History:    Smoking:  No Smoking History = 0   Alcohol:limit consumption of alcohol to nor more than 14 drinks/week and 4 drinks per occasion for men, or no more than 7 drinks/week and 3 drinks per occasion for women    Medications:   Prior to Admission medications    Medication Sig Start Date End Date Taking? Authorizing Provider   testosterone cypionate (DEPOTESTOTERONE CYPIONATE) 200 MG/ML injection Inject 100 mg into the muscle once a week. Yes Historical Provider, MD   asenapine maleate (SAPHRIS) 10 MG SUBL sublingual tablet Place 20 mg under the tongue nightly   Yes Historical Provider, MD   brexpiprazole (REXULTI) 4 MG TABS tablet Take 4 mg by mouth nightly   Yes Historical Provider, MD   polyethylene glycol (GLYCOLAX) powder Take 17 g by mouth daily as needed  12/9/16  Yes Historical Provider, MD   acetaZOLAMIDE (DIAMOX) 250 MG tablet Take 500 mg by mouth 3 times daily  3/9/17  Yes Historical Provider, MD   ALPRAZolam Cordella Lorichard) 0.5 MG tablet Take 2 mg by mouth 4 times daily.   2/13/17  Yes Historical Provider, MD   LATUDA 40 MG TABS tablet Take 120 mg by mouth nightly  3/9/17  Yes Historical Provider, MD   ketorolac (TORADOL) 10 MG tablet Take 10 mg by mouth every 6 hours as needed  12/15/16  Yes Historical Provider, MD   busPIRone (BUSPAR) 15 MG tablet Take 15 mg by mouth 3 times daily   Yes Historical Provider, MD   buprenorphine-naloxone (SUBOXONE) 8-2 MG SUBL SL tablet Place 3 tablets under the tongue daily. Yes Historical Provider, MD   lisinopril-hydrochlorothiazide (PRINZIDE;ZESTORETIC) 20-25 MG per tablet Take 1 tablet by mouth daily   Yes Historical Provider, MD       Allergies: No Known Allergies    Review of systems:     * Constitutional: negative     * HEENT: negative     * Skin: negative     * Cardiac: negative     * Respiratory: negative     * GI: negative     * : negative     * Neuro: negative     * CNS: negative     * Extremities: negative     * Endcrine: negative     Physical Exam:   /65   Pulse 95   Temp 97.7 °F (36.5 °C) (Tympanic)   Resp 18   Ht 5' 10\" (1.778 m)   Wt 240 lb (108.9 kg)   SpO2 97%   BMI 34.44 kg/m²     - General: normal development and appearance     - HEENT: normocephalic, BRET     - Skin: pink, warm, normal tone     - Neck: supple, no masses,     - Chest: no rales or wheezes, normal expansion     - Heart: RRR, no murmurs/gallops     - Abdomen: soft, nondistended, nontender, normal sounds     - Vascular: normal pulses, color, tone     - Pysch/Neuro: normal affect, mood, alert and oriented     - Musculoskeletal: Left ankle swelling and ecchymosis tenderness to palpation of the medial side. Impression: Left ankle displaced medial malleolus fracture      Surgical Plan: Open reduction internal fixation of the left ankle displaced medial malleolus fracture.       Electronically signed by Luna Burks MD on 12/15/2021 at 11:45 AM

## 2021-12-15 NOTE — ANESTHESIA POSTPROCEDURE EVALUATION
Department of Anesthesiology  Postprocedure Note    Patient: Julio Valentin  MRN: [id number]  YOB: 1980  Date of evaluation: 12/15/2021  Time:  2:06 PM     Procedure Summary     Date: 12/15/21 Room / Location: [address]    Anesthesia Start: 3952 Anesthesia Stop: 2247    Procedure: OPEN REDUCTION INTERNAL FIXATION MEDIAL MALLEOLUS FRACTURE (N/A Ankle) Diagnosis: (I70.32GC)    Surgeons: Sudhakar Newell MD Responsible Provider: FABIAN Egan CRNA    Anesthesia Type: general ASA Status: 2          Anesthesia Type: general    Sandhya Phase I: Sandhya Score: 6    Sandhya Phase II:      Last vitals: Reviewed and per EMR flowsheets.        Anesthesia Post Evaluation    Patient location during evaluation: PACU  Patient participation: waiting for patient participation  Level of consciousness: sleepy but conscious  Pain score: 0  Airway patency: patent  Nausea & Vomiting: no nausea and no vomiting  Complications: no  Cardiovascular status: blood pressure returned to baseline and hemodynamically stable  Respiratory status: acceptable, room air and oral airway  Hydration status: euvolemic

## 2021-12-15 NOTE — ANESTHESIA PROCEDURE NOTES
Peripheral Block    Patient location during procedure: holding area  Start time: 12/15/2021 11:55 AM  End time: 12/15/2021 11:57 AM  Staffing  Performed: anesthesiologist   Anesthesiologist: Gaby Grey MD  Preanesthetic Checklist  Completed: patient identified, IV checked, site marked, risks and benefits discussed, surgical consent, monitors and equipment checked, pre-op evaluation, timeout performed, anesthesia consent given, oxygen available and patient being monitored  Peripheral Block  Patient position: supine  Prep: ChloraPrep  Patient monitoring: continuous pulse ox and frequent blood pressure checks  Block type: Sciatic  Laterality: left  Injection technique: single-shot  Guidance: ultrasound guided  Local infiltration: lidocaine  Popliteal  Provider prep: sterile gloves and mask  Local infiltration: lidocaine  Needle  Needle gauge: 20 G  Needle length: 10 cm  Needle localization: ultrasound guidance  Assessment  Injection assessment: negative aspiration for heme, no paresthesia on injection and local visualized surrounding nerve on ultrasound  Paresthesia pain: none  Slow fractionated injection: yes  Hemodynamics: stable  Medications Administered  Ropivacaine (NAROPIN) injection 0.5%, 20 mL  Reason for block: post-op pain management

## 2021-12-15 NOTE — BRIEF OP NOTE
Brief Postoperative Note      Patient: Jacob Parker  YOB: 1980  MRN: [id number]    Date of Procedure: 12/15/2021    Pre-Op Diagnosis: Left medial malleolus fracture    Post-Op Diagnosis: Left displaced medial malleolus fracture       Procedure(s):  OPEN REDUCTION INTERNAL FIXATION MEDIAL MALLEOLUS FRACTURE    Surgeon(s):  Ander Jacob MD    Assistant:  * No surgical staff found *    Anesthesia: General    Estimated Blood Loss (mL): Minimal    Complications: None        Electronically signed by Ander Jacob MD on 12/15/2021 at 11:46 AM

## 2021-12-15 NOTE — ANESTHESIA PRE PROCEDURE
Department of Anesthesiology  Preprocedure Note       Name:  Melecio Pimentel   Age:  42 y.o.  :  1980                                          MRN:  [id number]         Date:  12/15/2021      Surgeon: Chevy Henry):  Velia Babin MD    Procedure: Procedure(s):  OPEN REDUCTION INTERNAL FIXATION MEDIAL MALLEOLUS FRACTURE    Medications prior to admission:   Prior to Admission medications    Medication Sig Start Date End Date Taking? Authorizing Provider   testosterone cypionate (DEPOTESTOTERONE CYPIONATE) 200 MG/ML injection Inject 100 mg into the muscle once a week. Historical Provider, MD   asenapine maleate (SAPHRIS) 10 MG SUBL sublingual tablet Place 20 mg under the tongue nightly    Historical Provider, MD   brexpiprazole (REXULTI) 4 MG TABS tablet Take 4 mg by mouth nightly    Historical Provider, MD   polyethylene glycol (GLYCOLAX) powder Take 17 g by mouth daily as needed  16   Historical Provider, MD   acetaZOLAMIDE (DIAMOX) 250 MG tablet 500 mg 3 times daily  3/9/17   Historical Provider, MD   ALPRAZolam (XANAX) 0.5 MG tablet 2 mg 4 times daily. 17   Historical Provider, MD   LATUDA 40 MG TABS tablet 120 mg nightly  3/9/17   Historical Provider, MD   ketorolac (TORADOL) 10 MG tablet  12/15/16   Historical Provider, MD   busPIRone (BUSPAR) 15 MG tablet Take 15 mg by mouth 3 times daily    Historical Provider, MD   buprenorphine-naloxone (SUBOXONE) 8-2 MG SUBL SL tablet Place 3 tablets under the tongue daily.      Historical Provider, MD   lisinopril-hydrochlorothiazide (PRINZIDE;ZESTORETIC) 20-25 MG per tablet Take 1 tablet by mouth daily    Historical Provider, MD       Current medications:    Current Facility-Administered Medications   Medication Dose Route Frequency Provider Last Rate Last Admin    ceFAZolin (ANCEF) 2000 mg in dextrose 4 % 100 mL IVPB (premix)  2,000 mg IntraVENous Once Velia Babin MD        lactated ringers infusion   IntraVENous Continuous Velia Babin MD Allergies:  No Known Allergies    Problem List:    Patient Active Problem List   Diagnosis Code    [facility] F39    Suicidal ideation R45.851    PTSD (post-traumatic stress disorder) F43.10    Polysubstance dependence ([facility] F19.20    Legal circumstance [id number]    Antisocial personality disorder (CODE) F60.2    Bipolar disorder with severe giacomo ([facility] F31.13    Acute kidney injury ([facility] N17.9    Pseudotumor cerebri G93.2    Hypertension I10    Psychosis, affective ([facility] F39    Psychosis ([facility] F29       Past Medical History:        Diagnosis Date    [facility] ([facility] 3/23/2021    Adult ADHD     Antisocial personality disorder (CODE)     Bipolar 1 disorder, depressed ([facility]     Hypertension     PTSD (post-traumatic stress disorder)     PTSD (post-traumatic stress disorder)        Past Surgical History:        Procedure Laterality Date    APPENDECTOMY         Social History:    Social History     Tobacco Use    Smoking status: Current Every Day Smoker     Packs/day: 2.00     Types: Cigarettes    Smokeless tobacco: Never Used   Substance Use Topics    Alcohol use: No                                Ready to quit: Not Answered  Counseling given: Not Answered      Vital Signs (Current): There were no vitals filed for this visit.                                            BP Readings from Last 3 Encounters:   03/24/21 110/72   03/12/17 101/67   12/09/16 (!) 144/85       NPO Status:                                                                                 BMI:   Wt Readings from Last 3 Encounters:   03/23/21 250 lb (113.4 kg)   03/12/17 288 lb 6.4 oz (130.8 kg)   12/09/16 285 lb (129.3 kg)     There is no height or weight on file to calculate BMI.    CBC:   Lab Results   Component Value Date    WBC 9.9 03/24/2021    RBC 5.54 03/24/2021    HGB 16.2 03/24/2021    HCT 50.0 03/24/2021    HCT 39.4 05/08/2012    MCV 90.3 03/24/2021    RDW 12.6 03/24/2021     03/24/2021  05/08/2012       CMP:   Lab Results   Component Value Date     03/24/2021     05/08/2012    K 3.6 03/24/2021    K 3.8 05/08/2012     03/24/2021     05/08/2012    CO2 24 03/24/2021    BUN 12 03/24/2021    CREATININE 1.0 03/24/2021    CREATININE 0.8 05/08/2012    GFRAA >59 03/24/2021    LABGLOM >60 03/24/2021    GLUCOSE 105 03/24/2021    PROT 6.0 03/24/2021    PROT 7.1 05/08/2012    CALCIUM 9.0 03/24/2021    BILITOT 0.3 03/24/2021    ALKPHOS 57 03/24/2021    ALKPHOS 53 05/08/2012    AST 15 03/24/2021    ALT 13 03/24/2021       POC Tests: No results for input(s): POCGLU, POCNA, POCK, POCCL, POCBUN, POCHEMO, POCHCT in the last 72 hours. Coags: No results found for: PROTIME, INR, APTT    HCG (If Applicable): No results found for: PREGTESTUR, PREGSERUM, HCG, HCGQUANT     ABGs: No results found for: PHART, PO2ART, DJF8GGF, GTJ4RHA, BEART, R5RRKDUN     Type & Screen (If Applicable):  No results found for: LABABO, LABRH    Drug/Infectious Status (If Applicable):  No results found for: HIV, HEPCAB    COVID-19 Screening (If Applicable):   Lab Results   Component Value Date    COVID19 Not Detected 03/23/2021           Anesthesia Evaluation  Patient summary reviewed no history of anesthetic complications:   Airway: Mallampati: I  TM distance: >3 FB   Neck ROM: full  Mouth opening: > = 3 FB Dental:          Pulmonary:normal exam  breath sounds clear to auscultation  (+) sleep apnea: on noncompliant,  current smoker    (-) asthma and recent URI          Patient smoked on day of surgery.                  Cardiovascular:  Exercise tolerance: good (>4 METS),   (+) hypertension:,     (-) pacemaker, past MI, CABG/stent and  angina    ECG reviewed  Rhythm: regular  Rate: normal           Beta Blocker:  Not on Beta Blocker         Neuro/Psych:   (+) TIA (2010), psychiatric history (PTSD, Bipolar, ADHD, Mood disorder, Antisocial Personality Disorder):   (-) seizures and CVA            ROS comment: Pituitary tumor, under observation, treated with acetazolamide GI/Hepatic/Renal:        (-) GERD, liver disease and no renal disease       Endo/Other:        (-) diabetes mellitus, hypothyroidism, hyperthyroidism               Abdominal:             Vascular: Other Findings:             Anesthesia Plan      general     ASA 2     (Popliteal and Adductor nerve block  Preop famotidine, dexamethasone)  Induction: intravenous. MIPS: Postoperative opioids intended and Prophylactic antiemetics administered. Anesthetic plan and risks discussed with patient and spouse. Use of blood products discussed with patient and spouse whom consented to blood products.                  Raul Ledesma MD   12/15/2021

## 2021-12-16 NOTE — OP NOTE
the medial edge of the medial malleolus. Careful  dissection was carried down to the subcutaneous tissue. Careful sharp  dissection was carried down to the level of the fracture site. There  was periosteum interposed in the site as well as the posterior tibial  tendon. There was some maceration of the tendon. At this point in time, the fracture was debrided. The fracture was then  held reduced with a small forceps. I then placed multiple K-wires to  hold the fracture reduced. Once it was anatomically reduced under  x-ray, I then placed three 2.0 Synthes compression screws, 30 mm in  length. This helped to compress the fracture site. AP, lateral and  mortise views demonstrated an anatomic reduction. At this point in time, the wound was copiously irrigated. Meticulous  hemostasis maintained with electrocautery. The deep fascial tissue was  closed with an 0 Vicryl suture. The skin was then approximated with a  3-0 Vicryl suture and closed with a Prineo wound closure system. The  patient was then placed in a well-padded posterior splint. He awoke  from anesthesia without difficulty and was transferred to PACU in stable  condition. All sponge, needle and instrument counts were correct at the  end of the procedure.         Stalin London MD    D: 12/15/2021 15:30:25      T: 12/15/2021 15:34:44     [email or web address]  Job#: [id number]     Doc#: [id number]    CC:

## 2021-12-20 ENCOUNTER — TELEPHONE (OUTPATIENT)
Dept: NEUROLOGY | Age: 41
End: 2021-12-20

## 2021-12-20 NOTE — TELEPHONE ENCOUNTER
Called patient to reschedule appt due to provider being out of office. Patient is aware of new appointment date and time.

## 2022-01-10 RX ORDER — POTASSIUM CHLORIDE 20 MEQ/1
TABLET, EXTENDED RELEASE ORAL
Qty: 180 TABLET | Refills: 1 | Status: SHIPPED | OUTPATIENT
Start: 2022-01-10 | End: 2022-10-11

## 2022-01-28 ENCOUNTER — TELEPHONE (OUTPATIENT)
Dept: FAMILY MEDICINE CLINIC | Facility: CLINIC | Age: 42
End: 2022-01-28

## 2022-01-28 NOTE — TELEPHONE ENCOUNTER
Pt came to window and c/o of rapid heart rate and not feeling well, pt had male friend or family with him and advised that Dr Ascencio is out of the office and needed to go to ED and friend agreed to take him    Noted eyes very red and watering, and heart rate was 142, and put his hand to his chest. And agreeable to ED dept, did offer to call EMS for him and friend said that he would take him to ED and chest pain center

## 2022-02-01 ENCOUNTER — TELEPHONE (OUTPATIENT)
Dept: FAMILY MEDICINE CLINIC | Facility: CLINIC | Age: 42
End: 2022-02-01

## 2022-02-01 ENCOUNTER — NURSE TRIAGE (OUTPATIENT)
Dept: CALL CENTER | Facility: HOSPITAL | Age: 42
End: 2022-02-01

## 2022-02-01 DIAGNOSIS — R00.2 PALPITATIONS: ICD-10-CM

## 2022-02-01 DIAGNOSIS — I10 HYPERTENSION, UNSPECIFIED TYPE: Primary | ICD-10-CM

## 2022-02-01 DIAGNOSIS — E23.0 HYPOGONADOTROPIC HYPOGONADISM: ICD-10-CM

## 2022-02-01 DIAGNOSIS — R79.89 LOW SERUM TESTOSTERONE: ICD-10-CM

## 2022-02-01 DIAGNOSIS — E55.9 VITAMIN D DEFICIENCY: ICD-10-CM

## 2022-02-01 DIAGNOSIS — D35.2 PITUITARY MICROADENOMA: ICD-10-CM

## 2022-02-01 DIAGNOSIS — Z12.5 ENCOUNTER FOR SPECIAL SCREENING EXAMINATION FOR NEOPLASM OF PROSTATE: ICD-10-CM

## 2022-02-01 DIAGNOSIS — R94.6 THYROID FUNCTION TEST ABNORMAL: ICD-10-CM

## 2022-02-01 DIAGNOSIS — I10 ESSENTIAL HYPERTENSION: ICD-10-CM

## 2022-02-01 NOTE — TELEPHONE ENCOUNTER
If allowed to keep due to weather; come on   But he needs to start these tomorrow    Joni already has an appt Thursday, do you want that one cancelled?

## 2022-02-01 NOTE — TELEPHONE ENCOUNTER
CBC, CMP, TSH, fT4, holter  ER while we are doing this if gets bad  Apt 2-3w; as tests come back  Dx palpitations

## 2022-02-01 NOTE — TELEPHONE ENCOUNTER
Pt notified and lab appt scheduled for tomorrow. Advised pt of holter order.  He also needs his testosterone checked for Dr Oneil.  Lab orders entered.

## 2022-02-01 NOTE — TELEPHONE ENCOUNTER
Caller reports apple watch signaling heart beat greater than 100 BPM, states that had episodes on Friday and Monday where it was 160. Current heart beat 91, /77. Instructed to call PCP when office opens, if heart rate goes above 140 before then to proceed to ER for eval.     Reason for Disposition  • [1] Heart beating very rapidly (e.g., > 140 / minute) AND [2] not present now  (Exception: during exercise)    Additional Information  • Negative: Passed out (i.e., lost consciousness, collapsed and was not responding)  • Negative: Shock suspected (e.g., cold/pale/clammy skin, too weak to stand, low BP, rapid pulse)  • Negative: Difficult to awaken or acting confused (e.g., disoriented, slurred speech)  • Negative: Visible sweat on face or sweat dripping down face  • Negative: Unable to walk, or can only walk with assistance (e.g., requires support)  • Negative: [1] Received SHOCK from implantable cardiac defibrillator AND [2] persisting symptoms (i.e., palpitations, lightheadedness)  • Negative: [1] Dizziness, lightheadedness, or weakness AND [2] heart beating very rapidly (e.g., > 140 / minute)  • Negative: [1] Dizziness, lightheadedness, or weakness AND [2] heart beating very slowly  (e.g., < 50 / minute)  • Negative: Sounds like a life-threatening emergency to the triager  • Negative: Chest pain  • Negative: Implantable Cardiac Defibrillator (ICD) or a pacemaker symptoms or questions  • Negative: Difficulty breathing  • Negative: Dizziness, lightheadedness, or weakness  • Negative: [1] Heart beating very rapidly (e.g., > 140 / minute) AND [2] present now  (Exception: during exercise)  • Negative: Heart beating very slowly (e.g., < 50 / minute)  (Exception: athlete and heart rate normal for caller)  • Negative: New or worsened shortness of breath with activity (dyspnea on exertion)  • Negative: Patient sounds very sick or weak to the triager    Answer Assessment - Initial Assessment Questions  1. DESCRIPTION:  "\"Please describe your heart rate or heartbeat that you are having\" (e.g., fast/slow, regular/irregular, skipped or extra beats, \"palpitations\")      Fast  2. ONSET: \"When did it start?\" (Minutes, hours or days)       Friday 1/28  3. DURATION: \"How long does it last\" (e.g., seconds, minutes, hours)      15 to 20 min  4. PATTERN \"Does it come and go, or has it been constant since it started?\"  \"Does it get worse with exertion?\"   \"Are you feeling it now?\"      Comes and goes  5. TAP: \"Using your hand, can you tap out what you are feeling on a chair or table in front of you, so that I can hear?\" (Note: not all patients can do this)        na  6. HEART RATE: \"Can you tell me your heart rate?\" \"How many beats in 15 seconds?\"  (Note: not all patients can do this)        114  7. RECURRENT SYMPTOM: \"Have you ever had this before?\" If Yes, ask: \"When was the last time?\" and \"What happened that time?\"       no  8. CAUSE: \"What do you think is causing the palpitations?\"      Not sure  9. CARDIAC HISTORY: \"Do you have any history of heart disease?\" (e.g., heart attack, angina, bypass surgery, angioplasty, arrhythmia)       no  10. OTHER SYMPTOMS: \"Do you have any other symptoms?\" (e.g., dizziness, chest pain, sweating, difficulty breathing)        no  11. PREGNANCY: \"Is there any chance you are pregnant?\" \"When was your last menstrual period?\"        na    Protocols used: HEART RATE AND HEARTBEAT QUESTIONS-ADULT-AH      "

## 2022-02-07 ENCOUNTER — LAB (OUTPATIENT)
Dept: FAMILY MEDICINE CLINIC | Facility: CLINIC | Age: 42
End: 2022-02-07

## 2022-02-08 ENCOUNTER — OFFICE VISIT (OUTPATIENT)
Dept: FAMILY MEDICINE CLINIC | Facility: CLINIC | Age: 42
End: 2022-02-08

## 2022-02-08 VITALS
DIASTOLIC BLOOD PRESSURE: 82 MMHG | HEIGHT: 70 IN | RESPIRATION RATE: 18 BRPM | SYSTOLIC BLOOD PRESSURE: 128 MMHG | WEIGHT: 266.6 LBS | OXYGEN SATURATION: 96 % | TEMPERATURE: 97.8 F | BODY MASS INDEX: 38.17 KG/M2 | HEART RATE: 118 BPM

## 2022-02-08 DIAGNOSIS — I10 PRIMARY HYPERTENSION: ICD-10-CM

## 2022-02-08 DIAGNOSIS — R79.89 LOW TESTOSTERONE: ICD-10-CM

## 2022-02-08 DIAGNOSIS — E22.1 HYPERPROLACTINEMIA: ICD-10-CM

## 2022-02-08 DIAGNOSIS — D35.2 PITUITARY MICROADENOMA: ICD-10-CM

## 2022-02-08 DIAGNOSIS — I49.9 CARDIAC ARRHYTHMIA, UNSPECIFIED CARDIAC ARRHYTHMIA TYPE: ICD-10-CM

## 2022-02-08 DIAGNOSIS — R73.01 ELEVATED FASTING GLUCOSE: ICD-10-CM

## 2022-02-08 DIAGNOSIS — F98.8 ATTENTION DEFICIT DISORDER, UNSPECIFIED HYPERACTIVITY PRESENCE: ICD-10-CM

## 2022-02-08 DIAGNOSIS — E23.0 HYPOGONADOTROPIC HYPOGONADISM: ICD-10-CM

## 2022-02-08 DIAGNOSIS — R00.2 PALPITATIONS: ICD-10-CM

## 2022-02-08 DIAGNOSIS — F99 PSYCHIATRIC ILLNESS: ICD-10-CM

## 2022-02-08 PROBLEM — R89.9 ABNORMAL LABORATORY TEST: Status: ACTIVE | Noted: 2022-02-08

## 2022-02-08 LAB
ALBUMIN SERPL-MCNC: 4.5 G/DL (ref 3.5–5.2)
ALBUMIN/GLOB SERPL: 2.1 G/DL
ALP SERPL-CCNC: 64 U/L (ref 39–117)
ALT SERPL-CCNC: 24 U/L (ref 1–41)
AST SERPL-CCNC: 15 U/L (ref 1–40)
BASOPHILS # BLD AUTO: 0.07 10*3/MM3 (ref 0–0.2)
BASOPHILS NFR BLD AUTO: 0.9 % (ref 0–1.5)
BILIRUB SERPL-MCNC: <0.2 MG/DL (ref 0–1.2)
BUN SERPL-MCNC: 10 MG/DL (ref 6–20)
BUN/CREAT SERPL: 8.1 (ref 7–25)
CALCIUM SERPL-MCNC: 9.4 MG/DL (ref 8.6–10.5)
CHLORIDE SERPL-SCNC: 100 MMOL/L (ref 98–107)
CHOLEST SERPL-MCNC: 221 MG/DL (ref 0–200)
CO2 SERPL-SCNC: 21.4 MMOL/L (ref 22–29)
CREAT SERPL-MCNC: 1.23 MG/DL (ref 0.76–1.27)
EOSINOPHIL # BLD AUTO: 0.22 10*3/MM3 (ref 0–0.4)
EOSINOPHIL NFR BLD AUTO: 2.7 % (ref 0.3–6.2)
ERYTHROCYTE [DISTWIDTH] IN BLOOD BY AUTOMATED COUNT: 12.4 % (ref 12.3–15.4)
GLOBULIN SER CALC-MCNC: 2.1 GM/DL
GLUCOSE SERPL-MCNC: 103 MG/DL (ref 65–99)
HBA1C MFR BLD: 6 % (ref 4.8–5.6)
HCT VFR BLD AUTO: 51.3 % (ref 37.5–51)
HDLC SERPL-MCNC: 33 MG/DL (ref 40–60)
HGB BLD-MCNC: 17.5 G/DL (ref 13–17.7)
IGF-I SERPL-MCNC: 120 NG/ML (ref 84–270)
IGF-I Z-SCORE SERPL: -0.9 S.D.
IMM GRANULOCYTES # BLD AUTO: 0.03 10*3/MM3 (ref 0–0.05)
IMM GRANULOCYTES NFR BLD AUTO: 0.4 % (ref 0–0.5)
LDLC SERPL CALC-MCNC: 98 MG/DL (ref 0–100)
LDLC/HDLC SERPL: 2.44 {RATIO}
LYMPHOCYTES # BLD AUTO: 3.66 10*3/MM3 (ref 0.7–3.1)
LYMPHOCYTES NFR BLD AUTO: 45.6 % (ref 19.6–45.3)
MCH RBC QN AUTO: 29.7 PG (ref 26.6–33)
MCHC RBC AUTO-ENTMCNC: 34.1 G/DL (ref 31.5–35.7)
MCV RBC AUTO: 86.9 FL (ref 79–97)
MONOCYTES # BLD AUTO: 0.75 10*3/MM3 (ref 0.1–0.9)
MONOCYTES NFR BLD AUTO: 9.3 % (ref 5–12)
NEUTROPHILS # BLD AUTO: 3.3 10*3/MM3 (ref 1.7–7)
NEUTROPHILS NFR BLD AUTO: 41.1 % (ref 42.7–76)
NRBC BLD AUTO-RTO: 0 /100 WBC (ref 0–0.2)
PLATELET # BLD AUTO: 206 10*3/MM3 (ref 140–450)
POTASSIUM SERPL-SCNC: 3.7 MMOL/L (ref 3.5–5.2)
PROLACTIN SERPL-MCNC: 11.1 NG/ML (ref 4–15.2)
PROT SERPL-MCNC: 6.6 G/DL (ref 6–8.5)
PSA SERPL-MCNC: 0.38 NG/ML (ref 0–4)
RBC # BLD AUTO: 5.9 10*6/MM3 (ref 4.14–5.8)
SODIUM SERPL-SCNC: 141 MMOL/L (ref 136–145)
T4 FREE SERPL-MCNC: 0.76 NG/DL (ref 0.93–1.7)
TESTOST FREE SERPL-MCNC: 6.2 PG/ML (ref 6.8–21.5)
TESTOST SERPL-MCNC: 106 NG/DL (ref 264–916)
TRIGL SERPL-MCNC: 538 MG/DL (ref 0–150)
TSH SERPL DL<=0.005 MIU/L-ACNC: 4.24 UIU/ML (ref 0.27–4.2)
VLDLC SERPL CALC-MCNC: 90 MG/DL (ref 5–40)
WBC # BLD AUTO: 8.03 10*3/MM3 (ref 3.4–10.8)

## 2022-02-08 PROCEDURE — 99214 OFFICE O/P EST MOD 30 MIN: CPT | Performed by: FAMILY MEDICINE

## 2022-02-08 RX ORDER — PRAZOSIN HYDROCHLORIDE 1 MG/1
1 CAPSULE ORAL DAILY
COMMUNITY
Start: 2022-01-26 | End: 2023-01-13

## 2022-02-08 RX ORDER — DEXTROAMPHETAMINE SACCHARATE, AMPHETAMINE ASPARTATE, DEXTROAMPHETAMINE SULFATE AND AMPHETAMINE SULFATE 2.5; 2.5; 2.5; 2.5 MG/1; MG/1; MG/1; MG/1
1 TABLET ORAL DAILY
COMMUNITY
Start: 2022-01-26 | End: 2023-01-13

## 2022-02-08 RX ORDER — BUPRENORPHINE HYDROCHLORIDE 8 MG/1
8 TABLET SUBLINGUAL DAILY
COMMUNITY
Start: 2022-01-26

## 2022-02-08 NOTE — PROGRESS NOTES
"Subjective   Lincoln Ornelas is a 41 y.o. male presenting with chief complaint of:   Chief Complaint   Patient presents with   • Rapid Heart Rate     \"i had some labs yesterday\"   • Post-op Follow-up     \"I had three screws in my left ankle by Dr Brown back in Nov at [facility]\"       History of Present Illness :  Alone.  Here for primarily an acute issue today; palpitations.       Called 2.1.22; Caller reports apple watch signaling heart beat greater than 100 BPM, states that had episodes on Friday and Monday where it was 160. Current heart beat 91, /77. Instructed to call PCP when office opens, if heart rate goes above 140 before then to proceed to ER for eval.  (did not go to ER)    Recent: L ankle ORIF/Kevin//12.15.21 (no problems in the hospital verbalized to him about monitor, vitals)    Has multiple chronic problems to consider that might have a bearing on today's issues; not an interval appointment.       Chronic/acute problems reviewed today:   1. Primary hypertension Chronic/stable. Stable here past/no recent home blood pressures.  No significant chest pain, SOB, LE edema, orthopnea, near syncope, dizziness/light headness.   Recent Vitals       9/13/2021 11/11/2021 2/8/2022       BP: 124/64 148/84 128/82     Pulse: 127 114 118         irr     Temp: 97.1 °F (36.2 °C) 96.4 °F (35.8 °C) 97.8 °F (36.6 °C)     Weight: 108 kg (238 lb) 110 kg (243 lb) 121 kg (266 lb 9.6 oz)     BMI (Calculated): 34.7 35.4 38.4            2. Cardiac arrhythmia, unspecified cardiac arrhythmia type see palpitations   3. Elevated fasting glucose Chronic/stable recent.  No problem/pattern hypoglycemia/hyperglycemia manifest by poly- dypsia, phagia, uria, or sweats, diaphoretic episodes, syncope/near.     4. Psychiatric illness chronic variable other psychiatric issues again seen Dr. Deluca in [place].  He says this is going well   5. Attention deficit disorder, unspecified hyperactivity presence diagnosed by psychiatry Dr." "Sandrine and psychologist Rudi to have this; for which his current dose of Adderall has not been recently changed   6. Hypogonadotropic hypogonadism (HCC)- part of below   7. Pituitary microadenoma (CMS/HCC) ana chronic nonsurgical to this point no vision changes   8. Hyperprolactinemia (HCC) chronic past history; followed by Ana and delphine   9. Low testosterone chronic apparently variable his current levels are low.  Followed by Dr. Pratt for endocrinology; associated with conditions of hyperprolactinemia pituitary macroadenoma for which she follows with Dr. Ascencio.   10. Palpitations acute issue; thus far without established cardiac arrhythmia.  No syncope near syncope or chest pain with this.     Has an/another acute issue today: none.    The following portions of the patient's history were reviewed and updated as appropriate: allergies, current medications, past family history, past medical history, past social history, past surgical history and problem list.      Current Outpatient Medications:   •  acetaZOLAMIDE (DIAMOX) 250 MG tablet, TAKE 2 TABLETS BY MOUTH THREE TIMES DAILY, Disp: 180 tablet, Rfl: 3  •  ALPRAZolam (XANAX) 2 MG tablet, TK 1 T PO  TID, Disp: , Rfl: 0  •  amphetamine-dextroamphetamine (ADDERALL) 10 MG tablet, Take 1 tablet by mouth Daily. 4 pm \"with 20 mg tid\", Disp: , Rfl:   •  amphetamine-dextroamphetamine (ADDERALL) 20 MG tablet, Take 1 tablet by mouth 3 (Three) Times a Day., Disp: , Rfl:   •  Brexpiprazole 4 MG tablet, Take 4 mg by mouth., Disp: , Rfl:   •  buprenorphine (SUBUTEX) 8 MG sublingual tablet SL tablet, Place 8 mg under the tongue Daily., Disp: , Rfl:   •  buPROPion (WELLBUTRIN) 75 MG tablet, Take 150 mg by mouth Every Morning. Dr Deluca, Disp: , Rfl:   •  Lurasidone HCl 120 MG tablet, Take 1 tablet by mouth Daily. Dr Deluca, Disp: , Rfl:   •  Needle, Disp, 18G X 1\" misc, Use weekly as indicated, Disp: 4 each, Rfl: 11  •  OXcarbazepine (TRILEPTAL) 300 MG tablet, Take " "2 tablets by mouth 3 (Three) Times a Day., Disp: , Rfl:   •  potassium chloride (K-DUR,KLOR-CON) 20 MEQ CR tablet, TAKE 1 TABLET BY MOUTH TWICE DAILY, Disp: 180 tablet, Rfl: 1  •  Saphris 10 MG sublingual tablet sublingual tablet, Place 1 tablet under the tongue Every Night., Disp: , Rfl:   •  Syringe/Needle, Disp, (B-D 3CC LUER-COBY SYR 21GX1\") 21G X 1\" 3 ML misc, USE ONCE WEEKLY AS DIRECTED, Disp: 4 each, Rfl: 6  •  Testosterone Cypionate (DEPOTESTOTERONE CYPIONATE) 200 MG/ML injection, Inject 0.5 mL into the appropriate muscle as directed by prescriber 1 (One) Time Per Week. 100mg weekly, Provide(#4)18G,(#4)21Gneedles (#4)3mlsyringes q month, Disp: 4 mL, Rfl: 1  •  lisinopril-hydrochlorothiazide (PRINZIDE,ZESTORETIC) 20-25 MG per tablet, TAKE 1 TABLET BY MOUTH DAILY, Disp: 90 tablet, Rfl: 2  •  NON FORMULARY, Inject 200 mg as directed 2 (Two) Times a Week. Aromataste Inhibitor, Disp: , Rfl:   •  prazosin (MINIPRESS) 1 MG capsule, Take 1 capsule by mouth Daily., Disp: , Rfl:     No problems with medications.    No Known Allergies    Review of Systems  GENERAL:  Inactive/slower with limits, speed, stamina for age and fatigue/desire. Sleep is ok; apnea denied. No fever now.  SKIN: No rash/skin lesion of concern; tatoos.   ENDO:  No syncope, near or diaphoretic sweaty spells.  BS Ok to this point. .  HEENT: No recent head injury; daily headache.  No vision change.  No significant hearing loss.  Ears without pain/drainage.  No sore throat.  No significant nasal/sinus congestion/drainage. No epistaxis.  CHEST: No chest wall tenderness or mass. No significant cough, without wheeze.  No SOB; no hemoptysis.  CV: No exertional chest pain, mild end of day equal LE/ankle edema.  GI: No heartburn, dysphagia.  No abdominal pain, diarrhea, constipation.  No rectal bleeding, or melena.    :  Voids without dysuria, or incontinence to completion.  ORTHO: No painful/swollen joints but various on /off sore.  Daily some sore neck or " back.  No acute neck or back pain without recent injury.  NEURO: No dizziness, weakness of extremities.  No numbness/paresthesias.   PSYCH: No memory loss.  Mood variable; often anxious, depressed but/and not suicidal.  Tries to tolerate stress .   Screening:  Mammogram: NA  Bone density: N  Low dose CT chest:   Tobacco-smoker/age 9/2 ppd/  Tobacco-2nd handed/life  Tobacco-chew/age 8  GI: NA  Prostate: NA  Usual lab order  6m CBC, CMP, A1c, TSH, fT4  12m CBC, CMP, A1c, LIPID, TSH, fT4, Vit D, testosterone    Data reviewed:   Recent admit/ER/MD visits: none  Last cardiac testing:   Echo:   Results for orders placed during the hospital encounter of 09/23/16    Adult Stress Echo Only    Interpretation Summary  · Below average functional capacity.  · Clinically and electrically negative for ischemia.  · There is normal contractility at rest with appropriate increase with stress    Exercise stress echocardiogram is low risk for ischemia.    Lab Results:  Results for orders placed or performed in visit on 02/01/22   Comprehensive metabolic panel    Specimen: Blood   Result Value Ref Range    Glucose 103 (H) 65 - 99 mg/dL    BUN 10 6 - 20 mg/dL    Creatinine 1.23 0.76 - 1.27 mg/dL    eGFR Non African Am 65 >60 mL/min/1.73    eGFR African Am 78 >60 mL/min/1.73    BUN/Creatinine Ratio 8.1 7.0 - 25.0    Sodium 141 136 - 145 mmol/L    Potassium 3.7 3.5 - 5.2 mmol/L    Chloride 100 98 - 107 mmol/L    Total CO2 21.4 (L) 22.0 - 29.0 mmol/L    Calcium 9.4 8.6 - 10.5 mg/dL    Total Protein 6.6 6.0 - 8.5 g/dL    Albumin 4.50 3.50 - 5.20 g/dL    Globulin 2.1 gm/dL    A/G Ratio 2.1 g/dL    Total Bilirubin <0.2 0.0 - 1.2 mg/dL    Alkaline Phosphatase 64 39 - 117 U/L    AST (SGOT) 15 1 - 40 U/L    ALT (SGPT) 24 1 - 41 U/L   T4, free    Specimen: Blood   Result Value Ref Range    Free T4 0.76 (L) 0.93 - 1.70 ng/dL   Testosterone, Free, Total    Specimen: Blood   Result Value Ref Range    Testosterone, Total 106 (L) 264 - 916 ng/dL     Testosterone, Free 6.2 (L) 6.8 - 21.5 pg/mL   TSH    Specimen: Blood   Result Value Ref Range    TSH 4.240 (H) 0.270 - 4.200 uIU/mL   Lipid Panel With LDL / HDL Ratio    Specimen: Blood   Result Value Ref Range    Total Cholesterol 221 (H) 0 - 200 mg/dL    Triglycerides 538 (H) 0 - 150 mg/dL    HDL Cholesterol 33 (L) 40 - 60 mg/dL    VLDL Cholesterol Savage 90 (H) 5 - 40 mg/dL    LDL Chol Calc (NIH) 98 0 - 100 mg/dL    LDL/HDL RATIO 2.44    PSA Screen    Specimen: Blood   Result Value Ref Range    PSA 0.382 0.000 - 4.000 ng/mL   Hemoglobin A1c    Specimen: Blood   Result Value Ref Range    Hemoglobin A1C 6.00 (H) 4.80 - 5.60 %   Insulin-like Growth Factor-1 (IGF-1) With Z Score    Specimen: Blood   Result Value Ref Range    Insulin-Like Growth Factor-1 120 84 - 270 ng/mL    IGF-1, Z SCORE -0.9 -2.0 - 2.0 S.D.   Prolactin    Specimen: Blood   Result Value Ref Range    Prolactin 11.1 4.0 - 15.2 ng/mL   CBC & Differential    Specimen: Blood   Result Value Ref Range    WBC 8.03 3.40 - 10.80 10*3/mm3    RBC 5.90 (H) 4.14 - 5.80 10*6/mm3    Hemoglobin 17.5 13.0 - 17.7 g/dL    Hematocrit 51.3 (H) 37.5 - 51.0 %    MCV 86.9 79.0 - 97.0 fL    MCH 29.7 26.6 - 33.0 pg    MCHC 34.1 31.5 - 35.7 g/dL    RDW 12.4 12.3 - 15.4 %    Platelets 206 140 - 450 10*3/mm3    Neutrophil Rel % 41.1 (L) 42.7 - 76.0 %    Lymphocyte Rel % 45.6 (H) 19.6 - 45.3 %    Monocyte Rel % 9.3 5.0 - 12.0 %    Eosinophil Rel % 2.7 0.3 - 6.2 %    Basophil Rel % 0.9 0.0 - 1.5 %    Neutrophils Absolute 3.30 1.70 - 7.00 10*3/mm3    Lymphocytes Absolute 3.66 (H) 0.70 - 3.10 10*3/mm3    Monocytes Absolute 0.75 0.10 - 0.90 10*3/mm3    Eosinophils Absolute 0.22 0.00 - 0.40 10*3/mm3    Basophils Absolute 0.07 0.00 - 0.20 10*3/mm3    Immature Granulocyte Rel % 0.4 0.0 - 0.5 %    Immature Grans Absolute 0.03 0.00 - 0.05 10*3/mm3    nRBC 0.0 0.0 - 0.2 /100 WBC       A1C:  Lab Results - Last 18 Months   Lab Units 02/07/22  0834   HEMOGLOBIN A1C % 6.00*     GLUCOSE:  Lab  "Results - Last 18 Months   Lab Units 02/07/22  0834 11/11/21  1453 03/23/21  1208   GLUCOSE mg/dL 103* 124* 212*     LIPID:  Lab Results - Last 18 Months   Lab Units 02/07/22  0834   CHOLESTEROL mg/dL 221*   LDL CHOL mg/dL 98   HDL CHOL mg/dL 33*   TRIGLYCERIDES mg/dL 538*     PSA:  Lab Results - Last 18 Months   Lab Units 02/07/22  0834   PSA ng/mL 0.382     CBC:  Lab Results - Last 18 Months   Lab Units 02/07/22  0834 11/11/21  1453 03/24/21  0311 03/23/21  1208   WBC 10*3/mm3 8.03 14.44* 9.9 15.5*   HEMOGLOBIN g/dL 17.5 17.6 16.2 17.7   HEMATOCRIT % 51.3* 52.8* 50.0 55.1*   PLATELETS 10*3/mm3 206 231 189 263      BMP/CMP:  Lab Results - Last 18 Months   Lab Units 02/07/22  0834 11/11/21  1453 10/01/21  1447 03/23/21  1208   SODIUM mmol/L 141 139  --  144   POTASSIUM mmol/L 3.7 3.8  --  4.2   CHLORIDE mmol/L 100 101  --  103   TOTAL CO2 mmol/L 21.4* 27.8  --  18*   GLUCOSE mg/dL 103* 124*  --  212*   BUN mg/dL 10 13  --  21*   CREATININE mg/dL 1.23 1.62* 1.20 1.6*   EGFR IF NONAFRICN AM mL/min/1.73 65 47*  --  48*   EGFR IF AFRICN AM mL/min/1.73 78 57*  --  58*   CALCIUM mg/dL 9.4 10.1  --  9.6     HEPATIC:  Lab Results - Last 18 Months   Lab Units 02/07/22  0834 11/11/21  1453 03/23/21  1208   ALT (SGPT) U/L 24 37 16   AST (SGOT) U/L 15 28 15   ALK PHOS U/L 64 68 63     THYROID:  Lab Results - Last 18 Months   Lab Units 02/07/22  0834   TSH uIU/mL 4.240*   FREE T4 ng/dL 0.76*       Objective   /82 (BP Location: Right arm, Patient Position: Sitting, Cuff Size: Large Adult)   Pulse 118 Comment: irr  Temp 97.8 °F (36.6 °C) (Infrared)   Resp 18   Ht 177.5 cm (69.9\")   Wt 121 kg (266 lb 9.6 oz)   SpO2 96%   BMI 38.36 kg/m²   Body mass index is 38.36 kg/m².      Recent Vitals       9/13/2021 11/11/2021 2/8/2022       BP: 124/64 148/84 128/82     Pulse: 127 114 118         irr     Temp: 97.1 °F (36.2 °C) 96.4 °F (35.8 °C) 97.8 °F (36.6 °C)     Weight: 108 kg (238 lb) 110 kg (243 lb) 121 kg (266 lb 9.6 oz)  "    BMI (Calculated): 34.7 35.4 38.4           Physical Exam  GENERAL:  Well nourished/developed in no acute distress.   SKIN: Turgor excellent, without wound, rash, lesion.  HEENT: Normal cephalic without trauma.  Pupils equal round reactive to light. Extraocular motions full without nystagmus.   External canals nonobstructive nontender without reddness. Tymphatic membranes vania with talia structures intact.   Oral cavity without growths, exudates, and moist.  Posterior pharynx without mass, obstruction, redness.  No thyromegaly, mass, tenderness, lymphadenopathy and supple.  CV: Regular rhythm.  No murmur, gallop, edema. Posterior pulses intact.  No carotid bruits.  CHEST: No chest wall tenderness or mass.   LUNGS: Symmetric motion with clear to auscultation.   ABD: Soft, nontender without mass.   ORTHO: Symmetric extremities without swelling/point tenderness.  Full gross range of motion.  NEURO: CN 2-12 grossly intact.  Symmetric facies and UE/LE. 3-4/5 strength throughout. 1/4 x bicep equal reflexes.  Nonfocal use extremities. Speech clear.  Intact light touch with monofilament, vibratory sensation with tuning fork; equal toes/distal feet.    PSYCH: Oriented x 3.  Pleasant calm, well kept.  Purposeful/directed conservation with intact short/long gross memory.     Assessment/Plan     1. Primary hypertension    2. Cardiac arrhythmia, unspecified cardiac arrhythmia type    3. Elevated fasting glucose    4. Psychiatric illness    5. Attention deficit disorder, unspecified hyperactivity presence    6. Hypogonadotropic hypogonadism (HCC)-    7. Pituitary microadenoma (CMS/HCC) ana    8. Hyperprolactinemia (HCC)    9. Low testosterone    10. Palpitations        Discussion:  BP ok  DM/BS ok  Thyroid looks low  Liver ok  CBC ok  Renal ok  Lipid \triglycerides too high    Cut smoking 1/2; discused how waiting alittle longer a simple way to get started without Rx at this point  Copy of this note to ; asking  her to NOT treat thyroid until holter back  Copy of labs to  for testosterone/others  Noting with Dr Mena his HCT and triglycerides  Holter; 1w  Decrease caffeine; go to some decaf products to supplement  Keep apt with Sonu    There is no immunization history on file for this patient.  Vaccine reviewed: today none; later strongly advise covid- covid booster but if choses not too; asking he call immediately if any cold symptoms.     Screening reviewed/updated    Medical decision issues:   Data review above:   Rx: reviewed and decisions:   Same Rx for now     Visit today involved chronic significant medical problems or differentials and/or intensive drug monitoring: ie potential to cause serious morbidity or death:   No orders of the defined types were placed in this encounter.      Orders placed:   LAB/Testing/Referrals: reviewed/orders:   Today:   Orders Placed This Encounter   Procedures   • Holter Monitor - 72 Hour Up To 15 Days     Chronic/recurrent labs above or change to:   same     Health maintenance:   Body mass index is 38.36 kg/m².  Patient's Body mass index is 38.36 kg/m². indicating that he is obese (BMI >30). Obesity-related health conditions include the following: hypertension. Obesity is unchanged. BMI is is above average; BMI management plan is completed. We discussed portion control and increasing exercise..      Tobacco use reviewed:   Lincoln Ornelas  reports that he quit smoking about 15 months ago. His smoking use included cigarettes. He started smoking about 32 years ago. He has a 50.00 pack-year smoking history. His smokeless tobacco use includes chew.. I have educated him on the risk of diseases from using tobacco products such as cancer, COPD and heart disease.     I advised him to quit and he is not willing to quit.    I spent 1 minutes counseling the patient.     Reminded; as discussed before.    There are no Patient Instructions on file for this visit.    Follow up: Return  for lab;, Dr Ascencio-, 6 m;.  Future Appointments   Date Time Provider Department Center   3/14/2022 11:45 AM PAD MRI 2  [facility] MRI [facility]   3/15/2022 11:00 AM Zev Ascencio MD [facility]     CC: Dr Mena with copy of labs

## 2022-02-10 ENCOUNTER — TELEPHONE (OUTPATIENT)
Dept: FAMILY MEDICINE CLINIC | Facility: CLINIC | Age: 42
End: 2022-02-10

## 2022-02-10 ENCOUNTER — APPOINTMENT (OUTPATIENT)
Dept: GENERAL RADIOLOGY | Facility: HOSPITAL | Age: 42
End: 2022-02-10

## 2022-02-10 ENCOUNTER — HOSPITAL ENCOUNTER (OUTPATIENT)
Dept: CARDIOLOGY | Facility: HOSPITAL | Age: 42
Discharge: HOME | End: 2022-02-10

## 2022-02-10 ENCOUNTER — HOSPITAL ENCOUNTER (EMERGENCY)
Facility: HOSPITAL | Age: 42
Discharge: HOME | End: 2022-02-10
Admitting: EMERGENCY MEDICINE

## 2022-02-10 VITALS
SYSTOLIC BLOOD PRESSURE: 134 MMHG | RESPIRATION RATE: 18 BRPM | OXYGEN SATURATION: 96 % | DIASTOLIC BLOOD PRESSURE: 88 MMHG | HEART RATE: 92 BPM | WEIGHT: 265 LBS | TEMPERATURE: 98.3 F | BODY MASS INDEX: 37.94 KG/M2 | HEIGHT: 70 IN

## 2022-02-10 DIAGNOSIS — R00.2 PALPITATIONS: ICD-10-CM

## 2022-02-10 LAB
ALBUMIN SERPL-MCNC: 4.5 G/DL (ref 3.5–5.2)
ALBUMIN/GLOB SERPL: 1.7 G/DL
ALP SERPL-CCNC: 53 U/L (ref 39–117)
ALT SERPL W P-5'-P-CCNC: 24 U/L (ref 1–41)
AMPHET+METHAMPHET UR QL: POSITIVE
AMPHETAMINES UR QL: POSITIVE
ANION GAP SERPL CALCULATED.3IONS-SCNC: 8 MMOL/L (ref 5–15)
APAP SERPL-MCNC: <5 MCG/ML (ref 0–30)
AST SERPL-CCNC: 15 U/L (ref 1–40)
BACTERIA UR QL AUTO: ABNORMAL /HPF
BARBITURATES UR QL SCN: NEGATIVE
BASOPHILS # BLD AUTO: 0.09 10*3/MM3 (ref 0–0.2)
BASOPHILS NFR BLD AUTO: 0.8 % (ref 0–1.5)
BENZODIAZ UR QL SCN: POSITIVE
BILIRUB SERPL-MCNC: 0.2 MG/DL (ref 0–1.2)
BILIRUB UR QL STRIP: NEGATIVE
BUN SERPL-MCNC: 13 MG/DL (ref 6–20)
BUN/CREAT SERPL: 12.1 (ref 7–25)
BUPRENORPHINE SERPL-MCNC: POSITIVE NG/ML
CALCIUM SPEC-SCNC: 9.2 MG/DL (ref 8.6–10.5)
CANNABINOIDS SERPL QL: POSITIVE
CHLORIDE SERPL-SCNC: 106 MMOL/L (ref 98–107)
CLARITY UR: CLEAR
CO2 SERPL-SCNC: 28 MMOL/L (ref 22–29)
COCAINE UR QL: NEGATIVE
COLOR UR: ABNORMAL
CREAT SERPL-MCNC: 1.07 MG/DL (ref 0.76–1.27)
D DIMER PPP FEU-MCNC: 0.33 MG/L (FEU) (ref 0–0.5)
DEPRECATED RDW RBC AUTO: 40.6 FL (ref 37–54)
EOSINOPHIL # BLD AUTO: 0.14 10*3/MM3 (ref 0–0.4)
EOSINOPHIL NFR BLD AUTO: 1.3 % (ref 0.3–6.2)
ERYTHROCYTE [DISTWIDTH] IN BLOOD BY AUTOMATED COUNT: 12.3 % (ref 12.3–15.4)
ETHANOL UR QL: <0.01 %
GFR SERPL CREATININE-BSD FRML MDRD: 76 ML/MIN/1.73
GLOBULIN UR ELPH-MCNC: 2.7 GM/DL
GLUCOSE SERPL-MCNC: 125 MG/DL (ref 65–99)
GLUCOSE UR STRIP-MCNC: NEGATIVE MG/DL
HCT VFR BLD AUTO: 52.4 % (ref 37.5–51)
HGB BLD-MCNC: 16.8 G/DL (ref 13–17.7)
HGB UR QL STRIP.AUTO: NEGATIVE
HOLD SPECIMEN: NORMAL
HYALINE CASTS UR QL AUTO: ABNORMAL /LPF
IMM GRANULOCYTES # BLD AUTO: 0.04 10*3/MM3 (ref 0–0.05)
IMM GRANULOCYTES NFR BLD AUTO: 0.4 % (ref 0–0.5)
KETONES UR QL STRIP: ABNORMAL
LEUKOCYTE ESTERASE UR QL STRIP.AUTO: ABNORMAL
LYMPHOCYTES # BLD AUTO: 2.74 10*3/MM3 (ref 0.7–3.1)
LYMPHOCYTES NFR BLD AUTO: 25.3 % (ref 19.6–45.3)
MAGNESIUM SERPL-MCNC: 1.7 MG/DL (ref 1.6–2.6)
MCH RBC QN AUTO: 28.9 PG (ref 26.6–33)
MCHC RBC AUTO-ENTMCNC: 32.1 G/DL (ref 31.5–35.7)
MCV RBC AUTO: 90.2 FL (ref 79–97)
METHADONE UR QL SCN: NEGATIVE
MONOCYTES # BLD AUTO: 0.69 10*3/MM3 (ref 0.1–0.9)
MONOCYTES NFR BLD AUTO: 6.4 % (ref 5–12)
NEUTROPHILS NFR BLD AUTO: 65.8 % (ref 42.7–76)
NEUTROPHILS NFR BLD AUTO: 7.13 10*3/MM3 (ref 1.7–7)
NITRITE UR QL STRIP: NEGATIVE
NRBC BLD AUTO-RTO: 0 /100 WBC (ref 0–0.2)
NT-PROBNP SERPL-MCNC: <5 PG/ML (ref 0–450)
OPIATES UR QL: NEGATIVE
OXYCODONE UR QL SCN: NEGATIVE
PCP UR QL SCN: NEGATIVE
PH UR STRIP.AUTO: 7.5 [PH] (ref 5–8)
PLATELET # BLD AUTO: 206 10*3/MM3 (ref 140–450)
PMV BLD AUTO: 11.5 FL (ref 6–12)
POTASSIUM SERPL-SCNC: 3.6 MMOL/L (ref 3.5–5.2)
PROPOXYPH UR QL: NEGATIVE
PROT SERPL-MCNC: 7.2 G/DL (ref 6–8.5)
PROT UR QL STRIP: ABNORMAL
RBC # BLD AUTO: 5.81 10*6/MM3 (ref 4.14–5.8)
RBC # UR STRIP: ABNORMAL /HPF
REF LAB TEST METHOD: ABNORMAL
SALICYLATES SERPL-MCNC: 0.9 MG/DL
SODIUM SERPL-SCNC: 142 MMOL/L (ref 136–145)
SP GR UR STRIP: 1.02 (ref 1–1.03)
SQUAMOUS #/AREA URNS HPF: ABNORMAL /HPF
T4 FREE SERPL-MCNC: 0.73 NG/DL (ref 0.93–1.7)
TRICYCLICS UR QL SCN: NEGATIVE
TROPONIN T SERPL-MCNC: <0.01 NG/ML (ref 0–0.03)
TROPONIN T SERPL-MCNC: <0.01 NG/ML (ref 0–0.03)
TSH SERPL DL<=0.05 MIU/L-ACNC: 2.01 UIU/ML (ref 0.27–4.2)
UROBILINOGEN UR QL STRIP: ABNORMAL
WBC # UR STRIP: ABNORMAL /HPF
WBC NRBC COR # BLD: 10.83 10*3/MM3 (ref 3.4–10.8)
WHOLE BLOOD HOLD SPECIMEN: NORMAL
WHOLE BLOOD HOLD SPECIMEN: NORMAL

## 2022-02-10 PROCEDURE — 96361 HYDRATE IV INFUSION ADD-ON: CPT

## 2022-02-10 PROCEDURE — 96360 HYDRATION IV INFUSION INIT: CPT

## 2022-02-10 PROCEDURE — 84481 FREE ASSAY (FT-3): CPT | Performed by: PHYSICIAN ASSISTANT

## 2022-02-10 PROCEDURE — 93010 ELECTROCARDIOGRAM REPORT: CPT | Performed by: INTERNAL MEDICINE

## 2022-02-10 PROCEDURE — 84439 ASSAY OF FREE THYROXINE: CPT | Performed by: PHYSICIAN ASSISTANT

## 2022-02-10 PROCEDURE — 93005 ELECTROCARDIOGRAM TRACING: CPT | Performed by: EMERGENCY MEDICINE

## 2022-02-10 PROCEDURE — 80179 DRUG ASSAY SALICYLATE: CPT | Performed by: PHYSICIAN ASSISTANT

## 2022-02-10 PROCEDURE — 80050 GENERAL HEALTH PANEL: CPT | Performed by: PHYSICIAN ASSISTANT

## 2022-02-10 PROCEDURE — 93005 ELECTROCARDIOGRAM TRACING: CPT | Performed by: PHYSICIAN ASSISTANT

## 2022-02-10 PROCEDURE — 84484 ASSAY OF TROPONIN QUANT: CPT | Performed by: PHYSICIAN ASSISTANT

## 2022-02-10 PROCEDURE — 36415 COLL VENOUS BLD VENIPUNCTURE: CPT

## 2022-02-10 PROCEDURE — 80306 DRUG TEST PRSMV INSTRMNT: CPT | Performed by: PHYSICIAN ASSISTANT

## 2022-02-10 PROCEDURE — 99283 EMERGENCY DEPT VISIT LOW MDM: CPT

## 2022-02-10 PROCEDURE — 85379 FIBRIN DEGRADATION QUANT: CPT | Performed by: PHYSICIAN ASSISTANT

## 2022-02-10 PROCEDURE — 71045 X-RAY EXAM CHEST 1 VIEW: CPT

## 2022-02-10 PROCEDURE — 83880 ASSAY OF NATRIURETIC PEPTIDE: CPT | Performed by: PHYSICIAN ASSISTANT

## 2022-02-10 PROCEDURE — 81001 URINALYSIS AUTO W/SCOPE: CPT | Performed by: PHYSICIAN ASSISTANT

## 2022-02-10 PROCEDURE — 82077 ASSAY SPEC XCP UR&BREATH IA: CPT | Performed by: PHYSICIAN ASSISTANT

## 2022-02-10 PROCEDURE — 83735 ASSAY OF MAGNESIUM: CPT | Performed by: PHYSICIAN ASSISTANT

## 2022-02-10 PROCEDURE — 80143 DRUG ASSAY ACETAMINOPHEN: CPT | Performed by: PHYSICIAN ASSISTANT

## 2022-02-10 RX ORDER — SODIUM CHLORIDE 0.9 % (FLUSH) 0.9 %
10 SYRINGE (ML) INJECTION AS NEEDED
Status: DISCONTINUED | OUTPATIENT
Start: 2022-02-10 | End: 2022-02-10 | Stop reason: HOSPADM

## 2022-02-10 RX ORDER — SODIUM CHLORIDE, SODIUM LACTATE, POTASSIUM CHLORIDE, CALCIUM CHLORIDE 600; 310; 30; 20 MG/100ML; MG/100ML; MG/100ML; MG/100ML
100 INJECTION, SOLUTION INTRAVENOUS CONTINUOUS
Status: DISCONTINUED | OUTPATIENT
Start: 2022-02-10 | End: 2022-02-10 | Stop reason: HOSPADM

## 2022-02-10 RX ADMIN — SODIUM CHLORIDE, POTASSIUM CHLORIDE, SODIUM LACTATE AND CALCIUM CHLORIDE 100 ML/HR: 600; 310; 30; 20 INJECTION, SOLUTION INTRAVENOUS at 14:48

## 2022-02-10 RX ADMIN — SODIUM CHLORIDE, POTASSIUM CHLORIDE, SODIUM LACTATE AND CALCIUM CHLORIDE 500 ML: 600; 310; 30; 20 INJECTION, SOLUTION INTRAVENOUS at 14:47

## 2022-02-10 NOTE — TELEPHONE ENCOUNTER
Caller: Lincoln Ornelas    Relationship: Self    Best call back number: [phone] (H)    What is the best time to reach you: ANYTIME     Who are you requesting to speak with (clinical staff, provider,  specific staff member): CLINICAL     Do you know the name of the person who called: CLINICAL     What was the call regarding: REQUESTING CALL BACK TO BE ADVISED OF HOW HE GETS THE HEART MONITOR     Do you require a callback: YES

## 2022-02-10 NOTE — ED PROVIDER NOTES
Subjective   History of Present Illness    Patient is a pleasant 41-year-old gentleman with chief complaint of his heart racing and feeling short of breath.  He describes the symptoms first began about 3 weeks ago.  He was just sitting there when suddenly his heart rate jumped up to 160s.  He noticed this being detected on his watch.  He thought there was an error.  This lasted about 45 minutes to an hour.  He did seek medical attention with his primary care provider.  He continued to monitor this.  During that particular episode, he denied any episodes of chest tightness, discomfort, or pain.  He did feel a bit short of breath.  He also felt lightheaded but denies any syncope or near syncope.  3 days later, he had a recurrent episode lasting about the same time with no predisposing factors that he is aware of.  He does drink 2 large cups of coffee daily which has remained unchanged.  He denies any other use of stimulants or decongestants.  He denies any illicit drug use aside from 2 remote uses of marijuana.  She denies any other stimulants.  The patient did stop his lisinopril/HCTZ medications about a month ago when his blood pressure ran about 110/70.  He thought that could contribute symptoms so he resumed half the dose of the medication in the past week but that has not affected anything.    Today while he was visiting his father in the hospital who was admitted due to pneumonia and emphysema, the patient had a recurrent episode lasting about 2 hours.  With this more persistent symptoms, he came to the ER to be further evaluated.  Currently, his heart rate is 105.  He reports feeling better but does not feel completely normal.  He continues not any further chest pain, pressure, tightness.  He did complete left ankle surgery with the past 2 months.  He denies any leg pain or swelling.  Denies a history of DVT or PE.  He does feel like he has gained some weight although cannot quantify specifically how much.  He  denies any history of CHF.  He denies any other cardiological history.  He did complete a stress test over 5 years ago he thinks due to a job.  The patient reports it was negative.  He believes his maternal grandfather passed away at a rather young age in his 50s or 60s due to cardiac issues.  His maternal grandfather did have a heart attack.    Patient denies any fever or cough.  He denies any symptoms of pneumonia.  He does smoke.  He did complete blood work with his primary care provider in this past week.    Review of Systems   Constitutional: Positive for activity change, fatigue and unexpected weight change.   HENT: Negative for congestion.    Respiratory: Positive for shortness of breath and stridor. Negative for cough, chest tightness and wheezing.    Cardiovascular: Negative.  Negative for chest pain and leg swelling.   Gastrointestinal: Negative.    Genitourinary: Negative.    Musculoskeletal: Negative.    Skin: Negative.    Neurological: Negative.    Psychiatric/Behavioral: Negative.    All other systems reviewed and are negative.      Past Medical History:   Diagnosis Date   • Anxiety    • Depression    • Hypertension    • Intracranial hypertension    • Mood disorder (HCC)    • Pituitary mass (HCC)    • PTSD (post-traumatic stress disorder)    • Spinal headache        No Known Allergies    Past Surgical History:   Procedure Laterality Date   • NO PAST SURGERIES     • VASECTOMY N/A 5/18/2018    Procedure: VASECTOMY;  Surgeon: Ion Yanez MD;  Location: [facility];  Service: Urology       Family History   Problem Relation Age of Onset   • Gonadal disorder Neg Hx        Social History     Socioeconomic History   • Marital status:      Spouse name: Keely   • Number of children: 2   • Years of education: 12   Tobacco Use   • Smoking status: Former Smoker     Packs/day: 2.00     Years: 25.00     Pack years: 50.00     Types: Cigarettes     Start date: 11/30/1989     Quit date: 10/21/2020      "Years since quittin.3   • Smokeless tobacco: Current User     Types: Chew   • Tobacco comment: \"every once in a while\"   Substance and Sexual Activity   • Alcohol use: No   • Drug use: Yes     Types: Marijuana   • Sexual activity: Yes     Partners: Female     Birth control/protection: None     Comment: wife       Prior to Admission medications    Medication Sig Start Date End Date Taking? Authorizing Provider   acetaZOLAMIDE (DIAMOX) 250 MG tablet TAKE 2 TABLETS BY MOUTH THREE TIMES DAILY 9/15/21   Josafat Ascencio MD   ALPRAZolam (XANAX) 2 MG tablet TK 1 T PO  TID 3/7/19   Ivan Livingston MD   amphetamine-dextroamphetamine (ADDERALL) 10 MG tablet Take 1 tablet by mouth Daily. 4 pm \"with 20 mg tid\" 22   Fabiano Pichardo MD   amphetamine-dextroamphetamine (ADDERALL) 20 MG tablet Take 1 tablet by mouth 3 (Three) Times a Day. 10/7/20   Fabiano Pichardo MD   Brexpiprazole 4 MG tablet Take 4 mg by mouth.    Fabiano Pichardo MD   buprenorphine (SUBUTEX) 8 MG sublingual tablet SL tablet Place 8 mg under the tongue Daily. 22   Fabiano Pichardo MD   buPROPion (WELLBUTRIN) 75 MG tablet Take 150 mg by mouth Every Morning. Fabiano Ugalde MD   lisinopril-hydrochlorothiazide (PRINZIDE,ZESTORETIC) 20-25 MG per tablet TAKE 1 TABLET BY MOUTH DAILY 21   Josafat Ascencio MD   Lurasidone HCl 120 MG tablet Take 1 tablet by mouth Daily. Fabiano Ugalde MD   Needle, Disp, 18G X 1\" misc Use weekly as indicated 19   Matteo Herrera MD   NON FORMULARY Inject 200 mg as directed 2 (Two) Times a Week. Aromataste Inhibitor    Fabiano Pichardo MD   OXcarbazepine (TRILEPTAL) 300 MG tablet Take 2 tablets by mouth 3 (Three) Times a Day. 10/12/20   Fabiano Pichardo MD   potassium chloride (K-DUR,KLOR-CON) 20 MEQ CR tablet TAKE 1 TABLET BY MOUTH TWICE DAILY 1/10/22   Santy Zeng APRN   prazosin (MINIPRESS) 1 MG capsule Take 1 capsule " "by mouth Daily. 1/26/22   Fabiano Pichardo MD   Saphris 10 MG sublingual tablet sublingual tablet Place 1 tablet under the tongue Every Night. 10/15/20   Fabiano Pichardo MD   Syringe/Needle, Disp, (B-D 3CC LUER-COBY SYR 21GX1\") 21G X 1\" 3 ML misc USE ONCE WEEKLY AS DIRECTED 7/12/21   Josafat Ascencio MD   Testosterone Cypionate (DEPOTESTOTERONE CYPIONATE) 200 MG/ML injection Inject 0.5 mL into the appropriate muscle as directed by prescriber 1 (One) Time Per Week. 100mg weekly, Provide(#4)18G,(#4)21Gneedles (#4)3mlsyringes q month 3/9/20   Josafat Ascencio MD       Medications   sodium chloride 0.9 % flush 10 mL (has no administration in time range)   lactated ringers infusion (100 mL/hr Intravenous New Bag 2/10/22 1448)   lactated ringers bolus 500 mL (0 mL Intravenous Stopped 2/10/22 1558)       /88   Pulse 92   Temp 98.3 °F (36.8 °C) (Oral)   Resp 18   Ht 177.8 cm (70\")   Wt 120 kg (265 lb)   SpO2 96%   BMI 38.02 kg/m²       Objective   Physical Exam  Vitals reviewed.   Constitutional:       Appearance: He is well-developed.   HENT:      Head: Normocephalic and atraumatic.      Right Ear: External ear normal.      Left Ear: External ear normal.      Nose: Nose normal.   Eyes:      Conjunctiva/sclera: Conjunctivae normal.      Pupils: Pupils are equal, round, and reactive to light.   Neck:      Trachea: No tracheal deviation.   Cardiovascular:      Rate and Rhythm: Regular rhythm. Tachycardia present.      Heart sounds: Normal heart sounds. No murmur heard.  No friction rub. No gallop.    Pulmonary:      Effort: Pulmonary effort is normal. No respiratory distress.      Breath sounds: Normal breath sounds. No wheezing or rales.   Chest:      Chest wall: No tenderness.   Abdominal:      General: Bowel sounds are normal. There is no distension.      Palpations: Abdomen is soft. There is no mass.      Tenderness: There is no abdominal tenderness. There is no guarding or rebound. "   Musculoskeletal:         General: No swelling, tenderness or deformity. Normal range of motion.      Cervical back: Normal range of motion and neck supple.      Right lower leg: No edema.      Left lower leg: No edema.      Comments: Negative Homans' sign bilaterally.    Patient does have circumcisions left ankle without any secondary signs of infection   Skin:     General: Skin is warm and dry.      Capillary Refill: Capillary refill takes less than 2 seconds.      Coloration: Skin is not pale.      Findings: No erythema or rash.   Neurological:      General: No focal deficit present.      Mental Status: He is alert and oriented to person, place, and time.      Deep Tendon Reflexes: Reflexes are normal and symmetric.   Psychiatric:         Mood and Affect: Mood normal.         Behavior: Behavior normal.         Thought Content: Thought content normal.         Judgment: Judgment normal.         Procedures         Lab Results (last 24 hours)     Procedure Component Value Units Date/Time    [facility] Blood Culture Bottle Set [[id number]] Collected: 02/10/22 1415    Specimen: Blood from Arm, Right Updated: 02/10/22 1530     Extra Tube Hold for add-ons.     Comment: Auto resulted.       CBC & Differential [[id number]]  (Abnormal) Collected: 02/10/22 1415    Specimen: Blood Updated: 02/10/22 1514    Narrative:      The following orders were created for panel order CBC & Differential.  Procedure                               Abnormality         Status                     ---------                               -----------         ------                     CBC Auto Differential[[id number]]        Abnormal            Final result                 Please view results for these tests on the individual orders.    Comprehensive Metabolic Panel [[id number]]  (Abnormal) Collected: 02/10/22 1415    Specimen: Blood Updated: 02/10/22 1452     Glucose 125 mg/dL      BUN 13 mg/dL      Creatinine 1.07 mg/dL      Sodium 142 mmol/L       Potassium 3.6 mmol/L      Comment: Slight hemolysis detected by analyzer. Results may be affected.        Chloride 106 mmol/L      CO2 28.0 mmol/L      Calcium 9.2 mg/dL      Total Protein 7.2 g/dL      Albumin 4.50 g/dL      ALT (SGPT) 24 U/L      AST (SGOT) 15 U/L      Alkaline Phosphatase 53 U/L      Total Bilirubin 0.2 mg/dL      eGFR Non African Amer 76 mL/min/1.73      Globulin 2.7 gm/dL      A/G Ratio 1.7 g/dL      BUN/Creatinine Ratio 12.1     Anion Gap 8.0 mmol/L     Narrative:      GFR Normal >60  Chronic Kidney Disease <60  Kidney Failure <15      BNP [[id number]]  (Normal) Collected: 02/10/22 1415    Specimen: Blood Updated: 02/10/22 1452     proBNP <5.0 pg/mL     Narrative:      Among patients with dyspnea, NT-proBNP is highly sensitive for the detection of acute congestive heart failure. In addition NT-proBNP of <300 pg/ml effectively rules out acute congestive heart failure with 99% negative predictive value.    Results may be falsely decreased if patient taking Biotin.      D-dimer, Quantitative [[id number]]  (Normal) Collected: 02/10/22 1415    Specimen: Blood Updated: 02/10/22 1444     D-Dimer, Quantitative 0.33 mg/L (FEU)     Narrative:      Reference Range is 0-0.50 mg/L FEU. However, results <0.50 mg/L FEU tends to rule out DVT or PE. Results >0.50 mg/L FEU are not useful in predicting absence or presence of DVT or PE.      Troponin [[id number]]  (Normal) Collected: 02/10/22 1415    Specimen: Blood Updated: 02/10/22 1450     Troponin T <0.010 ng/mL     Narrative:      Troponin T Reference Range:  <= 0.03 ng/mL-   Negative for AMI  >0.03 ng/mL-     Abnormal for myocardial necrosis.  Clinicians would have to utilize clinical acumen, EKG, Troponin and serial changes to determine if it is an Acute Myocardial Infarction or myocardial injury due to an underlying chronic condition.       Results may be falsely decreased if patient taking Biotin.      Acetaminophen Level [[id number]]  (Normal) Collected:  02/10/22 1415    Specimen: Blood Updated: 02/10/22 1454     Acetaminophen <5.0 mcg/mL     Ethanol [[id number]] Collected: 02/10/22 1415    Specimen: Blood Updated: 02/10/22 1447     Ethanol % <0.010 %     Narrative:      Not for legal purposes. Chain of Custody not followed.     Salicylate Level [[id number]]  (Normal) Collected: 02/10/22 1415    Specimen: Blood Updated: 02/10/22 1454     Salicylate 0.9 mg/dL     Magnesium [[id number]]  (Normal) Collected: 02/10/22 1415    Specimen: Blood Updated: 02/10/22 1447     Magnesium 1.7 mg/dL     TSH [[id number]]  (Normal) Collected: 02/10/22 1415    Specimen: Blood Updated: 02/10/22 1457     TSH 2.010 uIU/mL     T4, Free [[id number]]  (Abnormal) Collected: 02/10/22 1415    Specimen: Blood Updated: 02/10/22 1456     Free T4 0.73 ng/dL     Narrative:      Results may be falsely increased if patient taking Biotin.      T3, Free [[id number]] Collected: 02/10/22 1415    Specimen: Blood Updated: 02/10/22 1435    CBC Auto Differential [[id number]]  (Abnormal) Collected: 02/10/22 1415    Specimen: Blood Updated: 02/10/22 1514     WBC 10.83 10*3/mm3      RBC 5.81 10*6/mm3      Hemoglobin 16.8 g/dL      Hematocrit 52.4 %      MCV 90.2 fL      MCH 28.9 pg      MCHC 32.1 g/dL      RDW 12.3 %      RDW-SD 40.6 fl      MPV 11.5 fL      Platelets 206 10*3/mm3      Neutrophil % 65.8 %      Lymphocyte % 25.3 %      Monocyte % 6.4 %      Eosinophil % 1.3 %      Basophil % 0.8 %      Immature Grans % 0.4 %      Neutrophils, Absolute 7.13 10*3/mm3      Lymphocytes, Absolute 2.74 10*3/mm3      Monocytes, Absolute 0.69 10*3/mm3      Eosinophils, Absolute 0.14 10*3/mm3      Basophils, Absolute 0.09 10*3/mm3      Immature Grans, Absolute 0.04 10*3/mm3      nRBC 0.0 /100 WBC     Urinalysis With Culture If Indicated - Urine, Clean Catch [[id number]]  (Abnormal) Collected: 02/10/22 1615    Specimen: Urine, Clean Catch Updated: 02/10/22 1700     Color, UA Dark Yellow     Appearance, UA Clear     pH, UA 7.5      Specific Gravity, UA 1.023     Glucose, UA Negative     Ketones, UA Trace     Bilirubin, UA Negative     Blood, UA Negative     Protein, UA Trace     Leuk Esterase, UA Trace     Nitrite, UA Negative     Urobilinogen, UA 1.0 E.U./dL    Urine Drug Screen - Urine, Clean Catch [[id number]]  (Abnormal) Collected: 02/10/22 1615    Specimen: Urine, Clean Catch Updated: 02/10/22 1651     THC, Screen, Urine Positive     Phencyclidine (PCP), Urine Negative     Cocaine Screen, Urine Negative     Methamphetamine, Ur Positive     Opiate Screen Negative     Amphetamine Screen, Urine Positive     Benzodiazepine Screen, Urine Positive     Tricyclic Antidepressants Screen Negative     Methadone Screen, Urine Negative     Barbiturates Screen, Urine Negative     Oxycodone Screen, Urine Negative     Propoxyphene Screen Negative     Buprenorphine, Screen, Urine Positive    Narrative:      Cutoff For Drugs Screened:    Amphetamines               500 ng/ml  Barbiturates               200 ng/ml  Benzodiazepines            150 ng/ml  Cocaine                    150 ng/ml  Methadone                  200 ng/ml  Opiates                    100 ng/ml  Phencyclidine               25 ng/ml  THC                            50 ng/ml  Methamphetamine            500 ng/ml  Tricyclic Antidepressants  300 ng/ml  Oxycodone                  100 ng/ml  Propoxyphene               300 ng/ml  Buprenorphine               10 ng/ml    The normal value for all drugs tested is negative. This report includes unconfirmed screening results, with the cutoff values listed, to be used for medical treatment purposes only.  Unconfirmed results must not be used for non-medical purposes such as employment or legal testing.  Clinical consideration should be applied to any drug of abuse test, particularly when unconfirmed results are used.      Urinalysis, Microscopic Only - Urine, Clean Catch [[id number]]  (Abnormal) Collected: 02/10/22 1615    Specimen: Urine, Clean Catch  Updated: 02/10/22 1703     RBC, UA 3-5 /HPF      WBC, UA 0-2 /HPF      Bacteria, UA None Seen /HPF      Squamous Epithelial Cells, UA 0-2 /HPF      Hyaline Casts, UA None Seen /LPF      Methodology Manual Light Microscopy    Troponin [[id number]]  (Normal) Collected: 02/10/22 1631    Specimen: Blood Updated: 02/10/22 1658     Troponin T <0.010 ng/mL     Narrative:      Troponin T Reference Range:  <= 0.03 ng/mL-   Negative for AMI  >0.03 ng/mL-     Abnormal for myocardial necrosis.  Clinicians would have to utilize clinical acumen, EKG, Troponin and serial changes to determine if it is an Acute Myocardial Infarction or myocardial injury due to an underlying chronic condition.       Results may be falsely decreased if patient taking Biotin.            XR Chest 1 View    Result Date: 2/10/2022  Narrative: EXAMINATION: XR CHEST 1 VW- 2/10/2022 4:05 PM CST  HISTORY: Shortness of breath.  REPORT: A frontal view of the chest was obtained.  COMPARISON: Chest x-ray 8/9/2011. The lungs are hypoaerated, no focal infiltrate or pulmonary consolidation is identified. No pneumothorax or effusion is identified. Heart size is normal. The osseous structures show no acute findings. There is is stable small round metallic foreign body projecting over the right apex region.      Impression: Shallow inspiration, no acute cardiopulmonary abnormality.  This report was finalized on 02/10/2022 16:07 by Dr. Faisal Alvarado MD.      ED Course  ED Course as of 02/10/22 1706   u Feb 10, 2022   1702 Patient has been reassessed.  His heart rate has improved while here.  I have educated him of his laboratory data and test results.  Cardiac work-up essentially unremarkable.  His free T4 is slightly low at 0.73. [TK]   1703 TSH is 2.010.  The patient has been educated that he is positive for methamphetamines.  He denies using the drug himself but believes he has been around it and he did drink a Gatorade that could question been laced with  methamphetamines.  He reports that about 3 to 4 days ago.  He denies being a regular user.  I have educated him of the effects of methamphetamines.  Advised him to continue follow-up with his primary care provider and we will set him up with a zio patch.  Patient voices understanding and will be discharged stable condition. [TK]      ED Course User Index  [TK] Sukhdev Gonzalez PA          [facility]    Final diagnoses:   Palpitations          Sukhdev Gonzalez PA  02/10/22 5900

## 2022-02-11 ENCOUNTER — HOSPITAL ENCOUNTER (OUTPATIENT)
Dept: CARDIOLOGY | Facility: HOSPITAL | Age: 42
Discharge: HOME | End: 2022-02-11
Admitting: FAMILY MEDICINE

## 2022-02-11 LAB
QT INTERVAL: 324 MS
QT INTERVAL: 350 MS
QTC INTERVAL: 416 MS
QTC INTERVAL: 428 MS
T3FREE SERPL-MCNC: 2.74 PG/ML (ref 2–4.4)

## 2022-02-11 PROCEDURE — 93242 EXT ECG>48HR<7D RECORDING: CPT

## 2022-02-15 ENCOUNTER — TELEPHONE (OUTPATIENT)
Dept: FAMILY MEDICINE CLINIC | Facility: CLINIC | Age: 42
End: 2022-02-15

## 2022-02-15 NOTE — TELEPHONE ENCOUNTER
I spoke to his wife and she is going to call dr bell office to see if she was going to address the trig.if she does not hear back from them by thurs she will call me back

## 2022-02-15 NOTE — TELEPHONE ENCOUNTER
Caller: Keely Ornelas    Relationship: Emergency Contact    Best call back number: [phone] (M)    What is the best time to reach you: ANYTIME     Who are you requesting to speak with (clinical staff, provider,  specific staff member): CLINICAL     Do you know the name of the person who called: CLINICAL     What was the call regarding: REQUESTING CALL BACK TO BE ADVISED ABOUT HIS CHOLESTEROL LEVELS   Do you require a callback: YES       IF NEEDED   [facility] [employer] ST[facility] #[id number] [place], [place] - [facility] [address] AT [facility] [address] [facility] - [phone] [address] [phone]   [phone]

## 2022-02-16 NOTE — TELEPHONE ENCOUNTER
Please note this and I dont think it is urgent to require a call back since he has appt Monday with [facility]

## 2022-02-16 NOTE — TELEPHONE ENCOUNTER
Keely, wife, called back and advised that she spoke w/  office and they will not prescribe anything without an appt. They scheduled an appt for Monday.     Keely said that if sooner treatment is needed to contact her via phone or vufindhart message. She said that if nothing is needed sooner, she does not require a call back.

## 2022-02-26 LAB
MAXIMAL PREDICTED HEART RATE: 178 BPM
STRESS TARGET HR: 151 BPM

## 2022-02-26 PROCEDURE — 93244 EXT ECG>48HR<7D REV&INTERPJ: CPT | Performed by: INTERNAL MEDICINE

## 2022-03-14 ENCOUNTER — HOSPITAL ENCOUNTER (OUTPATIENT)
Dept: MRI IMAGING | Facility: HOSPITAL | Age: 42
Discharge: HOME | End: 2022-03-14
Admitting: NEUROLOGICAL SURGERY

## 2022-03-14 DIAGNOSIS — D35.2 PITUITARY MICROADENOMA: ICD-10-CM

## 2022-03-14 LAB — CREAT BLDA-MCNC: 1.2 MG/DL (ref 0.6–1.3)

## 2022-03-14 PROCEDURE — 82565 ASSAY OF CREATININE: CPT

## 2022-03-14 PROCEDURE — A9577 INJ MULTIHANCE: HCPCS | Performed by: NEUROLOGICAL SURGERY

## 2022-03-14 PROCEDURE — 70553 MRI BRAIN STEM W/O & W/DYE: CPT

## 2022-03-14 PROCEDURE — 0 GADOBENATE DIMEGLUMINE 529 MG/ML SOLUTION: Performed by: NEUROLOGICAL SURGERY

## 2022-03-14 RX ADMIN — GADOBENATE DIMEGLUMINE 20 ML: 529 INJECTION, SOLUTION INTRAVENOUS at 13:10

## 2022-03-15 ENCOUNTER — OFFICE VISIT (OUTPATIENT)
Dept: NEUROSURGERY | Facility: CLINIC | Age: 42
End: 2022-03-15

## 2022-03-15 VITALS — WEIGHT: 273.4 LBS | BODY MASS INDEX: 39.14 KG/M2 | HEIGHT: 70 IN

## 2022-03-15 DIAGNOSIS — F17.210 CIGARETTE SMOKER: ICD-10-CM

## 2022-03-15 DIAGNOSIS — Z72.0 CHEWING TOBACCO USE: ICD-10-CM

## 2022-03-15 DIAGNOSIS — D35.2 PITUITARY MICROADENOMA: Primary | ICD-10-CM

## 2022-03-15 DIAGNOSIS — E66.01 CLASS 2 SEVERE OBESITY DUE TO EXCESS CALORIES WITH SERIOUS COMORBIDITY AND BODY MASS INDEX (BMI) OF 39.0 TO 39.9 IN ADULT: ICD-10-CM

## 2022-03-15 PROBLEM — E66.812 CLASS 2 SEVERE OBESITY DUE TO EXCESS CALORIES WITH SERIOUS COMORBIDITY AND BODY MASS INDEX (BMI) OF 39.0 TO 39.9 IN ADULT: Status: ACTIVE | Noted: 2022-03-15

## 2022-03-15 PROCEDURE — 99213 OFFICE O/P EST LOW 20 MIN: CPT | Performed by: NEUROLOGICAL SURGERY
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
## 2022-03-15 NOTE — PATIENT INSTRUCTIONS
"PATIENT TO CONTINUE TO FOLLOW UP WITH HIS PRIMARY CARE PROVIDER FOR YEARLY PHYSICAL EXAMS TO ENSURE COMPLETE HEALTH MAINTENANCE      BMI for Adults  What is BMI?  Body mass index (BMI) is a number that is calculated from a person's weight and height. BMI can help estimate how much of a person's weight is composed of fat. BMI does not measure body fat directly. Rather, it is an alternative to procedures that directly measure body fat, which can be difficult and expensive.  BMI can help identify people who may be at higher risk for certain medical problems.  What are BMI measurements used for?  BMI is used as a screening tool to identify possible weight problems. It helps determine whether a person is obese, overweight, a healthy weight, or underweight.  BMI is useful for:  Identifying a weight problem that may be related to a medical condition or may increase the risk for medical problems.  Promoting changes, such as changes in diet and exercise, to help reach a healthy weight. BMI screening can be repeated to see if these changes are working.  How is BMI calculated?  BMI involves measuring your weight in relation to your height. Both height and weight are measured, and the BMI is calculated from those numbers. This can be done either in English ([place] or metric measurements. Note that charts and online BMI calculators are available to help you find your BMI quickly and easily without having to do these calculations yourself.  To calculate your BMI in English ([place] measurements:    Measure your weight in pounds (lb).  Multiply the number of pounds by 703.  For example, for a person who weighs 180 lb, multiply that number by 703, which equals 126,540.  Measure your height in inches. Then multiply that number by itself to get a measurement called \"inches squared.\"  For example, for a person who is 70 inches tall, the \"inches squared\" measurement is 70 inches x 70 inches, which equals 4,900 inches squared.  Divide the " "total from step 2 (number of lb x 703) by the total from step 3 (inches squared): 126,540 ÷ 4,900 = 25.8. This is your BMI.    To calculate your BMI in metric measurements:  Measure your weight in kilograms (kg).  Measure your height in meters (m). Then multiply that number by itself to get a measurement called \"meters squared.\"  For example, for a person who is 1.75 m tall, the \"meters squared\" measurement is 1.75 m x 1.75 m, which is equal to 3.1 meters squared.  Divide the number of kilograms (your weight) by the meters squared number. In this example: 70 ÷ 3.1 = 22.6. This is your BMI.  What do the results mean?  BMI charts are used to identify whether you are underweight, normal weight, overweight, or obese. The following guidelines will be used:  Underweight: BMI less than 18.5.  Normal weight: BMI between 18.5 and 24.9.  Overweight: BMI between 25 and 29.9.  Obese: BMI of 30 or above.  Keep these notes in mind:  Weight includes both fat and muscle, so someone with a muscular build, such as an athlete, may have a BMI that is higher than 24.9. In cases like these, BMI is not an accurate measure of body fat.  To determine if excess body fat is the cause of a BMI of 25 or higher, further assessments may need to be done by a health care provider.  BMI is usually interpreted in the same way for men and women.  Where to find more information  For more information about BMI, including tools to quickly calculate your BMI, go to these websites:  Centers for Disease Control and Prevention: www.cdc.gov  American Heart Association: www.heart.org  National Heart, Lung, and Blood [place]: www.nhlbi.nih.gov  Summary  Body mass index (BMI) is a number that is calculated from a person's weight and height.  BMI may help estimate how much of a person's weight is composed of fat. BMI can help identify those who may be at higher risk for certain medical problems.  BMI can be measured using English measurements or metric " "measurements.  BMI charts are used to identify whether you are underweight, normal weight, overweight, or obese.  This information is not intended to replace advice given to you by your health care provider. Make sure you discuss any questions you have with your health care provider.  Document Revised: 09/09/2020 Document Reviewed: 07/17/2020  [employer] Patient Education © 2021 [employer]  https://www.nhlbi.nih.gov/files/docs/public/heart/dash_brief.pdf\">   DASH Eating Plan  DASH stands for Dietary Approaches to Stop Hypertension. The DASH eating plan is a healthy eating plan that has been shown to:  Reduce high blood pressure (hypertension).  Reduce your risk for type 2 diabetes, heart disease, and stroke.  Help with weight loss.  What are tips for following this plan?  Reading food labels  Check food labels for the amount of salt (sodium) per serving. Choose foods with less than 5 percent of the Daily Value of sodium. Generally, foods with less than 300 milligrams (mg) of sodium per serving fit into this eating plan.  To find whole grains, look for the word \"whole\" as the first word in the ingredient list.  Shopping  Buy products labeled as \"low-sodium\" or \"no salt added.\"  Buy fresh foods. Avoid canned foods and pre-made or frozen meals.  Cooking  Avoid adding salt when cooking. Use salt-free seasonings or herbs instead of table salt or sea salt. Check with your health care provider or pharmacist before using salt substitutes.  Do not p[place]man foods. Cook foods using healthy methods such as baking, boiling, grilling, roasting, and broiling instead.  Cook with heart-healthy oils, such as olive, canola, avocado, soybean, or sunflower oil.  Meal planning    Eat a balanced diet that includes:  4 or more servings of fruits and 4 or more servings of vegetables each day. Try to fill one-half of your plate with fruits and vegetables.  6-8 servings of whole grains each day.  Less than 6 oz (170 g) of lean meat, poultry, or " fish each day. A 3-oz (85-g) serving of meat is about the same size as a deck of cards. One egg equals 1 oz (28 g).  2-3 servings of low-fat dairy each day. One serving is 1 cup (237 mL).  1 serving of nuts, seeds, or beans 5 times each week.  2-3 servings of heart-healthy fats. Healthy fats called omega-3 fatty acids are found in foods such as walnuts, flaxseeds, fortified milks, and eggs. These fats are also found in cold-water fish, such as sardines, salmon, and mackerel.  Limit how much you eat of:  Canned or prepackaged foods.  Food that is high in trans fat, such as some fried foods.  Food that is high in saturated fat, such as fatty meat.  Desserts and other sweets, sugary drinks, and other foods with added sugar.  Full-fat dairy products.  Do not salt foods before eating.  Do not eat more than 4 egg yolks a week.  Try to eat at least 2 vegetarian meals a week.  Eat more home-cooked food and less restaurant, buffet, and fast food.    Lifestyle  When eating at a restaurant, ask that your food be prepared with less salt or no salt, if possible.  If you drink alcohol:  Limit how much you use to:  0-1 drink a day for women who are not pregnant.  0-2 drinks a day for men.  Be aware of how much alcohol is in your drink. In the [place] one drink equals one 12 oz bottle of beer (355 mL), one 5 oz glass of wine (148 mL), or one 1½ oz glass of hard liquor (44 mL).  General information  Avoid eating more than 2,300 mg of salt a day. If you have hypertension, you may need to reduce your sodium intake to 1,500 mg a day.  Work with your health care provider to maintain a healthy body weight or to lose weight. Ask what an ideal weight is for you.  Get at least 30 minutes of exercise that causes your heart to beat faster (aerobic exercise) most days of the week. Activities may include walking, swimming, or biking.  Work with your health care provider or dietitian to adjust your eating plan to your individual calorie  needs.  What foods should I eat?  Fruits  All fresh, dried, or frozen fruit. Canned fruit in natural juice (without added sugar).  Vegetables  Fresh or frozen vegetables (raw, steamed, roasted, or grilled). Low-sodium or reduced-sodium tomato and vegetable juice. Low-sodium or reduced-sodium tomato sauce and tomato paste. Low-sodium or reduced-sodium canned vegetables.  Grains  Whole-grain or whole-wheat bread. Whole-grain or whole-wheat pasta. Brown rice. Oatmeal. Quinoa. Bulgur. Whole-grain and low-sodium cereals. Ailyn bread. Low-fat, low-sodium crackers. Whole-wheat flour tortillas.  Meats and other proteins  Skinless chicken or turkey. Ground chicken or turkey. Pork with fat trimmed off. Fish and seafood. Egg whites. Dried beans, peas, or lentils. Unsalted nuts, nut butters, and seeds. Unsalted canned beans. Lean cuts of beef with fat trimmed off. Low-sodium, lean precooked or cured meat, such as sausages or meat loaves.  Dairy  Low-fat (1%) or fat-free (skim) milk. Reduced-fat, low-fat, or fat-free cheeses. Nonfat, low-sodium ricotta or cottage cheese. Low-fat or nonfat yogurt. Low-fat, low-sodium cheese.  Fats and oils  Soft margarine without trans fats. Vegetable oil. Reduced-fat, low-fat, or light mayonnaise and salad dressings (reduced-sodium). Canola, safflower, olive, avocado, soybean, and sunflower oils. Avocado.  Seasonings and condiments  Herbs. Spices. Seasoning mixes without salt.  Other foods  Unsalted popcorn and pretzels. Fat-free sweets.  The items listed above may not be a complete list of foods and beverages you can eat. Contact a dietitian for more information.  What foods should I avoid?  Fruits  Canned fruit in a light or heavy syrup. Fried fruit. Fruit in cream or butter sauce.  Vegetables  Creamed or fried vegetables. Vegetables in a cheese sauce. Regular canned vegetables (not low-sodium or reduced-sodium). Regular canned tomato sauce and paste (not low-sodium or reduced-sodium). Regular  tomato and vegetable juice (not low-sodium or reduced-sodium). Pickles. Olives.  Grains  Baked goods made with fat, such as croissants, muffins, or some breads. Dry pasta or rice meal packs.  Meats and other proteins  Fatty cuts of meat. Ribs. Fried meat. Melendrez. Bologna, salami, and other precooked or cured meats, such as sausages or meat loaves. Fat from the back of a pig (fatback). Bratwurst. Salted nuts and seeds. Canned beans with added salt. Canned or smoked fish. Whole eggs or egg yolks. Chicken or turkey with skin.  Dairy  Whole or 2% milk, cream, and half-and-half. Whole or full-fat cream cheese. Whole-fat or sweetened yogurt. Full-fat cheese. Nondairy creamers. Whipped toppings. Processed cheese and cheese spreads.  Fats and oils  Butter. Stick margarine. Lard. Shortening. Ghee. Melendrez fat. Tropical oils, such as coconut, palm kernel, or palm oil.  Seasonings and condiments  Onion salt, garlic salt, seasoned salt, table salt, and sea salt. Worcestershire sauce. Tartar sauce. Barbecue sauce. Teriyaki sauce. Soy sauce, including reduced-sodium. Steak sauce. Canned and packaged gravies. Fish sauce. Oyster sauce. Cocktail sauce. Store-bought horseradish. Ketchup. Mustard. Meat flavorings and tenderizers. Bouillon cubes. Hot sauces. Pre-made or packaged marinades. Pre-made or packaged taco seasonings. Relishes. Regular salad dressings.  Other foods  Salted popcorn and pretzels.  The items listed above may not be a complete list of foods and beverages you should avoid. Contact a dietitian for more information.  Where to find more information  National Heart, Lung, and Blood [place]: www.nhlbi.nih.gov  American Heart Association: www.heart.org  Academy of Nutrition and Dietetics: www.eatright.org  National Kidney Foundation: www.kidney.org  Summary  The DASH eating plan is a healthy eating plan that has been shown to reduce high blood pressure (hypertension). It may also reduce your risk for type 2 diabetes,  heart disease, and stroke.  When on the DASH eating plan, aim to eat more fresh fruits and vegetables, whole grains, lean proteins, low-fat dairy, and heart-healthy fats.  With the DASH eating plan, you should limit salt (sodium) intake to 2,300 mg a day. If you have hypertension, you may need to reduce your sodium intake to 1,500 mg a day.  Work with your health care provider or dietitian to adjust your eating plan to your individual calorie needs.  This information is not intended to replace advice given to you by your health care provider. Make sure you discuss any questions you have with your health care provider.  Document Revised: 11/20/2020 Document Reviewed: 11/20/2020  [email or web address] Patient Education © 2021 [email or web address]    Steps to Quit Smoking  Smoking tobacco is the leading cause of preventable death. It can affect almost every organ in the body. Smoking puts you and those around you at risk for developing many serious chronic diseases. Quitting smoking can be difficult, but it is one of the best things that you can do for your health. It is never too late to quit.  How do I get ready to quit?  When you decide to quit smoking, create a plan to help you succeed. Before you quit:  Pick a date to quit. Set a date within the next 2 weeks to give you time to prepare.  Write down the reasons why you are quitting. Keep this list in places where you will see it often.  Tell your family, friends, and co-workers that you are quitting. Support from your loved ones can make quitting easier.  Talk with your health care provider about your options for quitting smoking.  Find out what treatment options are covered by your health insurance.  Identify people, places, things, and activities that make you want to smoke (triggers). Avoid them.  What first steps can I take to quit smoking?  Throw away all cigarettes at home, at work, and in your car.  Throw away smoking accessories, such as ashtrays and lighters.  Clean your car.  Make sure to empty the ashtray.  Clean your home, including curtains and carpets.  What strategies can I use to quit smoking?  Talk with your health care provider about combining strategies, such as taking medicines while you are also receiving in-person counseling. Using these two strategies together makes you more likely to succeed in quitting than if you used either strategy on its own.  If you are pregnant or breastfeeding, talk with your health care provider about finding counseling or other support strategies to quit smoking. Do not take medicine to help you quit smoking unless your health care provider tells you to do so.  To quit smoking:  Quit right away  Quit smoking completely, instead of gradually reducing how much you smoke over a period of time. Research shows that stopping smoking right away is more successful than gradually quitting.  Attend in-person counseling to help you build problem-solving skills. You are more likely to succeed in quitting if you attend counseling sessions regularly. Even short sessions of 10 minutes can be effective.  Take medicine  You may take medicines to help you quit smoking. Some medicines require a prescription and some you can purchase over-the-counter. Medicines may have nicotine in them to replace the nicotine in cigarettes. Medicines may:  Help to stop cravings.  Help to relieve withdrawal symptoms.  Your health care provider may recommend:  Nicotine patches, gum, or lozenges.  Nicotine inhalers or sprays.  Non-nicotine medicine that is taken by mouth.  Find resources  Find resources and support systems that can help you to quit smoking and remain smoke-free after you quit. These resources are most helpful when you use them often. They include:  Online chats with a counselor.  Telephone quitlines.  Printed self-help materials.  Support groups or group counseling.  Text messaging programs.  Mobile phone apps or applications. Use apps that can help you stick to your  quit plan by providing reminders, tips, and encouragement. There are many free apps for mobile devices as well as websites. Examples include Quit Guide from the CDC and smokefree.gov  What things can I do to make it easier to quit?    Reach out to your family and friends for support and encouragement. Call telephone quitlines (1-800-QUIT-NOW), reach out to support groups, or work with a counselor for support.  Ask people who smoke to avoid smoking around you.  Avoid places that trigger you to smoke, such as bars, parties, or smoke-break areas at work.  Spend time with people who do not smoke.  Lessen the stress in your life. Stress can be a smoking trigger for some people. To lessen stress, try:  Exercising regularly.  Doing deep-breathing exercises.  Doing yoga.  Meditating.  Performing a body scan. This involves closing your eyes, scanning your body from head to toe, and noticing which parts of your body are particularly tense. Try to relax the muscles in those areas.  How will I feel when I quit smoking?  Day 1 to 3 weeks  Within the first 24 hours of quitting smoking, you may start to feel withdrawal symptoms. These symptoms are usually most noticeable 2-3 days after quitting, but they usually do not last for more than 2-3 weeks. You may experience these symptoms:  Mood swings.  Restlessness, anxiety, or irritability.  Trouble concentrating.  Dizziness.  Strong cravings for sugary foods and nicotine.  Mild weight gain.  Constipation.  Nausea.  Coughing or a sore throat.  Changes in how the medicines that you take for unrelated issues work in your body.  Depression.  Trouble sleeping (insomnia).  Week 3 and afterward  After the first 2-3 weeks of quitting, you may start to notice more positive results, such as:  Improved sense of smell and taste.  Decreased coughing and sore throat.  Slower heart rate.  Lower blood pressure.  Clearer skin.  The ability to breathe more easily.  Fewer sick days.  Quitting smoking  can be very challenging. Do not get discouraged if you are not successful the first time. Some people need to make many attempts to quit before they achieve long-term success. Do your best to stick to your quit plan, and talk with your health care provider if you have any questions or concerns.  Summary  Smoking tobacco is the leading cause of preventable death. Quitting smoking is one of the best things that you can do for your health.  When you decide to quit smoking, create a plan to help you succeed.  Quit smoking right away, not slowly over a period of time.  When you start quitting, seek help from your health care provider, family, or friends.  This information is not intended to replace advice given to you by your health care provider. Make sure you discuss any questions you have with your health care provider.  Document Revised: 09/11/2020 Document Reviewed: 03/07/2020  [employer] Patient Education © 2021 [employer]  Tobacco Use Disorder  Tobacco use disorder (TUD) occurs when a person craves, seeks, and uses tobacco, regardless of the consequences. This disorder can cause problems with mental and physical health. It can affect your ability to have healthy relationships, and it can keep you from meeting your responsibilities at work, home, or school.  Tobacco may be:  Smoked as a cigarette or cigar.  Inhaled using e-cigarettes.  Smoked in a pipe or hookah.  Chewed as smokeless tobacco.  Inhaled into the nostrils as snuff.  Tobacco products contain a dangerous chemical called nicotine, which is very addictive. Nicotine triggers hormones that make the body feel stimulated and works on areas of the brain that make you feel good. These effects can make it hard for people to quit nicotine.  Tobacco contains many other unsafe chemicals that can damage almost every organ in the body. Smoking tobacco also puts others in danger due to fire risk and possible health problems caused by breathing in secondhand  smoke.  What are the signs or symptoms?  Symptoms of TUD may include:  Being unable to slow down or stop your tobacco use.  Spending an abnormal amount of time getting or using tobacco.  Craving tobacco. Cravings may last for up to 6 months after quitting.  Tobacco use that:  Interferes with your work, school, or home life.  Interferes with your personal and social relationships.  Makes you give up activities that you once enjoyed or found important.  Using tobacco even though you know that it is:  Dangerous or bad for your health or someone else's health.  Causing problems in your life.  Needing more and more of the substance to get the same effect (developing tolerance).  Experiencing unpleasant symptoms if you do not use the substance (withdrawal). Withdrawal symptoms may include:  Depressed, anxious, or irritable mood.  Difficulty concentrating.  Increased appetite.  Restlessness or trouble sleeping.  Using the substance to avoid withdrawal.  How is this diagnosed?  This condition may be diagnosed based on:  Your current and past tobacco use. Your health care provider may ask questions about how your tobacco use affects your life.  A physical exam.  You may be diagnosed with TUD if you have at least two symptoms within a 12-month period.  How is this treated?  This condition is treated by stopping tobacco use. Many people are unable to quit on their own and need help. Treatment may include:  Nicotine replacement therapy (NRT). NRT provides nicotine without the other harmful chemicals in tobacco. NRT gradually lowers the dosage of nicotine in the body and reduces withdrawal symptoms. NRT is available as:  Over-the-counter gums, lozenges, and skin patches.  Prescription mouth inhalers and nasal sprays.  Medicine that acts on the brain to reduce cravings and withdrawal symptoms.  A type of talk therapy that examines your triggers for tobacco use, how to avoid them, and how to cope with cravings (behavioral  therapy).  Hypnosis. This may help with withdrawal symptoms.  Joining a support group for others coping with TUD.  The best treatment for TUD is usually a combination of medicine, talk therapy, and support groups. Recovery can be a long process. Many people start using tobacco again after stopping (relapse). If you relapse, it does not mean that treatment will not work.  Follow these instructions at home:    Lifestyle  Do not use any products that contain nicotine or tobacco, such as cigarettes and e-cigarettes.  Avoid things that trigger tobacco use as much as you can. Triggers include people and situations that usually cause you to use tobacco.  Avoid drinks that contain caffeine, including coffee. These may worsen some withdrawal symptoms.  Find ways to manage stress. Wanting to smoke may cause stress, and stress can make you want to smoke. Relaxation techniques such as deep breathing, meditation, and yoga may help.  Attend support groups as needed. These groups are an important part of long-term recovery for many people.  General instructions  Take over-the-counter and prescription medicines only as told by your health care provider.  Check with your health care provider before taking any new prescription or over-the-counter medicines.  Decide on a friend, family member, or smoking quit-line (such as 1-800-QUIT-NOW in the [place] that you can call or text when you feel the urge to smoke or when you need help coping with cravings.  Keep all follow-up visits as told by your health care provider and therapist. This is important.  Contact a health care provider if:  You are not able to take your medicines as prescribed.  Your symptoms get worse, even with treatment.  Summary  Tobacco use disorder (TUD) occurs when a person craves, seeks, and uses tobacco regardless of the consequences.  This condition may be diagnosed based on your current and past tobacco use and a physical exam.  Many people are unable to quit on  their own and need help. Recovery can be a long process.  The most effective treatment for TUD is usually a combination of medicine, talk therapy, and support groups.  This information is not intended to replace advice given to you by your health care provider. Make sure you discuss any questions you have with your health care provider.  Document Revised: 12/05/2018 Document Reviewed: 12/05/2018  [employer] Patient Education © 2021 [employer]  Smokeless Tobacco Information, Adult    Tobacco is a leafy plant that contains a chemical (nicotine). Nicotine affects your brain and causes you to become addicted to it. Smokeless tobacco is tobacco that you put in your mouth instead of smoking it. It may also be called chewing tobacco or snuff.  Smokeless tobacco is made from the leaves of tobacco plants and comes in many forms, such as:  Loose, dry leaves.  Plugs or twists.  Moist pouches.  Dissolving lozenges or strips.  Chewing, sucking, or holding the tobacco in your mouth causes your mouth to make more saliva. The saliva mixes with the tobacco, which you swallow or spit out. Using tobacco is harmful to your health.  How can smokeless tobacco affect me?  All forms of tobacco contain many chemicals that can harm every organ in the body. Using smokeless tobacco increases your risk for:  Cancer. Tobacco use is one of the leading causes of cancer. Smokeless tobacco is linked to cancer of the mouth, esophagus, and pancreas.  Other long-term health problems, including high blood pressure, heart disease, and stroke.  Addiction.  Pregnancy complications, if this applies. Pregnant women who use smokeless tobacco are more likely to have a miscarriage or deliver a baby too early.  Mouth and dental problems, such as:  Bad breath.  Teeth that look yellow or brown.  Mouth sores.  Cracking and bleeding lips.  Gum recession, gum disease, or tooth decay.  Lesions on the soft tissues of your mouth (leukoplakia).  The benefits of not  using smokeless tobacco include:  A healthy mind and body.  Saving money. You avoid the cost of buying tobacco and the cost of treating illnesses that are caused by tobacco.  What actions can I take to quit using tobacco?  Ask your health care provider for help to quit using smokeless tobacco. This may involve treatment.  These tips may also help you quit:  Pick a date to quit. Set a date within the next two weeks. This gives you time to prepare.  Write down the reasons why you are quitting. Keep this list in places where you will see it often, such as on your bathroom mirror or in your car or wallet.  Identify the people, places, things, and activities that make you want to use smokeless tobacco (triggers). Avoid them.  Get rid of any tobacco you have and remove any tobacco smells. To do this:  Throw away all containers of tobacco at home, at work, and in your car.  Throw away any other items that you use regularly when you chew tobacco.  Clean your car and make sure to remove all tobacco-related items.  Clean your home, including curtains and carpets.  Tell your family and friends that you are quitting. Having support can make quitting easier.  Chew sugarless gum or sunflower seeds when you want to use smokeless tobacco.  Stay positive. Be prepared for cravings. It is common to slip up when you first quit, so take it one day at a time.  Stay busy and take care of your body. Get plenty of exercise. Drink enough water to keep your urine pale yellow.  Keep track of how many days have passed since you quit. Remembering how long and hard you have worked to quit can help you avoid using smokeless tobacco again.  Where to find support  Ask your health care provider if there is a local support group for quitting smokeless tobacco.  Where to find more information  You can learn more about the risks of using smokeless tobacco and the benefits of quitting from these sources:  American Cancer Society:    www.cdc.gov

## 2022-04-01 ENCOUNTER — APPOINTMENT (OUTPATIENT)
Dept: MRI IMAGING | Facility: HOSPITAL | Age: 42
End: 2022-04-01

## 2022-04-10 DIAGNOSIS — D35.2 PITUITARY MICROADENOMA: ICD-10-CM

## 2022-04-10 DIAGNOSIS — G89.29 CHRONIC INTRACTABLE HEADACHE, UNSPECIFIED HEADACHE TYPE: ICD-10-CM

## 2022-04-10 DIAGNOSIS — G93.2 PSEUDOTUMOR CEREBRI: ICD-10-CM

## 2022-04-10 DIAGNOSIS — F99 PSYCHIATRIC ILLNESS: ICD-10-CM

## 2022-04-10 DIAGNOSIS — R51.9 CHRONIC INTRACTABLE HEADACHE, UNSPECIFIED HEADACHE TYPE: ICD-10-CM

## 2022-04-11 RX ORDER — ACETAZOLAMIDE 250 MG/1
TABLET ORAL
Qty: 540 TABLET | Refills: 1 | Status: SHIPPED | OUTPATIENT
Start: 2022-04-11 | End: 2023-01-13

## 2022-04-11 NOTE — TELEPHONE ENCOUNTER
Rx Refill Note  Requested Prescriptions     Pending Prescriptions Disp Refills   • acetaZOLAMIDE (DIAMOX) 250 MG tablet [Pharmacy Med Name: ACETAZOLAMIDE 250MG TABLETS] 540 tablet      Sig: TAKE 2 TABLETS BY MOUTH THREE TIMES DAILY      Last office visit with prescribing clinician: 2/8/2022      Next office visit with prescribing clinician: Visit date not found            Trudy Gallardo LPN  04/11/22, 15:59 CDT

## 2022-05-06 ENCOUNTER — TELEPHONE (OUTPATIENT)
Dept: FAMILY MEDICINE CLINIC | Facility: CLINIC | Age: 42
End: 2022-05-06

## 2022-05-06 NOTE — TELEPHONE ENCOUNTER
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    Do you require a callback: YES

## 2022-05-09 NOTE — TELEPHONE ENCOUNTER
Attempted to call and advised that must sign a release of information before anymore info can be providied, no answer

## 2022-05-11 NOTE — TELEPHONE ENCOUNTER
Attempted to call pt insurance and no answer, called pt and advised if they call him, he will need to sign a release of information if they call him and pt is agreeable

## 2022-05-18 ENCOUNTER — TELEPHONE (OUTPATIENT)
Dept: FAMILY MEDICINE CLINIC | Facility: CLINIC | Age: 42
End: 2022-05-18

## 2022-05-18 NOTE — TELEPHONE ENCOUNTER
Caller: Lincoln Ornelas    Relationship: Self    Best call back number: [phone]    What medication are you requesting: CHANTIX    What are your current symptoms: SMOKING A PACK AND A HALF A DAY, AND ALSO CHEWS    How long have you been experiencing symptoms:   Have you had these symptoms before:    [] Yes  [] No    Have you been treated for these symptoms before:   [] Yes  [] No    If a prescription is needed, what is your preferred pharmacy and phone number: [id number] - [place], [place] - [address] AT [facility] [address] [facility] [phone] [facility] [phone] FX     Additional notes: PLEASE GIVE PATIENT A CALL

## 2022-05-23 ENCOUNTER — OFFICE VISIT (OUTPATIENT)
Dept: FAMILY MEDICINE CLINIC | Facility: CLINIC | Age: 42
End: 2022-05-23

## 2022-05-23 VITALS
BODY MASS INDEX: 39.4 KG/M2 | SYSTOLIC BLOOD PRESSURE: 120 MMHG | HEIGHT: 70 IN | RESPIRATION RATE: 20 BRPM | WEIGHT: 275.2 LBS | HEART RATE: 115 BPM | TEMPERATURE: 97.1 F | OXYGEN SATURATION: 94 % | DIASTOLIC BLOOD PRESSURE: 80 MMHG

## 2022-05-23 DIAGNOSIS — F17.210 CIGARETTE SMOKER: ICD-10-CM

## 2022-05-23 DIAGNOSIS — R07.89 CHEST WALL PAIN: ICD-10-CM

## 2022-05-23 DIAGNOSIS — R09.89 DECREASED PULSES IN FEET: Primary | ICD-10-CM

## 2022-05-23 DIAGNOSIS — I10 PRIMARY HYPERTENSION: ICD-10-CM

## 2022-05-23 DIAGNOSIS — F32.A DEPRESSION, UNSPECIFIED DEPRESSION TYPE: ICD-10-CM

## 2022-05-23 DIAGNOSIS — F99 PSYCHIATRIC ILLNESS: ICD-10-CM

## 2022-05-23 DIAGNOSIS — R73.01 ELEVATED FASTING GLUCOSE: ICD-10-CM

## 2022-05-23 DIAGNOSIS — N52.9 ERECTILE DYSFUNCTION, UNSPECIFIED ERECTILE DYSFUNCTION TYPE: ICD-10-CM

## 2022-05-23 DIAGNOSIS — E23.0 HYPOGONADOTROPIC HYPOGONADISM: ICD-10-CM

## 2022-05-23 DIAGNOSIS — Z72.0 CHEWING TOBACCO USE: ICD-10-CM

## 2022-05-23 DIAGNOSIS — R79.89 LOW TESTOSTERONE: ICD-10-CM

## 2022-05-23 DIAGNOSIS — D35.2 PITUITARY MICROADENOMA: ICD-10-CM

## 2022-05-23 PROCEDURE — 99214 OFFICE O/P EST MOD 30 MIN: CPT | Performed by: FAMILY MEDICINE
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
## 2022-07-26 ENCOUNTER — TELEPHONE (OUTPATIENT)
Dept: FAMILY MEDICINE CLINIC | Facility: CLINIC | Age: 42
End: 2022-07-26

## 2022-07-26 NOTE — TELEPHONE ENCOUNTER
Caller: Lincoln Ornelas    Relationship: Self    Best call back number: [phone]     What is the best time to reach you: ANYTIME     Who are you requesting to speak with (clinical staff, provider,  specific staff member): CLINICAL    What was the call regarding: WANTING TO KNOW WHAT HIS BLOOD TYPE IS     Do you require a callback: YES

## 2022-07-26 NOTE — TELEPHONE ENCOUNTER
Called pt and advised we don't normally do a type and cross, it is usually a surgery issue, pt advised he will call a doctor that did his surgery in the past

## 2022-08-03 ENCOUNTER — OFFICE VISIT (OUTPATIENT)
Dept: FAMILY MEDICINE CLINIC | Facility: CLINIC | Age: 42
End: 2022-08-03

## 2022-08-03 VITALS
HEART RATE: 124 BPM | DIASTOLIC BLOOD PRESSURE: 80 MMHG | BODY MASS INDEX: 38.39 KG/M2 | HEIGHT: 70 IN | RESPIRATION RATE: 18 BRPM | TEMPERATURE: 97.3 F | OXYGEN SATURATION: 97 % | WEIGHT: 268.2 LBS | SYSTOLIC BLOOD PRESSURE: 138 MMHG

## 2022-08-03 DIAGNOSIS — F99 PSYCHIATRIC ILLNESS: ICD-10-CM

## 2022-08-03 DIAGNOSIS — Z79.899 POLYPHARMACY: ICD-10-CM

## 2022-08-03 DIAGNOSIS — R00.0 TACHYCARDIA: ICD-10-CM

## 2022-08-03 DIAGNOSIS — I10 PRIMARY HYPERTENSION: ICD-10-CM

## 2022-08-03 DIAGNOSIS — G89.29 CHRONIC INTRACTABLE HEADACHE, UNSPECIFIED HEADACHE TYPE: ICD-10-CM

## 2022-08-03 DIAGNOSIS — I49.9 CARDIAC ARRHYTHMIA, UNSPECIFIED CARDIAC ARRHYTHMIA TYPE: ICD-10-CM

## 2022-08-03 DIAGNOSIS — R73.01 ELEVATED FASTING GLUCOSE: ICD-10-CM

## 2022-08-03 DIAGNOSIS — G93.2 PSEUDOTUMOR CEREBRI: ICD-10-CM

## 2022-08-03 DIAGNOSIS — F45.21 HYPOCHONDRIA: ICD-10-CM

## 2022-08-03 DIAGNOSIS — E78.1 HYPERTRIGLYCERIDEMIA: ICD-10-CM

## 2022-08-03 DIAGNOSIS — Z87.828 HISTORY OF HEAD INJURY: ICD-10-CM

## 2022-08-03 DIAGNOSIS — D35.2 PITUITARY MICROADENOMA: ICD-10-CM

## 2022-08-03 DIAGNOSIS — R41.3 MEMORY LOSS: ICD-10-CM

## 2022-08-03 DIAGNOSIS — R51.9 CHRONIC INTRACTABLE HEADACHE, UNSPECIFIED HEADACHE TYPE: ICD-10-CM

## 2022-08-03 DIAGNOSIS — N52.9 ERECTILE DYSFUNCTION, UNSPECIFIED ERECTILE DYSFUNCTION TYPE: ICD-10-CM

## 2022-08-03 PROCEDURE — 99214 OFFICE O/P EST MOD 30 MIN: CPT | Performed by: FAMILY MEDICINE

## 2022-08-03 RX ORDER — FENOFIBRATE 145 MG/1
145 TABLET, COATED ORAL DAILY
Qty: 30 TABLET | Refills: 5 | Status: SHIPPED | OUTPATIENT
Start: 2022-08-03 | End: 2023-03-29
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
## 2022-08-05 ENCOUNTER — TELEPHONE (OUTPATIENT)
Dept: FAMILY MEDICINE CLINIC | Facility: CLINIC | Age: 42
End: 2022-08-05

## 2022-08-05 DIAGNOSIS — R23.8 DRY SCALP: Primary | ICD-10-CM

## 2022-08-05 NOTE — TELEPHONE ENCOUNTER
Caller: Lincoln Ornelas    Relationship: Self    Best call back number: [phone]    What medication are you requesting: CHANTIX, SOMETHING FOR ITCHY DRY SCALP    What are your current symptoms: SOMETHING TO HELP WITH DRY SCALP, SOMETHING TO HELP STOP SMOKING      If a prescription is needed, what is your preferred pharmacy and phone number: [id number] - [place], [place] - [address] AT [address] [facility] - [phone] [facility] [phone] FX

## 2022-08-09 RX ORDER — CLOTRIMAZOLE AND BETAMETHASONE DIPROPIONATE 10; .64 MG/G; MG/G
1 CREAM TOPICAL DAILY PRN
Qty: 45 G | Refills: 0 | Status: SHIPPED | OUTPATIENT
Start: 2022-08-09

## 2022-08-09 NOTE — TELEPHONE ENCOUNTER
Called pt and advised he has appt tomorrow and discuss at that time, did advise rx for dry scalp sent in

## 2022-08-15 ENCOUNTER — TELEPHONE (OUTPATIENT)
Dept: FAMILY MEDICINE CLINIC | Facility: CLINIC | Age: 42
End: 2022-08-15

## 2022-08-15 DIAGNOSIS — J02.9 SORE THROAT: Primary | ICD-10-CM

## 2022-08-15 DIAGNOSIS — J31.2 CHRONIC SORE THROAT: ICD-10-CM

## 2022-08-15 NOTE — TELEPHONE ENCOUNTER
"Called pt and stated \"I just seen Dr Ascencio and he looked down my throat, I think it is from smoking all these years. I dont want to come there again, I want to just go to ENT\"    Attempted to advise pt and kept saying he just wants to see ENT    Denied fever or trouble swallowing  "

## 2022-08-15 NOTE — TELEPHONE ENCOUNTER
Confirm no fever, trouble swallowing; if either go to [facility]    Otherwise; apt this week dudley to look at throat (we have to know what we are referring

## 2022-10-11 RX ORDER — POTASSIUM CHLORIDE 20 MEQ/1
TABLET, EXTENDED RELEASE ORAL
Qty: 180 TABLET | Refills: 1 | Status: SHIPPED | OUTPATIENT
Start: 2022-10-11 | End: 2023-03-29

## 2023-01-11 ENCOUNTER — TELEPHONE (OUTPATIENT)
Dept: FAMILY MEDICINE CLINIC | Facility: CLINIC | Age: 43
End: 2023-01-11
Payer: MEDICARE

## 2023-01-11 NOTE — TELEPHONE ENCOUNTER
Caller: Lincoln Ornelas    Relationship: Self    Best call back number: [phone] (Mobile)    Requested Prescriptions:    VIAGRA 100 MG     Pharmacy where request should be sent: [facility] [address] [place] [address] [place] - [address] - [phone] [facility] [phone] FX     Additional details provided by patient: NONE   Does the patient have less than a 3 day supply:  [x] Yes  [] No    Would you like a call back once the refill request has been completed: [x] Yes [] No    If the office needs to give you a call back, can they leave a voicemail: [x] Yes [] No    Rolan Pitts Rep   01/11/23 12:20 CST

## 2023-01-11 NOTE — TELEPHONE ENCOUNTER
"Called pt and wanted to see \"if he could see our APRN? I was seeing another doctor but he was cash pay, I have been on it for awhile\"    advised will send Dr Ascencio a message and call him tomorrow  "

## 2023-01-12 NOTE — TELEPHONE ENCOUNTER
If he has proof of using some dose without palpitations, chest pain, any issues; he can have refill BUT understanding  NO NTG of any type   Patient ID: Ms. Irwin is a 49 year old female.    Chief Complaint   Patient presents with   • Office Visit   • Knee Pain     Patient c/o left knee pain, current pain score 8       HPI: has had l knee pain for a month. Is a  and is on her feet about 12 hrs a day. Was seen a few wks ago in [facility] by her job and had xr which she says was ok .denies any injury. States she has 6.10 pain in inner knee. Is worse w walking and going up and down stairs. occas popping and cracking but knee does not give out. She has no swelling of the knee. Is otherwise doing ok . Has no cp, sob ,n,v, d. Her appetite and bm are ok   She is taking her meds regularly. She has no back pain . States that r knee is slight tender as she has been compensating for the left knee. She has ache and soreness. otc ibuprofen has helped. She was not given anything for the knee for pain but was advised to have PT which she has not done.       ALLERGIES:   Allergen Reactions   • Amoxicillin-Pot Clavulanate DIARRHEA and Nausea & Vomiting     diarrhea     Current Outpatient Medications   Medication Sig Dispense Refill   • meloxicam (MOBIC) 15 MG tablet Take 1 tablet by mouth daily as needed for Pain. 30 tablet 0   • lisinopril (ZESTRIL) 20 MG tablet Take 1 tablet by mouth daily. 90 tablet 1   • fluticasone (FLONASE ALLERGY RELIEF) 50 MCG/ACT nasal spray Spray 1 spray in each nostril daily. 16 g 1     No current facility-administered medications for this visit.     Patient Active Problem List   Diagnosis   • Essential hypertension   • Umbilical hernia without obstruction and without gangrene   • Bilateral leg numbness   • Strain of left knee     Past Medical History:   Diagnosis Date   • Allergy    • Anxiety    • Blurry vision 1/24/2019   • Essential (primary) hypertension    • Routine general medical examination at health care facility 1/24/2019     Social History     Tobacco Use   Smoking Status Never Smoker   Smokeless Tobacco Never Used      Patient Active Problem List     Substance and Sexual Activity   Drug Use No     Social History     Substance and Sexual Activity   Alcohol Use Yes     History reviewed. No pertinent family history.  Patient's medications, allergies, past medical, surgical, social and family histories were reviewed and updated as appropriate.    Review of Systems   Constitutional: Negative for activity change, chills and diaphoresis.   HENT: Negative for congestion, ear discharge, facial swelling, mouth sores, nosebleeds, rhinorrhea, sinus pressure, sinus pain and sneezing.    Respiratory: Negative for choking and stridor.    Cardiovascular: Negative for chest pain and leg swelling.   Gastrointestinal: Negative for abdominal distention, anal bleeding, blood in stool, constipation and diarrhea.   Genitourinary: Negative for difficulty urinating, enuresis and frequency.   Musculoskeletal: Positive for arthralgias. Negative for back pain, joint swelling, myalgias and neck pain.        L knee pain   Mild r knee pain      Visit Vitals  /70   Pulse 92   Temp 98.3 °F (36.8 °C)   Resp 16   Ht 5' 7\" (1.702 m)   Wt 81 kg (178 lb 7.4 oz)   LMP 11/11/2022 (Exact Date)   SpO2 99%   BMI 27.95 kg/m²     Physical Exam  Vitals reviewed.   Constitutional:       General: She is not in acute distress.     Appearance: Normal appearance. She is well-developed. She is not diaphoretic.   HENT:      Head: Normocephalic and atraumatic.      Right Ear: Tympanic membrane and external ear normal.      Left Ear: Tympanic membrane and external ear normal.      Nose: Nose normal. No congestion or rhinorrhea.      Mouth/Throat:      Mouth: Mucous membranes are moist.      Pharynx: No oropharyngeal exudate.   Eyes:      General: Lids are normal. No scleral icterus.        Right eye: No discharge.         Left eye: No discharge.      Conjunctiva/sclera: Conjunctivae normal.      Pupils: Pupils are equal, round, and reactive to light.   Neck:      Thyroid: No  thyroid mass or thyromegaly.      Vascular: No carotid bruit or JVD.      Trachea: No tracheal tenderness or tracheal deviation.   Cardiovascular:      Rate and Rhythm: Normal rate and regular rhythm.      Heart sounds: Normal heart sounds. No murmur heard.    No friction rub. No gallop. No S3 or S4 sounds.   Pulmonary:      Effort: Pulmonary effort is normal. No respiratory distress.      Breath sounds: Normal breath sounds. No stridor. No wheezing or rales.   Chest:      Chest wall: No tenderness.   Abdominal:      General: Bowel sounds are normal. There is no distension.      Palpations: Abdomen is soft. There is no mass.      Tenderness: There is no abdominal tenderness. There is no guarding or rebound.      Hernia: No hernia is present.   Musculoskeletal:         General: Tenderness present. No swelling or deformity. Normal range of motion.      Cervical back: Normal range of motion and neck supple. No edema.      Right lower leg: No edema.      Left lower leg: No edema.      Comments: l medial tenderness along joint line  Nl ROM  No crepitus  No ballotment  No warmth  Full ROM     Lymphadenopathy:      Cervical: No cervical adenopathy.   Skin:     Coloration: Skin is not jaundiced or pale.      Findings: No bruising, erythema or rash.   Neurological:      Mental Status: She is alert and oriented to person, place, and time.      Cranial Nerves: No cranial nerve deficit.      Sensory: No sensory deficit.      Motor: No weakness, tremor or abnormal muscle tone.      Coordination: Coordination normal.      Deep Tendon Reflexes: Reflexes are normal and symmetric.   Psychiatric:         Behavior: Behavior normal.         Judgment: Judgment normal.       Problem List Items Addressed This Visit        Cardiac and Vasculature    Essential hypertension       Musculoskeletal and Injuries    Strain of left knee - Primary         l knee strain   15 min tid  Knee support  rx meloxicam 15 mg qd prn for 10-14 d   She refused  PT -does not have time she states  Off work unil 11.21.22    htn  bp is controlled well w lisinopril     F/u w pcp already scheduled for 12.22 Dr Madai Cm MD

## 2023-01-12 NOTE — TELEPHONE ENCOUNTER
[facility] 100 mg daily prn #28 was last filled 10-13-22 from the other doctor\"  "

## 2023-01-13 ENCOUNTER — OFFICE VISIT (OUTPATIENT)
Dept: FAMILY MEDICINE CLINIC | Facility: CLINIC | Age: 43
End: 2023-01-13
Payer: COMMERCIAL

## 2023-01-13 ENCOUNTER — TELEPHONE (OUTPATIENT)
Dept: FAMILY MEDICINE CLINIC | Facility: CLINIC | Age: 43
End: 2023-01-13

## 2023-01-13 VITALS
BODY MASS INDEX: 40.26 KG/M2 | WEIGHT: 281.2 LBS | TEMPERATURE: 97.1 F | SYSTOLIC BLOOD PRESSURE: 132 MMHG | RESPIRATION RATE: 18 BRPM | DIASTOLIC BLOOD PRESSURE: 86 MMHG | HEIGHT: 70 IN | OXYGEN SATURATION: 92 % | HEART RATE: 90 BPM

## 2023-01-13 DIAGNOSIS — N52.9 ERECTILE DYSFUNCTION, UNSPECIFIED ERECTILE DYSFUNCTION TYPE: Primary | ICD-10-CM

## 2023-01-13 DIAGNOSIS — I10 HYPERTENSION, UNSPECIFIED TYPE: ICD-10-CM

## 2023-01-13 DIAGNOSIS — E29.1 HYPOGONADISM IN MALE: ICD-10-CM

## 2023-01-13 DIAGNOSIS — E78.1 HYPERTRIGLYCERIDEMIA: ICD-10-CM

## 2023-01-13 DIAGNOSIS — Z79.899 CONTROLLED SUBSTANCE AGREEMENT SIGNED: ICD-10-CM

## 2023-01-13 PROCEDURE — 99214 OFFICE O/P EST MOD 30 MIN: CPT | Performed by: NURSE PRACTITIONER

## 2023-01-13 RX ORDER — SILDENAFIL 100 MG/1
100 TABLET, FILM COATED ORAL DAILY PRN
Qty: 15 TABLET | Refills: 1 | Status: SHIPPED | OUTPATIENT
Start: 2023-01-13 | End: 2023-02-27

## 2023-01-13 RX ORDER — HYDROCHLOROTHIAZIDE 12.5 MG/1
TABLET ORAL
COMMUNITY
Start: 2022-12-13 | End: 2023-02-23 | Stop reason: SDUPTHER

## 2023-01-13 RX ORDER — TESTOSTERONE CYPIONATE 100 MG/ML
INJECTION, SOLUTION INTRAMUSCULAR
COMMUNITY
Start: 2022-12-01

## 2023-01-13 RX ORDER — TRAZODONE HYDROCHLORIDE 50 MG/1
TABLET ORAL
COMMUNITY
Start: 2022-12-01 | End: 2023-03-29

## 2023-01-13 RX ORDER — HYDROXYZINE PAMOATE 25 MG/1
CAPSULE ORAL
COMMUNITY
Start: 2023-01-08 | End: 2023-02-23 | Stop reason: SDUPTHER

## 2023-01-13 RX ORDER — ALBUTEROL SULFATE 90 UG/1
AEROSOL, METERED RESPIRATORY (INHALATION)
COMMUNITY
Start: 2022-12-05 | End: 2023-03-30 | Stop reason: SDUPTHER

## 2023-01-13 RX ORDER — ATOMOXETINE 25 MG/1
CAPSULE ORAL
COMMUNITY
Start: 2022-12-07 | End: 2023-01-13

## 2023-01-13 RX ORDER — KETOCONAZOLE 20 MG/ML
SHAMPOO TOPICAL
COMMUNITY
Start: 2022-11-10 | End: 2023-02-23 | Stop reason: SDUPTHER

## 2023-01-13 RX ORDER — BUPRENORPHINE HYDROCHLORIDE AND NALOXONE HYDROCHLORIDE DIHYDRATE 8; 2 MG/1; MG/1
TABLET SUBLINGUAL
COMMUNITY
Start: 2023-01-11 | End: 2023-01-13

## 2023-01-13 RX ORDER — LISINOPRIL 10 MG/1
10 TABLET ORAL
COMMUNITY
Start: 2023-01-08 | End: 2023-02-23 | Stop reason: SDUPTHER

## 2023-01-13 NOTE — TELEPHONE ENCOUNTER
dont think this changes anything    #28/since October not an issue AND proves he has successfully/safely used it    Again; we can write BUT he needs his regular f/us and to be advised no NTG with this.

## 2023-01-13 NOTE — TELEPHONE ENCOUNTER
"Caller: Lincoln Ornelas    Relationship: Self    Best call back number: [phone]    What medications are you currently taking:   Current Outpatient Medications on File Prior to Visit   Medication Sig Dispense Refill   • albuterol sulfate  (90 Base) MCG/ACT inhaler      • amphetamine-dextroamphetamine (ADDERALL) 20 MG tablet Take 1 tablet by mouth 3 (Three) Times a Day.     • Brexpiprazole 4 MG tablet Take 4 mg by mouth.     • buprenorphine (SUBUTEX) 8 MG sublingual tablet SL tablet Place 8 mg under the tongue Daily.     • buprenorphine-naloxone (SUBOXONE) 8-2 MG per SL tablet      • buPROPion (WELLBUTRIN) 75 MG tablet Take 150 mg by mouth Every Morning. Dr Deluca     • clotrimazole-betamethasone (Lotrisone) 1-0.05 % cream Apply 1 application topically to the appropriate area as directed Daily As Needed (dry scalp). 45 g 0   • fenofibrate (Tricor) 145 MG tablet Take 1 tablet by mouth Daily. 30 tablet 5   • hydroCHLOROthiazide (HYDRODIURIL) 12.5 MG tablet      • hydrOXYzine pamoate (VISTARIL) 25 MG capsule TAKE 1 CAPSULE BY MOUTH EVERY 6 HOURS     • ketoconazole (NIZORAL) 2 % shampoo      • lisinopril (PRINIVIL,ZESTRIL) 10 MG tablet Take 10 mg by mouth every night at bedtime.     • Lurasidone HCl 120 MG tablet Take 1 tablet by mouth Daily. Dr Deluca     • Needle, Disp, 18G X 1\" misc Use weekly as indicated 4 each 11   • nicotine (NICOTROL) 10 MG inhaler Inhale 1 puff As Needed for Smoking Cessation. 1 each 2   • nicotine polacrilex (NICORETTE) 4 MG gum Chew 1 each As Needed for Smoking Cessation. 40 each 1   • OXcarbazepine (TRILEPTAL) 300 MG tablet Take 2 tablets by mouth 3 (Three) Times a Day.     • potassium chloride (K-DUR,KLOR-CON) 20 MEQ CR tablet TAKE 1 TABLET BY MOUTH TWICE DAILY 180 tablet 1   • Saphris 10 MG sublingual tablet sublingual tablet Place 1 tablet under the tongue Every Night.     • Syringe/Needle, Disp, (B-D 3CC LUER-COBY SYR 21GX1\") 21G X 1\" 3 ML misc USE ONCE WEEKLY AS DIRECTED 4 " "each 6   • testosterone cypionate (DEPO-TESTOSTERONE) 100 MG/ML solution injection      • Testosterone Cypionate (DEPOTESTOTERONE CYPIONATE) 200 MG/ML injection Inject 0.5 mL into the appropriate muscle as directed by prescriber 1 (One) Time Per Week. 100mg weekly, Provide(#4)18G,(#4)21Gneedles (#4)3mlsyringes q month 4 mL 1   • traZODone (DESYREL) 50 MG tablet      • [DISCONTINUED] acetaZOLAMIDE (DIAMOX) 250 MG tablet TAKE 2 TABLETS BY MOUTH THREE TIMES DAILY 540 tablet 1   • [DISCONTINUED] ALPRAZolam (XANAX) 2 MG tablet TK 1 T PO  TID  0   • [DISCONTINUED] amphetamine-dextroamphetamine (ADDERALL) 10 MG tablet Take 1 tablet by mouth Daily. 4 pm \"with 20 mg tid\"     • [DISCONTINUED] atomoxetine (STRATTERA) 25 MG capsule      • [DISCONTINUED] lisinopril-hydrochlorothiazide (PRINZIDE,ZESTORETIC) 20-25 MG per tablet TAKE 1 TABLET BY MOUTH DAILY 90 tablet 2   • [DISCONTINUED] NON FORMULARY Inject 200 mg as directed 2 (Two) Times a Week. Aromataste Inhibitor     • [DISCONTINUED] prazosin (MINIPRESS) 1 MG capsule Take 1 capsule by mouth Daily.       No current facility-administered medications on file prior to visit.     Which medication are you concerned about: VIAGRA    What are your concerns: PATIENT REQUESTING THIS MEDICATION BE SENT TO THE PHARMACY ASAP.          "

## 2023-01-13 NOTE — PROGRESS NOTES
"Subjective   Chief Complaint:  Medication refill.    History of Present Illness:  This 42 y.o. male was seen in the office today. The patient presents today for a medication refill. He reports he recently attended a dual-treatment facility in [facility]. He states he was taking 3 Subutex daily, Adderall 70 mg daily, and 4 Xanax \"bars\" daily. The patient notes he wanted to discontinue all of those medications except for his Suboxone maintenance. He reports he is currently taking Suboxone and [facility] is prescribing his other medications aside from his testosterone and Viagra. He reportedly no longer takes Adderall.       The patient states he will be starting testosterone injections twice weekly, but he has not received them yet. He reports he ran out of the testosterone he had at home which he had been taking for a while, but it does not alleviate his symptoms of impotence. He is taking Viagra gel 100 mg every 5 days which does help. The patient notes he no longer seeing Dr. Deluca, the prescribing doctor, since discontinuing his other medications. His last laboratory studies were obtained at [facility], and he believes his testosterone was approximately 108 ng/dL or less at that time.     The patient's blood pressure is well controlled today. He states he was taken off of lisinopril and potassium secondary to hypotension and several syncopal events.    He denies any symptoms of depression at this time.     The patient states he discontinued smoking and dipping. His last cigarette was approximately 2 months ago    No Known Allergies   Current Outpatient Medications on File Prior to Visit   Medication Sig   • albuterol sulfate  (90 Base) MCG/ACT inhaler    • Brexpiprazole 4 MG tablet Take 4 mg by mouth.   • buprenorphine (SUBUTEX) 8 MG sublingual tablet SL tablet Place 8 mg under the tongue Daily.   • buPROPion (WELLBUTRIN) 75 MG tablet Take 150 mg by mouth Every Morning. Dr Deluca   • " "clotrimazole-betamethasone (Lotrisone) 1-0.05 % cream Apply 1 application topically to the appropriate area as directed Daily As Needed (dry scalp).   • fenofibrate (Tricor) 145 MG tablet Take 1 tablet by mouth Daily.   • hydroCHLOROthiazide (HYDRODIURIL) 12.5 MG tablet    • hydrOXYzine pamoate (VISTARIL) 25 MG capsule TAKE 1 CAPSULE BY MOUTH EVERY 6 HOURS   • ketoconazole (NIZORAL) 2 % shampoo    • lisinopril (PRINIVIL,ZESTRIL) 10 MG tablet Take 10 mg by mouth every night at bedtime.   • Needle, Disp, 18G X 1\" misc Use weekly as indicated   • OXcarbazepine (TRILEPTAL) 300 MG tablet Take 2 tablets by mouth 3 (Three) Times a Day.   • Saphris 10 MG sublingual tablet sublingual tablet Place 1 tablet under the tongue Every Night.   • Syringe/Needle, Disp, (B-D 3CC LUER-COBY SYR 21GX1\") 21G X 1\" 3 ML misc USE ONCE WEEKLY AS DIRECTED   • testosterone cypionate (DEPO-TESTOSTERONE) 100 MG/ML solution injection    • [DISCONTINUED] NON FORMULARY Inject 200 mg as directed 2 (Two) Times a Week. Aromataste Inhibitor   • amphetamine-dextroamphetamine (ADDERALL) 20 MG tablet Take 1 tablet by mouth 3 (Three) Times a Day.   • Lurasidone HCl 120 MG tablet Take 1 tablet by mouth Daily. Dr Deluca   • nicotine (NICOTROL) 10 MG inhaler Inhale 1 puff As Needed for Smoking Cessation.   • nicotine polacrilex (NICORETTE) 4 MG gum Chew 1 each As Needed for Smoking Cessation.   • potassium chloride (K-DUR,KLOR-CON) 20 MEQ CR tablet TAKE 1 TABLET BY MOUTH TWICE DAILY   • Testosterone Cypionate (DEPOTESTOTERONE CYPIONATE) 200 MG/ML injection Inject 0.5 mL into the appropriate muscle as directed by prescriber 1 (One) Time Per Week. 100mg weekly, Provide(#4)18G,(#4)21Gneedles (#4)3mlsyringes q month   • traZODone (DESYREL) 50 MG tablet    • [DISCONTINUED] acetaZOLAMIDE (DIAMOX) 250 MG tablet TAKE 2 TABLETS BY MOUTH THREE TIMES DAILY   • [DISCONTINUED] ALPRAZolam (XANAX) 2 MG tablet TK 1 T PO  TID   • [DISCONTINUED] amphetamine-dextroamphetamine " "(ADDERALL) 10 MG tablet Take 1 tablet by mouth Daily. 4 pm \"with 20 mg tid\"   • [DISCONTINUED] atomoxetine (STRATTERA) 25 MG capsule    • [DISCONTINUED] buprenorphine-naloxone (SUBOXONE) 8-2 MG per SL tablet    • [DISCONTINUED] lisinopril-hydrochlorothiazide (PRINZIDE,ZESTORETIC) 20-25 MG per tablet TAKE 1 TABLET BY MOUTH DAILY   • [DISCONTINUED] prazosin (MINIPRESS) 1 MG capsule Take 1 capsule by mouth Daily.     No current facility-administered medications on file prior to visit.      Past Medical, Surgical, Social, and Family History:  Past Medical History:   Diagnosis Date   • Anxiety    • Depression    • Hypertension    • Intracranial hypertension    • Mood disorder (HCC)    • Pituitary mass (HCC)    • PTSD (post-traumatic stress disorder)    • Spinal headache      Past Surgical History:   Procedure Laterality Date   • NO PAST SURGERIES     • VASECTOMY N/A 2018    Procedure: VASECTOMY;  Surgeon: Ion Yanez MD;  Location: [facility];  Service: Urology     Social History     Socioeconomic History   • Marital status:      Spouse name: Keely   • Number of children: 2   • Years of education: 12   Tobacco Use   • Smoking status: Former     Packs/day: 1.00     Years: 25.00     Pack years: 25.00     Types: Cigarettes     Start date: 1989     Quit date: 10/21/2020     Years since quittin.2     Passive exposure: Past   • Smokeless tobacco: Former     Types: Chew     Quit date: 2022   Substance and Sexual Activity   • Alcohol use: No   • Drug use: Yes     Types: Marijuana   • Sexual activity: Yes     Partners: Female     Birth control/protection: None     Comment: wife     Family History   Problem Relation Age of Onset   • Gonadal disorder Neg Hx        Prior Visit Notes/Records, Lab, Imaging, and Diagnostic Results Reviewed:  A1C:  Lab Results - Last 18 Months   Lab Units 22  0834   HEMOGLOBIN A1C % 6.00*     GLUCOSE:  Lab Results - Last 18 Months   Lab Units 02/10/22  1415 " 02/07/22  0834 11/11/21  1453   GLUCOSE mg/dL 125* 103* 124*     LIPID:  Lab Results - Last 18 Months   Lab Units 02/07/22  0834   CHOLESTEROL mg/dL 221*   LDL CHOL mg/dL 98   HDL CHOL mg/dL 33*   TRIGLYCERIDES mg/dL 538*     PSA:  Lab Results - Last 18 Months   Lab Units 02/07/22  0834   PSA ng/mL 0.382     CBC:  Lab Results - Last 18 Months   Lab Units 02/10/22  1415 02/07/22  0834 11/11/21  1453   WBC 10*3/mm3 10.83* 8.03 14.44*   HEMOGLOBIN g/dL 16.8 17.5 17.6   HEMATOCRIT % 52.4* 51.3* 52.8*   PLATELETS 10*3/mm3 206 206 231      BMP/CMP:  Lab Results - Last 18 Months   Lab Units 03/14/22  1127 02/10/22  1415 02/07/22  0834 11/11/21  1453 10/01/21  1447   SODIUM mmol/L  --  142 141 139  --    POTASSIUM mmol/L  --  3.6 3.7 3.8  --    CHLORIDE mmol/L  --  106 100 101  --    TOTAL CO2 mmol/L  --   --  21.4* 27.8  --    CO2 mmol/L  --  28.0  --   --   --    GLUCOSE mg/dL  --  125* 103* 124*  --    BUN mg/dL  --  13 10 13  --    CREATININE mg/dL 1.20 1.07 1.23 1.62* 1.20   EGFR IF NONAFRICN AM mL/min/1.73  --  76 65 47*  --    EGFR IF AFRICN AM mL/min/1.73  --   --  78 57*  --    CALCIUM mg/dL  --  9.2 9.4 10.1  --      HEPATIC:  Lab Results - Last 18 Months   Lab Units 02/10/22  1415 02/07/22  0834 11/11/21  1453   ALT (SGPT) U/L 24 24 37   AST (SGOT) U/L 15 15 28   ALK PHOS U/L 53 64 68     Vit D:No results for input(s): [id number] in the last 35553 hours.  THYROID:  Lab Results - Last 18 Months   Lab Units 02/10/22  1415 02/07/22  0834   TSH uIU/mL 2.010 4.240*   FREE T4 ng/dL 0.73* 0.76*       Objective   Physical Exam  Constitutional:       General: He is not in acute distress.     Appearance: He is obese.   Neck:      Vascular: No carotid bruit.   Cardiovascular:      Rate and Rhythm: Normal rate and regular rhythm.      Pulses: Normal pulses.      Heart sounds: No murmur heard.    No friction rub. No gallop.      Comments: Bilateral posterior tibial pulses palpable and strong.  Pulmonary:      Effort: Pulmonary  "effort is normal. No respiratory distress.      Breath sounds: Normal breath sounds. No wheezing or rhonchi.   Neurological:      Mental Status: He is alert.     /86 (BP Location: Left arm, Patient Position: Sitting, Cuff Size: Adult)   Pulse 90   Temp 97.1 °F (36.2 °C) (Infrared)   Resp 18   Ht 177.8 cm (70\")   Wt 128 kg (281 lb 3.2 oz)   SpO2 92%   BMI 40.35 kg/m²     Assessment & Plan   Diagnoses and all orders for this visit:    1. Erectile dysfunction, unspecified erectile dysfunction type (Primary)  -     Testosterone    2. Hypogonadism in male  -     Testosterone    3. Hypertriglyceridemia  -     Lipid Panel    4. Hypertension, unspecified type  -     CBC & Differential  -     Comprehensive Metabolic Panel  -     TSH    5. Controlled substance agreement signed    Other orders  -     sildenafil (Viagra) 100 MG tablet; Take 1 tablet by mouth Daily As Needed for Erectile Dysfunction.  Dispense: 15 tablet; Refill: 1    Discussion:  Advised and educated plan of care. Advised laboratory studies today. Advised follow-up in 6 months.     Follow-up:  Return in about 6 months (around 7/13/2023) for Follow-Up with Separate Fasting Lab Visit Prior.    Electronically signed by Santy Zeng, 01/13/23, 10:41 AM CST.    "

## 2023-01-13 NOTE — TELEPHONE ENCOUNTER
Caller: Lincoln Ornelas    Relationship: Self    Best call back number: [phone]    Who are you requesting to speak with (clinical staff, provider,  specific staff member): ROSEANNE    What was the call regarding: PATIENT REQUESTING CALLBACK FROM ROSEANNE. PATIENT DID NOT DISCLOSE WHY.    Do you require a callback: YES

## 2023-01-14 LAB
ALBUMIN SERPL-MCNC: 4.8 G/DL (ref 3.5–5.2)
ALBUMIN/GLOB SERPL: 2.3 G/DL
ALP SERPL-CCNC: 49 U/L (ref 39–117)
ALT SERPL-CCNC: 38 U/L (ref 1–41)
AST SERPL-CCNC: 18 U/L (ref 1–40)
BASOPHILS # BLD AUTO: 0.09 10*3/MM3 (ref 0–0.2)
BASOPHILS NFR BLD AUTO: 0.8 % (ref 0–1.5)
BILIRUB SERPL-MCNC: 0.2 MG/DL (ref 0–1.2)
BUN SERPL-MCNC: 15 MG/DL (ref 6–20)
BUN/CREAT SERPL: 12.3 (ref 7–25)
CALCIUM SERPL-MCNC: 9.5 MG/DL (ref 8.6–10.5)
CHLORIDE SERPL-SCNC: 104 MMOL/L (ref 98–107)
CHOLEST SERPL-MCNC: 240 MG/DL (ref 0–200)
CO2 SERPL-SCNC: 26.4 MMOL/L (ref 22–29)
CREAT SERPL-MCNC: 1.22 MG/DL (ref 0.76–1.27)
EGFRCR SERPLBLD CKD-EPI 2021: 75.4 ML/MIN/1.73
EOSINOPHIL # BLD AUTO: 0.32 10*3/MM3 (ref 0–0.4)
EOSINOPHIL NFR BLD AUTO: 2.7 % (ref 0.3–6.2)
ERYTHROCYTE [DISTWIDTH] IN BLOOD BY AUTOMATED COUNT: 12.9 % (ref 12.3–15.4)
GLOBULIN SER CALC-MCNC: 2.1 GM/DL
GLUCOSE SERPL-MCNC: 117 MG/DL (ref 65–99)
HCT VFR BLD AUTO: 53.7 % (ref 37.5–51)
HDLC SERPL-MCNC: 31 MG/DL (ref 40–60)
HGB BLD-MCNC: 17.9 G/DL (ref 13–17.7)
IMM GRANULOCYTES # BLD AUTO: 0.12 10*3/MM3 (ref 0–0.05)
IMM GRANULOCYTES NFR BLD AUTO: 1 % (ref 0–0.5)
LDLC SERPL CALC-MCNC: 128 MG/DL (ref 0–100)
LYMPHOCYTES # BLD AUTO: 4.06 10*3/MM3 (ref 0.7–3.1)
LYMPHOCYTES NFR BLD AUTO: 34.4 % (ref 19.6–45.3)
MCH RBC QN AUTO: 29.4 PG (ref 26.6–33)
MCHC RBC AUTO-ENTMCNC: 33.3 G/DL (ref 31.5–35.7)
MCV RBC AUTO: 88.2 FL (ref 79–97)
MONOCYTES # BLD AUTO: 1.18 10*3/MM3 (ref 0.1–0.9)
MONOCYTES NFR BLD AUTO: 10 % (ref 5–12)
NEUTROPHILS # BLD AUTO: 6.02 10*3/MM3 (ref 1.7–7)
NEUTROPHILS NFR BLD AUTO: 51.1 % (ref 42.7–76)
NRBC BLD AUTO-RTO: 0.1 /100 WBC (ref 0–0.2)
PLATELET # BLD AUTO: 244 10*3/MM3 (ref 140–450)
POTASSIUM SERPL-SCNC: 4 MMOL/L (ref 3.5–5.2)
PROT SERPL-MCNC: 6.9 G/DL (ref 6–8.5)
RBC # BLD AUTO: 6.09 10*6/MM3 (ref 4.14–5.8)
SODIUM SERPL-SCNC: 142 MMOL/L (ref 136–145)
TESTOST SERPL-MCNC: 910 NG/DL (ref 264–916)
TRIGL SERPL-MCNC: 449 MG/DL (ref 0–150)
TSH SERPL DL<=0.005 MIU/L-ACNC: 3.1 UIU/ML (ref 0.27–4.2)
VLDLC SERPL CALC-MCNC: 81 MG/DL (ref 5–40)
WBC # BLD AUTO: 11.79 10*3/MM3 (ref 3.4–10.8)

## 2023-01-16 RX ORDER — ROSUVASTATIN CALCIUM 40 MG/1
40 TABLET, COATED ORAL NIGHTLY
Qty: 30 TABLET | Refills: 5 | Status: SHIPPED | OUTPATIENT
Start: 2023-01-16

## 2023-01-16 NOTE — PROGRESS NOTES
Please call the patient - Would suggest stopping tricor and starting crestor instead.  Would like to see how trigs respond to a statin.  I need the statin to work on the LDL some and there are other cardiac protective reasons for this, we can discuss in more detail in person.  He reported being on testosterone 100 mg q5 days, I am willing to continue it but needs to be on 100 mg q7 days.  This again is a cardiac protective reasoning.  His Hct is slightly elevated and with testosterone being better than optimal, he should be able to handle a slight dosage reduction well.      Electronically signed by HI Bird, 01/16/23, 8:09 AM GEOFF.

## 2023-02-03 ENCOUNTER — TELEPHONE (OUTPATIENT)
Dept: FAMILY MEDICINE CLINIC | Facility: CLINIC | Age: 43
End: 2023-02-03

## 2023-02-03 NOTE — TELEPHONE ENCOUNTER
Caller: Lincoln Ornelas    Relationship: Self    Best call back number: [phone]    What is the best time to reach you: AS SOON AS POSSIBLE    Who are you requesting to speak with (clinical staff, provider,  specific staff member): CLINICAL    What was the call regarding: PATIENT HAS QUESTIONS ABOUT HIS TESTOSTERONE.     Do you require a callback: YES

## 2023-02-08 ENCOUNTER — OFFICE VISIT (OUTPATIENT)
Dept: FAMILY MEDICINE CLINIC | Facility: CLINIC | Age: 43
End: 2023-02-08
Payer: COMMERCIAL

## 2023-02-08 VITALS
BODY MASS INDEX: 41.29 KG/M2 | DIASTOLIC BLOOD PRESSURE: 80 MMHG | WEIGHT: 288.4 LBS | HEIGHT: 70 IN | SYSTOLIC BLOOD PRESSURE: 140 MMHG | TEMPERATURE: 97.3 F | HEART RATE: 103 BPM | OXYGEN SATURATION: 96 % | RESPIRATION RATE: 18 BRPM

## 2023-02-08 DIAGNOSIS — G47.33 OSA (OBSTRUCTIVE SLEEP APNEA): ICD-10-CM

## 2023-02-08 DIAGNOSIS — E29.1 HYPOGONADISM IN MALE: ICD-10-CM

## 2023-02-08 DIAGNOSIS — E66.01 CLASS 3 SEVERE OBESITY DUE TO EXCESS CALORIES WITH SERIOUS COMORBIDITY AND BODY MASS INDEX (BMI) OF 40.0 TO 44.9 IN ADULT: Primary | ICD-10-CM

## 2023-02-08 PROCEDURE — 99214 OFFICE O/P EST MOD 30 MIN: CPT | Performed by: NURSE PRACTITIONER

## 2023-02-08 RX ORDER — METFORMIN HYDROCHLORIDE 750 MG/1
750 TABLET, EXTENDED RELEASE ORAL
Qty: 30 TABLET | Refills: 5 | Status: SHIPPED | OUTPATIENT
Start: 2023-02-08 | End: 2023-02-10

## 2023-02-08 RX ORDER — ACETAZOLAMIDE 250 MG/1
TABLET ORAL
COMMUNITY
Start: 2023-01-16 | End: 2023-02-23 | Stop reason: SDUPTHER

## 2023-02-08 NOTE — PROGRESS NOTES
"Subjective   Chief Complaint:  Discussion of medication and sleep apnea    History of Present Illness:  This 42 y.o. male was seen in the office today.  He reports he was diagnosed with sleep apnea about 5 years ago but did not tolerate CPAP he is interested in losing weight and getting back on the CPAP.  Reports he does have nonrestorative sleep and other discussion items listed below in the sleep apnea discussion.  He has a history of hypergonadism-reports he has not started the new dosage that was given to him with the last labs yet.  He also is inquiring if metformin might be good for weight loss.    No Known Allergies   Current Outpatient Medications on File Prior to Visit   Medication Sig   • acetaZOLAMIDE (DIAMOX) 250 MG tablet TAKE 2 TABLET BY MOUTH THREE TIMES DAILY   • albuterol sulfate  (90 Base) MCG/ACT inhaler    • amphetamine-dextroamphetamine (ADDERALL) 20 MG tablet Take 1 tablet by mouth 3 (Three) Times a Day.   • Brexpiprazole 4 MG tablet Take 4 mg by mouth.   • buprenorphine (SUBUTEX) 8 MG sublingual tablet SL tablet Place 8 mg under the tongue Daily.   • buPROPion (WELLBUTRIN) 75 MG tablet Take 150 mg by mouth Every Morning. Dr Deluca   • clotrimazole-betamethasone (Lotrisone) 1-0.05 % cream Apply 1 application topically to the appropriate area as directed Daily As Needed (dry scalp).   • fenofibrate (Tricor) 145 MG tablet Take 1 tablet by mouth Daily.   • hydroCHLOROthiazide (HYDRODIURIL) 12.5 MG tablet    • hydrOXYzine pamoate (VISTARIL) 25 MG capsule TAKE 1 CAPSULE BY MOUTH EVERY 6 HOURS   • ketoconazole (NIZORAL) 2 % shampoo    • lisinopril (PRINIVIL,ZESTRIL) 10 MG tablet Take 10 mg by mouth every night at bedtime.   • Lurasidone HCl 120 MG tablet Take 1 tablet by mouth Daily. Dr Deluca   • Needle, Disp, 18G X 1\" misc Use weekly as indicated   • nicotine (NICOTROL) 10 MG inhaler Inhale 1 puff As Needed for Smoking Cessation.   • nicotine polacrilex (NICORETTE) 4 MG gum Chew 1 each As " "Needed for Smoking Cessation.   • OXcarbazepine (TRILEPTAL) 300 MG tablet Take 2 tablets by mouth 3 (Three) Times a Day.   • potassium chloride (K-DUR,KLOR-CON) 20 MEQ CR tablet TAKE 1 TABLET BY MOUTH TWICE DAILY   • rosuvastatin (Crestor) 40 MG tablet Take 1 tablet by mouth Every Night.   • Saphris 10 MG sublingual tablet sublingual tablet Place 1 tablet under the tongue Every Night.   • sildenafil (Viagra) 100 MG tablet Take 1 tablet by mouth Daily As Needed for Erectile Dysfunction.   • Syringe/Needle, Disp, (B-D 3CC LUER-COBY SYR 21GX1\") 21G X 1\" 3 ML misc USE ONCE WEEKLY AS DIRECTED   • testosterone cypionate (DEPO-TESTOSTERONE) 100 MG/ML solution injection    • traZODone (DESYREL) 50 MG tablet    • [DISCONTINUED] Testosterone Cypionate (DEPOTESTOTERONE CYPIONATE) 200 MG/ML injection Inject 0.5 mL into the appropriate muscle as directed by prescriber 1 (One) Time Per Week. 100mg weekly, Provide(#4)18G,(#4)21Gneedles (#4)3mlsyringes q month     No current facility-administered medications on file prior to visit.      Past Medical, Surgical, Social, and Family History:  Past Medical History:   Diagnosis Date   • Anxiety    • Depression    • Hypertension    • Intracranial hypertension    • Mood disorder (HCC)    • Pituitary mass (HCC)    • PTSD (post-traumatic stress disorder)    • Spinal headache      Past Surgical History:   Procedure Laterality Date   • NO PAST SURGERIES     • VASECTOMY N/A 2018    Procedure: VASECTOMY;  Surgeon: Ion Yanez MD;  Location: [facility];  Service: Urology     Social History     Socioeconomic History   • Marital status:      Spouse name: Keely   • Number of children: 2   • Years of education: 12   Tobacco Use   • Smoking status: Former     Packs/day: 1.00     Years: 25.00     Pack years: 25.00     Types: Cigarettes     Start date: 1989     Quit date: 10/21/2020     Years since quittin.3     Passive exposure: Past   • Smokeless tobacco: Former     Types: " Chew     Quit date: 11/20/2022   Substance and Sexual Activity   • Alcohol use: No   • Drug use: Yes     Types: Marijuana   • Sexual activity: Yes     Partners: Female     Birth control/protection: None     Comment: wife     Family History   Problem Relation Age of Onset   • Gonadal disorder Neg Hx        Prior Visit Notes/Records, Lab, Imaging, and Diagnostic Results Reviewed:  A1C:  Lab Results - Last 18 Months   Lab Units 02/07/22  0834   HEMOGLOBIN A1C % 6.00*     GLUCOSE:  Lab Results - Last 18 Months   Lab Units 01/13/23  0811 02/10/22  1415 02/07/22  0834 11/11/21  1453   GLUCOSE mg/dL 117* 125* 103* 124*     LIPID:  Lab Results - Last 18 Months   Lab Units 01/13/23  0811 02/07/22  0834   CHOLESTEROL mg/dL 240* 221*   LDL CHOL mg/dL 128* 98   HDL CHOL mg/dL 31* 33*   TRIGLYCERIDES mg/dL 449* 538*     PSA:  Lab Results - Last 18 Months   Lab Units 02/07/22  0834   PSA ng/mL 0.382     CBC:  Lab Results - Last 18 Months   Lab Units 01/13/23  0811 02/10/22  1415 02/07/22  0834 11/11/21  1453   WBC 10*3/mm3 11.79* 10.83* 8.03 14.44*   HEMOGLOBIN g/dL 17.9* 16.8 17.5 17.6   HEMATOCRIT % 53.7* 52.4* 51.3* 52.8*   PLATELETS 10*3/mm3 244 206 206 231      BMP/CMP:  Lab Results - Last 18 Months   Lab Units 01/13/23  0811 03/14/22  1127 02/10/22  1415 02/07/22  0834 11/11/21  1453 10/01/21  1447   SODIUM mmol/L 142  --  142 141 139  --    POTASSIUM mmol/L 4.0  --  3.6 3.7 3.8  --    CHLORIDE mmol/L 104  --  106 100 101  --    TOTAL CO2 mmol/L 26.4  --   --  21.4* 27.8  --    CO2 mmol/L  --   --  28.0  --   --   --    GLUCOSE mg/dL 117*  --  125* 103* 124*  --    BUN mg/dL 15  --  13 10 13  --    CREATININE mg/dL 1.22 1.20 1.07 1.23 1.62* 1.20   EGFR IF NONAFRICN AM mL/min/1.73  --   --  76 65 47*  --    EGFR IF AFRICN AM mL/min/1.73  --   --   --  78 57*  --    EGFR RESULT mL/min/1.73 75.4  --   --   --   --   --    CALCIUM mg/dL 9.5  --  9.2 9.4 10.1  --      HEPATIC:  Lab Results - Last 18 Months   Lab Units  "01/13/23  0811 02/10/22  1415 02/07/22  0834 11/11/21  1453   ALT (SGPT) U/L 38 24 24 37   AST (SGOT) U/L 18 15 15 28   ALK PHOS U/L 49 53 64 68     Vit D:No results for input(s): OCJZ95RY in the last 22561 hours.  THYROID:  Lab Results - Last 18 Months   Lab Units 01/13/23  0811 02/10/22  1415 02/07/22  0834   TSH uIU/mL 3.100 2.010 4.240*   FREE T4 ng/dL  --  0.73* 0.76*       Objective   Physical Exam  Constitutional:       General: He is not in acute distress.     Appearance: He is obese.   Cardiovascular:      Rate and Rhythm: Normal rate and regular rhythm.      Pulses: Normal pulses.      Heart sounds: No murmur heard.    No friction rub. No gallop.   Pulmonary:      Effort: Pulmonary effort is normal. No respiratory distress.      Breath sounds: Normal breath sounds. No wheezing or rhonchi.   Neurological:      Mental Status: He is alert.     /80 (BP Location: Left arm, Patient Position: Sitting, Cuff Size: Adult)   Pulse 103   Temp 97.3 °F (36.3 °C) (Infrared)   Resp 18   Ht 177.8 cm (70\")   Wt 131 kg (288 lb 6.4 oz)   SpO2 96%   BMI 41.38 kg/m²     Assessment & Plan   Diagnoses and all orders for this visit:    1. Class 3 severe obesity due to excess calories with serious comorbidity and body mass index (BMI) of 40.0 to 44.9 in adult (HCC) (Primary)  -     metFORMIN ER (GLUCOPHAGE-XR) 750 MG 24 hr tablet; Take 1 tablet by mouth Daily With Breakfast.  Dispense: 30 tablet; Refill: 5    2. TARAH (obstructive sleep apnea)  -     Miscellaneous DME    3. Hypogonadism in male  -     Cancel: Testosterone; Future  -     Cancel: CBC & Differential  -     Cancel: Comprehensive Metabolic Panel  -     Testosterone; Future  -     CBC & Differential; Future  -     Comprehensive Metabolic Panel; Future    Discussion:  Advised and educated plan of care.  Advised benefits of metformin usually fairly modest but willing to give this a try to help with weight loss.    Sleep Apnea Discussion:    Symptoms patient is " experiencing are patient is only sleeping an hour at night uninterrupted, insomnia, no energy.  Other positive responses include daytime sleepiness in excess, witnessed apnea, dry mouth, memory loss, uncontrolled need to sleep, nonrestorative sleep, frequent awakenings, leg and body jerks during sleep, inability to stay asleep, morning headaches, snoring.    Patient has been experiencing symptoms for 10 yearsago.    Pertinent negative symptoms include nasal obstructions or maxillofacial normalities, reflux, nocturia, inability to fall asleep, nasal abnormalities, uncontrollable muscle weakness, airway abnormalities..    [place] Sleepiness Scale    Situation Chance of Dozing or Sleeping   Sitting and reading 0   Watching TV 3   Sitting inactive in a public place 2   Being a passenger in a motor vehicle for an hour or more 3   Lying down in the afternoon 1   Sitting and talking to someone 0   Sitting quietly after lunch (no alcohol) 1   Stopped for a few minutes in traffic while driving 0   Total score (add the scores up) 10     0 = would never doze  1 = slight change to dozing  2 = moderate chance of dozing  3 = high chance of dozing           Class 3 Severe Obesity (BMI >=40). Obesity-related health conditions include the following: obstructive sleep apnea and hypertension. Obesity is unchanged. BMI is is above average; BMI management plan is completed. We discussed portion control, increasing exercise and pharmacologic options including metformin trial.    Follow-up:  Return in about 6 weeks (around 3/22/2023) for lab appointment.    Electronically signed by HI Bird, 02/08/23, 11:11 AM CST.

## 2023-02-10 ENCOUNTER — TELEPHONE (OUTPATIENT)
Dept: FAMILY MEDICINE CLINIC | Facility: CLINIC | Age: 43
End: 2023-02-10
Payer: MEDICARE

## 2023-02-10 DIAGNOSIS — E66.01 CLASS 3 SEVERE OBESITY DUE TO EXCESS CALORIES WITH SERIOUS COMORBIDITY AND BODY MASS INDEX (BMI) OF 40.0 TO 44.9 IN ADULT: ICD-10-CM

## 2023-02-10 RX ORDER — METFORMIN HYDROCHLORIDE 750 MG/1
750 TABLET, EXTENDED RELEASE ORAL
Qty: 30 TABLET | Refills: 5 | Status: SHIPPED | OUTPATIENT
Start: 2023-02-10

## 2023-02-10 NOTE — TELEPHONE ENCOUNTER
Caller: Lincoln Ornelas    Relationship to patient: Self    Best call back number:     [phone] (Mobile)     Patient is needing: pt said that samreen denies receipt of prescription for metformin.   Please send farmac[facility]ett message or call him when resolved so he can .

## 2023-02-10 NOTE — TELEPHONE ENCOUNTER
Notified to    Rx Refill Note  Requested Prescriptions     Pending Prescriptions Disp Refills   • metFORMIN ER (GLUCOPHAGE-XR) 750 MG 24 hr tablet 30 tablet 5     Sig: Take 1 tablet by mouth Daily With Breakfast.      Last office visit with prescribing clinician: 8/3/2022      Next office visit with prescribing clinician: Visit date not found            Trudy Gallardo LPN  02/10/23, 15:40 CST

## 2023-02-23 DIAGNOSIS — F17.210 CIGARETTE SMOKER: ICD-10-CM

## 2023-02-23 DIAGNOSIS — Z72.0 CHEWING TOBACCO USE: ICD-10-CM

## 2023-02-23 NOTE — TELEPHONE ENCOUNTER
Caller: Lincoln Ornelas    Relationship: Self    Best call back number: [phone]    Requested Prescriptions:   Requested Prescriptions     Pending Prescriptions Disp Refills   • lisinopril (PRINIVIL,ZESTRIL) 10 MG tablet       Sig: Take 1 tablet by mouth every night at bedtime.   • hydroCHLOROthiazide (HYDRODIURIL) 12.5 MG tablet     • hydrOXYzine pamoate (VISTARIL) 25 MG capsule     • buPROPion (WELLBUTRIN) 75 MG tablet       Sig: Take 2 tablets by mouth Every Morning. Dr Deluca   • Brexpiprazole 4 MG tablet 30 tablet      Sig: Take 1 tablet by mouth.   • OXcarbazepine (TRILEPTAL) 300 MG tablet       Sig: Take 2 tablets by mouth 3 (Three) Times a Day.   • Lurasidone HCl (LATUDA) 120 MG tablet tablet 30 tablet      Sig: Take 1 tablet by mouth Daily. Dr Deluca   • ketoconazole (NIZORAL) 2 % shampoo     • nicotine (NICOTROL) 10 MG inhaler 1 each 2     Sig: Inhale 1 puff As Needed for Smoking Cessation.   • acetaZOLAMIDE (DIAMOX) 250 MG tablet     • buprenorphine (SUBUTEX) 8 MG sublingual tablet SL tablet       Sig: Place 1 tablet under the tongue Daily.        Pharmacy where request should be sent: [facility][id number] [address] AT [facility] [phone] [facility] [phone] FX     Does the patient have less than a 3 day supply:  [x] Yes  [] No    Would you like a call back once the refill request has been completed: [x] Yes [] No    If the office needs to give you a call back, can they leave a voicemail: [x] Yes [] No    Rolan Thurman Rep   02/23/23 14:12 CST

## 2023-02-24 NOTE — TELEPHONE ENCOUNTER
Rx Refill Note  Requested Prescriptions     Pending Prescriptions Disp Refills    lisinopril (PRINIVIL,ZESTRIL) 10 MG tablet       Sig: Take 1 tablet by mouth every night at bedtime.    hydroCHLOROthiazide (HYDRODIURIL) 12.5 MG tablet      hydrOXYzine pamoate (VISTARIL) 25 MG capsule      buPROPion (WELLBUTRIN) 75 MG tablet       Sig: Take 2 tablets by mouth Every Morning. Dr Deluca    Brexpiprazole 4 MG tablet 30 tablet      Sig: Take 1 tablet by mouth.    OXcarbazepine (TRILEPTAL) 300 MG tablet       Sig: Take 2 tablets by mouth 3 (Three) Times a Day.    Lurasidone HCl (LATUDA) 120 MG tablet tablet 30 tablet      Sig: Take 1 tablet by mouth Daily. Dr Deluca    ketoconazole (NIZORAL) 2 % shampoo      nicotine (NICOTROL) 10 MG inhaler 1 each 2     Sig: Inhale 1 puff As Needed for Smoking Cessation.    acetaZOLAMIDE (DIAMOX) 250 MG tablet      buprenorphine (SUBUTEX) 8 MG sublingual tablet SL tablet       Sig: Place 1 tablet under the tongue Daily.      Last office visit with prescribing clinician: 8/3/2022   Last telemedicine visit with prescribing clinician: 3/22/2023   Next office visit with prescribing clinician: Visit date not found                         Would you like a call back once the refill request has been completed: [] Yes [] No    If the office needs to give you a call back, can they leave a voicemail: [] Yes [] No    Felicita Kemp, PCT  02/24/23, 12:08 CST

## 2023-02-27 RX ORDER — KETOCONAZOLE 20 MG/ML
SHAMPOO TOPICAL AS NEEDED
Qty: 120 ML | Refills: 0 | Status: SHIPPED | OUTPATIENT
Start: 2023-02-27

## 2023-02-27 RX ORDER — BUPRENORPHINE HYDROCHLORIDE 8 MG/1
8 TABLET SUBLINGUAL DAILY
Qty: 30 TABLET | Refills: 3 | OUTPATIENT
Start: 2023-02-27

## 2023-02-27 RX ORDER — LISINOPRIL 10 MG/1
10 TABLET ORAL
Qty: 30 TABLET | Refills: 3 | Status: SHIPPED | OUTPATIENT
Start: 2023-02-27 | End: 2023-03-03 | Stop reason: SDUPTHER

## 2023-02-27 RX ORDER — LURASIDONE HYDROCHLORIDE 120 MG/1
120 TABLET, FILM COATED ORAL DAILY
Qty: 30 TABLET | Refills: 3 | OUTPATIENT
Start: 2023-02-27

## 2023-02-27 RX ORDER — OXCARBAZEPINE 300 MG/1
600 TABLET, FILM COATED ORAL 3 TIMES DAILY
Qty: 180 TABLET | Refills: 3 | Status: SHIPPED | OUTPATIENT
Start: 2023-02-27

## 2023-02-27 RX ORDER — SILDENAFIL 100 MG/1
TABLET, FILM COATED ORAL
Qty: 15 TABLET | Refills: 1 | Status: SHIPPED | OUTPATIENT
Start: 2023-02-27

## 2023-02-27 RX ORDER — ACETAZOLAMIDE 250 MG/1
250 TABLET ORAL 3 TIMES DAILY
Qty: 90 TABLET | Refills: 3 | Status: SHIPPED | OUTPATIENT
Start: 2023-02-27

## 2023-02-27 RX ORDER — BUPROPION HYDROCHLORIDE 75 MG/1
150 TABLET ORAL EVERY MORNING
Qty: 60 TABLET | Refills: 3 | Status: SHIPPED | OUTPATIENT
Start: 2023-02-27

## 2023-02-27 RX ORDER — HYDROXYZINE PAMOATE 25 MG/1
25 CAPSULE ORAL EVERY 6 HOURS PRN
Qty: 120 CAPSULE | Refills: 3 | Status: SHIPPED | OUTPATIENT
Start: 2023-02-27 | End: 2023-03-03 | Stop reason: SDUPTHER

## 2023-02-27 RX ORDER — HYDROCHLOROTHIAZIDE 12.5 MG/1
12.5 TABLET ORAL DAILY
Qty: 30 TABLET | Refills: 3 | Status: SHIPPED | OUTPATIENT
Start: 2023-02-27 | End: 2023-03-03 | Stop reason: SDUPTHER

## 2023-02-27 NOTE — TELEPHONE ENCOUNTER
Rx Refill Note  Requested Prescriptions     Pending Prescriptions Disp Refills   • sildenafil (VIAGRA) 100 MG tablet [Pharmacy Med Name: SILDENAFIL 100 MG TABLET] 15 tablet 1     Sig: TAKE ONE TABLET BY MOUTH DAILY AS NEEDED FOR ERECTILE DYSFUNCTION.      Last office visit with prescribing clinician: 2/8/2023      Next office visit with prescribing clinician: 7/13/2023            Angelia Hyman MA  02/27/23, 08:45 CST

## 2023-02-28 NOTE — TELEPHONE ENCOUNTER
I held off signing the more specialty psychiatric meds.  I need to know if he is still going to a psychiatrist for these.  If not, then I can do a 1 month at a time till he can get back in or be referred.  These meds are best managed by a specialist.    Also, please advise patient that we can't do refills on suboxone - this is another speciality medication that requires special RUF[employer] license additional certification and no provider in this office has this extra RUFUS certification.

## 2023-03-03 RX ORDER — LISINOPRIL 10 MG/1
10 TABLET ORAL
Qty: 30 TABLET | Refills: 3 | Status: SHIPPED | OUTPATIENT
Start: 2023-03-03

## 2023-03-03 RX ORDER — HYDROXYZINE PAMOATE 25 MG/1
25 CAPSULE ORAL EVERY 6 HOURS PRN
Qty: 120 CAPSULE | Refills: 3 | Status: SHIPPED | OUTPATIENT
Start: 2023-03-03

## 2023-03-03 RX ORDER — HYDROCHLOROTHIAZIDE 12.5 MG/1
12.5 TABLET ORAL DAILY
Qty: 30 TABLET | Refills: 3 | Status: SHIPPED | OUTPATIENT
Start: 2023-03-03

## 2023-03-03 NOTE — TELEPHONE ENCOUNTER
Caller: [facility] [place], [place] - [address] [phone] [facility] [phone] FX    Relationship: Pharmacy- [facility] call back number: [phone]- [facility]    Requested Prescriptions:   Requested Prescriptions     Pending Prescriptions Disp Refills   • lisinopril (PRINIVIL,ZESTRIL) 10 MG tablet 30 tablet 3     Sig: Take 1 tablet by mouth every night at bedtime.   • hydrOXYzine pamoate (VISTARIL) 25 MG capsule 120 capsule 3     Sig: Take 1 capsule by mouth Every 6 (Six) Hours As Needed for Itching.   • hydroCHLOROthiazide (HYDRODIURIL) 12.5 MG tablet 30 tablet 3     Sig: Take 1 tablet by mouth Daily.        Pharmacy where request should be sent: [facility], [place] - [address] [facility] [phone] [facility] [phone] FX     Additional details provided by patient: [facility] STATES THAT THEY HAVE A QUESTION REGARDING THE DOSE OF THE Lurasidone HCl 120 MG tablet  AND Brexpiprazole 4 MG tablet AND WOULD LIKE FOR A CLINICAL TEAM MEMBER TO CALL THEM BACK.      Does the patient have less than a 3 day supply:  [] Yes  [x] No      Rolan Valencia Rep   03/03/23 10:52 CST

## 2023-03-03 NOTE — TELEPHONE ENCOUNTER
Rx Refill Note  Requested Prescriptions     Pending Prescriptions Disp Refills    lisinopril (PRINIVIL,ZESTRIL) 10 MG tablet 30 tablet 3     Sig: Take 1 tablet by mouth every night at bedtime.    hydrOXYzine pamoate (VISTARIL) 25 MG capsule 120 capsule 3     Sig: Take 1 capsule by mouth Every 6 (Six) Hours As Needed for Itching.    hydroCHLOROthiazide (HYDRODIURIL) 12.5 MG tablet 30 tablet 3     Sig: Take 1 tablet by mouth Daily.      Last office visit with prescribing clinician: 8/3/2022   Last telemedicine visit with prescribing clinician: 3/22/2023   Next office visit with prescribing clinician: Visit date not found                         Would you like a call back once the refill request has been completed: [] Yes [] No    If the office needs to give you a call back, can they leave a voicemail: [] Yes [] No    Felicita Kemp, PCT  03/03/23, 15:26 CST

## 2023-03-09 ENCOUNTER — TELEPHONE (OUTPATIENT)
Dept: FAMILY MEDICINE CLINIC | Facility: CLINIC | Age: 43
End: 2023-03-09
Payer: MEDICARE

## 2023-03-09 ENCOUNTER — TELEPHONE (OUTPATIENT)
Dept: FAMILY MEDICINE CLINIC | Facility: CLINIC | Age: 43
End: 2023-03-09

## 2023-03-09 NOTE — TELEPHONE ENCOUNTER
"Caller: Lincoln Ornelas    Relationship: Self    Best call back number: [phone]    What medications are you currently taking:   Current Outpatient Medications on File Prior to Visit   Medication Sig Dispense Refill   • acetaZOLAMIDE (DIAMOX) 250 MG tablet Take 1 tablet by mouth 3 (Three) Times a Day. 90 tablet 3   • albuterol sulfate  (90 Base) MCG/ACT inhaler      • amphetamine-dextroamphetamine (ADDERALL) 20 MG tablet Take 1 tablet by mouth 3 (Three) Times a Day.     • Brexpiprazole 4 MG tablet Take 4 mg by mouth.     • buprenorphine (SUBUTEX) 8 MG sublingual tablet SL tablet Place 8 mg under the tongue Daily.     • buPROPion (WELLBUTRIN) 75 MG tablet Take 2 tablets by mouth Every Morning. Dr Deluca 60 tablet 3   • clotrimazole-betamethasone (Lotrisone) 1-0.05 % cream Apply 1 application topically to the appropriate area as directed Daily As Needed (dry scalp). 45 g 0   • fenofibrate (Tricor) 145 MG tablet Take 1 tablet by mouth Daily. 30 tablet 5   • hydroCHLOROthiazide (HYDRODIURIL) 12.5 MG tablet Take 1 tablet by mouth Daily. 30 tablet 3   • hydrOXYzine pamoate (VISTARIL) 25 MG capsule Take 1 capsule by mouth Every 6 (Six) Hours As Needed for Itching. 120 capsule 3   • ketoconazole (NIZORAL) 2 % shampoo Apply  topically to the appropriate area as directed As Needed for Irritation or Dandruff. 120 mL 0   • lisinopril (PRINIVIL,ZESTRIL) 10 MG tablet Take 1 tablet by mouth every night at bedtime. 30 tablet 3   • Lurasidone HCl 120 MG tablet Take 1 tablet by mouth Daily. Dr Deluca     • metFORMIN ER (GLUCOPHAGE-XR) 750 MG 24 hr tablet Take 1 tablet by mouth Daily With Breakfast. 30 tablet 5   • Needle, Disp, 18G X 1\" misc Use weekly as indicated 4 each 11   • nicotine (NICOTROL) 10 MG inhaler Inhale 1 puff As Needed for Smoking Cessation. 1 each 2   • nicotine polacrilex (NICORETTE) 4 MG gum Chew 1 each As Needed for Smoking Cessation. 40 each 1   • OXcarbazepine (TRILEPTAL) 300 MG tablet Take 2 " "tablets by mouth 3 (Three) Times a Day. 180 tablet 3   • potassium chloride (K-DUR,KLOR-CON) 20 MEQ CR tablet TAKE 1 TABLET BY MOUTH TWICE DAILY 180 tablet 1   • rosuvastatin (Crestor) 40 MG tablet Take 1 tablet by mouth Every Night. 30 tablet 5   • Saphris 10 MG sublingual tablet sublingual tablet Place 1 tablet under the tongue Every Night.     • sildenafil (VIAGRA) 100 MG tablet TAKE ONE TABLET BY MOUTH DAILY AS NEEDED FOR ERECTILE DYSFUNCTION. 15 tablet 1   • Syringe/Needle, Disp, (B-D 3CC LUER-COBY SYR 21GX1\") 21G X 1\" 3 ML misc USE ONCE WEEKLY AS DIRECTED 4 each 6   • testosterone cypionate (DEPO-TESTOSTERONE) 100 MG/ML solution injection      • traZODone (DESYREL) 50 MG tablet        No current facility-administered medications on file prior to visit.      When did you start taking these medications: ABOUT 2 MONTHS AGO    Which medication are you concerned about: METFORMIN    Who prescribed you this medication: RADHA WOODS    What are your concerns: PATIENT STATES IT IS NOT WORKING. HE IS STILL HAVING HEADACHES AND BLURRED VISION. PATIENT STOPPED TAKING THIS MEDICATION ABOUT A DAY AGO. PATIENT IS WANTING TO BE PRESCRIBED THE PREVIOUS MEDICATION HE WAS ON TO HELP WITH THE SYMPTOMS THAT HE WAS PRESCRIBED METFORMIN FOR. PLEASE CALL BACK TO DISCUSS.     PATIENT ALSO WANTS TO KNOW THE STATUS OF THE SLEEP APNEA MACHINE HE IS SUPPOSED TO BE GETTING.      How long have you had these concerns: ABOUT ONE MONTH          "

## 2023-03-09 NOTE — TELEPHONE ENCOUNTER
Caller: Lincoln Ornelas    Relationship to patient: Self    Best call back number:     [phone] (Home)        Patient is needing:  Pt said that he was previously taking Semaglutide 2mg and wants to go back to that medication because he is having a bad reaction to the metformin. He stopped taking it today because it was making him feel so badly.

## 2023-03-10 ENCOUNTER — OFFICE VISIT (OUTPATIENT)
Dept: FAMILY MEDICINE CLINIC | Facility: CLINIC | Age: 43
End: 2023-03-10
Payer: COMMERCIAL

## 2023-03-10 ENCOUNTER — TELEPHONE (OUTPATIENT)
Dept: FAMILY MEDICINE CLINIC | Facility: CLINIC | Age: 43
End: 2023-03-10

## 2023-03-10 VITALS
WEIGHT: 284.4 LBS | HEART RATE: 112 BPM | HEIGHT: 70 IN | TEMPERATURE: 97.7 F | DIASTOLIC BLOOD PRESSURE: 82 MMHG | OXYGEN SATURATION: 96 % | SYSTOLIC BLOOD PRESSURE: 148 MMHG | BODY MASS INDEX: 40.71 KG/M2 | RESPIRATION RATE: 18 BRPM

## 2023-03-10 DIAGNOSIS — R79.9 ABNORMAL FINDING OF BLOOD CHEMISTRY, UNSPECIFIED: ICD-10-CM

## 2023-03-10 DIAGNOSIS — E66.01 CLASS 3 SEVERE OBESITY WITH SERIOUS COMORBIDITY AND BODY MASS INDEX (BMI) OF 40.0 TO 44.9 IN ADULT, UNSPECIFIED OBESITY TYPE: ICD-10-CM

## 2023-03-10 DIAGNOSIS — E29.1 HYPOGONADISM IN MALE: Primary | ICD-10-CM

## 2023-03-10 PROCEDURE — 99213 OFFICE O/P EST LOW 20 MIN: CPT | Performed by: NURSE PRACTITIONER

## 2023-03-10 RX ORDER — SEMAGLUTIDE 2.4 MG/.75ML
2.4 INJECTION, SOLUTION SUBCUTANEOUS WEEKLY
Qty: 3 ML | Refills: 5 | Status: SHIPPED | OUTPATIENT
Start: 2023-03-10 | End: 2023-03-29

## 2023-03-10 RX ORDER — BUPRENORPHINE HYDROCHLORIDE AND NALOXONE HYDROCHLORIDE DIHYDRATE 8; 2 MG/1; MG/1
TABLET SUBLINGUAL
COMMUNITY
Start: 2023-02-16 | End: 2023-03-10 | Stop reason: SDUPTHER

## 2023-03-10 RX ORDER — ALPRAZOLAM 2 MG/1
TABLET ORAL
COMMUNITY
Start: 2023-02-28

## 2023-03-10 RX ORDER — DICLOFENAC SODIUM 75 MG/1
TABLET, DELAYED RELEASE ORAL
COMMUNITY
Start: 2023-02-22 | End: 2023-03-29

## 2023-03-10 RX ORDER — LISINOPRIL AND HYDROCHLOROTHIAZIDE 25; 20 MG/1; MG/1
TABLET ORAL
COMMUNITY
Start: 2023-03-06 | End: 2023-03-10 | Stop reason: SDUPTHER

## 2023-03-10 NOTE — TELEPHONE ENCOUNTER
Caller: Lincoln Ornelas    Relationship to patient: Self    Best call back number: [phone]    Patient is needing: NEEDING PA FOR SEMAGLUTIDE

## 2023-03-10 NOTE — TELEPHONE ENCOUNTER
He will need to be evaluated for diabetes first.  He will need to come in for an A1C and schedule a follow-up.    Electronically signed by HI Bird, 03/10/23, 7:53 AM CST.

## 2023-03-10 NOTE — PROGRESS NOTES
Subjective   Chief Complaint:  Discussion about weight loss    History of Present Illness:  This 42 y.o. male was seen in the office today.  He reports struggles with weight loss.  He reports he works at the gym every single day.  He reports he has a pretty well 0 sugar diet.  He reports foods are very simple such as a spoonful of peanut butter for breakfast.  He is interested in the GLP-1 medicines.  He denies any history of thyroid cancer, denies history of pancreatitis.  Denies any family history of either.    No Known Allergies   Current Outpatient Medications on File Prior to Visit   Medication Sig   • acetaZOLAMIDE (DIAMOX) 250 MG tablet Take 1 tablet by mouth 3 (Three) Times a Day.   • albuterol sulfate  (90 Base) MCG/ACT inhaler    • ALPRAZolam (XANAX) 2 MG tablet    • amphetamine-dextroamphetamine (ADDERALL) 20 MG tablet Take 1 tablet by mouth 3 (Three) Times a Day.   • Brexpiprazole 4 MG tablet Take 1 tablet by mouth.   • buprenorphine (SUBUTEX) 8 MG sublingual tablet SL tablet Place 1 tablet under the tongue Daily.   • buPROPion (WELLBUTRIN) 75 MG tablet Take 2 tablets by mouth Every Morning. Dr Deluca   • clotrimazole-betamethasone (Lotrisone) 1-0.05 % cream Apply 1 application topically to the appropriate area as directed Daily As Needed (dry scalp).   • diclofenac (VOLTAREN) 75 MG EC tablet    • fenofibrate (Tricor) 145 MG tablet Take 1 tablet by mouth Daily.   • hydroCHLOROthiazide (HYDRODIURIL) 12.5 MG tablet Take 1 tablet by mouth Daily.   • hydrOXYzine pamoate (VISTARIL) 25 MG capsule Take 1 capsule by mouth Every 6 (Six) Hours As Needed for Itching.   • ketoconazole (NIZORAL) 2 % shampoo Apply  topically to the appropriate area as directed As Needed for Irritation or Dandruff.   • lisinopril (PRINIVIL,ZESTRIL) 10 MG tablet Take 1 tablet by mouth every night at bedtime.   • Lurasidone HCl 120 MG tablet Take 1 tablet by mouth Daily. Dr Deluca   • metFORMIN ER (GLUCOPHAGE-XR) 750 MG 24 hr tablet  "Take 1 tablet by mouth Daily With Breakfast.   • Needle, Disp, 18G X 1\" misc Use weekly as indicated   • nicotine (NICOTROL) 10 MG inhaler Inhale 1 puff As Needed for Smoking Cessation.   • nicotine polacrilex (NICORETTE) 4 MG gum Chew 1 each As Needed for Smoking Cessation.   • OXcarbazepine (TRILEPTAL) 300 MG tablet Take 2 tablets by mouth 3 (Three) Times a Day.   • potassium chloride (K-DUR,KLOR-CON) 20 MEQ CR tablet TAKE 1 TABLET BY MOUTH TWICE DAILY   • rosuvastatin (Crestor) 40 MG tablet Take 1 tablet by mouth Every Night.   • Saphris 10 MG sublingual tablet sublingual tablet Place 1 tablet under the tongue Every Night.   • sildenafil (VIAGRA) 100 MG tablet TAKE ONE TABLET BY MOUTH DAILY AS NEEDED FOR ERECTILE DYSFUNCTION.   • Syringe/Needle, Disp, (B-D 3CC LUER-COBY SYR 21GX1\") 21G X 1\" 3 ML misc USE ONCE WEEKLY AS DIRECTED   • testosterone cypionate (DEPO-TESTOSTERONE) 100 MG/ML solution injection    • traZODone (DESYREL) 50 MG tablet    • [DISCONTINUED] buprenorphine-naloxone (SUBOXONE) 8-2 MG per SL tablet    • [DISCONTINUED] lisinopril-hydrochlorothiazide (PRINZIDE,ZESTORETIC) 20-25 MG per tablet      No current facility-administered medications on file prior to visit.      Past Medical, Surgical, Social, and Family History:  Past Medical History:   Diagnosis Date   • Anxiety    • Depression    • Hypertension    • Intracranial hypertension    • Mood disorder (HCC)    • Pituitary mass (HCC)    • PTSD (post-traumatic stress disorder)    • Spinal headache      Past Surgical History:   Procedure Laterality Date   • NO PAST SURGERIES     • VASECTOMY N/A 5/18/2018    Procedure: VASECTOMY;  Surgeon: Ion Yanez MD;  Location: [facility];  Service: Urology     Social History     Socioeconomic History   • Marital status:      Spouse name: Keely   • Number of children: 2   • Years of education: 12   Tobacco Use   • Smoking status: Former     Packs/day: 1.00     Years: 25.00     Pack years: 25.00     " Types: Cigarettes     Start date: 1989     Quit date: 10/21/2020     Years since quittin.3     Passive exposure: Past   • Smokeless tobacco: Former     Types: Chew     Quit date: 2022   Substance and Sexual Activity   • Alcohol use: No   • Drug use: Yes     Types: Marijuana   • Sexual activity: Yes     Partners: Female     Birth control/protection: None     Comment: wife     Family History   Problem Relation Age of Onset   • Gonadal disorder Neg Hx        Prior Visit Notes/Records, Lab, Imaging, and Diagnostic Results Reviewed:  A1C:  Lab Results - Last 18 Months   Lab Units 22  0834   HEMOGLOBIN A1C % 6.00*     GLUCOSE:  Lab Results - Last 18 Months   Lab Units 23  0811 02/10/22  1415 22  0834 21  1453   GLUCOSE mg/dL 117* 125* 103* 124*     LIPID:  Lab Results - Last 18 Months   Lab Units 23  0811 22  0834   CHOLESTEROL mg/dL 240* 221*   LDL CHOL mg/dL 128* 98   HDL CHOL mg/dL 31* 33*   TRIGLYCERIDES mg/dL 449* 538*     PSA:  Lab Results - Last 18 Months   Lab Units 22  0834   PSA ng/mL 0.382     CBC:  Lab Results - Last 18 Months   Lab Units 23  0811 02/10/22  1415 22  0834 21  1453   WBC 10*3/mm3 11.79* 10.83* 8.03 14.44*   HEMOGLOBIN g/dL 17.9* 16.8 17.5 17.6   HEMATOCRIT % 53.7* 52.4* 51.3* 52.8*   PLATELETS 10*3/mm3 244 206 206 231      BMP/CMP:  Lab Results - Last 18 Months   Lab Units 23  0811 22  1127 02/10/22  1415 22  0834 21  1453 10/01/21  1447   SODIUM mmol/L 142  --  142 141 139  --    POTASSIUM mmol/L 4.0  --  3.6 3.7 3.8  --    CHLORIDE mmol/L 104  --  106 100 101  --    TOTAL CO2 mmol/L 26.4  --   --  21.4* 27.8  --    CO2 mmol/L  --   --  28.0  --   --   --    GLUCOSE mg/dL 117*  --  125* 103* 124*  --    BUN mg/dL 15  --  13 10 13  --    CREATININE mg/dL 1.22 1.20 1.07 1.23 1.62* 1.20   EGFR IF NONAFRICN AM mL/min/1.73  --   --  76 65 47*  --    EGFR IF AFRICN AM mL/min/1.73  --   --   --  78 57*   "--    EGFR RESULT mL/min/1.73 75.4  --   --   --   --   --    CALCIUM mg/dL 9.5  --  9.2 9.4 10.1  --      HEPATIC:  Lab Results - Last 18 Months   Lab Units 01/13/23  0811 02/10/22  1415 02/07/22  0834 11/11/21  1453   ALT (SGPT) U/L 38 24 24 37   AST (SGOT) U/L 18 15 15 28   ALK PHOS U/L 49 53 64 68     Vit D:No results for input(s): XCTP52UH in the last 99739 hours.  THYROID:  Lab Results - Last 18 Months   Lab Units 01/13/23  0811 02/10/22  1415 02/07/22  0834   TSH uIU/mL 3.100 2.010 4.240*   FREE T4 ng/dL  --  0.73* 0.76*       Objective   Physical Exam  Constitutional:       General: He is not in acute distress.     Appearance: He is obese.   Cardiovascular:      Rate and Rhythm: Normal rate and regular rhythm.      Pulses: Normal pulses.      Heart sounds: No murmur heard.    No friction rub. No gallop.   Pulmonary:      Effort: Pulmonary effort is normal. No respiratory distress.      Breath sounds: Normal breath sounds. No wheezing or rhonchi.   Neurological:      Mental Status: He is alert.     /82 (BP Location: Right arm, Patient Position: Sitting, Cuff Size: Adult)   Pulse 112   Temp 97.7 °F (36.5 °C) (Infrared)   Resp 18   Ht 177.8 cm (70\")   Wt 129 kg (284 lb 6.4 oz)   SpO2 96%   BMI 40.81 kg/m²     Assessment & Plan   Diagnoses and all orders for this visit:    1. Hypogonadism in male (Primary)  -     Testosterone    2. Class 3 severe obesity with serious comorbidity and body mass index (BMI) of 40.0 to 44.9 in adult, unspecified obesity type (HCC)  -     CBC & Differential  -     Comprehensive Metabolic Panel  -     Hemoglobin A1c  -     Semaglutide-Weight Management 0.25 MG/0.5ML solution auto-injector; Inject 0.25 mg under the skin into the appropriate area as directed 1 (One) Time Per Week.  Dispense: 4 mL; Refill: 0  -     Semaglutide-Weight Management 0.5 MG/0.5ML solution auto-injector; Inject 0.5 mL under the skin into the appropriate area as directed 1 (One) Time Per Week.  " Dispense: 2 mL; Refill: 0  -     Semaglutide-Weight Management 1 MG/0.5ML solution auto-injector; Inject 1 mg under the skin into the appropriate area as directed 1 (One) Time Per Week.  Dispense: 4 mL; Refill: 0  -     Semaglutide-Weight Management 1.7 MG/0.75ML solution auto-injector; Inject 0.75 mL under the skin into the appropriate area as directed 1 (One) Time Per Week.  Dispense: 3 mL; Refill: 0  -     Semaglutide-Weight Management (Wegovy) 2.4 MG/0.75ML solution auto-injector; Inject 2.4 mg under the skin into the appropriate area as directed 1 (One) Time Per Week.  Dispense: 3 mL; Refill: 5    3. Abnormal finding of blood chemistry, unspecified  -     Hemoglobin A1c  -     Semaglutide-Weight Management 0.25 MG/0.5ML solution auto-injector; Inject 0.25 mg under the skin into the appropriate area as directed 1 (One) Time Per Week.  Dispense: 4 mL; Refill: 0  -     Semaglutide-Weight Management 0.5 MG/0.5ML solution auto-injector; Inject 0.5 mL under the skin into the appropriate area as directed 1 (One) Time Per Week.  Dispense: 2 mL; Refill: 0  -     Semaglutide-Weight Management 1 MG/0.5ML solution auto-injector; Inject 1 mg under the skin into the appropriate area as directed 1 (One) Time Per Week.  Dispense: 4 mL; Refill: 0  -     Semaglutide-Weight Management 1.7 MG/0.75ML solution auto-injector; Inject 0.75 mL under the skin into the appropriate area as directed 1 (One) Time Per Week.  Dispense: 3 mL; Refill: 0  -     Semaglutide-Weight Management (Wegovy) 2.4 MG/0.75ML solution auto-injector; Inject 2.4 mg under the skin into the appropriate area as directed 1 (One) Time Per Week.  Dispense: 3 mL; Refill: 5    Discussion:  Advised and educated plan of care.  We will try for Pavan approval-if not approved we will try Saxenda next.  Advised Webrinaovvinicio use advise diet related-we will obtain labs and if his A1c comes back elevated in the diabetic range-we will have a discussion about other meds.     Class 3  Severe Obesity (BMI >=40). Obesity-related health conditions include the following: hypertension and dyslipidemias. Obesity is worsening. BMI is is above average; BMI management plan is completed. We discussed portion control, increasing exercise and pharmacologic options including glp-1.    Follow-up:  Return in about 3 months (around 6/10/2023) for Follow-Up.    Electronically signed by HI Bird, 03/10/23, 3:08 PM CST.

## 2023-03-11 LAB
ALBUMIN SERPL-MCNC: 4.8 G/DL (ref 3.5–5.2)
ALBUMIN/GLOB SERPL: 2 G/DL
ALP SERPL-CCNC: 49 U/L (ref 39–117)
ALT SERPL-CCNC: 22 U/L (ref 1–41)
AST SERPL-CCNC: 16 U/L (ref 1–40)
BASOPHILS # BLD AUTO: 0.06 10*3/MM3 (ref 0–0.2)
BASOPHILS NFR BLD AUTO: 0.6 % (ref 0–1.5)
BILIRUB SERPL-MCNC: <0.2 MG/DL (ref 0–1.2)
BUN SERPL-MCNC: 13 MG/DL (ref 6–20)
BUN/CREAT SERPL: 10.2 (ref 7–25)
CALCIUM SERPL-MCNC: 10.2 MG/DL (ref 8.6–10.5)
CHLORIDE SERPL-SCNC: 97 MMOL/L (ref 98–107)
CO2 SERPL-SCNC: 28.5 MMOL/L (ref 22–29)
CREAT SERPL-MCNC: 1.28 MG/DL (ref 0.76–1.27)
EGFRCR SERPLBLD CKD-EPI 2021: 71.2 ML/MIN/1.73
EOSINOPHIL # BLD AUTO: 0.22 10*3/MM3 (ref 0–0.4)
EOSINOPHIL NFR BLD AUTO: 2.2 % (ref 0.3–6.2)
ERYTHROCYTE [DISTWIDTH] IN BLOOD BY AUTOMATED COUNT: 12.4 % (ref 12.3–15.4)
GLOBULIN SER CALC-MCNC: 2.4 GM/DL
GLUCOSE SERPL-MCNC: 153 MG/DL (ref 65–99)
HBA1C MFR BLD: 5.9 % (ref 4.8–5.6)
HCT VFR BLD AUTO: 53.7 % (ref 37.5–51)
HGB BLD-MCNC: 17.9 G/DL (ref 13–17.7)
IMM GRANULOCYTES # BLD AUTO: 0.07 10*3/MM3 (ref 0–0.05)
IMM GRANULOCYTES NFR BLD AUTO: 0.7 % (ref 0–0.5)
LYMPHOCYTES # BLD AUTO: 3.31 10*3/MM3 (ref 0.7–3.1)
LYMPHOCYTES NFR BLD AUTO: 32.8 % (ref 19.6–45.3)
MCH RBC QN AUTO: 30.1 PG (ref 26.6–33)
MCHC RBC AUTO-ENTMCNC: 33.3 G/DL (ref 31.5–35.7)
MCV RBC AUTO: 90.3 FL (ref 79–97)
MONOCYTES # BLD AUTO: 0.76 10*3/MM3 (ref 0.1–0.9)
MONOCYTES NFR BLD AUTO: 7.5 % (ref 5–12)
NEUTROPHILS # BLD AUTO: 5.66 10*3/MM3 (ref 1.7–7)
NEUTROPHILS NFR BLD AUTO: 56.2 % (ref 42.7–76)
NRBC BLD AUTO-RTO: 0 /100 WBC (ref 0–0.2)
PLATELET # BLD AUTO: 206 10*3/MM3 (ref 140–450)
POTASSIUM SERPL-SCNC: 3.5 MMOL/L (ref 3.5–5.2)
PROT SERPL-MCNC: 7.2 G/DL (ref 6–8.5)
RBC # BLD AUTO: 5.95 10*6/MM3 (ref 4.14–5.8)
SODIUM SERPL-SCNC: 139 MMOL/L (ref 136–145)
TESTOST SERPL-MCNC: >1500 NG/DL (ref 264–916)
WBC # BLD AUTO: 10.08 10*3/MM3 (ref 3.4–10.8)

## 2023-03-13 ENCOUNTER — TELEPHONE (OUTPATIENT)
Dept: FAMILY MEDICINE CLINIC | Facility: CLINIC | Age: 43
End: 2023-03-13

## 2023-03-13 DIAGNOSIS — E29.1 HYPOGONADISM IN MALE: Primary | ICD-10-CM

## 2023-03-13 NOTE — PROGRESS NOTES
"Pt stated \"I get 2 shots a week fo 1ml and he took them both together one day instead of spacing them out and then the following day he took another 1ml. I usually take them Monday and thursdays. I took and extra vial and that is why it was so high.\"    Pt has been notified of test results."

## 2023-03-13 NOTE — PROGRESS NOTES
He needs to hold the medication this week, starting next week, He needs to take 1 ml/100 mg testosterone weekly only.  He needs to get labs in 1 month.      Electronically signed by HI Bird, 03/13/23, 11:04 AM CDT.

## 2023-03-13 NOTE — TELEPHONE ENCOUNTER
"    Caller: Lincoln Ornelas    Relationship: Self    Best call back number:[phone]    What medications are you currently taking:   Current Outpatient Medications on File Prior to Visit   Medication Sig Dispense Refill    acetaZOLAMIDE (DIAMOX) 250 MG tablet Take 1 tablet by mouth 3 (Three) Times a Day. 90 tablet 3    albuterol sulfate  (90 Base) MCG/ACT inhaler       ALPRAZolam (XANAX) 2 MG tablet       amphetamine-dextroamphetamine (ADDERALL) 20 MG tablet Take 1 tablet by mouth 3 (Three) Times a Day.      Brexpiprazole 4 MG tablet Take 1 tablet by mouth.      buprenorphine (SUBUTEX) 8 MG sublingual tablet SL tablet Place 1 tablet under the tongue Daily.      buPROPion (WELLBUTRIN) 75 MG tablet Take 2 tablets by mouth Every Morning. Dr Deluca 60 tablet 3    clotrimazole-betamethasone (Lotrisone) 1-0.05 % cream Apply 1 application topically to the appropriate area as directed Daily As Needed (dry scalp). 45 g 0    diclofenac (VOLTAREN) 75 MG EC tablet       fenofibrate (Tricor) 145 MG tablet Take 1 tablet by mouth Daily. 30 tablet 5    hydroCHLOROthiazide (HYDRODIURIL) 12.5 MG tablet Take 1 tablet by mouth Daily. 30 tablet 3    hydrOXYzine pamoate (VISTARIL) 25 MG capsule Take 1 capsule by mouth Every 6 (Six) Hours As Needed for Itching. 120 capsule 3    ketoconazole (NIZORAL) 2 % shampoo Apply  topically to the appropriate area as directed As Needed for Irritation or Dandruff. 120 mL 0    lisinopril (PRINIVIL,ZESTRIL) 10 MG tablet Take 1 tablet by mouth every night at bedtime. 30 tablet 3    Lurasidone HCl 120 MG tablet Take 1 tablet by mouth Daily. Dr Deluca      metFORMIN ER (GLUCOPHAGE-XR) 750 MG 24 hr tablet Take 1 tablet by mouth Daily With Breakfast. 30 tablet 5    Needle, Disp, 18G X 1\" misc Use weekly as indicated 4 each 11    nicotine (NICOTROL) 10 MG inhaler Inhale 1 puff As Needed for Smoking Cessation. 1 each 2    nicotine polacrilex (NICORETTE) 4 MG gum Chew 1 each As " "Needed for Smoking Cessation. 40 each 1    OXcarbazepine (TRILEPTAL) 300 MG tablet Take 2 tablets by mouth 3 (Three) Times a Day. 180 tablet 3    potassium chloride (K-DUR,KLOR-CON) 20 MEQ CR tablet TAKE 1 TABLET BY MOUTH TWICE DAILY 180 tablet 1    rosuvastatin (Crestor) 40 MG tablet Take 1 tablet by mouth Every Night. 30 tablet 5    Saphris 10 MG sublingual tablet sublingual tablet Place 1 tablet under the tongue Every Night.      Semaglutide-Weight Management (Wegovy) 2.4 MG/0.75ML solution auto-injector Inject 2.4 mg under the skin into the appropriate area as directed 1 (One) Time Per Week. 3 mL 5    Semaglutide-Weight Management 0.25 MG/0.5ML solution auto-injector Inject 0.25 mg under the skin into the appropriate area as directed 1 (One) Time Per Week. 4 mL 0    Semaglutide-Weight Management 0.5 MG/0.5ML solution auto-injector Inject 0.5 mL under the skin into the appropriate area as directed 1 (One) Time Per Week. 2 mL 0    Semaglutide-Weight Management 1 MG/0.5ML solution auto-injector Inject 1 mg under the skin into the appropriate area as directed 1 (One) Time Per Week. 4 mL 0    Semaglutide-Weight Management 1.7 MG/0.75ML solution auto-injector Inject 0.75 mL under the skin into the appropriate area as directed 1 (One) Time Per Week. 3 mL 0    sildenafil (VIAGRA) 100 MG tablet TAKE ONE TABLET BY MOUTH DAILY AS NEEDED FOR ERECTILE DYSFUNCTION. 15 tablet 1    Syringe/Needle, Disp, (B-D 3CC LUER-COBY SYR 21GX1\") 21G X 1\" 3 ML misc USE ONCE WEEKLY AS DIRECTED 4 each 6    testosterone cypionate (DEPO-TESTOSTERONE) 100 MG/ML solution injection       traZODone (DESYREL) 50 MG tablet        No current facility-administered medications on file prior to visit.      When did you start taking these medications: HAVE NOT STARTED     Which medication are you concerned about: Semaglutide-Weight Management (Wegovy) 2.4 MG/0.75ML solution auto-injector    Who prescribed you this medication: RADHA ORTIZ" "    What are your concerns: NEEDS A PRIOR AUTHORIZATION             DELETE AFTER READING TO PATIENT: "Thank you for sharing this information with me. I will send a message to the clinical team. Please allow 48 hours for the clinical staff to follow up on this request.  If your symptoms worsen, please seek emergent medical attention."    "

## 2023-03-13 NOTE — PROGRESS NOTES
Please call result -testosterone is way too high.  This is because his hemoglobin also to go up which is not desirable.  I know what he should be on, can we ask him how much she is actually taking.    Electronically signed by HI Bird, 03/13/23, 8:07 AM CDT.

## 2023-03-15 ENCOUNTER — TELEPHONE (OUTPATIENT)
Dept: FAMILY MEDICINE CLINIC | Facility: CLINIC | Age: 43
End: 2023-03-15
Payer: MEDICARE

## 2023-03-15 DIAGNOSIS — E66.01 CLASS 3 SEVERE OBESITY WITH SERIOUS COMORBIDITY AND BODY MASS INDEX (BMI) OF 40.0 TO 44.9 IN ADULT, UNSPECIFIED OBESITY TYPE: Primary | ICD-10-CM

## 2023-03-15 NOTE — TELEPHONE ENCOUNTER
Unfortunately some plans don't cover weight loss medication.  He is not diabetic so the other alternatives are not an option.  Would he be interested in talking to a bariatric physician?    Electronically signed by HI Bird, 03/15/23, 2:49 PM CDT.

## 2023-03-15 NOTE — TELEPHONE ENCOUNTER
Patient called in and asked if he can try trulicity, ozempic, or victoza since his PA was denied for wegovy. Patient stated PA was denied to script called in under weight loss not dm.

## 2023-03-16 NOTE — PROGRESS NOTES
"Subjective   Chief Complaint:  Left flank pain    History of Present Illness:  This 40 y.o. male was seen in the office today.  Reports cute onset left flank pain 2 weeks ago, reports was overnight but then got better.  Reports that exacerbated today and is quite severe at times.  Denies any falls or injuries.    No Known Allergies   Current Outpatient Medications on File Prior to Visit   Medication Sig   • acetaZOLAMIDE (DIAMOX) 250 MG tablet TAKE 2 TABLETS BY MOUTH THREE TIMES DAILY   • ALPRAZolam (XANAX) 2 MG tablet TK 1 T PO  TID   • amphetamine-dextroamphetamine (ADDERALL) 20 MG tablet Take 1 tablet by mouth 3 (Three) Times a Day.   • ARIPiprazole (ABILIFY) 5 MG tablet Take 1 tablet by mouth Daily.   • Brexpiprazole 4 MG tablet Take 4 mg by mouth.   • buprenorphine-naloxone (SUBOXONE) 8-2 MG per SL tablet Place 1 tablet under the tongue 3 (Three) Times a Day As Needed.   • buPROPion (WELLBUTRIN) 75 MG tablet Take 150 mg by mouth Every Morning. Dr Deluca   • Cholecalciferol (VITAMIN D) 1000 units tablet Take 1 tablet by mouth 2 (Two) Times a Day.   • Chorionic Gonadotropin (HCG IJ) Inject 500 Units as directed 2 (Two) Times a Week.   • lisinopril-hydrochlorothiazide (PRINZIDE,ZESTORETIC) 20-25 MG per tablet TAKE 1 TABLET BY MOUTH DAILY   • Lurasidone HCl 120 MG tablet Take 1 tablet by mouth Daily. Dr Deluca   • Needle, Disp, 18G X 1\" misc Use weekly as indicated   • NON FORMULARY Inject 200 mg as directed 2 (Two) Times a Week. Aromataste Inhibitor   • OXcarbazepine (TRILEPTAL) 300 MG tablet Take 2 tablets by mouth 3 (Three) Times a Day.   • potassium chloride (K-DUR,KLOR-CON) 20 MEQ CR tablet TAKE 1 TABLET BY MOUTH TWICE DAILY   • Saphris 10 MG sublingual tablet sublingual tablet Place 1 tablet under the tongue Every Night.   • Syringe/Needle, Disp, (B-D 3CC LUER-COBY SYR 21GX1\") 21G X 1\" 3 ML misc USE ONCE WEEKLY AS DIRECTED   • Testosterone Cypionate (DEPOTESTOTERONE CYPIONATE) 200 MG/ML injection Inject 0.5 mL " "into the appropriate muscle as directed by prescriber 1 (One) Time Per Week. 100mg weekly, Provide(#4)18G,(#4)21Gneedles (#4)3mlsyringes q month   • Vraylar 3 MG capsule capsule Take 1 capsule by mouth Daily.     No current facility-administered medications on file prior to visit.      Past Medical, Surgical, Social, and Family History:  Past Medical History:   Diagnosis Date   • Anxiety    • Depression    • Hypertension    • Intracranial hypertension    • Mood disorder (HCC)    • Pituitary mass (HCC)    • PTSD (post-traumatic stress disorder)    • Spinal headache      Past Surgical History:   Procedure Laterality Date   • NO PAST SURGERIES     • VASECTOMY N/A 2018    Procedure: VASECTOMY;  Surgeon: Ion Yanez MD;  Location[facility];  Service: Urology     Social History     Socioeconomic History   • Marital status:      Spouse name: Keely   • Number of children: 2   • Years of education: 12   Tobacco Use   • Smoking status: Former Smoker     Packs/day: 2.00     Years: 25.00     Pack years: 50.00     Types: Cigarettes     Start date: 1989     Quit date: 10/21/2020     Years since quittin.0   • Smokeless tobacco: Current User     Types: Chew   Substance and Sexual Activity   • Alcohol use: No   • Drug use: No   • Sexual activity: Not Currently     Partners: Male     Family History   Problem Relation Age of Onset   • Gonadal disorder Neg Hx      Objective   Physical Exam  Constitutional:       General: He is not in acute distress.  Cardiovascular:      Rate and Rhythm: Normal rate and regular rhythm.   Pulmonary:      Effort: Pulmonary effort is normal.      Breath sounds: Normal breath sounds.   Abdominal:      Tenderness: There is left CVA tenderness.   Neurological:      Mental Status: He is alert.     /84   Pulse 114   Temp 96.4 °F (35.8 °C)   Resp 16   Ht 176.5 cm (69.5\")   Wt 110 kg (243 lb)   SpO2 96%   BMI 35.37 kg/m²     Assessment/Plan   Diagnoses and all orders " for this visit:    1. Left flank pain (Primary)  -     CBC & Differential  -     Comprehensive Metabolic Panel  -     UA / M With / Rflx Culture([facility] ONLY) - Urine, Clean Catch  -     CT Abdomen Pelvis Stone Protocol; Future  -     HYDROcodone-acetaminophen (Norco) 5-325 MG per tablet; Take 1 tablet by mouth Every 6 (Six) Hours As Needed for Severe Pain .  Dispense: 20 tablet; Refill: 0    2. Class 2 obesity due to excess calories with body mass index (BMI) of 35.0 to 35.9 in adult, unspecified whether serious comorbidity present    Other orders  -     ketorolac (TORADOL) 10 MG tablet; Take 1 tablet by mouth Every 6 (Six) Hours As Needed for Moderate Pain .  Dispense: 20 tablet; Refill: 0    Discussion:  Advised and educated plan of care.  Differentials include musculoskeletal-renal calculi.  We will proceed with pain medication for comfort, obtain labs and CT.  Advised ED if worse.    We discussed giving Norco for acute pain but he needs to contact his Subutex provider and get guidance to how to take-spoke with the pharmacy later, they actually refused to refill the [place] altogether.  Toradol prescription sent.    Patient's Body mass index is 35.37 kg/m². indicating that he is obese (BMI >30). Obesity-related health conditions include the following: Hypogonadism. Obesity is unchanged. BMI is is above average; BMI management plan is completed. We discussed portion control and increasing exercise..      Follow-up:  No follow-ups on file.    Electronically signed by HI Bird, 11/12/21, 7:01 AM GEOFF.   severe

## 2023-03-29 ENCOUNTER — TELEPHONE (OUTPATIENT)
Dept: FAMILY MEDICINE CLINIC | Facility: CLINIC | Age: 43
End: 2023-03-29

## 2023-03-29 ENCOUNTER — OFFICE VISIT (OUTPATIENT)
Dept: FAMILY MEDICINE CLINIC | Facility: CLINIC | Age: 43
End: 2023-03-29
Payer: MEDICARE

## 2023-03-29 VITALS
BODY MASS INDEX: 41.8 KG/M2 | HEIGHT: 70 IN | DIASTOLIC BLOOD PRESSURE: 86 MMHG | OXYGEN SATURATION: 92 % | WEIGHT: 292 LBS | RESPIRATION RATE: 14 BRPM | SYSTOLIC BLOOD PRESSURE: 148 MMHG | HEART RATE: 112 BPM

## 2023-03-29 DIAGNOSIS — G47.33 OSA (OBSTRUCTIVE SLEEP APNEA): ICD-10-CM

## 2023-03-29 DIAGNOSIS — G47.10 HYPERSOMNOLENCE: ICD-10-CM

## 2023-03-29 DIAGNOSIS — D58.2 ELEVATED HEMOGLOBIN: ICD-10-CM

## 2023-03-29 DIAGNOSIS — F17.210 CIGARETTE SMOKER: ICD-10-CM

## 2023-03-29 DIAGNOSIS — Z12.2 ENCOUNTER FOR SCREENING FOR LUNG CANCER: ICD-10-CM

## 2023-03-29 DIAGNOSIS — Z72.0 CHEWING TOBACCO USE: ICD-10-CM

## 2023-03-29 DIAGNOSIS — Z87.891 PERSONAL HISTORY OF NICOTINE DEPENDENCE: ICD-10-CM

## 2023-03-29 DIAGNOSIS — E66.01 CLASS 3 SEVERE OBESITY DUE TO EXCESS CALORIES WITH SERIOUS COMORBIDITY AND BODY MASS INDEX (BMI) OF 40.0 TO 44.9 IN ADULT: ICD-10-CM

## 2023-03-29 DIAGNOSIS — Z79.890 LONG-TERM CURRENT USE OF TESTOSTERONE REPLACEMENT THERAPY: ICD-10-CM

## 2023-03-29 PROBLEM — E66.9 OBESITY: Status: ACTIVE | Noted: 2022-03-15

## 2023-03-29 RX ORDER — VARENICLINE TARTRATE 1 MG/1
1 TABLET, FILM COATED ORAL 2 TIMES DAILY
Qty: 60 TABLET | Refills: 2 | Status: SHIPPED | OUTPATIENT
Start: 2023-03-29

## 2023-03-29 RX ORDER — DEXTROAMPHETAMINE SACCHARATE, AMPHETAMINE ASPARTATE, DEXTROAMPHETAMINE SULFATE AND AMPHETAMINE SULFATE 2.5; 2.5; 2.5; 2.5 MG/1; MG/1; MG/1; MG/1
TABLET ORAL
COMMUNITY
Start: 2023-03-29

## 2023-03-29 NOTE — TELEPHONE ENCOUNTER
Caller: Lincoln Ornelas    Relationship: Self    Best call back number: [phone]    What is the best time to reach you: ANYTIME    Who are you requesting to speak with (clinical staff, provider,  specific staff member): CLINICAL    What was the call regarding: PATIENT HAS TALKED TO RADHA ABOUT SLEEP APNEA MACHINE BEFORE AND HAS A FEW QUESTIONS ABOUT IT. PLEASE ADVISE.     Do you require a callback: YES

## 2023-03-29 NOTE — PROGRESS NOTES
The ABCs of the Annual Wellness Visit  Subsequent Medicare Wellness Visit    Subjective    Lincoln Ornelas is a 42 y.o. male who presents for a Subsequent Medicare Wellness Visit.    The following portions of the patient's history were reviewed and   updated as appropriate: allergies, current medications, past family history, past medical history, past social history, past surgical history and problem list.    Compared to one year ago, the patient feels his physical   health is the same.    Compared to one year ago, the patient feels his mental   health is the same.    Recent Hospitalizations:  He was not admitted to the hospital during the last year.       Current Medical Providers:  Patient Care Team:  Josafat Ascencio MD as PCP - General (Family Medicine)  Zhen Miller MD as Referring Physician (Ophthalmology)  Marie Oneil MD as Consulting Physician (Endocrinology)  Shon Lugo MD as Consulting Physician (Urology)    Outpatient Medications Prior to Visit   Medication Sig Dispense Refill   • acetaZOLAMIDE (DIAMOX) 250 MG tablet Take 1 tablet by mouth 3 (Three) Times a Day. 90 tablet 3   • albuterol sulfate  (90 Base) MCG/ACT inhaler      • ALPRAZolam (XANAX) 2 MG tablet      • amphetamine-dextroamphetamine (ADDERALL) 10 MG tablet      • amphetamine-dextroamphetamine (ADDERALL) 20 MG tablet Take 1 tablet by mouth 3 (Three) Times a Day.     • Brexpiprazole 4 MG tablet Take 1 tablet by mouth.     • buprenorphine (SUBUTEX) 8 MG sublingual tablet SL tablet Place 1 tablet under the tongue Daily.     • buPROPion (WELLBUTRIN) 75 MG tablet Take 2 tablets by mouth Every Morning. Dr Deluca 60 tablet 3   • clotrimazole-betamethasone (Lotrisone) 1-0.05 % cream Apply 1 application topically to the appropriate area as directed Daily As Needed (dry scalp). 45 g 0   • hydroCHLOROthiazide (HYDRODIURIL) 12.5 MG tablet Take 1 tablet by mouth Daily. 30 tablet 3   • hydrOXYzine pamoate (VISTARIL)  "25 MG capsule Take 1 capsule by mouth Every 6 (Six) Hours As Needed for Itching. 120 capsule 3   • ketoconazole (NIZORAL) 2 % shampoo Apply  topically to the appropriate area as directed As Needed for Irritation or Dandruff. 120 mL 0   • lisinopril (PRINIVIL,ZESTRIL) 10 MG tablet Take 1 tablet by mouth every night at bedtime. 30 tablet 3   • Lurasidone HCl 120 MG tablet Take 1 tablet by mouth Daily. Dr Deluca     • metFORMIN ER (GLUCOPHAGE-XR) 750 MG 24 hr tablet Take 1 tablet by mouth Daily With Breakfast. 30 tablet 5   • Needle, Disp, 18G X 1\" misc Use weekly as indicated 4 each 11   • OXcarbazepine (TRILEPTAL) 300 MG tablet Take 2 tablets by mouth 3 (Three) Times a Day. 180 tablet 3   • rosuvastatin (Crestor) 40 MG tablet Take 1 tablet by mouth Every Night. 30 tablet 5   • Saphris 10 MG sublingual tablet sublingual tablet Place 1 tablet under the tongue Every Night.     • sildenafil (VIAGRA) 100 MG tablet TAKE ONE TABLET BY MOUTH DAILY AS NEEDED FOR ERECTILE DYSFUNCTION. 15 tablet 1   • Syringe/Needle, Disp, (B-D 3CC LUER-COBY SYR 21GX1\") 21G X 1\" 3 ML misc USE ONCE WEEKLY AS DIRECTED 4 each 6   • testosterone cypionate (DEPO-TESTOSTERONE) 100 MG/ML solution injection      • nicotine polacrilex (NICORETTE) 4 MG gum Chew 1 each As Needed for Smoking Cessation. 40 each 1   • diclofenac (VOLTAREN) 75 MG EC tablet      • fenofibrate (Tricor) 145 MG tablet Take 1 tablet by mouth Daily. 30 tablet 5   • nicotine (NICOTROL) 10 MG inhaler Inhale 1 puff As Needed for Smoking Cessation. 1 each 2   • potassium chloride (K-DUR,KLOR-CON) 20 MEQ CR tablet TAKE 1 TABLET BY MOUTH TWICE DAILY 180 tablet 1   • Semaglutide-Weight Management (Wegovy) 2.4 MG/0.75ML solution auto-injector Inject 2.4 mg under the skin into the appropriate area as directed 1 (One) Time Per Week. 3 mL 5   • Semaglutide-Weight Management 0.25 MG/0.5ML solution auto-injector Inject 0.25 mg under the skin into the appropriate area as directed 1 (One) Time Per " Week. 4 mL 0   • Semaglutide-Weight Management 0.5 MG/0.5ML solution auto-injector Inject 0.5 mL under the skin into the appropriate area as directed 1 (One) Time Per Week. 2 mL 0   • Semaglutide-Weight Management 1 MG/0.5ML solution auto-injector Inject 1 mg under the skin into the appropriate area as directed 1 (One) Time Per Week. 4 mL 0   • Semaglutide-Weight Management 1.7 MG/0.75ML solution auto-injector Inject 0.75 mL under the skin into the appropriate area as directed 1 (One) Time Per Week. 3 mL 0   • traZODone (DESYREL) 50 MG tablet        No facility-administered medications prior to visit.       Opioid medication/s are on active medication list.  and I have evaluated his active treatment plan and pain score trends (see table).  There were no vitals filed for this visit.  I have reviewed the chart for potential of high risk medication and harmful drug interactions in the elderly.            Aspirin is not on active medication list.  Aspirin use is not indicated based on review of current medical condition/s. Risk of harm outweighs potential benefits.  .    Patient Active Problem List   Diagnosis   • Pseudotumor cerebri-ana   • Cigarette smoker   • Chronic headache   • Hypogonadotropic hypogonadism (HCC)-   • Pituitary microadenoma (CMS/HCC) ana   • BMI 39.0-39.9,adult   • Wellness examination   • Psychiatric illness: Jazmine/edward   • Hypertension   • Laboratory test*   • Low testosterone-replaced-   • Erectile dysfunction   • Depression   • Hyperprolactinemia (HCC)   • Vitamin D deficiency   • Elevated fasting glucose   • Chewing tobacco use   • Cardiac arrhythmia: palpitations   • Attention deficit disorder: jazmine/edward   • Abnormal laboratory test-thyroid-   • Obesity   • Memory loss   • Hypersomnolence   • TARAH (obstructive sleep apnea)     Advance Care Planning  Advance Directive is not on file.  ACP discussion was held with the patient during this visit. Patient does not  "have an advance directive, information provided.     Objective    Vitals:    23 1534   BP: 148/86   Pulse: 112   Resp: 14   SpO2: 92%   Weight: 132 kg (292 lb)   Height: 177.8 cm (70\")     Estimated body mass index is 41.9 kg/m² as calculated from the following:    Height as of this encounter: 177.8 cm (70\").    Weight as of this encounter: 132 kg (292 lb).    Class 3 Severe Obesity (BMI >=40). Obesity-related health conditions include the following: hypertension. Obesity is unchanged. BMI is is above average; BMI management plan is completed. We discussed portion control, increasing exercise and consulting a Bariatric surgeon.      Does the patient have evidence of cognitive impairment? Yes    Lab Results   Component Value Date    CHLPL 240 (H) 2023    TRIG 449 (H) 2023    HDL 31 (L) 2023     (H) 2023    VLDL 81 (H) 2023    HGBA1C 5.90 (H) 03/10/2023        HEALTH RISK ASSESSMENT    Smoking Status:  Social History     Tobacco Use   Smoking Status Former   • Packs/day: 1.00   • Years: 25.00   • Pack years: 25.00   • Types: Cigarettes   • Start date: 1989   • Quit date: 10/21/2020   • Years since quittin.4   • Passive exposure: Past   Smokeless Tobacco Former   • Types: Chew   • Quit date: 2022     Alcohol Consumption:  Social History     Substance and Sexual Activity   Alcohol Use No     Fall Risk Screen:    FARIHAADI Fall Risk Assessment has not been completed.    Depression Screening:  PHQ-2/PHQ-9 Depression Screening 3/29/2023   Little Interest or Pleasure in Doing Things 0-->not at all   Feeling Down, Depressed or Hopeless 0-->not at all   PHQ-9: Brief Depression Severity Measure Score 0       Health Habits and Functional and Cognitive Screening:  Functional & Cognitive Status 3/29/2023   Do you have difficulty preparing food and eating? No   Do you have difficulty bathing yourself, getting dressed or grooming yourself? No   Do you have difficulty using the " toilet? No   Do you have difficulty moving around from place to place? No   Do you have trouble with steps or getting out of a bed or a chair? No   Current Diet Well Balanced Diet   Dental Exam Up to date   Eye Exam Up to date   Exercise (times per week) 6 times per week   Current Exercises Include Weightlifting   Do you need help using the phone?  No   Are you deaf or do you have serious difficulty hearing?  No   Do you need help with transportation? No   Do you need help shopping? No   Do you need help preparing meals?  No   Do you need help with housework?  No   Do you need help with laundry? No   Do you need help taking your medications? No   Do you need help managing money? No   Do you ever drive or ride in a car without wearing a seat belt? No   Have you felt unusual stress, anger or loneliness in the last month? Yes   Who do you live with? Spouse   If you need help, do you have trouble finding someone available to you? No   Have you been bothered in the last four weeks by sexual problems? No   Do you have difficulty concentrating, remembering or making decisions? Yes       Age-appropriate Screening Schedule:  Refer to the list below for future screening recommendations based on patient's age, sex and/or medical conditions. Orders for these recommended tests are listed in the plan section. The patient has been provided with a written plan.    Health Maintenance   Topic Date Due   • COVID-19 Vaccine (1) Never done   • TDAP/TD VACCINES (1 - Tdap) Never done   • DXA SCAN  12/02/2021   • INFLUENZA VACCINE  08/01/2022   • LIPID PANEL  01/13/2024   • ANNUAL WELLNESS VISIT  03/29/2024   • HEPATITIS C SCREENING  Completed   • Pneumococcal Vaccine 0-64  Aged Out                CMS Preventative Services Quick Reference  Risk Factors Identified During Encounter  Immunizations Discussed/Encouraged: Tdap, Influenza and COVID19  Tobacco Use/Dependance Risk (use dotphrase .tobaccocessation for documentation)  The above  risks/problems have been discussed with the patient.  Pertinent information has been shared with the patient in the After Visit Summary.  An After Visit Summary and PPPS were made available to the patient.    Follow Up:   Next Medicare Wellness visit to be scheduled in 1 year.       Additional E&M Note during same encounter follows:  Patient has multiple medical problems which are significant and separately identifiable that require additional work above and beyond the Medicare Wellness Visit.        E/M encounter  Subjective   Lincoln Ornelas is a 42 y.o. male presenting with chief complaint of:   Chief Complaint   Patient presents with   • Obesity     Pt states that he has changed his diet & is exercising but he is gaining weight.     • Welcome To Medicare     AWV never  Last Completed Annual Wellness Visit          ANNUAL WELLNESS VISIT (Yearly) Next due on 3/29/2024    03/29/2023  Level of Service: PPPS, SUBSEQ VISIT                 History of Present Illness :  Alone.  Here for primarily he wants nicorette gum/wants to stop smoking.   Here for review of chronic problems that includes obesity and others.  Also yearly medicare wellness exam.    Has multiple chronic problems to consider that might have a bearing on today's issues;  an interval appointment.       Chronic/acute problems reviewed today:   1. Class 3 severe obesity due to excess calories with serious comorbidity and body mass index (BMI) of 40.0 to 44.9 in adult (HCC) weight is worsening-Santy is referred to bariatrics   2. Cigarette smoker still smoking   3. Personal history of nicotine dependence he wants to get off the cigarettes but he is asked for medication before and has not succeeded   4. Elevated hemoglobin (HCC) his hemoglobin is elevated still; smoking and testosterone use   5. Long-term current use of testosterone replacement therapy it appears he still using testosterone   6 Hypersomulence: says he stays sleepy during day.  No  injuries.  Psych has on adderall. Has sleep apnea/machine he cannot use and someone is working on getting him another machine.    7 TARAH; apparently not treating.    8 Memory issues;  Poorly defined but shallow affect/thought process at times.      Has an/another acute issue today: none.    The following portions of the patient's history were reviewed and updated as appropriate: allergies, current medications, past family history, past medical history, past social history, past surgical history and problem list.      Current Outpatient Medications:   •  acetaZOLAMIDE (DIAMOX) 250 MG tablet, Take 1 tablet by mouth 3 (Three) Times a Day., Disp: 90 tablet, Rfl: 3  •  albuterol sulfate  (90 Base) MCG/ACT inhaler, , Disp: , Rfl:   •  ALPRAZolam (XANAX) 2 MG tablet, , Disp: , Rfl:   •  amphetamine-dextroamphetamine (ADDERALL) 10 MG tablet, , Disp: , Rfl:   •  amphetamine-dextroamphetamine (ADDERALL) 20 MG tablet, Take 1 tablet by mouth 3 (Three) Times a Day., Disp: , Rfl:   •  Brexpiprazole 4 MG tablet, Take 1 tablet by mouth., Disp: , Rfl:   •  buprenorphine (SUBUTEX) 8 MG sublingual tablet SL tablet, Place 1 tablet under the tongue Daily., Disp: , Rfl:   •  buPROPion (WELLBUTRIN) 75 MG tablet, Take 2 tablets by mouth Every Morning. Dr Deluca, Disp: 60 tablet, Rfl: 3  •  clotrimazole-betamethasone (Lotrisone) 1-0.05 % cream, Apply 1 application topically to the appropriate area as directed Daily As Needed (dry scalp)., Disp: 45 g, Rfl: 0  •  hydroCHLOROthiazide (HYDRODIURIL) 12.5 MG tablet, Take 1 tablet by mouth Daily., Disp: 30 tablet, Rfl: 3  •  hydrOXYzine pamoate (VISTARIL) 25 MG capsule, Take 1 capsule by mouth Every 6 (Six) Hours As Needed for Itching., Disp: 120 capsule, Rfl: 3  •  ketoconazole (NIZORAL) 2 % shampoo, Apply  topically to the appropriate area as directed As Needed for Irritation or Dandruff., Disp: 120 mL, Rfl: 0  •  lisinopril (PRINIVIL,ZESTRIL) 10 MG tablet, Take 1 tablet by mouth every  "night at bedtime., Disp: 30 tablet, Rfl: 3  •  Lurasidone HCl 120 MG tablet, Take 1 tablet by mouth Daily. Dr Deluca, Disp: , Rfl:   •  metFORMIN ER (GLUCOPHAGE-XR) 750 MG 24 hr tablet, Take 1 tablet by mouth Daily With Breakfast., Disp: 30 tablet, Rfl: 5  •  Needle, Disp, 18G X 1\" misc, Use weekly as indicated, Disp: 4 each, Rfl: 11  •  nicotine polacrilex (NICORETTE) 4 MG gum, Chew 1 each As Needed for Smoking Cessation., Disp: 40 each, Rfl: 1  •  OXcarbazepine (TRILEPTAL) 300 MG tablet, Take 2 tablets by mouth 3 (Three) Times a Day., Disp: 180 tablet, Rfl: 3  •  rosuvastatin (Crestor) 40 MG tablet, Take 1 tablet by mouth Every Night., Disp: 30 tablet, Rfl: 5  •  Saphris 10 MG sublingual tablet sublingual tablet, Place 1 tablet under the tongue Every Night., Disp: , Rfl:   •  sildenafil (VIAGRA) 100 MG tablet, TAKE ONE TABLET BY MOUTH DAILY AS NEEDED FOR ERECTILE DYSFUNCTION., Disp: 15 tablet, Rfl: 1  •  Syringe/Needle, Disp, (B-D 3CC LUER-COBY SYR 21GX1\") 21G X 1\" 3 ML misc, USE ONCE WEEKLY AS DIRECTED, Disp: 4 each, Rfl: 6  •  testosterone cypionate (DEPO-TESTOSTERONE) 100 MG/ML solution injection, , Disp: , Rfl:     No problems with medications.    No Known Allergies    Review of Systems  GENERAL:  Inactive/slower with limits, speed, stamina for age and fatigue/desire. Sleep is ok; apnea denied but working to get his cpap tolerated.  Says he has daytime hypersomulence without accident.  No fever now/recent.  SKIN: No rash/skin lesion of concern; tatoos.   ENDO:  No syncope, near or diaphoretic sweaty spells.  BS Ok to this point. .  HEENT: No recent head injury; same daily headache.  No vision change.  No significant hearing loss.  Ears without pain/drainage.  No sore throat.  No significant nasal/sinus congestion/drainage. No epistaxis.  CHEST: No chest wall tenderness or mass. No significant cough, without wheeze.  No SOB; no hemoptysis.  CV: No exertional chest pain, palpitations; mild end of day equal " LE/ankle edema.  GI: No heartburn, dysphagia.  No abdominal pain, diarrhea, constipation.  No rectal bleeding, or melena.    :  Voids without dysuria, or incontinence to completion.  ORTHO: No painful/swollen joints but various on /off sore.  Daily some sore neck or back.  No acute neck or back pain without recent injury.  NEURO: No dizziness, weakness of extremities.  No numbness/paresthesias.   PSYCH: Declines memory loss.  Mood variable; often anxious, depressed but/and not suicidal. Long term psych patient and history psuedotumor cerebri.  Tries to tolerate stress .   Screening:  Mammogram: NA  Bone density: N  Low dose CT chest:   Tobacco-smoker/age 8/2 ppd/  Tobacco-2nd handed/life  Tobacco-chew/age 8  GI: NA  Prostate: NA  Usual lab order  6m CBC, CMP, A1c, TSH, fT4  12m CBC, CMP, A1c, LIPID, TSH, fT4, Vit D, testosterone    Copy/paste function used for ROS/exam AND each area of these were reviewed, updated, confirmed and supplemented as needed.  Data reviewed:   Recent admit/ER/MD visits: 8.3.22    1. Chronic intractable headache, unspecified headache type    2. Pseudotumor cerebri-ana    3. Psychiatric illness: Sandrine/edward    4. Pituitary microadenoma (CMS/HCC) ana    5. Elevated fasting glucose    6. Primary hypertension    7. Cardiac arrhythmia, unspecified cardiac arrhythmia type    8. Memory loss    9. History of head injury    10. Erectile dysfunction, unspecified erectile dysfunction type    11. Hypochondria    12. Polypharmacy    13. Tachycardia    14. Hypertriglyceridemia       Discussions/medical decisions/reviews:  BP ok  Other vitals HR high  DM/BS 6.0 last  Lipid LDL 98; tri 538 last  PSA ok last  CBC Hct 52 last  Renal ok last  Liver ok last  Vit D 15.8 last  Thyroid low fT4 last; sees      Holter monitor to try to see what his heart rates tend to run.  I may want to share this information with others specialist particularly those writing Adderall  He is a reasonable candidate  to try Tricor; at risk for pancreatitis and other complications with this level of triglycerides  Urology referral to see if any feel that anything is being missed; headaches head contusion memory loss  I am not sure how compliant he is going to be with anything today    Last cardiac testing:   Echo: Results for orders placed during the hospital encounter of 09/23/16    Adult Stress Echo Only    Interpretation Summary  · Below average functional capacity.  · Clinically and electrically negative for ischemia.  · There is normal contractility at rest with appropriate increase with stress    Exercise stress echocardiogram is low risk for ischemia.    Radiology considered:   No radiology results for the last 90 days.    Lab Results:  Results for orders placed or performed in visit on 03/10/23   Comprehensive Metabolic Panel    Specimen: Blood   Result Value Ref Range    Glucose 153 (H) 65 - 99 mg/dL    BUN 13 6 - 20 mg/dL    Creatinine 1.28 (H) 0.76 - 1.27 mg/dL    EGFR Result 71.2 >60.0 mL/min/1.73    BUN/Creatinine Ratio 10.2 7.0 - 25.0    Sodium 139 136 - 145 mmol/L    Potassium 3.5 3.5 - 5.2 mmol/L    Chloride 97 (L) 98 - 107 mmol/L    Total CO2 28.5 22.0 - 29.0 mmol/L    Calcium 10.2 8.6 - 10.5 mg/dL    Total Protein 7.2 6.0 - 8.5 g/dL    Albumin 4.8 3.5 - 5.2 g/dL    Globulin 2.4 gm/dL    A/G Ratio 2.0 g/dL    Total Bilirubin <0.2 0.0 - 1.2 mg/dL    Alkaline Phosphatase 49 39 - 117 U/L    AST (SGOT) 16 1 - 40 U/L    ALT (SGPT) 22 1 - 41 U/L   Hemoglobin A1c    Specimen: Blood   Result Value Ref Range    Hemoglobin A1C 5.90 (H) 4.80 - 5.60 %   Testosterone    Specimen: Blood   Result Value Ref Range    Testosterone, Total >1500 (H) 264 - 916 ng/dL   CBC & Differential    Specimen: Blood   Result Value Ref Range    WBC 10.08 3.40 - 10.80 10*3/mm3    RBC 5.95 (H) 4.14 - 5.80 10*6/mm3    Hemoglobin 17.9 (H) 13.0 - 17.7 g/dL    Hematocrit 53.7 (H) 37.5 - 51.0 %    MCV 90.3 79.0 - 97.0 fL    MCH 30.1 26.6 - 33.0 pg     MCHC 33.3 31.5 - 35.7 g/dL    RDW 12.4 12.3 - 15.4 %    Platelets 206 140 - 450 10*3/mm3    Neutrophil Rel % 56.2 42.7 - 76.0 %    Lymphocyte Rel % 32.8 19.6 - 45.3 %    Monocyte Rel % 7.5 5.0 - 12.0 %    Eosinophil Rel % 2.2 0.3 - 6.2 %    Basophil Rel % 0.6 0.0 - 1.5 %    Neutrophils Absolute 5.66 1.70 - 7.00 10*3/mm3    Lymphocytes Absolute 3.31 (H) 0.70 - 3.10 10*3/mm3    Monocytes Absolute 0.76 0.10 - 0.90 10*3/mm3    Eosinophils Absolute 0.22 0.00 - 0.40 10*3/mm3    Basophils Absolute 0.06 0.00 - 0.20 10*3/mm3    Immature Granulocyte Rel % 0.7 (H) 0.0 - 0.5 %    Immature Grans Absolute 0.07 (H) 0.00 - 0.05 10*3/mm3    nRBC 0.0 0.0 - 0.2 /100 WBC       A1C:  Lab Results - Last 18 Months   Lab Units 03/10/23  1429 02/07/22  0834   HEMOGLOBIN A1C % 5.90* 6.00*     GLUCOSE:  Lab Results - Last 18 Months   Lab Units 03/10/23  1429 01/13/23  0811 02/10/22  1415 02/07/22  0834 11/11/21  1453   GLUCOSE mg/dL 153* 117* 125* 103* 124*     LIPID:  Lab Results - Last 18 Months   Lab Units 01/13/23  0811 02/07/22  0834   CHOLESTEROL mg/dL 240* 221*   LDL CHOL mg/dL 128* 98   HDL CHOL mg/dL 31* 33*   TRIGLYCERIDES mg/dL 449* 538*     PSA:  Lab Results   Component Value Date/Time    PSA 0.382 02/07/2022 08:34 AM       CBC:  Lab Results - Last 18 Months   Lab Units 03/10/23  1429 01/13/23  0811 02/10/22  1415 02/07/22  0834 11/11/21  1453   WBC 10*3/mm3 10.08 11.79* 10.83* 8.03 14.44*   HEMOGLOBIN g/dL 17.9* 17.9* 16.8 17.5 17.6   HEMATOCRIT % 53.7* 53.7* 52.4* 51.3* 52.8*   PLATELETS 10*3/mm3 206 244 206 206 231      BMP/CMP:  Lab Results - Last 18 Months   Lab Units 03/10/23  1429 01/13/23  0811 03/14/22  1127 02/10/22  1415 02/07/22  0834 11/11/21  1453 10/01/21  1447   SODIUM mmol/L 139 142  --  142 141 139  --    POTASSIUM mmol/L 3.5 4.0  --  3.6 3.7 3.8  --    CHLORIDE mmol/L 97* 104  --  106 100 101  --    TOTAL CO2 mmol/L 28.5 26.4  --   --  21.4* 27.8  --    CO2 mmol/L  --   --   --  28.0  --   --   --    GLUCOSE  "mg/dL 153* 117*  --  125* 103* 124*  --    BUN mg/dL 13 15  --  13 10 13  --    CREATININE mg/dL 1.28* 1.22 1.20 1.07 1.23 1.62* 1.20   EGFR IF NONAFRICN AM mL/min/1.73  --   --   --  76 65 47*  --    EGFR IF AFRICN AM mL/min/1.73  --   --   --   --  78 57*  --    EGFR RESULT mL/min/1.73 71.2 75.4  --   --   --   --   --    CALCIUM mg/dL 10.2 9.5  --  9.2 9.4 10.1  --      HEPATIC:  Lab Results - Last 18 Months   Lab Units 03/10/23  1429 01/13/23  0811 02/10/22  1415 02/07/22  0834 11/11/21  1453   ALT (SGPT) U/L 22 38 24 24 37   AST (SGOT) U/L 16 18 15 15 28   ALK PHOS U/L 49 49 53 64 68     Vit D:No results for input(s): [id number] in the last 92983 hours.  THYROID:  Lab Results - Last 18 Months   Lab Units 01/13/23  0811 02/10/22  1415 02/07/22  0834   TSH uIU/mL 3.100 2.010 4.240*   FREE T4 ng/dL  --  0.73* 0.76*     Objective   /86   Pulse 112   Resp 14   Ht 177.8 cm (70\")   Wt 132 kg (292 lb)   SpO2 92%   BMI 41.90 kg/m²       Recent Vitals       2/8/2023 3/10/2023 3/29/2023       BP: 140/80 148/82 148/86     Pulse: 103 112 112     Temp: 97.3 °F (36.3 °C) 97.7 °F (36.5 °C) --     Weight: 131 kg (288 lb 6.4 oz) 129 kg (284 lb 6.4 oz) 132 kg (292 lb)     BMI (Calculated): 41.4 40.8 41.9         Wt Readings from Last 15 Encounters:   03/29/23 1534 132 kg (292 lb)   03/10/23 1501 129 kg (284 lb 6.4 oz)   02/08/23 1036 131 kg (288 lb 6.4 oz)   01/13/23 0840 128 kg (281 lb 3.2 oz)   08/03/22 1355 122 kg (268 lb 3.2 oz)   05/23/22 1436 125 kg (275 lb 3.2 oz)   03/15/22 1104 124 kg (273 lb 6.4 oz)   02/10/22 1352 120 kg (265 lb)   02/08/22 1405 121 kg (266 lb 9.6 oz)   11/11/21 1526 110 kg (243 lb)   09/13/21 1542 108 kg (238 lb)   12/07/20 1150 110 kg (243 lb)   10/28/20 1434 112 kg (248 lb)   03/12/20 1331 122 kg (269 lb 3.2 oz)   03/06/20 1448 124 kg (272 lb 4.8 oz)       Physical Exam  GENERAL:  Well nourished/developed in no acute distress.   SKIN: Turgor excellent, without wound, rash, lesion.  HEENT: " Normal cephalic without trauma.  Pupils equal round reactive to light. Extraocular motions full without nystagmus.   External canals nonobstructive nontender without reddness. Tymphatic membranes vania with talia structures intact.   Oral cavity without growths, exudates, and moist.  Posterior pharynx without mass, obstruction, redness.  No thyromegaly, mass, tenderness, lymphadenopathy and supple.  CV: Regular rhythm.  No murmur, gallop, edema. Posterior pulses intact.  No carotid bruits.  CHEST: No chest wall tenderness or mass.   LUNGS: Symmetric motion with clear to auscultation.   ABD: Soft, nontender without mass.   ORTHO: Symmetric extremities without swelling/point tenderness.  Full gross range of motion.  NEURO: CN 2-12 grossly intact.  Symmetric facies and UE/LE. 3-4/5 strength throughout. 1/4 x bicep equal reflexes.  Nonfocal use extremities. Speech clear.  Intact light touch with monofilament, vibratory sensation with tuning fork; equal toes/distal feet.    PSYCH: Oriented x 3.  Pleasant calm, well kept.  Shallow ? But purposeful/directed conservation with intact short/long gross memory.     Assessment & Plan     1. Class 3 severe obesity due to excess calories with serious comorbidity and body mass index (BMI) of 40.0 to 44.9 in adult (HCC)    2. Chewing tobacco use    3. Cigarette smoker    4. Encounter for screening for lung cancer    5. Personal history of nicotine dependence    6. Elevated hemoglobin (HCC)    7. Long-term current use of testosterone replacement therapy    8. Hypersomnolence    9. TARAH (obstructive sleep apnea)        Issues that are new, uncontrolled, or required review HPI/ROS/exam and decisions beyond wellness today: (ie: requiring the service of a physician and/or not likely to resolve independently without clinical intervention.)   Hct high-needs to quit smoking and have phebotomy with his testosterone tx as these are probably the cause  Tobacco abuse  zari  addiction  Overweight-options reviewed  Hypersomulence; likely from untreated TARAH  TARAH; not treating    Discussions/medical decisions/reviews:  BP over target; without symptoms  Other vitals overweight and worsening  DM/BS 5.9 3.10.23  Lipid  1.13.23; crestor 40  PSA ok 2.7.22  CBC Hct 17.9 3.10.23; same  Renal ok 3.10.23  Liver ok 3.10.23  Vit D sometime  Thyroid TSH ok 1.13.23    Wellness/or annual done   Screening reviewed/updated   Vaccines discussed Tdap at health department  Work with weight loss clinic  Continue to work to be sure you are treating your sleep apnea  Beyond the cpap; work with psych on hypersolumence as they are giving adderall already  Will work with nursing to either stop testosterone or start phebotomy    Data review above:   Rx: reviewed and decisions:   Rx new/changes:   New Medications Ordered This Visit   Medications   • nicotine polacrilex (NICORETTE) 4 MG gum     Sig: Chew 1 each As Needed for Smoking Cessation.     Dispense:  40 each     Refill:  1   • Varenicline Tartrate, Starter, 0.5 MG X 11 & 1 MG X 42 tablet therapy pack     Sig: Take 0.5 mg by mouth As Needed (follow the kit) for up to 30 days. Take 0.5 mg po daily x 3 days, then 0.5 mg po bid x 4 days, then 1 mg po bid     Dispense:  95 each     Refill:  0   • varenicline (CHANTIX) 1 MG tablet     Sig: Take 1 tablet by mouth 2 (Two) Times a Day.     Dispense:  60 tablet     Refill:  2     Orders placed:   LAB/Testing/Referrals: reviewed/orders:   Today:   No orders of the defined types were placed in this encounter.    Chronic/recurrent labs above or change to:   Same   Personal interpretation of tests/procedures:   Procedures   Care affected by social determinants: his level of understanding      There is no immunization history on file for this patient.  We advised/reaffirmed our support/suggestion for staying complete with covid- covid boosters, seasonal flu/yearly and any missing vaccine from list we supplied; we  "suggest contact with local health department office to review missing/needed vaccines and then bring nursing documentation for these vaccines to this office or call this information in. Shingles became \"free\" 1.1.23.      Health maintenance:     Tobacco use reviewed:   Lincoln Ornelas  reports that he quit smoking about 2 years ago. His smoking use included cigarettes. He started smoking about 33 years ago. He has a 25.00 pack-year smoking history. He has been exposed to tobacco smoke. He quit smokeless tobacco use about 4 months ago.  His smokeless tobacco use included chew.. I have educated him on the risk of diseases from using tobacco products such as cancer, COPD and heart disease.     I advised him to quit and he is willing to quit. We have discussed the following method/s for tobacco cessation:  Counseling OTC Cessation Products Prescription Medicaiton.  Together we have set a quit date for uncertain.  He will follow up with me in 4 weeks or sooner to check on his progress.    I spent 8 minutes counseling the patient.       Reminded; as discussed before.     Annual/wellness also done today.  Issues as appropriate discussed as counseling, anticipatory guidance:   Nutrition, physical activity, healthy weight, injury prevention, misuse of tobacco, alcohol and drugs, sexual behavior and STDs, contraception, dental health, mental health, immunizations, screenings as appropriate. As appropriate see AVS.         Assessment and Plan   Diagnoses and all orders for this visit:    1. Class 3 severe obesity due to excess calories with serious comorbidity and body mass index (BMI) of 40.0 to 44.9 in adult (HCC)    2. Chewing tobacco use    3. Cigarette smoker  -     nicotine polacrilex (NICORETTE) 4 MG gum; Chew 1 each As Needed for Smoking Cessation.  Dispense: 40 each; Refill: 1  -     Varenicline Tartrate, Starter, 0.5 MG X 11 & 1 MG X 42 tablet therapy pack; Take 0.5 mg by mouth As Needed (follow the kit) for up " "to 30 days. Take 0.5 mg po daily x 3 days, then 0.5 mg po bid x 4 days, then 1 mg po bid  Dispense: 95 each; Refill: 0  -     varenicline (CHANTIX) 1 MG tablet; Take 1 tablet by mouth 2 (Two) Times a Day.  Dispense: 60 tablet; Refill: 2    4. Encounter for screening for lung cancer  -     nicotine polacrilex (NICORETTE) 4 MG gum; Chew 1 each As Needed for Smoking Cessation.  Dispense: 40 each; Refill: 1  -     Varenicline Tartrate, Starter, 0.5 MG X 11 & 1 MG X 42 tablet therapy pack; Take 0.5 mg by mouth As Needed (follow the kit) for up to 30 days. Take 0.5 mg po daily x 3 days, then 0.5 mg po bid x 4 days, then 1 mg po bid  Dispense: 95 each; Refill: 0  -     varenicline (CHANTIX) 1 MG tablet; Take 1 tablet by mouth 2 (Two) Times a Day.  Dispense: 60 tablet; Refill: 2    5. Personal history of nicotine dependence  -     nicotine polacrilex (NICORETTE) 4 MG gum; Chew 1 each As Needed for Smoking Cessation.  Dispense: 40 each; Refill: 1  -     Varenicline Tartrate, Starter, 0.5 MG X 11 & 1 MG X 42 tablet therapy pack; Take 0.5 mg by mouth As Needed (follow the kit) for up to 30 days. Take 0.5 mg po daily x 3 days, then 0.5 mg po bid x 4 days, then 1 mg po bid  Dispense: 95 each; Refill: 0  -     varenicline (CHANTIX) 1 MG tablet; Take 1 tablet by mouth 2 (Two) Times a Day.  Dispense: 60 tablet; Refill: 2    6. Elevated hemoglobin (HCC)    7. Long-term current use of testosterone replacement therapy    8. Hypersomnolence    9. TARAH (obstructive sleep apnea)           Patient Instructions   Medicare/insurances offer certain visits called \"wellness/annual\" that allows for time to deal with and  review the many aspects of \"being well\" that just might not get mentioned during other visits with your doctor through the year.  This includes things like reviews of health screenings (mammograms, various labs),  weight, exercise, vaccines for just a few examples.      In order to help you with this we wish to make you aware of " a few things for you to consider:    1. Advanced directives.  These are documents used to help direct your care if your health/situation should reach a point that you cannot make your own decisions.  While it is likely you do not currently have a need for these documents now; it is something that we all might face at any time.   The hand outs you are being given today are simply for you to review and use to learn more about these documents and consider them as you wish.      2. Vaccines: Certain vaccines are important after age 50, 60, and 65 and some health situations (for example COPD), require even boosters beyond age 65.  We are happy to review with you your vaccine status and vaccines that might be needed for you at this point:      a. Tetanus.   Like anyone this needs to given every 10 years; sooner for/with lacerations/wounds.   Likely when getting this booster it needs to be a tetanus called Tdap (tetanus mixed with diptheria and pertussis).   Years ago you had this vaccine.  We now know we can lose our immunity to pertussis (a part of this vaccine) and run a risk of catching this.  Now only would this make us ill; but more importantly we can spread this to very young children (and for them it can be a much more dangerous illness).   We call this the grandparent vaccine for this reason.     b. Pneumonia (strept).   This comes now with three brands.   Previously it was recommended to take prevnar and a year later pneumovax 23.  Now pneumonia 20 is replacing these with a one time pneumonia vaccine.   Even if you have had these before; we need to review when and your current health situation/s as you may need boosters and even recently the CDC has made recent/new recommendations for pneumovax.      c. Shingles.  You do not want to catch shingles.  Though you will recover from this; the pain associated with shingles can be severe.  Even if you have had the now older zostavax, or have had shingles; it is  recommended you still get the Shingrix (the new vaccine just available early 2018 shingles vaccine).  A new shingles vaccine (a shot to lower your chance of catching shingles) is now available (shingrix).  This vaccine is the second vaccine created for this purpose; (we have had zostavax for years).  Shingrix provides a much better and longer immunity for shingles than zostavax.  For this and other reasons Shingrix can be started at age 50.  If you have had zostavax in the past; you can still take Shingrix.  Beginning 2023 medicare no longer requires a co-pay for this (ie: it is free)    This vaccine is not paid for in a doctor's office by medicare, medicaid and probably most insurances.  Like zostavax; this is covered in drug stores.  This is a vaccine that if you chose to get you need to get at a drug store that gives vaccines (like [facility], [facility] and [employer].      d. Yearly flu vaccine given from September through April each year (there is a special vaccine for those over 65).     e. Travel vaccines:  If you are one to do international travel; be sure and ask us for any particular unusual vaccines you may need.     f.  Miscellaneous:  If you have certain health situations/disease you may need specific/particular vaccines not give to the general public.     g.  Covid: currently recommended everyone over 6m  The brands Pfizer/Moderna are for 3 total shots as immunity will wane from less than this.  [employer] has a version that comes with recommendations for an initial vaccine and booster after.  I no longer recommend J&J as a first choice as Pfizer/Moderna are readily available.  If you have had an initial J&J I recommend you booster with Moderna.   I strongly recommend covid vaccination; being unvaccinated or partially vaccinated carries real risk for disease and even death.     Because of many restrictions on this office always having all the above vaccines; you may be advised to  work with your local health department to keep up with your individual vaccine needs.    The vaccines we have on record for you include:     There is no immunization history on file for this patient.    If you have record of other vaccines from vaccine clinics, [facility], [facility] and like and want them to show in your chart here; please talk to our nurses about having your vaccine record updated. We would be very pleased if you would take the time to get us this information to keep you main health record here current.     3. Exercise: regular cardio exercise something everyone should consider and try to do; even if health limitations (ie find that exercise UE/LE/cardio that they can tolerate).   Normal weight a goal for everyone (as we discussed)    4. Healthy diet helpful for weight management, illness prevention.     5. If over 50-screening exams include men PSA/rectal exam, women mammograms, and everyone colonoscopy screening for colon cancer.    6. If you use tobacco of any kind or e-products you should stop. We are providing you some information to consider that could make this process easier.      ##################################              Follow up: Return for lab as planned then lab/Dr Ascencio 6m.  Future Appointments   Date Time Provider Department Center   4/6/2023  1:00 PM Tara Tyson APRN [facility] None   4/6/2023  1:00 PM Neena Irene RD [facility] None   4/19/2023  1:30 PM [facility] [place] [facility]   6/13/2023  9:30 AM Santy Zeng APRN [facility]   7/6/2023  8:15 AM [facility] [place] [facility]   7/13/2023  9:30 AM Santy Zeng APRN [facility]

## 2023-03-29 NOTE — PATIENT INSTRUCTIONS
"Medicare/insurances offer certain visits called \"wellness/annual\" that allows for time to deal with and  review the many aspects of \"being well\" that just might not get mentioned during other visits with your doctor through the year.  This includes things like reviews of health screenings (mammograms, various labs),  weight, exercise, vaccines for just a few examples.      In order to help you with this we wish to make you aware of a few things for you to consider:    1. Advanced directives.  These are documents used to help direct your care if your health/situation should reach a point that you cannot make your own decisions.  While it is likely you do not currently have a need for these documents now; it is something that we all might face at any time.   The hand outs you are being given today are simply for you to review and use to learn more about these documents and consider them as you wish.      2. Vaccines: Certain vaccines are important after age 50, 60, and 65 and some health situations (for example COPD), require even boosters beyond age 65.  We are happy to review with you your vaccine status and vaccines that might be needed for you at this point:      a. Tetanus.   Like anyone this needs to given every 10 years; sooner for/with lacerations/wounds.   Likely when getting this booster it needs to be a tetanus called Tdap (tetanus mixed with diptheria and pertussis).   Years ago you had this vaccine.  We now know we can lose our immunity to pertussis (a part of this vaccine) and run a risk of catching this.  Now only would this make us ill; but more importantly we can spread this to very young children (and for them it can be a much more dangerous illness).   We call this the grandparent vaccine for this reason.     b. Pneumonia (strept).   This comes now with three brands.   Previously it was recommended to take prevnar and a year later pneumovax 23.  Now pneumonia 20 is replacing these with a one time " pneumonia vaccine.   Even if you have had these before; we need to review when and your current health situation/s as you may need boosters and even recently the CDC has made recent/new recommendations for pneumovax.      c. Shingles.  You do not want to catch shingles.  Though you will recover from this; the pain associated with shingles can be severe.  Even if you have had the now older zostavax, or have had shingles; it is recommended you still get the Shingrix (the new vaccine just available early 2018 shingles vaccine).  A new shingles vaccine (a shot to lower your chance of catching shingles) is now available (shingrix).  This vaccine is the second vaccine created for this purpose; (we have had zostavax for years).  Shingrix provides a much better and longer immunity for shingles than zostavax.  For this and other reasons Shingrix can be started at age 50.  If you have had zostavax in the past; you can still take Shingrix.  Beginning 2023 medicare no longer requires a co-pay for this (ie: it is free)    This vaccine is not paid for in a doctor's office by medicare, medicaid and probably most insurances.  Like zostavax; this is covered in drug stores.  This is a vaccine that if you chose to get you need to get at a drug store that gives vaccines (like [facility], [facility] and [employer].      d. Yearly flu vaccine given from September through April each year (there is a special vaccine for those over 65).     e. Travel vaccines:  If you are one to do international travel; be sure and ask us for any particular unusual vaccines you may need.     f.  Miscellaneous:  If you have certain health situations/disease you may need specific/particular vaccines not give to the general public.     g.  Covid: currently recommended everyone over 6m  The brands Pfizer/Moderna are for 3 total shots as immunity will wane from less than this.  [employer] has a version that comes with recommendations for an  initial vaccine and booster after.  I no longer recommend J&J as a first choice as Pfizer/Moderna are readily available.  If you have had an initial J&J I recommend you booster with Moderna.   I strongly recommend covid vaccination; being unvaccinated or partially vaccinated carries real risk for disease and even death.     Because of many restrictions on this office always having all the above vaccines; you may be advised to work with your local health department to keep up with your individual vaccine needs.    The vaccines we have on record for you include:     There is no immunization history on file for this patient.    If you have record of other vaccines from vaccine clinics, [facility], [facility] and like and want them to show in your chart here; please talk to our nurses about having your vaccine record updated. We would be very pleased if you would take the time to get us this information to keep you main health record here current.     3. Exercise: regular cardio exercise something everyone should consider and try to do; even if health limitations (ie find that exercise UE/LE/cardio that they can tolerate).   Normal weight a goal for everyone (as we discussed)    4. Healthy diet helpful for weight management, illness prevention.     5. If over 50-screening exams include men PSA/rectal exam, women mammograms, and everyone colonoscopy screening for colon cancer.    6. If you use tobacco of any kind or e-products you should stop. We are providing you some information to consider that could make this process easier.      ##################################

## 2023-03-29 NOTE — TELEPHONE ENCOUNTER
Sent patient a [employer] message to let him know that he could discuss the cpap with Dr. Ascencio at his OV today

## 2023-03-30 DIAGNOSIS — F17.210 CIGARETTE SMOKER: ICD-10-CM

## 2023-03-30 DIAGNOSIS — Z87.891 PERSONAL HISTORY OF NICOTINE DEPENDENCE: ICD-10-CM

## 2023-03-30 DIAGNOSIS — Z12.2 ENCOUNTER FOR SCREENING FOR LUNG CANCER: ICD-10-CM

## 2023-03-30 RX ORDER — ALBUTEROL SULFATE 90 UG/1
1 AEROSOL, METERED RESPIRATORY (INHALATION)
Qty: 8 G | Refills: 1 | Status: SHIPPED | OUTPATIENT
Start: 2023-03-30

## 2023-04-05 ENCOUNTER — TELEPHONE (OUTPATIENT)
Dept: BARIATRICS/WEIGHT MGMT | Facility: CLINIC | Age: 43
End: 2023-04-05
Payer: MEDICARE

## 2023-04-07 ENCOUNTER — TELEPHONE (OUTPATIENT)
Dept: FAMILY MEDICINE CLINIC | Facility: CLINIC | Age: 43
End: 2023-04-07
Payer: MEDICARE

## 2023-04-07 NOTE — TELEPHONE ENCOUNTER
I tried to call the patient to speak to him about the phlebotomy but got no answer and no VM box to LM on.

## 2023-04-14 ENCOUNTER — PATIENT MESSAGE (OUTPATIENT)
Dept: FAMILY MEDICINE CLINIC | Facility: CLINIC | Age: 43
End: 2023-04-14
Payer: MEDICARE

## 2023-04-14 NOTE — TELEPHONE ENCOUNTER
From: Lincoln Ornelas  To: Santy Zeng  Sent: 4/14/2023 10:29 AM CDT  Subject: CPAP Machine    I spoke with Luis Fernando this morning. They have been able to locate Joni's prior sleep study. Before completing the prescription for the machine they need to speak with someone from the office about the pressure amounts. The number is [phone]. Thank you and if you need me to do anything further please let me know.

## 2023-04-21 ENCOUNTER — TELEPHONE (OUTPATIENT)
Dept: FAMILY MEDICINE CLINIC | Facility: CLINIC | Age: 43
End: 2023-04-21

## 2023-04-21 NOTE — TELEPHONE ENCOUNTER
Caller: Lincoln Ornelas    Relationship: Self    Best call back number: [phone]    What is the best time to reach you: SOON PLEASE    Who are you requesting to speak with (clinical staff, provider,  specific staff member): PROVIDER OR CLINICAL STAFF        What was the call regarding: PATIENT REQUESTING A CALL BACK TO CHECK ON THE STATUS OF THE PAPERWORK FOR HIS CPAP   PATIENT STATES HE HAS NOT SLEPT FOR TWO NIGHTS NOW BECAUSE HE IS AFRAID TO FALL ASLEEP    Do you require a callback:  YES

## 2023-04-21 NOTE — TELEPHONE ENCOUNTER
Checking in the office for requested form to be filled out & will fax to [employer] when found

## 2023-05-09 ENCOUNTER — OFFICE VISIT (OUTPATIENT)
Dept: FAMILY MEDICINE CLINIC | Facility: CLINIC | Age: 43
End: 2023-05-09
Payer: MEDICARE

## 2023-05-09 VITALS
TEMPERATURE: 97.3 F | BODY MASS INDEX: 38.74 KG/M2 | DIASTOLIC BLOOD PRESSURE: 88 MMHG | HEIGHT: 70 IN | WEIGHT: 270.6 LBS | HEART RATE: 104 BPM | SYSTOLIC BLOOD PRESSURE: 142 MMHG | RESPIRATION RATE: 18 BRPM | OXYGEN SATURATION: 96 %

## 2023-05-09 DIAGNOSIS — G47.33 OSA (OBSTRUCTIVE SLEEP APNEA): ICD-10-CM

## 2023-05-09 DIAGNOSIS — Z00.00 WELLNESS EXAMINATION: ICD-10-CM

## 2023-05-09 DIAGNOSIS — I10 ESSENTIAL HYPERTENSION: ICD-10-CM

## 2023-05-09 DIAGNOSIS — R60.1 GENERALIZED EDEMA: ICD-10-CM

## 2023-05-09 DIAGNOSIS — D58.2 ELEVATED HEMOGLOBIN: ICD-10-CM

## 2023-05-09 DIAGNOSIS — E29.1 HYPOGONADISM IN MALE: Primary | ICD-10-CM

## 2023-05-09 DIAGNOSIS — R60.9 PERIPHERAL EDEMA: ICD-10-CM

## 2023-05-09 RX ORDER — HYDROCHLOROTHIAZIDE 25 MG/1
25 TABLET ORAL DAILY
Qty: 30 TABLET | Refills: 5 | Status: SHIPPED | OUTPATIENT
Start: 2023-05-09

## 2023-05-09 NOTE — PROGRESS NOTES
Subjective   Chief Complaint:  Leg swelling.     History of Present Illness:  This 42 y.o. male was seen in the office today.      The patient reports has not been seen by the bariatric specialist. He reports he decided to trial the keto diet and was able to lose 22 pounds since his last visit, although he feels his weight has plateaued. He reports he has been walking in an effort to burn calories and has been on his feet frequently this week. The patient reports an onset of swelling to his bilateral lower extremities, left greater than right. He states the swelling is especially noticeable when lying down at night, but he denies any lower extremity pain. He reports a history of hardware to the medial aspect of his left ankle consisting of 3 screws and a plate that may be a contributing factor to his swelling.    The patient reports he was previously being prescribed testosterone 100 mg of testosterone twice weekly by a provider he is following with over the internet via phone calls. However, he discontinued this after being informed by our office that his level was significantly elevated. He notes he discontinued taking his testosterone for 2 weeks, but did receive an injection yesterday, 05/08/2023, although he adds that he is reducing his dosage to 100 mg weekly. The patient denies any urinary flow issues. He denies obtaining therapeutic phlebotomy as he was unsure of where to go for this treatment. He is open to obtaining an ultrasound to rule out any blood clots.     Additionally, the patient reports he has a bruise near his groin, but denies any prior injury to the area. He denies any deep thigh or deep calf pain.     The patient's blood pressure is slightly elevated today and his heart rate is in the 100 bpm range. He reports his resting heart rate is low compared to normal at 68 bpm to 72 bpm, although, he adds that his heart rate easily increases to 100 bpm to 114 bpm with movement.     The patient reports  "his sleep is improved this week, which could be contributing to his decreased heart rate. He reports he received his CPAP machine, but he is being refitted for a full mask today as he was not tolerating his previous nasal mask and his machine was shutting off multiple times during the night secondary to the patient breathing through is mouth.     No Known Allergies   Current Outpatient Medications on File Prior to Visit   Medication Sig   • acetaZOLAMIDE (DIAMOX) 250 MG tablet Take 1 tablet by mouth 3 (Three) Times a Day.   • albuterol sulfate  (90 Base) MCG/ACT inhaler Inhale 1 puff 4 (Four) Times a Day.   • ALPRAZolam (XANAX) 2 MG tablet    • amphetamine-dextroamphetamine (ADDERALL) 10 MG tablet    • amphetamine-dextroamphetamine (ADDERALL) 20 MG tablet Take 1 tablet by mouth 3 (Three) Times a Day.   • Brexpiprazole 4 MG tablet Take 1 tablet by mouth.   • buprenorphine (SUBUTEX) 8 MG sublingual tablet SL tablet Place 1 tablet under the tongue Daily.   • buPROPion (WELLBUTRIN) 75 MG tablet Take 2 tablets by mouth Every Morning. Dr Deluca   • clotrimazole-betamethasone (Lotrisone) 1-0.05 % cream Apply 1 application topically to the appropriate area as directed Daily As Needed (dry scalp).   • hydrOXYzine pamoate (VISTARIL) 25 MG capsule Take 1 capsule by mouth Every 6 (Six) Hours As Needed for Itching.   • ketoconazole (NIZORAL) 2 % shampoo Apply  topically to the appropriate area as directed As Needed for Irritation or Dandruff.   • lisinopril (PRINIVIL,ZESTRIL) 10 MG tablet Take 1 tablet by mouth every night at bedtime.   • Lurasidone HCl 120 MG tablet Take 1 tablet by mouth Daily. Dr Deluca   • metFORMIN ER (GLUCOPHAGE-XR) 750 MG 24 hr tablet Take 1 tablet by mouth Daily With Breakfast.   • Needle, Disp, 18G X 1\" misc Use weekly as indicated   • nicotine polacrilex (NICORETTE) 4 MG gum Chew 1 each As Needed for Smoking Cessation.   • OXcarbazepine (TRILEPTAL) 300 MG tablet Take 2 tablets by mouth 3 (Three) " "Times a Day.   • rosuvastatin (Crestor) 40 MG tablet Take 1 tablet by mouth Every Night.   • Saphris 10 MG sublingual tablet sublingual tablet Place 1 tablet under the tongue Every Night.   • sildenafil (VIAGRA) 100 MG tablet TAKE ONE TABLET BY MOUTH DAILY AS NEEDED FOR ERECTILE DYSFUNCTION.   • Syringe/Needle, Disp, (B-D 3CC LUER-COBY SYR 21GX1\") 21G X 1\" 3 ML misc USE ONCE WEEKLY AS DIRECTED   • testosterone cypionate (DEPO-TESTOSTERONE) 100 MG/ML solution injection    • varenicline (CHANTIX) 1 MG tablet Take 1 tablet by mouth 2 (Two) Times a Day.   • [DISCONTINUED] hydroCHLOROthiazide (HYDRODIURIL) 12.5 MG tablet Take 1 tablet by mouth Daily.     No current facility-administered medications on file prior to visit.      Past Medical, Surgical, Social, and Family History:  Past Medical History:   Diagnosis Date   • Anxiety    • Depression    • Hypertension    • Intracranial hypertension    • Mood disorder    • Pituitary mass    • PTSD (post-traumatic stress disorder)    • Spinal headache      Past Surgical History:   Procedure Laterality Date   • NO PAST SURGERIES     • VASECTOMY N/A 2018    Procedure: VASECTOMY;  Surgeon: Ion Yanez MD;  Location: [facility];  Service: Urology     Social History     Socioeconomic History   • Marital status:      Spouse name: Keely   • Number of children: 2   • Years of education: 12   Tobacco Use   • Smoking status: Former     Packs/day: 1.00     Years: 25.00     Pack years: 25.00     Types: Cigarettes     Start date: 1989     Quit date: 10/21/2020     Years since quittin.5     Passive exposure: Past   • Smokeless tobacco: Former     Types: Chew     Quit date: 2022   Substance and Sexual Activity   • Alcohol use: No   • Drug use: Yes     Types: Marijuana   • Sexual activity: Yes     Partners: Female     Birth control/protection: None     Comment: wife     Family History   Problem Relation Age of Onset   • Gonadal disorder Neg Hx        Objective " "  Physical Exam  Constitutional:       General: He is not in acute distress.     Appearance: He is obese.   Neck:      Vascular: No carotid bruit.   Cardiovascular:      Rate and Rhythm: Normal rate and regular rhythm.      Pulses: Normal pulses.           Dorsalis pedis pulses are 2+ on the right side and 2+ on the left side.        Posterior tibial pulses are 2+ on the right side and 2+ on the left side.      Heart sounds: No murmur heard.    No friction rub. No gallop.   Pulmonary:      Effort: Pulmonary effort is normal. No respiratory distress.      Breath sounds: Normal breath sounds. No wheezing or rhonchi.   Musculoskeletal:      Right lower leg: Edema (Bilateral, nonpitting peripheral edema present.) present.      Left lower leg: Edema (Bilateral, nonpitting peripheral edema present.) present.      Comments: No palpable calf tenderness. Bilateral calves are soft and nontender.   Neurological:      Mental Status: He is alert.     /88 (BP Location: Left arm, Patient Position: Sitting, Cuff Size: Adult)   Pulse 104   Temp 97.3 °F (36.3 °C) (Infrared)   Resp 18   Ht 177.8 cm (70\")   Wt 123 kg (270 lb 9.6 oz)   SpO2 96%   BMI 38.83 kg/m²     Prior Visit Notes/Records, Lab, Imaging, and Diagnostic Results Reviewed:  CBC:  Lab Results - Last 18 Months   Lab Units 04/19/23  1241 03/10/23  1429 01/13/23  0811 02/10/22  1415 02/07/22  0834 11/11/21  1453   WBC 10*3/mm3 11.02* 10.08 11.79* 10.83* 8.03 14.44*   HEMOGLOBIN g/dL 18.5* 17.9* 17.9* 16.8 17.5 17.6   HEMATOCRIT % 56.4* 53.7* 53.7* 52.4* 51.3* 52.8*   PLATELETS 10*3/mm3 193 206 244 206 206 231      Chemistry:  Lab Results - Last 18 Months   Lab Units 03/10/23  1429 01/13/23  0811 03/14/22  1127 02/10/22  1415 02/07/22  0834 11/11/21  1453   SODIUM mmol/L 139 142  --  142 141 139   POTASSIUM mmol/L 3.5 4.0  --  3.6 3.7 3.8   CHLORIDE mmol/L 97* 104  --  106 100 101   TOTAL CO2 mmol/L 28.5 26.4  --   --  21.4* 27.8   CO2 mmol/L  --   --   --  28.0  " --   --    GLUCOSE mg/dL 153* 117*  --  125* 103* 124*   BUN mg/dL 13 15  --  13 10 13   CREATININE mg/dL 1.28* 1.22 1.20 1.07 1.23 1.62*   EGFR IF NONAFRICN AM mL/min/1.73  --   --   --  76 65 47*   EGFR IF AFRICN AM mL/min/1.73  --   --   --   --  78 57*   EGFR RESULT mL/min/1.73 71.2 75.4  --   --   --   --    CALCIUM mg/dL 10.2 9.5  --  9.2 9.4 10.1     BMI Trend:  BMI Readings from Last 10 Encounters:   05/09/23 38.83 kg/m²   03/29/23 41.90 kg/m²   03/10/23 40.81 kg/m²   02/08/23 41.38 kg/m²   01/13/23 40.35 kg/m²   08/03/22 38.48 kg/m²   05/23/22 39.49 kg/m²   03/15/22 39.23 kg/m²   02/10/22 38.02 kg/m²   02/08/22 38.36 kg/m²     Assessment & Plan   Diagnoses and all orders for this visit:    1. Hypogonadism in male (Primary)  -     Comprehensive Metabolic Panel  -     Cancel: Testosterone; Future  -     Testosterone    2. Elevated hemoglobin  -     CBC & Differential    3. Wellness examination  -     Lipid Panel  -     TSH    4. Essential hypertension  -     hydroCHLOROthiazide (HYDRODIURIL) 25 MG tablet; Take 1 tablet by mouth Daily.  Dispense: 30 tablet; Refill: 5    5. TARAH (obstructive sleep apnea)    6. Peripheral edema  -     US Venous Doppler Lower Extremity Bilateral (duplex); Future    7. Generalized edema  -     US Venous Doppler Lower Extremity Bilateral (duplex); Future    Discussion:  Advised and educated plan of care. Advised patient, per Dr. Ascencio, we are unable to prescribe him testosterone at this time. Advised updated laboratory studies to follow. Recommended therapeutic phlebotomy if indicated pending today's laboratory studies. Will proceed with a STAT ultrasound of bilateral lower extremities. Will increase HCTZ from 12.5 mg to 25 mg. Over-the-counter knee-high support hose is recommended to assist with lower extremity swelling related to testosterone use. Patient will follow up in 3 months.     Class 2 Severe Obesity (BMI >=35 and <=39.9). Obesity-related health conditions include the  following: hypertension. Obesity is improving with lifestyle modifications. BMI is is above average; BMI management plan is completed. We discussed portion control and increasing exercise.    Follow-up:  Return in about 3 months (around 8/9/2023) for Follow-Up.    Transcribed from ambient dictation for HI Bird by Reyna Lamar.  05/09/23   12:57 CDT    Patient or patient representative verbalized consent to the visit recording.  I have personally performed the services described in this document as transcribed by the above individual, and it is both accurate and complete.    Electronically signed by Santy Zeng, 05/09/23, 12:57 PM CDT.

## 2023-05-10 LAB
ALBUMIN SERPL-MCNC: 4.7 G/DL (ref 3.5–5.2)
ALBUMIN/GLOB SERPL: 2.1 G/DL
ALP SERPL-CCNC: 47 U/L (ref 39–117)
ALT SERPL-CCNC: 29 U/L (ref 1–41)
AST SERPL-CCNC: 17 U/L (ref 1–40)
BASOPHILS # BLD AUTO: 0.06 10*3/MM3 (ref 0–0.2)
BASOPHILS NFR BLD AUTO: 0.7 % (ref 0–1.5)
BILIRUB SERPL-MCNC: 0.2 MG/DL (ref 0–1.2)
BUN SERPL-MCNC: 12 MG/DL (ref 6–20)
BUN/CREAT SERPL: 9.8 (ref 7–25)
CALCIUM SERPL-MCNC: 10.4 MG/DL (ref 8.6–10.5)
CHLORIDE SERPL-SCNC: 102 MMOL/L (ref 98–107)
CHOLEST SERPL-MCNC: 165 MG/DL (ref 0–200)
CO2 SERPL-SCNC: 27.8 MMOL/L (ref 22–29)
CREAT SERPL-MCNC: 1.23 MG/DL (ref 0.76–1.27)
EGFRCR SERPLBLD CKD-EPI 2021: 74.7 ML/MIN/1.73
EOSINOPHIL # BLD AUTO: 0.17 10*3/MM3 (ref 0–0.4)
EOSINOPHIL NFR BLD AUTO: 2 % (ref 0.3–6.2)
ERYTHROCYTE [DISTWIDTH] IN BLOOD BY AUTOMATED COUNT: 12.2 % (ref 12.3–15.4)
GLOBULIN SER CALC-MCNC: 2.2 GM/DL
GLUCOSE SERPL-MCNC: 101 MG/DL (ref 65–99)
HCT VFR BLD AUTO: 36.5 % (ref 37.5–51)
HDLC SERPL-MCNC: 30 MG/DL (ref 40–60)
HGB BLD-MCNC: 18.6 G/DL (ref 13–17.7)
IMM GRANULOCYTES # BLD AUTO: 0.03 10*3/MM3 (ref 0–0.05)
IMM GRANULOCYTES NFR BLD AUTO: 0.3 % (ref 0–0.5)
LDLC SERPL CALC-MCNC: 92 MG/DL (ref 0–100)
LYMPHOCYTES # BLD AUTO: 2.16 10*3/MM3 (ref 0.7–3.1)
LYMPHOCYTES NFR BLD AUTO: 25 % (ref 19.6–45.3)
MCH RBC QN AUTO: 28.7 PG (ref 26.6–33)
MCHC RBC AUTO-ENTMCNC: 31.8 G/DL (ref 31.5–35.7)
MCV RBC AUTO: 90.1 FL (ref 79–97)
MONOCYTES # BLD AUTO: 0.82 10*3/MM3 (ref 0.1–0.9)
MONOCYTES NFR BLD AUTO: 9.5 % (ref 5–12)
NEUTROPHILS # BLD AUTO: 5.41 10*3/MM3 (ref 1.7–7)
NEUTROPHILS NFR BLD AUTO: 62.5 % (ref 42.7–76)
NRBC BLD AUTO-RTO: 0 /100 WBC (ref 0–0.2)
PLATELET # BLD AUTO: 226 10*3/MM3 (ref 140–450)
POTASSIUM SERPL-SCNC: 3.9 MMOL/L (ref 3.5–5.2)
PROT SERPL-MCNC: 6.9 G/DL (ref 6–8.5)
RBC # BLD AUTO: 6.48 10*6/MM3 (ref 4.14–5.8)
SODIUM SERPL-SCNC: 140 MMOL/L (ref 136–145)
TESTOST SERPL-MCNC: 1325 NG/DL (ref 264–916)
TRIGL SERPL-MCNC: 251 MG/DL (ref 0–150)
TSH SERPL DL<=0.005 MIU/L-ACNC: 3.06 UIU/ML (ref 0.27–4.2)
VLDLC SERPL CALC-MCNC: 43 MG/DL (ref 5–40)
WBC # BLD AUTO: 8.65 10*3/MM3 (ref 3.4–10.8)

## 2023-05-10 NOTE — PROGRESS NOTES
Please call result - h/h still up - I had already sent an order for a therapeutic phlebotomy to [facility].  Can we approach patient again about getting this done and help him get this set up?    Electronically signed by HI Bird, 05/10/23, 9:18 AM CDT.

## 2023-05-11 DIAGNOSIS — E29.1 HYPOGONADISM IN MALE: Primary | ICD-10-CM

## 2023-05-11 DIAGNOSIS — E83.118 OTHER HEMOCHROMATOSIS: ICD-10-CM

## 2023-05-18 ENCOUNTER — HOSPITAL ENCOUNTER (INPATIENT)
Age: 43
LOS: 1 days | Discharge: LEFT BEFORE DISCHARGE | End: 2023-05-19
Attending: EMERGENCY MEDICINE | Admitting: STUDENT IN AN ORGANIZED HEALTH CARE EDUCATION/TRAINING PROGRAM
Payer: COMMERCIAL

## 2023-05-18 ENCOUNTER — APPOINTMENT (OUTPATIENT)
Dept: CT IMAGING | Age: 43
End: 2023-05-18
Payer: COMMERCIAL

## 2023-05-18 ENCOUNTER — TELEMEDICINE (OUTPATIENT)
Dept: FAMILY MEDICINE CLINIC | Facility: CLINIC | Age: 43
End: 2023-05-18
Payer: MEDICARE

## 2023-05-18 ENCOUNTER — APPOINTMENT (OUTPATIENT)
Dept: GENERAL RADIOLOGY | Age: 43
End: 2023-05-18
Payer: COMMERCIAL

## 2023-05-18 DIAGNOSIS — E87.6 HYPOKALEMIA: ICD-10-CM

## 2023-05-18 DIAGNOSIS — E29.1 HYPOGONADISM IN MALE: Primary | ICD-10-CM

## 2023-05-18 DIAGNOSIS — J18.9 PNEUMONIA DUE TO INFECTIOUS ORGANISM, UNSPECIFIED LATERALITY, UNSPECIFIED PART OF LUNG: ICD-10-CM

## 2023-05-18 DIAGNOSIS — R41.82 ALTERED MENTAL STATUS, UNSPECIFIED ALTERED MENTAL STATUS TYPE: Primary | ICD-10-CM

## 2023-05-18 DIAGNOSIS — I63.9 CEREBROVASCULAR ACCIDENT (CVA), UNSPECIFIED MECHANISM (HCC): ICD-10-CM

## 2023-05-18 DIAGNOSIS — R47.9 DIFFICULTY WITH SPEECH: ICD-10-CM

## 2023-05-18 PROBLEM — R29.90 STROKE-LIKE SYMPTOMS: Status: ACTIVE | Noted: 2023-05-18

## 2023-05-18 PROBLEM — J69.0 ASPIRATION PNEUMONIA (HCC): Status: ACTIVE | Noted: 2023-05-18

## 2023-05-18 LAB
ALBUMIN SERPL-MCNC: 4.7 G/DL (ref 3.5–5.2)
ALP SERPL-CCNC: 53 U/L (ref 40–130)
ALT SERPL-CCNC: 38 U/L (ref 5–41)
ANION GAP SERPL CALCULATED.3IONS-SCNC: 10 MMOL/L (ref 7–19)
APAP SERPL-MCNC: <5 UG/ML (ref 10–30)
AST SERPL-CCNC: 26 U/L (ref 5–40)
BASOPHILS # BLD: 0.1 K/UL (ref 0–0.2)
BASOPHILS NFR BLD: 0.7 % (ref 0–1)
BILIRUB SERPL-MCNC: 0.4 MG/DL (ref 0.2–1.2)
BUN SERPL-MCNC: 15 MG/DL (ref 6–20)
CALCIUM SERPL-MCNC: 9.8 MG/DL (ref 8.6–10)
CHLORIDE SERPL-SCNC: 98 MMOL/L (ref 98–111)
CO2 SERPL-SCNC: 30 MMOL/L (ref 22–29)
CREAT SERPL-MCNC: 1.1 MG/DL (ref 0.5–1.2)
EKG P AXIS: 69 DEGREES
EKG P-R INTERVAL: 148 MS
EKG Q-T INTERVAL: 338 MS
EKG QRS DURATION: 90 MS
EKG QTC CALCULATION (BAZETT): 401 MS
EKG T AXIS: 47 DEGREES
EOSINOPHIL # BLD: 0.2 K/UL (ref 0–0.6)
EOSINOPHIL NFR BLD: 1.9 % (ref 0–5)
ERYTHROCYTE [DISTWIDTH] IN BLOOD BY AUTOMATED COUNT: 12.3 % (ref 11.5–14.5)
ETHANOLAMINE SERPL-MCNC: <10 MG/DL (ref 0–0.08)
GLUCOSE BLD-MCNC: 115 MG/DL (ref 70–99)
GLUCOSE SERPL-MCNC: 137 MG/DL (ref 74–109)
HBA1C MFR BLD: 6.1 % (ref 4–6)
HCT VFR BLD AUTO: 61.1 % (ref 42–52)
HGB BLD-MCNC: 19.6 G/DL (ref 14–18)
IMM GRANULOCYTES # BLD: 0 K/UL
INR PPP: 0.94 (ref 0.88–1.18)
LACTATE BLDV-SCNC: 1 MMOL/L (ref 0.5–1.9)
LYMPHOCYTES # BLD: 3 K/UL (ref 1.1–4.5)
LYMPHOCYTES NFR BLD: 30.3 % (ref 20–40)
MAGNESIUM SERPL-MCNC: 2.1 MG/DL (ref 1.6–2.6)
MCH RBC QN AUTO: 29.3 PG (ref 27–31)
MCHC RBC AUTO-ENTMCNC: 32.1 G/DL (ref 33–37)
MCV RBC AUTO: 91.2 FL (ref 80–94)
MONOCYTES # BLD: 0.8 K/UL (ref 0–0.9)
MONOCYTES NFR BLD: 8.6 % (ref 0–10)
NEUTROPHILS # BLD: 5.7 K/UL (ref 1.5–7.5)
NEUTS SEG NFR BLD: 58.3 % (ref 50–65)
PERFORMED ON: ABNORMAL
PLATELET # BLD AUTO: 229 K/UL (ref 130–400)
PMV BLD AUTO: 11.1 FL (ref 9.4–12.4)
POTASSIUM SERPL-SCNC: 3.1 MMOL/L (ref 3.5–5)
PROT SERPL-MCNC: 7.2 G/DL (ref 6.6–8.7)
PROTHROMBIN TIME: 12.2 SEC (ref 12–14.6)
RBC # BLD AUTO: 6.7 M/UL (ref 4.7–6.1)
SALICYLATES SERPL-MCNC: <0.3 MG/DL (ref 3–10)
SODIUM SERPL-SCNC: 138 MMOL/L (ref 136–145)
TROPONIN T SERPL-MCNC: <0.01 NG/ML (ref 0–0.03)
TSH SERPL DL<=0.005 MIU/L-ACNC: 1.52 UIU/ML (ref 0.35–5.5)
VIT B12 SERPL-MCNC: 737 PG/ML (ref 211–946)
WBC # BLD AUTO: 9.7 K/UL (ref 4.8–10.8)

## 2023-05-18 PROCEDURE — 70450 CT HEAD/BRAIN W/O DYE: CPT

## 2023-05-18 PROCEDURE — 6370000000 HC RX 637 (ALT 250 FOR IP): Performed by: NURSE PRACTITIONER

## 2023-05-18 PROCEDURE — 94760 N-INVAS EAR/PLS OXIMETRY 1: CPT

## 2023-05-18 PROCEDURE — 36415 COLL VENOUS BLD VENIPUNCTURE: CPT

## 2023-05-18 PROCEDURE — 2580000003 HC RX 258: Performed by: NURSE PRACTITIONER

## 2023-05-18 PROCEDURE — 83735 ASSAY OF MAGNESIUM: CPT

## 2023-05-18 PROCEDURE — 83036 HEMOGLOBIN GLYCOSYLATED A1C: CPT

## 2023-05-18 PROCEDURE — 84443 ASSAY THYROID STIM HORMONE: CPT

## 2023-05-18 PROCEDURE — 2500000003 HC RX 250 WO HCPCS: Performed by: EMERGENCY MEDICINE

## 2023-05-18 PROCEDURE — 95819 EEG AWAKE AND ASLEEP: CPT | Performed by: PSYCHIATRY & NEUROLOGY

## 2023-05-18 PROCEDURE — 80053 COMPREHEN METABOLIC PANEL: CPT

## 2023-05-18 PROCEDURE — 70498 CT ANGIOGRAPHY NECK: CPT

## 2023-05-18 PROCEDURE — 82962 GLUCOSE BLOOD TEST: CPT

## 2023-05-18 PROCEDURE — 85025 COMPLETE CBC W/AUTO DIFF WBC: CPT

## 2023-05-18 PROCEDURE — 96375 TX/PRO/DX INJ NEW DRUG ADDON: CPT

## 2023-05-18 PROCEDURE — 80143 DRUG ASSAY ACETAMINOPHEN: CPT

## 2023-05-18 PROCEDURE — 99223 1ST HOSP IP/OBS HIGH 75: CPT | Performed by: PSYCHIATRY & NEUROLOGY

## 2023-05-18 PROCEDURE — 87040 BLOOD CULTURE FOR BACTERIA: CPT

## 2023-05-18 PROCEDURE — 96365 THER/PROPH/DIAG IV INF INIT: CPT

## 2023-05-18 PROCEDURE — 82077 ASSAY SPEC XCP UR&BREATH IA: CPT

## 2023-05-18 PROCEDURE — 1160F RVW MEDS BY RX/DR IN RCRD: CPT | Performed by: NURSE PRACTITIONER

## 2023-05-18 PROCEDURE — 99285 EMERGENCY DEPT VISIT HI MDM: CPT

## 2023-05-18 PROCEDURE — 6360000002 HC RX W HCPCS: Performed by: EMERGENCY MEDICINE

## 2023-05-18 PROCEDURE — 6360000002 HC RX W HCPCS: Performed by: STUDENT IN AN ORGANIZED HEALTH CARE EDUCATION/TRAINING PROGRAM

## 2023-05-18 PROCEDURE — 83605 ASSAY OF LACTIC ACID: CPT

## 2023-05-18 PROCEDURE — 85610 PROTHROMBIN TIME: CPT

## 2023-05-18 PROCEDURE — 96368 THER/DIAG CONCURRENT INF: CPT

## 2023-05-18 PROCEDURE — 6360000002 HC RX W HCPCS: Performed by: NURSE PRACTITIONER

## 2023-05-18 PROCEDURE — 1159F MED LIST DOCD IN RCRD: CPT | Performed by: NURSE PRACTITIONER

## 2023-05-18 PROCEDURE — 95816 EEG AWAKE AND DROWSY: CPT

## 2023-05-18 PROCEDURE — 1210000000 HC MED SURG R&B

## 2023-05-18 PROCEDURE — 93005 ELECTROCARDIOGRAM TRACING: CPT | Performed by: EMERGENCY MEDICINE

## 2023-05-18 PROCEDURE — 80179 DRUG ASSAY SALICYLATE: CPT

## 2023-05-18 PROCEDURE — 71045 X-RAY EXAM CHEST 1 VIEW: CPT

## 2023-05-18 PROCEDURE — 2580000003 HC RX 258: Performed by: EMERGENCY MEDICINE

## 2023-05-18 PROCEDURE — 82607 VITAMIN B-12: CPT

## 2023-05-18 PROCEDURE — 93010 ELECTROCARDIOGRAM REPORT: CPT | Performed by: INTERNAL MEDICINE

## 2023-05-18 PROCEDURE — 6360000004 HC RX CONTRAST MEDICATION: Performed by: EMERGENCY MEDICINE

## 2023-05-18 PROCEDURE — 94640 AIRWAY INHALATION TREATMENT: CPT

## 2023-05-18 PROCEDURE — 99213 OFFICE O/P EST LOW 20 MIN: CPT | Performed by: NURSE PRACTITIONER

## 2023-05-18 PROCEDURE — 84484 ASSAY OF TROPONIN QUANT: CPT

## 2023-05-18 RX ORDER — IPRATROPIUM BROMIDE AND ALBUTEROL SULFATE 2.5; .5 MG/3ML; MG/3ML
1 SOLUTION RESPIRATORY (INHALATION)
Status: DISCONTINUED | OUTPATIENT
Start: 2023-05-18 | End: 2023-05-20 | Stop reason: HOSPADM

## 2023-05-18 RX ORDER — BUPROPION HYDROCHLORIDE 75 MG/1
150 TABLET ORAL DAILY
Status: DISCONTINUED | OUTPATIENT
Start: 2023-05-18 | End: 2023-05-20 | Stop reason: HOSPADM

## 2023-05-18 RX ORDER — OXCARBAZEPINE 300 MG/1
600 TABLET, FILM COATED ORAL 3 TIMES DAILY
COMMUNITY

## 2023-05-18 RX ORDER — POTASSIUM CHLORIDE 7.45 MG/ML
10 INJECTION INTRAVENOUS
Status: COMPLETED | OUTPATIENT
Start: 2023-05-18 | End: 2023-05-18

## 2023-05-18 RX ORDER — BUPRENORPHINE HYDROCHLORIDE 8 MG/1
8 TABLET SUBLINGUAL 3 TIMES DAILY PRN
Status: DISCONTINUED | OUTPATIENT
Start: 2023-05-18 | End: 2023-05-18

## 2023-05-18 RX ORDER — LABETALOL HYDROCHLORIDE 5 MG/ML
10 INJECTION, SOLUTION INTRAVENOUS EVERY 10 MIN PRN
Status: DISCONTINUED | OUTPATIENT
Start: 2023-05-18 | End: 2023-05-20 | Stop reason: HOSPADM

## 2023-05-18 RX ORDER — SILDENAFIL 100 MG/1
100 TABLET, FILM COATED ORAL PRN
COMMUNITY

## 2023-05-18 RX ORDER — SODIUM CHLORIDE 0.9 % (FLUSH) 0.9 %
5-40 SYRINGE (ML) INJECTION EVERY 12 HOURS SCHEDULED
Status: DISCONTINUED | OUTPATIENT
Start: 2023-05-18 | End: 2023-05-20 | Stop reason: HOSPADM

## 2023-05-18 RX ORDER — ACETAMINOPHEN 650 MG/1
650 SUPPOSITORY RECTAL EVERY 6 HOURS PRN
Status: DISCONTINUED | OUTPATIENT
Start: 2023-05-18 | End: 2023-05-20 | Stop reason: HOSPADM

## 2023-05-18 RX ORDER — ASPIRIN 300 MG/1
300 SUPPOSITORY RECTAL DAILY
Status: DISCONTINUED | OUTPATIENT
Start: 2023-05-18 | End: 2023-05-20 | Stop reason: HOSPADM

## 2023-05-18 RX ORDER — VARENICLINE TARTRATE 1 MG/1
1 TABLET, FILM COATED ORAL 2 TIMES DAILY
COMMUNITY

## 2023-05-18 RX ORDER — BUPRENORPHINE HYDROCHLORIDE 8 MG/1
8 TABLET SUBLINGUAL 3 TIMES DAILY
COMMUNITY

## 2023-05-18 RX ORDER — BUPROPION HYDROCHLORIDE 75 MG/1
150 TABLET ORAL DAILY
COMMUNITY

## 2023-05-18 RX ORDER — BUPRENORPHINE HYDROCHLORIDE AND NALOXONE HYDROCHLORIDE DIHYDRATE 8; 2 MG/1; MG/1
3 TABLET SUBLINGUAL PRN
Status: ON HOLD | COMMUNITY
End: 2023-05-18

## 2023-05-18 RX ORDER — ACETAMINOPHEN 325 MG/1
650 TABLET ORAL EVERY 6 HOURS PRN
Status: DISCONTINUED | OUTPATIENT
Start: 2023-05-18 | End: 2023-05-20 | Stop reason: HOSPADM

## 2023-05-18 RX ORDER — ONDANSETRON 2 MG/ML
4 INJECTION INTRAMUSCULAR; INTRAVENOUS EVERY 6 HOURS PRN
Status: DISCONTINUED | OUTPATIENT
Start: 2023-05-18 | End: 2023-05-20 | Stop reason: HOSPADM

## 2023-05-18 RX ORDER — DEXTROAMPHETAMINE SACCHARATE, AMPHETAMINE ASPARTATE, DEXTROAMPHETAMINE SULFATE AND AMPHETAMINE SULFATE 2.5; 2.5; 2.5; 2.5 MG/1; MG/1; MG/1; MG/1
10 TABLET ORAL DAILY
COMMUNITY

## 2023-05-18 RX ORDER — METFORMIN HYDROCHLORIDE 750 MG/1
750 TABLET, EXTENDED RELEASE ORAL
COMMUNITY

## 2023-05-18 RX ORDER — CLOTRIMAZOLE AND BETAMETHASONE DIPROPIONATE 10; .64 MG/G; MG/G
CREAM TOPICAL DAILY PRN
COMMUNITY

## 2023-05-18 RX ORDER — ATORVASTATIN CALCIUM 80 MG/1
80 TABLET, FILM COATED ORAL NIGHTLY
Status: DISCONTINUED | OUTPATIENT
Start: 2023-05-18 | End: 2023-05-20 | Stop reason: HOSPADM

## 2023-05-18 RX ORDER — METRONIDAZOLE 500 MG/100ML
500 INJECTION, SOLUTION INTRAVENOUS ONCE
Status: COMPLETED | OUTPATIENT
Start: 2023-05-18 | End: 2023-05-18

## 2023-05-18 RX ORDER — KETOCONAZOLE 20 MG/ML
SHAMPOO TOPICAL DAILY PRN
COMMUNITY

## 2023-05-18 RX ORDER — OXCARBAZEPINE 300 MG/1
600 TABLET, FILM COATED ORAL 3 TIMES DAILY
Status: DISCONTINUED | OUTPATIENT
Start: 2023-05-18 | End: 2023-05-20 | Stop reason: HOSPADM

## 2023-05-18 RX ORDER — ALBUTEROL SULFATE 90 UG/1
1 AEROSOL, METERED RESPIRATORY (INHALATION) 4 TIMES DAILY
COMMUNITY

## 2023-05-18 RX ORDER — LISINOPRIL 10 MG/1
10 TABLET ORAL NIGHTLY
COMMUNITY

## 2023-05-18 RX ORDER — HYDROCHLOROTHIAZIDE 25 MG/1
25 TABLET ORAL DAILY
COMMUNITY

## 2023-05-18 RX ORDER — ENOXAPARIN SODIUM 100 MG/ML
30 INJECTION SUBCUTANEOUS 2 TIMES DAILY
Status: DISCONTINUED | OUTPATIENT
Start: 2023-05-18 | End: 2023-05-20 | Stop reason: HOSPADM

## 2023-05-18 RX ORDER — SODIUM CHLORIDE 0.9 % (FLUSH) 0.9 %
5-40 SYRINGE (ML) INJECTION PRN
Status: DISCONTINUED | OUTPATIENT
Start: 2023-05-18 | End: 2023-05-20 | Stop reason: HOSPADM

## 2023-05-18 RX ORDER — SODIUM CHLORIDE 9 MG/ML
INJECTION, SOLUTION INTRAVENOUS PRN
Status: DISCONTINUED | OUTPATIENT
Start: 2023-05-18 | End: 2023-05-20 | Stop reason: HOSPADM

## 2023-05-18 RX ORDER — HYDROXYZINE PAMOATE 25 MG/1
25 CAPSULE ORAL 4 TIMES DAILY PRN
COMMUNITY

## 2023-05-18 RX ORDER — ROSUVASTATIN CALCIUM 40 MG/1
40 TABLET, COATED ORAL EVERY EVENING
COMMUNITY

## 2023-05-18 RX ORDER — BUPRENORPHINE HYDROCHLORIDE 8 MG/1
8 TABLET SUBLINGUAL 3 TIMES DAILY
Status: DISCONTINUED | OUTPATIENT
Start: 2023-05-18 | End: 2023-05-20 | Stop reason: HOSPADM

## 2023-05-18 RX ORDER — ONDANSETRON 4 MG/1
4 TABLET, ORALLY DISINTEGRATING ORAL EVERY 8 HOURS PRN
Status: DISCONTINUED | OUTPATIENT
Start: 2023-05-18 | End: 2023-05-20 | Stop reason: HOSPADM

## 2023-05-18 RX ORDER — ASPIRIN 81 MG/1
81 TABLET ORAL DAILY
Status: DISCONTINUED | OUTPATIENT
Start: 2023-05-18 | End: 2023-05-20 | Stop reason: HOSPADM

## 2023-05-18 RX ORDER — POLYETHYLENE GLYCOL 3350 17 G
2 POWDER IN PACKET (EA) ORAL
Status: DISCONTINUED | OUTPATIENT
Start: 2023-05-18 | End: 2023-05-20 | Stop reason: HOSPADM

## 2023-05-18 RX ORDER — DEXTROAMPHETAMINE SACCHARATE, AMPHETAMINE ASPARTATE, DEXTROAMPHETAMINE SULFATE AND AMPHETAMINE SULFATE 2.5; 2.5; 2.5; 2.5 MG/1; MG/1; MG/1; MG/1
20 TABLET ORAL 3 TIMES DAILY
Status: DISCONTINUED | OUTPATIENT
Start: 2023-05-18 | End: 2023-05-20 | Stop reason: HOSPADM

## 2023-05-18 RX ORDER — DEXTROAMPHETAMINE SACCHARATE, AMPHETAMINE ASPARTATE, DEXTROAMPHETAMINE SULFATE AND AMPHETAMINE SULFATE 5; 5; 5; 5 MG/1; MG/1; MG/1; MG/1
20 TABLET ORAL 3 TIMES DAILY
COMMUNITY

## 2023-05-18 RX ORDER — BUPRENORPHINE HYDROCHLORIDE AND NALOXONE HYDROCHLORIDE DIHYDRATE 8; 2 MG/1; MG/1
3 TABLET SUBLINGUAL PRN
Status: DISCONTINUED | OUTPATIENT
Start: 2023-05-18 | End: 2023-05-18

## 2023-05-18 RX ORDER — POLYETHYLENE GLYCOL 3350 17 G/17G
17 POWDER, FOR SOLUTION ORAL DAILY PRN
Status: DISCONTINUED | OUTPATIENT
Start: 2023-05-18 | End: 2023-05-20 | Stop reason: HOSPADM

## 2023-05-18 RX ADMIN — OXCARBAZEPINE 600 MG: 300 TABLET, FILM COATED ORAL at 17:50

## 2023-05-18 RX ADMIN — METRONIDAZOLE 500 MG: 500 INJECTION, SOLUTION INTRAVENOUS at 11:18

## 2023-05-18 RX ADMIN — IPRATROPIUM BROMIDE AND ALBUTEROL SULFATE 1 AMPULE: 2.5; .5 SOLUTION RESPIRATORY (INHALATION) at 18:26

## 2023-05-18 RX ADMIN — IPRATROPIUM BROMIDE AND ALBUTEROL SULFATE 1 AMPULE: 2.5; .5 SOLUTION RESPIRATORY (INHALATION) at 14:32

## 2023-05-18 RX ADMIN — PIPERACILLIN AND TAZOBACTAM 3375 MG: 3; .375 INJECTION, POWDER, LYOPHILIZED, FOR SOLUTION INTRAVENOUS at 22:16

## 2023-05-18 RX ADMIN — DEXTROAMPHETAMINE SACCHARATE, AMPHETAMINE ASPARTATE, DEXTROAMPHETAMINE SULFATE AND AMPHETAMINE SULFATE 20 MG: 2.5; 2.5; 2.5; 2.5 TABLET ORAL at 17:50

## 2023-05-18 RX ADMIN — POTASSIUM CHLORIDE 10 MEQ: 7.46 INJECTION, SOLUTION INTRAVENOUS at 10:46

## 2023-05-18 RX ADMIN — BUPRENORPHINE 8 MG: 8 TABLET SUBLINGUAL at 22:20

## 2023-05-18 RX ADMIN — BUPROPION HYDROCHLORIDE 150 MG: 75 TABLET, FILM COATED ORAL at 22:23

## 2023-05-18 RX ADMIN — ASPIRIN 81 MG: 81 TABLET, COATED ORAL at 15:03

## 2023-05-18 RX ADMIN — NICOTINE POLACRILEX 2 MG: 2 LOZENGE ORAL at 18:30

## 2023-05-18 RX ADMIN — OXCARBAZEPINE 600 MG: 300 TABLET, FILM COATED ORAL at 22:19

## 2023-05-18 RX ADMIN — SODIUM CHLORIDE, PRESERVATIVE FREE 10 ML: 5 INJECTION INTRAVENOUS at 22:24

## 2023-05-18 RX ADMIN — ACETAMINOPHEN 650 MG: 325 TABLET ORAL at 15:03

## 2023-05-18 RX ADMIN — ATORVASTATIN CALCIUM 80 MG: 80 TABLET, FILM COATED ORAL at 22:20

## 2023-05-18 RX ADMIN — PIPERACILLIN AND TAZOBACTAM 4500 MG: 4; .5 INJECTION, POWDER, LYOPHILIZED, FOR SOLUTION INTRAVENOUS at 14:58

## 2023-05-18 RX ADMIN — POTASSIUM CHLORIDE 10 MEQ: 7.46 INJECTION, SOLUTION INTRAVENOUS at 11:55

## 2023-05-18 RX ADMIN — AZITHROMYCIN MONOHYDRATE 500 MG: 500 INJECTION, POWDER, LYOPHILIZED, FOR SOLUTION INTRAVENOUS at 11:19

## 2023-05-18 RX ADMIN — CEFTRIAXONE 1000 MG: 1 INJECTION, POWDER, FOR SOLUTION INTRAMUSCULAR; INTRAVENOUS at 11:18

## 2023-05-18 RX ADMIN — IOPAMIDOL 70 ML: 755 INJECTION, SOLUTION INTRAVENOUS at 09:25

## 2023-05-18 ASSESSMENT — PAIN SCALES - GENERAL: PAINLEVEL_OUTOF10: 5

## 2023-05-18 ASSESSMENT — PAIN DESCRIPTION - LOCATION: LOCATION: HEAD

## 2023-05-18 NOTE — H&P
Conjunctiva/sclera: Conjunctivae normal.      Pupils: Pupils are equal, round, and reactive to light.   Cardiovascular:      Rate and Rhythm: Normal rate and regular rhythm.      Pulses: Normal pulses.      Heart sounds: Normal heart sounds.   Pulmonary:      Effort: Pulmonary effort is normal. No respiratory distress.      Breath sounds: Normal breath sounds. No wheezing, rhonchi or rales.   Abdominal:      General: Bowel sounds are normal. There is no distension.      Palpations: Abdomen is soft.      Tenderness: There is no abdominal tenderness.   Musculoskeletal:         General: No swelling, tenderness or deformity. Normal range of motion.      Cervical back: Normal range of motion and neck supple. No muscular tenderness.      Right lower leg: No edema.      Left lower leg: No edema.   Skin:     General: Skin is warm and dry.      Findings: No bruising or lesion.   Neurological:      Mental Status: He is alert. He is disoriented.      Comments: Oriented to self only, unable to recall year  Mild weakness to LUE and LLE noted on exam   Expressive aphasia with delayed speech   Psychiatric:         Mood and Affect: Mood normal.         Behavior: Behavior normal.         Thought Content: Thought content normal.        Diagnostic Data:    CBC:  Recent Labs     05/18/23  0923   WBC 9.7   HGB 19.6*   HCT 61.1*          BMP:  Recent Labs     05/18/23 0923      K 3.1*   CL 98   CO2 30*   BUN 15   CREATININE 1.1   CALCIUM 9.8     Recent Labs     05/18/23  0923   AST 26   ALT 38   BILITOT 0.4   ALKPHOS 53       Coag Panel:   Recent Labs     05/18/23 0923   INR 0.94   PROTIME 12.2       Cardiac Enzymes:   Recent Labs     05/18/23 0923   TROPONINI <0.01       ABGs:No results found for: PHART, PO2ART, [id number]    Urinalysis:  Lab Results   Component Value Date/Time    NITRU Negative 03/23/2021 06:33 PM    WBCUA 3 03/23/2021 06:33 PM    BACTERIA NEGATIVE 03/23/2021 06:33 PM    RBCUA 6 03/23/2021 06:33 PM

## 2023-05-18 NOTE — ED NOTES
Patient's wife arrived states that patient's last known well was at 10 pm 5/17/23.      Skylar Yañez RN  05/18/23 [id number]

## 2023-05-18 NOTE — ED PROVIDER NOTES
CBC WITH AUTO DIFFERENTIAL - Abnormal; Notable for the following components:       Result Value    RBC 6.70 (*)     Hemoglobin 19.6 (*)     Hematocrit 61.1 (*)     MCHC 32.1 (*)     All other components within normal limits   COMPREHENSIVE METABOLIC PANEL W/ REFLEX TO MG FOR LOW K - Abnormal; Notable for the following components:    Potassium reflex Magnesium 3.1 (*)     CO2 30 (*)     Glucose 137 (*)     All other components within normal limits   ACETAMINOPHEN LEVEL - Abnormal; Notable for the following components:    Acetaminophen Level <5 (*)     All other components within normal limits   SALICYLATE LEVEL - Abnormal; Notable for the following components:    Salicylate, Serum <6.4 (*)     All other components within normal limits   POCT GLUCOSE - Abnormal; Notable for the following components:    POC Glucose 115 (*)     All other components within normal limits   CULTURE, BLOOD 1   CULTURE, BLOOD 2   TROPONIN   PROTIME-INR   ETHANOL   MAGNESIUM   URINALYSIS WITH REFLEX TO CULTURE   DRUG SCRN, BUPRENORPHINE   POCT GLUCOSE       All other labs were within normal range or not returned as of this dictation. EMERGENCY DEPARTMENT COURSE and DIFFERENTIALDIAGNOSIS/MDM:   Vitals:    Vitals:    05/18/23 0938 05/18/23 1042 05/18/23 1102 05/18/23 1112   BP:  135/81 (!) 124/90 132/81   Pulse:  100 91 96   Resp:  13     Temp:       SpO2:  94% 95% 95%   Weight: 240 lb (108.9 kg)          MDM    Symptoms concerning for stroke. Last known well was last night so well outside tPA window. PACS system is down. Was given a verbal report on CT and CTA of head and neck stating that patient does not have a large vessel occlusion or intracranial hemorrhage. Awaiting final reports. Chest x-ray shows possible pneumonia. Still cannot get much history from the patient. His wife denies any respiratory complaints. Risk for aspiration given concern for stroke. Cultures and antibiotics ordered. Potassium low.   Replacement

## 2023-05-18 NOTE — ED NOTES
Report called to [address] on 5th floor.       Walt Wynne RN  05/18/23 [id number]

## 2023-05-18 NOTE — PROGRESS NOTES
4 Eyes Skin Assessment    Barry Winkler is being assessed upon: Admission    I agree that Kelsea Miramontes, RN, along with Kirill Gabriel RN (either 2 RN's or 1 LPN and 1 RN) have performed a thorough Head to Toe Skin Assessment on the patient. ALL assessment sites listed below have been assessed. Areas assessed by both nurses:     [x]   Head, Face, and Ears   [x]   Shoulders, Back, and Chest  [x]   Arms, Elbows, and Hands   [x]   Coccyx, Sacrum, and Ischium  [x]   Legs, Feet, and Heels    Does the Patient have Skin Breakdown?  No    Sergio Prevention initiated: NA  Wound Care Orders initiated: NA    [facility] nurse consulted for Pressure Injury (Stage 3,4, Unstageable, DTI, NWPT, and Complex wounds) and New or Established Ostomies: No        Primary Nurse eSignature: Adelita Hancock RN on 5/18/2023 at 1:43 PM      Co-Signer eSignature: {Esignature:[id number]}

## 2023-05-18 NOTE — PROGRESS NOTES
Pharmacy Adjustment per [facility][1] protocol    Shawn Colon is a 36 y.o. male. Pharmacy has adjusted medications per [facility][1] protocol. Recent Labs     05/18/23  0923   BUN 15       Recent Labs     05/18/23  0923   CREATININE 1.1       Estimated Creatinine Clearance: 114 mL/min (based on SCr of 1.1 mg/dL).     Height:   Ht Readings from Last 1 Encounters:   05/18/23 5' 10\" (1.778 m)     Weight:  Wt Readings from Last 1 Encounters:   05/18/23 269 lb 7 oz (122.2 kg)         Plan: Adjust the following medications based on [facility][1] protocol:           Zosyn to 4500 mg IV once over 30 minutes followed by 3375 mg IV every 8 hours extended infusion over 240 minutes     Electronically signed by Terrie Oppenheim, [facility] on 5/18/2023 at 1:34 PM

## 2023-05-18 NOTE — ED NOTES
Dr Fer Goldman called a Code Stroke 9452. Announced over [address] last night  Alerted Dr Fadi Victor 9130.   Alerted Stroke Coordinator [id number]     Jerrod Nassar  05/18/23 0301 HPI: Connie Zhang  is a 74 yo female with admitted to [facility]  11/11/19 acute chest pain and syncope and also scheduled for L knee surgery. Cardiology consulted, atypical ekg, troponin negative, no further workup recommended.  She was seen by  alert and oriented x 3; affect appropriate      ASSESSMENT/PLAN  PT/OT eval  Ortho f/u - nursing to schedule   Melatonin prn for insomnia     L Knee OA S/p left TKA 11/12/19  Pain control with norco10/325mg tabs 1-2tabs po q4- hours PRN   PT/OT,WBAT  ASA 3

## 2023-05-18 NOTE — PROGRESS NOTES
STROKE ADMISSION    Date of Note:  5/18/2023  Time of Note:  1:48 PM    Patient Name:  Rodriguez Iglesias  MRN:  [id number]  YOB: 1980  Age:      36 y.o. Gender:      male    Room:  [address] [place] [address]   Adm. Date: 5/18/2023  Adm. Status:   Allergies: Patient has no known allergies. Code Status: Full Code    Adm. Provider: Shawna Díaz MD  Neuro. Provider: Dr. Dorothea Davila    Bedside report and handoff assessment completed with [facility] - [facility] (WOMEN). Presentation(s)    ED Chief Complaint  Chief Complaint   Patient presents with    Altered Mental Status     Pt brought in via [facility], last known well unknown at this time as pt was at home by himself, pt attempted to call a doctor, they noticed he was unable to speak and called EMS, upon arrival EMS states pt was nonverbal, pupils pinpoint, and en route pt began to desat and 2 mg narcan was given and respiratory status improved. ED Impression  1. Altered mental status, unspecified altered mental status type    2. Cerebrovascular accident (CVA), unspecified mechanism ([address]    3. Hypokalemia    4. Possible Pneumonia due to infectious organism, unspecified laterality, unspecified part of lung             Admission Impression  Principal Problem:    Stroke-like symptoms  Active Problems:    Aspiration pneumonia (HCC)  Resolved Problems:    * No resolved hospital problems. *                  Last Known Well Date & Time    Last Known to be Well (Stroke Diagnosis)  Date Last Known Well: 05/17/23  Time Last Known Well: 2200    Current NIHSS:  NIH Stroke Scale  Interval: Baseline  Level of Consciousness (1a): Alert  LOC Questions (1b): Answers both correctly  LOC Commands (1c): Performs both tasks correctly  Best Gaze (2): Normal  Visual (3): No visual loss  Facial Palsy (4): (!) Minor paralysis  Motor Arm, Left (5a): Drift, but does not hit bed  Motor Arm, Right (5b): No drift  Motor Leg, Left (6a): Drift, but does not hit bed  Motor Leg, Right (6b):  No drift  Limb

## 2023-05-18 NOTE — ED NOTES
Imaging results reviewed with Dr. Erica Roth per Radiology Director in ED at this time. Report also sent to Dr. Danitza Guzman via text. Stroke Coordinator will upload report into Epic.       Juanita Zuniga RN  05/18/23 1007

## 2023-05-18 NOTE — PROGRESS NOTES
Pt absolutely refusing Lovenox injection. Explanation given on reason for medication and pt verbalizes understanding.

## 2023-05-18 NOTE — PROGRESS NOTES
Spoke with pt and completed TCM. Pt states she can't get her medications. Writer called [facility]. [facility] wasn't sure if her insurance changed since she turned 64. Pt states she still has her  coverage. [facility] stated the carafate, protonix, and advair are expensive, in the hundreds, but pt can still  the metoprolol, iron, and lasix. Pharmacy said fluticasone is cheaper for the pt OTC. Writer called pt back and advised her to  the medications that are covered for now, especially since her legs are swollen.   Pt wanted to know if someone could come to her house and talk to her about Medicare. Writer gave number to medicare or pt to call [phone].   Subjective   Chief Complaint:  Speech difficulty-requesting MRI    History of Present Illness:  After giving verbal consent to use voice with video technology to receive care through telemedicine, this 42 y.o. male was seen via telemedicine MyChart video conference today for speech difficulty.  He reports the last 3 days he has had difficulty speaking and expressing himself.  He is requesting an MRI of the brain today.  His speech has increased mild slurring and stuttering.  The telehealth screen room was dark-I was able to see his face but it was so grainy we were unable to do much of a physical exam.  To note this history, the stroke risk-he had a high testosterone level found after we took over prescribing earlier this year.  With this high level, he was taking the testosterone more frequently than advised.  He had mentioned that in the last visit.  Would stop prescribing but he evidently was getting from an outside clinic.  That being said, we will still monitor his H&H and set up 1 or 2 therapeutic phlebotomies which reportedly he did not go to.    No Known Allergies   Current Outpatient Medications on File Prior to Visit   Medication Sig    acetaZOLAMIDE (DIAMOX) 250 MG tablet Take 1 tablet by mouth 3 (Three) Times a Day.    albuterol sulfate  (90 Base) MCG/ACT inhaler Inhale 1 puff 4 (Four) Times a Day.    ALPRAZolam (XANAX) 2 MG tablet     amphetamine-dextroamphetamine (ADDERALL) 10 MG tablet     amphetamine-dextroamphetamine (ADDERALL) 20 MG tablet Take 1 tablet by mouth 3 (Three) Times a Day.    Brexpiprazole 4 MG tablet Take 1 tablet by mouth.    buprenorphine (SUBUTEX) 8 MG sublingual tablet SL tablet Place 1 tablet under the tongue Daily.    buPROPion (WELLBUTRIN) 75 MG tablet Take 2 tablets by mouth Every Morning. Dr Sandrine barrera-betamethasone (Lotrisone) 1-0.05 % cream Apply 1 application topically to the appropriate area as directed Daily As Needed (dry scalp).    hydroCHLOROthiazide  "(HYDRODIURIL) 25 MG tablet Take 1 tablet by mouth Daily.    hydrOXYzine pamoate (VISTARIL) 25 MG capsule Take 1 capsule by mouth Every 6 (Six) Hours As Needed for Itching.    ketoconazole (NIZORAL) 2 % shampoo Apply  topically to the appropriate area as directed As Needed for Irritation or Dandruff.    lisinopril (PRINIVIL,ZESTRIL) 10 MG tablet Take 1 tablet by mouth every night at bedtime.    Lurasidone HCl 120 MG tablet Take 1 tablet by mouth Daily. Dr Deluca    metFORMIN ER (GLUCOPHAGE-XR) 750 MG 24 hr tablet Take 1 tablet by mouth Daily With Breakfast.    Needle, Disp, 18G X 1\" misc Use weekly as indicated    nicotine polacrilex (NICORETTE) 4 MG gum Chew 1 each As Needed for Smoking Cessation.    OXcarbazepine (TRILEPTAL) 300 MG tablet Take 2 tablets by mouth 3 (Three) Times a Day.    rosuvastatin (Crestor) 40 MG tablet Take 1 tablet by mouth Every Night.    Saphris 10 MG sublingual tablet sublingual tablet Place 1 tablet under the tongue Every Night.    sildenafil (VIAGRA) 100 MG tablet TAKE ONE TABLET BY MOUTH DAILY AS NEEDED FOR ERECTILE DYSFUNCTION.    Syringe/Needle, Disp, (B-D 3CC LUER-COBY SYR 21GX1\") 21G X 1\" 3 ML misc USE ONCE WEEKLY AS DIRECTED    testosterone cypionate (DEPO-TESTOSTERONE) 100 MG/ML solution injection     varenicline (CHANTIX) 1 MG tablet Take 1 tablet by mouth 2 (Two) Times a Day.     No current facility-administered medications on file prior to visit.      Past Medical, Surgical, Social, and Family History:  Past Medical History:   Diagnosis Date    Anxiety     Depression     Hypertension     Intracranial hypertension     Mood disorder     Pituitary mass     PTSD (post-traumatic stress disorder)     Spinal headache      Past Surgical History:   Procedure Laterality Date    NO PAST SURGERIES      VASECTOMY N/A 5/18/2018    Procedure: VASECTOMY;  Surgeon: Ion Yanez MD;  Location: [facility] OR;  Service: Urology     Social History     Socioeconomic History    Marital status: "      Spouse name: Keely    Number of children: 2    Years of education: 12   Tobacco Use    Smoking status: Former     Packs/day: 1.00     Years: 25.00     Pack years: 25.00     Types: Cigarettes     Start date: 1989     Quit date: 10/21/2020     Years since quittin.5     Passive exposure: Past    Smokeless tobacco: Former     Types: Chew     Quit date: 2022   Substance and Sexual Activity    Alcohol use: No    Drug use: Yes     Types: Marijuana    Sexual activity: Yes     Partners: Female     Birth control/protection: None     Comment: wife     Family History   Problem Relation Age of Onset    Gonadal disorder Neg Hx      Objective   Physical Exam  Constitutional:       General: He is not in acute distress.  Cardiovascular:      Rate and Rhythm: Normal rate and regular rhythm.      Pulses: Normal pulses.      Heart sounds: No murmur heard.    No friction rub. No gallop.   Pulmonary:      Effort: Pulmonary effort is normal. No respiratory distress.      Breath sounds: Normal breath sounds. No wheezing or rhonchi.   Neurological:      Mental Status: He is alert.     Assessment & Plan   There are no diagnoses linked to this encounter.Discussion:  Advised and educated plan of care.  Ambulance called for patient-to [facility] ER.    Follow-up:  No follow-ups on file.    This was an audio and video enabled telemedicine encounter.     Electronically signed by HI Bird, 23, 8:15 AM CDT.

## 2023-05-18 NOTE — ED NOTES
Notified Dr. Anna Hernandez of radiology issues via text. Radiology Director advised that Dr. Karen Koehler would read CT off monitor and call ED with results.       Elver Carbajal RN  05/18/23 [id number]

## 2023-05-19 ENCOUNTER — APPOINTMENT (OUTPATIENT)
Dept: MRI IMAGING | Age: 43
End: 2023-05-19
Payer: COMMERCIAL

## 2023-05-19 VITALS
SYSTOLIC BLOOD PRESSURE: 119 MMHG | RESPIRATION RATE: 16 BRPM | WEIGHT: 269.44 LBS | DIASTOLIC BLOOD PRESSURE: 79 MMHG | HEIGHT: 70 IN | HEART RATE: 111 BPM | BODY MASS INDEX: 38.57 KG/M2 | TEMPERATURE: 98.2 F | OXYGEN SATURATION: 95 %

## 2023-05-19 LAB
ANION GAP SERPL CALCULATED.3IONS-SCNC: 9 MMOL/L (ref 7–19)
BACTERIA BLD CULT ORG #2: NORMAL
BACTERIA BLD CULT: NORMAL
BUN SERPL-MCNC: 15 MG/DL (ref 6–20)
CALCIUM SERPL-MCNC: 9.3 MG/DL (ref 8.6–10)
CHLORIDE SERPL-SCNC: 103 MMOL/L (ref 98–111)
CHOLEST SERPL-MCNC: 95 MG/DL (ref 160–199)
CO2 SERPL-SCNC: 29 MMOL/L (ref 22–29)
CREAT SERPL-MCNC: 1 MG/DL (ref 0.5–1.2)
ERYTHROCYTE [DISTWIDTH] IN BLOOD BY AUTOMATED COUNT: 12.1 % (ref 11.5–14.5)
GLUCOSE SERPL-MCNC: 114 MG/DL (ref 74–109)
HCT VFR BLD AUTO: 53 % (ref 42–52)
HDLC SERPL-MCNC: 28 MG/DL (ref 55–121)
HGB BLD-MCNC: 17.3 G/DL (ref 14–18)
LDLC SERPL CALC-MCNC: 32 MG/DL
LV EF: 65 %
LVEF MODALITY: NORMAL
MCH RBC QN AUTO: 29.3 PG (ref 27–31)
MCHC RBC AUTO-ENTMCNC: 32.6 G/DL (ref 33–37)
MCV RBC AUTO: 89.7 FL (ref 80–94)
PLATELET # BLD AUTO: 201 K/UL (ref 130–400)
PMV BLD AUTO: 11.6 FL (ref 9.4–12.4)
POTASSIUM SERPL-SCNC: 3.6 MMOL/L (ref 3.5–5)
RBC # BLD AUTO: 5.91 M/UL (ref 4.7–6.1)
SODIUM SERPL-SCNC: 141 MMOL/L (ref 136–145)
TRIGL SERPL-MCNC: 173 MG/DL (ref 0–149)
WBC # BLD AUTO: 9.2 K/UL (ref 4.8–10.8)

## 2023-05-19 PROCEDURE — 6370000000 HC RX 637 (ALT 250 FOR IP): Performed by: NURSE PRACTITIONER

## 2023-05-19 PROCEDURE — C8929 TTE W OR WO FOL WCON,DOPPLER: HCPCS

## 2023-05-19 PROCEDURE — 80061 LIPID PANEL: CPT

## 2023-05-19 PROCEDURE — 6360000002 HC RX W HCPCS: Performed by: STUDENT IN AN ORGANIZED HEALTH CARE EDUCATION/TRAINING PROGRAM

## 2023-05-19 PROCEDURE — 6360000004 HC RX CONTRAST MEDICATION: Performed by: NURSE PRACTITIONER

## 2023-05-19 PROCEDURE — 97162 PT EVAL MOD COMPLEX 30 MIN: CPT

## 2023-05-19 PROCEDURE — 92523 SPEECH SOUND LANG COMPREHEN: CPT

## 2023-05-19 PROCEDURE — 94640 AIRWAY INHALATION TREATMENT: CPT

## 2023-05-19 PROCEDURE — 99232 SBSQ HOSP IP/OBS MODERATE 35: CPT | Performed by: PSYCHIATRY & NEUROLOGY

## 2023-05-19 PROCEDURE — 36415 COLL VENOUS BLD VENIPUNCTURE: CPT

## 2023-05-19 PROCEDURE — 94760 N-INVAS EAR/PLS OXIMETRY 1: CPT

## 2023-05-19 PROCEDURE — 6360000002 HC RX W HCPCS: Performed by: PSYCHIATRY & NEUROLOGY

## 2023-05-19 PROCEDURE — 85027 COMPLETE CBC AUTOMATED: CPT

## 2023-05-19 PROCEDURE — 6360000002 HC RX W HCPCS: Performed by: NURSE PRACTITIONER

## 2023-05-19 PROCEDURE — 97110 THERAPEUTIC EXERCISES: CPT

## 2023-05-19 PROCEDURE — 80048 BASIC METABOLIC PNL TOTAL CA: CPT

## 2023-05-19 PROCEDURE — 97535 SELF CARE MNGMENT TRAINING: CPT

## 2023-05-19 PROCEDURE — 2580000003 HC RX 258: Performed by: NURSE PRACTITIONER

## 2023-05-19 PROCEDURE — 97530 THERAPEUTIC ACTIVITIES: CPT

## 2023-05-19 PROCEDURE — 92610 EVALUATE SWALLOWING FUNCTION: CPT

## 2023-05-19 PROCEDURE — 70551 MRI BRAIN STEM W/O DYE: CPT

## 2023-05-19 PROCEDURE — 97165 OT EVAL LOW COMPLEX 30 MIN: CPT

## 2023-05-19 RX ORDER — LORAZEPAM 2 MG/ML
0.5 INJECTION INTRAMUSCULAR ONCE
Status: COMPLETED | OUTPATIENT
Start: 2023-05-19 | End: 2023-05-19

## 2023-05-19 RX ORDER — HYDROXYZINE HYDROCHLORIDE 25 MG/1
25 TABLET, FILM COATED ORAL ONCE
Status: DISCONTINUED | OUTPATIENT
Start: 2023-05-19 | End: 2023-05-20 | Stop reason: HOSPADM

## 2023-05-19 RX ADMIN — PERFLUTREN 1.5 ML: 6.52 INJECTION, SUSPENSION INTRAVENOUS at 08:16

## 2023-05-19 RX ADMIN — OXCARBAZEPINE 600 MG: 300 TABLET, FILM COATED ORAL at 09:38

## 2023-05-19 RX ADMIN — IPRATROPIUM BROMIDE AND ALBUTEROL SULFATE 1 AMPULE: 2.5; .5 SOLUTION RESPIRATORY (INHALATION) at 10:24

## 2023-05-19 RX ADMIN — DEXTROAMPHETAMINE SACCHARATE, AMPHETAMINE ASPARTATE, DEXTROAMPHETAMINE SULFATE AND AMPHETAMINE SULFATE 20 MG: 2.5; 2.5; 2.5; 2.5 TABLET ORAL at 18:38

## 2023-05-19 RX ADMIN — SODIUM CHLORIDE, PRESERVATIVE FREE 10 ML: 5 INJECTION INTRAVENOUS at 09:52

## 2023-05-19 RX ADMIN — PIPERACILLIN AND TAZOBACTAM 3375 MG: 3; .375 INJECTION, POWDER, LYOPHILIZED, FOR SOLUTION INTRAVENOUS at 12:20

## 2023-05-19 RX ADMIN — BUPRENORPHINE 8 MG: 8 TABLET SUBLINGUAL at 09:38

## 2023-05-19 RX ADMIN — BUPRENORPHINE 8 MG: 8 TABLET SUBLINGUAL at 12:19

## 2023-05-19 RX ADMIN — PIPERACILLIN AND TAZOBACTAM 3375 MG: 3; .375 INJECTION, POWDER, LYOPHILIZED, FOR SOLUTION INTRAVENOUS at 04:19

## 2023-05-19 RX ADMIN — DEXTROAMPHETAMINE SACCHARATE, AMPHETAMINE ASPARTATE, DEXTROAMPHETAMINE SULFATE AND AMPHETAMINE SULFATE 20 MG: 2.5; 2.5; 2.5; 2.5 TABLET ORAL at 12:19

## 2023-05-19 RX ADMIN — ASPIRIN 81 MG: 81 TABLET, COATED ORAL at 09:38

## 2023-05-19 RX ADMIN — OXCARBAZEPINE 600 MG: 300 TABLET, FILM COATED ORAL at 12:19

## 2023-05-19 RX ADMIN — ENOXAPARIN SODIUM 30 MG: 100 INJECTION SUBCUTANEOUS at 09:38

## 2023-05-19 RX ADMIN — LORAZEPAM 0.5 MG: 2 INJECTION INTRAMUSCULAR at 14:40

## 2023-05-19 RX ADMIN — LORAZEPAM 0.5 MG: 2 INJECTION INTRAMUSCULAR; INTRAVENOUS at 11:45

## 2023-05-19 RX ADMIN — IPRATROPIUM BROMIDE AND ALBUTEROL SULFATE 1 AMPULE: 2.5; .5 SOLUTION RESPIRATORY (INHALATION) at 06:06

## 2023-05-19 RX ADMIN — NICOTINE POLACRILEX 2 MG: 2 LOZENGE ORAL at 05:24

## 2023-05-19 RX ADMIN — DEXTROAMPHETAMINE SACCHARATE, AMPHETAMINE ASPARTATE, DEXTROAMPHETAMINE SULFATE AND AMPHETAMINE SULFATE 20 MG: 2.5; 2.5; 2.5; 2.5 TABLET ORAL at 09:50

## 2023-05-19 RX ADMIN — IPRATROPIUM BROMIDE AND ALBUTEROL SULFATE 1 AMPULE: 2.5; .5 SOLUTION RESPIRATORY (INHALATION) at 18:32

## 2023-05-19 RX ADMIN — LORAZEPAM 0.5 MG: 2 INJECTION INTRAMUSCULAR at 13:58

## 2023-05-19 RX ADMIN — ACETAMINOPHEN 650 MG: 325 TABLET ORAL at 05:58

## 2023-05-19 RX ADMIN — IPRATROPIUM BROMIDE AND ALBUTEROL SULFATE 1 AMPULE: 2.5; .5 SOLUTION RESPIRATORY (INHALATION) at 15:10

## 2023-05-19 RX ADMIN — ACETAMINOPHEN 650 MG: 325 TABLET ORAL at 00:30

## 2023-05-19 ASSESSMENT — PAIN - FUNCTIONAL ASSESSMENT: PAIN_FUNCTIONAL_ASSESSMENT: PREVENTS OR INTERFERES SOME ACTIVE ACTIVITIES AND ADLS

## 2023-05-19 ASSESSMENT — PAIN DESCRIPTION - ORIENTATION: ORIENTATION: LEFT

## 2023-05-19 ASSESSMENT — PAIN DESCRIPTION - FREQUENCY: FREQUENCY: INTERMITTENT

## 2023-05-19 ASSESSMENT — PAIN DESCRIPTION - DESCRIPTORS: DESCRIPTORS: ACHING

## 2023-05-19 ASSESSMENT — PAIN DESCRIPTION - PAIN TYPE: TYPE: ACUTE PAIN

## 2023-05-19 ASSESSMENT — PAIN DESCRIPTION - ONSET: ONSET: GRADUAL

## 2023-05-19 ASSESSMENT — PAIN SCALES - GENERAL: PAINLEVEL_OUTOF10: 3

## 2023-05-19 ASSESSMENT — PAIN DESCRIPTION - LOCATION: LOCATION: HEAD

## 2023-05-19 NOTE — PROCEDURES
ADULT INPATIENT ELECTROENCEPHALOGRAM REPORT    Patient:   Barry Hansen  MR#:    [id number]  Room #:    INPATIENT  YOB: 1980  Date of Evaluation:  5/18/2023  Primary Physician:     Connor Burkett MD   Referring Physician:   Yandel Diaz DO      CLINICAL INFORMATION:     This patient is a 36 y.o. male with a history of speech difficulty. MEDICATIONS:     See MAR. RECORDING CONDITIONS:     This EEG was performed utilizing standard International 10-20 System of electrode placement, with additional channels monitored for eye movement. One channel electrocardiogram was monitored. Data was obtained, stored, and interpreted according to ACNS guidelines (J Clin Neurophysiol 2006;23(2):) utilizing referential montage recording, with reformatting to longitudinal, transverse bipolar, and referential montages as necessary for interpretation, along with the digital/automated EEG analysis. Patient tolerated entire procedure well. Photic stimulation and hyperventilation were utilized as activation procedures unless otherwise specified below. E.E.G. DESCRIPTION:     The resting predominant posterior background frequency is a 9-10 Hz 30-40 uV rhythm. No overt focal, lateralizing, or paroxysmal abnormalities were noted through the recording. Drowsiness was demonstrated by slow rolling eye movements followed by a loss of the background waking activities. Onset of stage I sleep was demonstrated by gradual disappearance of background waking rhythms with gradual symmetric mixed frequency 4-7 Hz slowing. Hyperventilation was not performed. Photic stimulation was performed and had little change on the recording. Muscle, motion, and eye movement artifacts were noted. EEG INTERPRETATION:    Normal EEG for age in the awake, drowsy, and sleep states. CLINICAL CORRELATION:     The absence of epileptiform abnormalities does not preclude a clinical diagnosis of seizures.        Yandel Diaz

## 2023-05-19 NOTE — PROGRESS NOTES
1640 Pt sitting on side of bed in room, has pulled apart his IV with Antibiotic running. Upset and unreasonable with staff stating that he is leaving the hospital. Pt is still not able to stand unaided at bedside and he is trying to dress himself and put on shoes. Note sent to MD.  Narayan Scherer Pt remains I his room. Family at bedside. Pt is expecting Neuro MD to check in with him later.

## 2023-05-19 NOTE — CARE COORDINATION
Pt unable to participate fully in assessment. Pt stated name,  w/great difficulty. Speech volume is low and and not clear. Pt looking at his phone looking through family photos and stating he is going home today. CM attempted to contact patient's wife but unable to reach spouse x 2 for collateral information. CM spoke to [facility], bedside RN, patient will not be discharging. Pt has a hx of prior CVA. Medical w/u in progress, CM to f/u for discharge needs.    Electronically signed by Tye Licea RN on 2023 at 3:42 PM

## 2023-05-19 NOTE — PROGRESS NOTES
Physical Therapy  Facility/Department: [facility] SURG SERVICES  Physical Therapy Initial Assessment    Name: Liz Lackey  : 1980  MRN: [id number]  Date of Service: 2023    Discharge Recommendations:  Continue to assess pending progress, 24 hour supervision or assist, Patient would benefit from continued therapy after discharge          Patient Diagnosis(es): The primary encounter diagnosis was Altered mental status, unspecified altered mental status type. Diagnoses of Cerebrovascular accident (CVA), unspecified mechanism ([address] Hypokalemia, and Possible Pneumonia due to infectious organism, unspecified laterality, unspecified part of lung were also pertinent to this visit. Past Medical History:  has a past medical history of Acute kidney injury ([facility] [address] Adult ADHD, Antisocial personality disorder (CODE), Apnea, Bipolar 1 disorder, depressed ([address] Hypertension, Pituitary lesion ([facility] [address] PTSD (post-traumatic stress disorder), and PTSD (post-traumatic stress disorder). Past Surgical History:  has a past surgical history that includes Appendectomy and Ankle fracture surgery (N/A, 12/15/2021). Assessment   Body Structures, Functions, Activity Limitations Requiring Skilled Therapeutic Intervention: Decreased functional mobility ; Decreased ROM; Decreased cognition;Decreased safe awareness;Decreased strength;Decreased sensation;Decreased balance;Decreased coordination; Increased pain;Decreased posture;Decreased fine motor control  Assessment: Pt. will benefit from cont. PT to decrease impairments. Pt. a fall risk and should not attempt mobility on his own at this time. Would use SS for transfers to chair due to LLE buckling with WB. Pt.returned to bed due to going to [facility] soon, per nursing. Will cont. to work on progressive mobility and out of bed to chair. Anticipate pt would need rehab upon d/c from [place] campus.   Treatment Diagnosis: impaired gait and mobility  Therapy Prognosis: Good  Decision Making: Medium

## 2023-05-19 NOTE — PROGRESS NOTES
Facility/Department: [address] SERVICES  CLINICAL BEDSIDE SWALLOW EVALUATION  SPEECH LANGUAGE EVALUATION     NAME: Barrington Bean  : 1980  MRN: [id number]    ADMISSION DATE: 2023  ADMITTING DIAGNOSIS: has Mood disorder ([address] Suicidal ideation; PTSD (post-traumatic stress disorder); Polysubstance dependence ([address] Legal circumstance; Antisocial personality disorder (CODE); Bipolar disorder with severe giacomo ([address] Acute kidney injury ([address] Pseudotumor cerebri; Hypertension; Psychosis, affective ([address] Psychosis ([address] Stroke-like symptoms; and Aspiration pneumonia ([address] on their problem list.    Date of Eval: 2023  Evaluating Therapist: UVALDO Pandey    Pain:  No pain reported during assessment. Reason for Referral  [place] Bean was referred for a bedside swallow evaluation to assess the efficiency of his swallow function, identify signs and symptoms of aspiration and make recommendations regarding safe dietary consistencies, effective compensatory strategies, and safe eating environment. Impression  Assessed patient's swallowing function. Patient exhibited slow, effortful oral prep and slow, effortful oral transit of more solid consistencies. Patient also exhibited inconsistently fast, effortful oral transit of thin liquids and sluggish laryngeal elevation for swallow airway protection. Even so, no outward S/S penetration/aspiration was noted with any regular solid consistency trial or thin H2O trial presented during evaluation this date. At this time, would trial soft and bite sized consistency with mildly thick/nectar thick liquids. Recommend meds crushed in pudding/applesauce. If patient receives mouth care prior to intake, okay for ice chips and sips regular water IN BETWEEN MEALS for comfort. Will continue to follow. Thank you for this consult.      Treatment Plan  Requires SLP Intervention: Yes     Recommended Diet and Intervention  Diet Solids Recommendation: Soft

## 2023-05-19 NOTE — PROGRESS NOTES
[facility]      [facility]    Patient:  Liz Lackey  YOB: 1980  Date of Service: 5/19/2023  MRN: [id number]   Acct: [de-identified]   Primary Care Physician: Jack Oppenheim, MD  Advance Directive: Full Code  Admit Date: 5/18/2023       Hospital Day: 1    Portions of this note have been copied forward, however, updated to reflect the most current clinical status of this patient. CHIEF COMPLAINT altered mental status, strokelike symptoms    SUBJECTIVE:  Mr. Elvis Gould was working with therapy this morning. Oriented to self only. Continues to have expressive aphasia. Left-sided weakness noted. CUMULATIVE HOSPITAL COURSE:   The patient is a 36 y.o. male with past medical history of Stroke, intracranial hypertension, partial empty sella, hypertension, ADHD and depression who presented to [facility] ED with complaints of altered mental status. Limited HPI due to patient having expressive aphasia. HPI obtained from chart review. Per ED documentation, patient was brought to [facility] ED by EMS. Nonverbal upon arrival.  Initially thought to be overdose, Narcan given by EMS with improvement in respiratory status however patient was still confused. Wife reported patient well-known before he went to bed at 10 PM last night. Reported prior history of CVA that affected left-sided but no residual deficit. Work-up in ED revealed K+ 3.1, troponin negative, Hgb 19.6, chest x-ray indicated patchy abnormalities in the medial right lower lobe, likely atelectasis, or focal pneumonia. CT head unremarkable for acute changes. CTA head and neck unremarkable. Patient was admitted to hospital medicine for further evaluation with neurology consultation. Neurology recommended MRI, EEG, and echo. EEG unremarkable. Echo indicated normal LV size with preserved LV function with EF of 65%, no regional wall motion abnormalities, normal diastolic function, possible small PFO. MRI brain pending.       Review of

## 2023-05-19 NOTE — PROGRESS NOTES
[facility] Neurology Progress Note      Patient:   Katheryn Irving  MR#:    [id number]   Room:    [facility]/642-   YOB: 1980  Date of Progress Note: 5/19/2023  Time of Note                           11:38 AM  Consulting Physician:  Hellen Mccormick DO  Attending Physician:  Maria M Murillo MD      INTERVAL HISTORY:  Doing about the same, speech unchanged. REVIEW OF SYSTEMS:  Limited given speech difficulty     PHYSICAL EXAM:    Constitutional -   /79   Pulse (!) 111   Temp 98.2 °F (36.8 °C) (Temporal)   Resp 16   Ht 5' 10\" (1.778 m)   Wt 269 lb 7 oz (122.2 kg)   SpO2 95%   BMI 38.66 kg/m²   General appearance: No acute distress   EYES -   Conjunctiva normal  Pupillary exam as below, see CN exam in the neurologic exam  ENT-    No scars, masses, or lesions over external nose or ears  Oropharynx without erythema, palate midline  Cardiovascular -   No clubbing, cyanosis, or edema   Pulmonary-   Good expansion, normal effort without use of accessory muscles  Musculoskeletal -   No significant wasting of muscles noted  Gait as below, see gait exam in the neurologic exam  Muscle strength, tone, stability as below see the motor exam in the neurologic exam.   No bony deformities  Skin -   Warm, dry, and intact to inspection and palpation. No rash, erythema, or pallor  Psychiatric -   Mood, affect, and behavior appear normal    Memory as below see mental status examination in the neurologic exam        NEUROLOGICAL EXAM     Mental status    [x] Awake, alert, oriented   [x] Affect attention and concentration appear appropriate  [] Recent and remote memory appears unremarkable  [] Speech normal without dysarthria or aphasia, comprehension and repetition intact.    COMMENTS: Speech difficulty questionable functional, following commands, alert    Cranial Nerves [x] No VF deficit to confrontation  [x] PERRLA, EOMI, no nystagmus, conjugate eye movements, no ptosis  [x] Face symmetric  [x] Facial

## 2023-05-19 NOTE — PROGRESS NOTES
Occupational Therapy  Facility/Department: [address] Initial Assessment    Name: Jabari Ding  : 1980  MRN: [id number]  Date of Service: 2023    Discharge Recommendations:  24 hour supervision or assist, Patient would benefit from continued therapy after discharge          Patient Diagnosis(es): The primary encounter diagnosis was Altered mental status, unspecified altered mental status type. Diagnoses of Cerebrovascular accident (CVA), unspecified mechanism ([facility] [address] Hypokalemia, and Possible Pneumonia due to infectious organism, unspecified laterality, unspecified part of lung were also pertinent to this visit. Past Medical History:  has a past medical history of Acute kidney injury ([facility] [address] Adult ADHD, Antisocial personality disorder (CODE), Apnea, Bipolar 1 disorder, depressed ([facility] [address] Hypertension, Pituitary lesion ([facility] [address] PTSD (post-traumatic stress disorder), and PTSD (post-traumatic stress disorder). Past Surgical History:  has a past surgical history that includes Appendectomy and Ankle fracture surgery (N/A, 12/15/2021). Assessment   Performance deficits / Impairments: Decreased functional mobility ; Decreased safe awareness;Decreased balance;Decreased coordination;Decreased ADL status; Decreased cognition;Decreased ROM; Decreased endurance;Decreased high-level IADLs;Decreased strength;Decreased fine motor control;Decreased sensation  Assessment: Pt would benefit from skilled OT to progress ADL, functional transfers/mobility, LUE strength/coord.  Anticipate pt would benefit from futher rehab at d/c.  REQUIRES OT FOLLOW-UP: Yes  Activity Tolerance  Activity Tolerance: Patient Tolerated treatment well        Plan   Occupational Therapy Plan  Times Per Week: 3-5  Current Treatment Recommendations: Strengthening, ROM, Balance training, Functional mobility training, Endurance training, Safety education & training, Cognitive reorientation, Neuromuscular

## 2023-05-20 NOTE — DISCHARGE SUMMARY
[facility]    Discharge Summary    Patient Clara Mullins  :  1980  MRN:  [id number]    Admit date:  2023  Discharge date:  2023      Discharging Physician:  Dr. Brady Sal Directive: Prior    Consults: neurology    Primary Care Physician:  Beau Thompson MD    Discharge Diagnoses:  Principal Problem:    Stroke-like symptoms  Active Problems:    Aspiration pneumonia [facility])  Resolved Problems:    * No resolved hospital problems. *      Portions of this note have been copied forward, however, changed to reflect the most current clinical status of this patient. Hospital Course: The patient is a 36 y.o. male with past medical history of Stroke, intracranial hypertension, partial empty sella, hypertension, ADHD and depression who presented to [facility] ED with complaints of altered mental status. Limited HPI due to patient having expressive aphasia. HPI obtained from chart review. Per ED documentation, patient was brought to [facility] ED by EMS. Nonverbal upon arrival.  Initially thought to be overdose, Narcan given by EMS with improvement in respiratory status however patient was still confused. Wife reported patient well-known before he went to bed at 10 PM last night. Reported prior history of CVA that affected left-sided but no residual deficit. Work-up in ED revealed K+ 3.1, troponin negative, Hgb 19.6, chest x-ray indicated patchy abnormalities in the medial right lower lobe, likely atelectasis, or focal pneumonia. CT head unremarkable for acute changes. CTA head and neck unremarkable. Patient was admitted to hospital medicine for further evaluation with neurology consultation. Neurology recommended MRI, EEG, and echo. EEG unremarkable. Echo indicated normal LV size with preserved LV function with EF of 65%, no regional wall motion abnormalities, normal diastolic function, possible small PFO.   MRI brain results were pending at the

## 2023-05-20 NOTE — PROGRESS NOTES
Patient insisted on leaving AMA even after patient education about staying in the hospital and being treated in a safe environment. Patient was not released by any healthcare provider but demanded to leave. Pt removed one IV this nurse stopped patient and removed the second IV. Both cath intact no complications and covered with Band-Aid. MD notified of patient leaving.

## 2023-05-21 ENCOUNTER — HOSPITAL ENCOUNTER (EMERGENCY)
Age: 43
Discharge: HOME | End: 2023-05-21
Attending: EMERGENCY MEDICINE
Payer: COMMERCIAL

## 2023-05-21 VITALS
RESPIRATION RATE: 16 BRPM | SYSTOLIC BLOOD PRESSURE: 133 MMHG | TEMPERATURE: 97.9 F | OXYGEN SATURATION: 96 % | DIASTOLIC BLOOD PRESSURE: 98 MMHG | HEART RATE: 96 BPM

## 2023-05-21 DIAGNOSIS — R47.89 ALTERATION IN SPEECH: Primary | ICD-10-CM

## 2023-05-21 LAB
ALBUMIN SERPL-MCNC: 4.3 G/DL (ref 3.5–5.2)
ALP SERPL-CCNC: 47 U/L (ref 40–130)
ALT SERPL-CCNC: 25 U/L (ref 5–41)
ANION GAP SERPL CALCULATED.3IONS-SCNC: 9 MMOL/L (ref 7–19)
AST SERPL-CCNC: 23 U/L (ref 5–40)
BASOPHILS # BLD: 0.1 K/UL (ref 0–0.2)
BASOPHILS NFR BLD: 0.9 % (ref 0–1)
BILIRUB SERPL-MCNC: <0.2 MG/DL (ref 0.2–1.2)
BUN SERPL-MCNC: 9 MG/DL (ref 6–20)
CALCIUM SERPL-MCNC: 9.6 MG/DL (ref 8.6–10)
CHLORIDE SERPL-SCNC: 105 MMOL/L (ref 98–111)
CO2 SERPL-SCNC: 27 MMOL/L (ref 22–29)
CREAT SERPL-MCNC: 0.9 MG/DL (ref 0.5–1.2)
EOSINOPHIL # BLD: 0.3 K/UL (ref 0–0.6)
EOSINOPHIL NFR BLD: 2.4 % (ref 0–5)
ERYTHROCYTE [DISTWIDTH] IN BLOOD BY AUTOMATED COUNT: 12.2 % (ref 11.5–14.5)
GLUCOSE BLD-MCNC: 116 MG/DL (ref 70–99)
GLUCOSE SERPL-MCNC: 126 MG/DL (ref 74–109)
HCT VFR BLD AUTO: 56.4 % (ref 42–52)
HGB BLD-MCNC: 17.8 G/DL (ref 14–18)
IMM GRANULOCYTES # BLD: 0 K/UL
LYMPHOCYTES # BLD: 3.1 K/UL (ref 1.1–4.5)
LYMPHOCYTES NFR BLD: 28.2 % (ref 20–40)
MCH RBC QN AUTO: 29.1 PG (ref 27–31)
MCHC RBC AUTO-ENTMCNC: 31.6 G/DL (ref 33–37)
MCV RBC AUTO: 92.3 FL (ref 80–94)
MONOCYTES # BLD: 0.9 K/UL (ref 0–0.9)
MONOCYTES NFR BLD: 8.4 % (ref 0–10)
NEUTROPHILS # BLD: 6.6 K/UL (ref 1.5–7.5)
NEUTS SEG NFR BLD: 59.7 % (ref 50–65)
PERFORMED ON: ABNORMAL
PLATELET # BLD AUTO: 227 K/UL (ref 130–400)
PMV BLD AUTO: 12 FL (ref 9.4–12.4)
POTASSIUM SERPL-SCNC: 3.9 MMOL/L (ref 3.5–5)
PROT SERPL-MCNC: 7 G/DL (ref 6.6–8.7)
RBC # BLD AUTO: 6.11 M/UL (ref 4.7–6.1)
SODIUM SERPL-SCNC: 141 MMOL/L (ref 136–145)
WBC # BLD AUTO: 11.1 K/UL (ref 4.8–10.8)

## 2023-05-21 PROCEDURE — 80053 COMPREHEN METABOLIC PANEL: CPT

## 2023-05-21 PROCEDURE — 36415 COLL VENOUS BLD VENIPUNCTURE: CPT

## 2023-05-21 PROCEDURE — 82962 GLUCOSE BLOOD TEST: CPT

## 2023-05-21 PROCEDURE — 85025 COMPLETE CBC W/AUTO DIFF WBC: CPT

## 2023-05-21 PROCEDURE — 93005 ELECTROCARDIOGRAM TRACING: CPT | Performed by: EMERGENCY MEDICINE

## 2023-05-21 PROCEDURE — 99284 EMERGENCY DEPT VISIT MOD MDM: CPT

## 2023-05-21 ASSESSMENT — PAIN SCALES - GENERAL: PAINLEVEL_OUTOF10: 7

## 2023-05-21 ASSESSMENT — PAIN DESCRIPTION - DESCRIPTORS: DESCRIPTORS: ACHING

## 2023-05-21 ASSESSMENT — PAIN DESCRIPTION - LOCATION: LOCATION: HEAD

## 2023-05-21 ASSESSMENT — PAIN - FUNCTIONAL ASSESSMENT: PAIN_FUNCTIONAL_ASSESSMENT: 0-10

## 2023-05-22 ENCOUNTER — TELEPHONE (OUTPATIENT)
Dept: NEUROLOGY | Age: 43
End: 2023-05-22

## 2023-05-22 ENCOUNTER — TELEPHONE (OUTPATIENT)
Dept: FAMILY MEDICINE CLINIC | Facility: CLINIC | Age: 43
End: 2023-05-22

## 2023-05-22 LAB
EKG P AXIS: 65 DEGREES
EKG P-R INTERVAL: 148 MS
EKG Q-T INTERVAL: 328 MS
EKG QRS DURATION: 86 MS
EKG QTC CALCULATION (BAZETT): 389 MS
EKG T AXIS: 45 DEGREES

## 2023-05-22 PROCEDURE — 93010 ELECTROCARDIOGRAM REPORT: CPT | Performed by: INTERNAL MEDICINE

## 2023-05-22 NOTE — TELEPHONE ENCOUNTER
Caller: Lincoln Ornelas    Relationship: Self    Best call back number: [phone]    What form or medical record are you requesting: NOTE DETAILING PATIENT'S MEDICAL HISTORY AND MEDICAL CONDITIONS     Who is requesting this form or medical record from you:     How would you like to receive the form or medical records (pick-up, mail, fax): PICK-UP     Timeframe paperwork needed: 05.22.23    Additional notes:     PLEASE CALL PATIENT WHEN PAPERWORK IS READY FOR PICK-UP.

## 2023-05-22 NOTE — TELEPHONE ENCOUNTER
Chemo's spouse Lucrecia Mckeon requests that office return their call. The best time to reach him is Anytime. Lucrecia Mike states he was seen in the hospital and needs to a follow up appointment with Dr. Rishabh Adair    Thank you.

## 2023-05-22 NOTE — TELEPHONE ENCOUNTER
Can we direct them to now to do a records request?    Electronically signed by HI Bird, 05/22/23, 9:00 AM CDT.

## 2023-05-23 LAB
BACTERIA BLD CULT ORG #2: NORMAL
BACTERIA BLD CULT: NORMAL

## 2023-05-23 NOTE — TELEPHONE ENCOUNTER
Patient's wife, Natacha Mcfarland, called back to schedule. Please return patient's call today if possible. Thank you.

## 2023-05-23 NOTE — TELEPHONE ENCOUNTER
Called patient wife back to let her know we can see him on 6/16/23 @ 9 with AMOR Mayer for his HFU. She voiced understanding. She stated he had a MRI last year and would find out where. She will call to let us know so we can get imaging or she can get disc. I voiced understanding.

## 2023-05-25 ENCOUNTER — OFFICE VISIT (OUTPATIENT)
Dept: FAMILY MEDICINE CLINIC | Facility: CLINIC | Age: 43
End: 2023-05-25
Payer: MEDICARE

## 2023-05-25 VITALS
SYSTOLIC BLOOD PRESSURE: 132 MMHG | TEMPERATURE: 96.9 F | OXYGEN SATURATION: 95 % | WEIGHT: 266.4 LBS | HEART RATE: 115 BPM | HEIGHT: 70 IN | DIASTOLIC BLOOD PRESSURE: 82 MMHG | RESPIRATION RATE: 18 BRPM | BODY MASS INDEX: 38.14 KG/M2

## 2023-05-25 DIAGNOSIS — I63.9 CEREBROVASCULAR ACCIDENT (CVA), UNSPECIFIED MECHANISM: Primary | ICD-10-CM

## 2023-05-25 DIAGNOSIS — G31.84 AMNESTIC MCI (MILD COGNITIVE IMPAIRMENT WITH MEMORY LOSS): ICD-10-CM

## 2023-05-25 DIAGNOSIS — D75.1 POLYCYTHEMIA: ICD-10-CM

## 2023-05-25 DIAGNOSIS — R53.1 WEAKNESS: ICD-10-CM

## 2023-05-25 NOTE — PROGRESS NOTES
Subjective   Chief Complaint:  Reevaluation after hospitalization.    History of Present Illness:  This 42 y.o. male was seen in the office today.      The patient was admitted to [facility] last week with acute CVA symptoms on 05/18/2023. He was seen here for a telehealth visit in which I called the ambulance for. He advises he cannot remember what day he was discharged, but it was in the last day or so. He reports having continued numbness and tingling on his left side and having amnesia and memory deficits. He goes back to neurology in 06/2023. He also sees endocrinology in 06/2023. He advises he has all the medication he needs at home. He reports they did a phlebotomy at [facility]. He has had a high hematocrit and we had asked him to get one locally, but this did not get done.     No Known Allergies   Current Outpatient Medications on File Prior to Visit   Medication Sig    acetaZOLAMIDE (DIAMOX) 250 MG tablet Take 1 tablet by mouth 3 (Three) Times a Day.    albuterol sulfate  (90 Base) MCG/ACT inhaler Inhale 1 puff 4 (Four) Times a Day.    ALPRAZolam (XANAX) 2 MG tablet     amphetamine-dextroamphetamine (ADDERALL) 10 MG tablet     amphetamine-dextroamphetamine (ADDERALL) 20 MG tablet Take 1 tablet by mouth 3 (Three) Times a Day.    Brexpiprazole 4 MG tablet Take 1 tablet by mouth.    buprenorphine (SUBUTEX) 8 MG sublingual tablet SL tablet Place 1 tablet under the tongue Daily.    buPROPion (WELLBUTRIN) 75 MG tablet Take 2 tablets by mouth Every Morning. Dr Sandrine robbinszole-betamethasone (Lotrisone) 1-0.05 % cream Apply 1 application topically to the appropriate area as directed Daily As Needed (dry scalp).    hydroCHLOROthiazide (HYDRODIURIL) 25 MG tablet Take 1 tablet by mouth Daily.    hydrOXYzine pamoate (VISTARIL) 25 MG capsule Take 1 capsule by mouth Every 6 (Six) Hours As Needed for Itching.    ketoconazole (NIZORAL) 2 % shampoo Apply  topically to the appropriate area as directed As  "Needed for Irritation or Dandruff.    lisinopril (PRINIVIL,ZESTRIL) 10 MG tablet Take 1 tablet by mouth every night at bedtime.    Lurasidone HCl 120 MG tablet Take 1 tablet by mouth Daily. Dr Deluca    metFORMIN ER (GLUCOPHAGE-XR) 750 MG 24 hr tablet Take 1 tablet by mouth Daily With Breakfast.    Needle, Disp, 18G X 1\" misc Use weekly as indicated    nicotine polacrilex (NICORETTE) 4 MG gum Chew 1 each As Needed for Smoking Cessation.    OXcarbazepine (TRILEPTAL) 300 MG tablet Take 2 tablets by mouth 3 (Three) Times a Day.    rosuvastatin (Crestor) 40 MG tablet Take 1 tablet by mouth Every Night.    Saphris 10 MG sublingual tablet sublingual tablet Place 1 tablet under the tongue Every Night.    sildenafil (VIAGRA) 100 MG tablet TAKE ONE TABLET BY MOUTH DAILY AS NEEDED FOR ERECTILE DYSFUNCTION.    Syringe/Needle, Disp, (B-D 3CC LUER-COBY SYR 21GX1\") 21G X 1\" 3 ML misc USE ONCE WEEKLY AS DIRECTED    testosterone cypionate (DEPO-TESTOSTERONE) 100 MG/ML solution injection     varenicline (CHANTIX) 1 MG tablet Take 1 tablet by mouth 2 (Two) Times a Day.     No current facility-administered medications on file prior to visit.      Past Medical, Surgical, Social, and Family History:  Past Medical History:   Diagnosis Date    Anxiety     Depression     Hypertension     Intracranial hypertension     Mood disorder     Pituitary mass     PTSD (post-traumatic stress disorder)     Spinal headache      Past Surgical History:   Procedure Laterality Date    NO PAST SURGERIES      VASECTOMY N/A 5/18/2018    Procedure: VASECTOMY;  Surgeon: Ion Yanez MD;  Location: [facility] OR;  Service: Urology     Social History     Socioeconomic History    Marital status:      Spouse name: Keely    Number of children: 2    Years of education: 12   Tobacco Use    Smoking status: Former     Packs/day: 1.00     Years: 25.00     Pack years: 25.00     Types: Cigarettes     Start date: 11/30/1989     Quit date: 10/21/2020     Years since " "quittin.5     Passive exposure: Past    Smokeless tobacco: Former     Types: Chew     Quit date: 2022   Substance and Sexual Activity    Alcohol use: No    Drug use: Yes     Types: Marijuana    Sexual activity: Yes     Partners: Female     Birth control/protection: None     Comment: wife     Family History   Problem Relation Age of Onset    Gonadal disorder Neg Hx        Objective   Physical Exam  Constitutional:       General: He is not in acute distress.     Appearance: He is obese.   Cardiovascular:      Rate and Rhythm: Normal rate and regular rhythm.      Pulses: Normal pulses.      Heart sounds: No murmur heard.    No friction rub. No gallop.   Pulmonary:      Effort: Pulmonary effort is normal. No respiratory distress.      Breath sounds: Normal breath sounds. No wheezing or rhonchi.   Neurological:      Mental Status: He is alert.      Comments: Dysarthria present. Ambulatory gait pattern. Residual weakness with , push and pull strength on the left as compared to right. Gross sensation is intact.     /82 (BP Location: Right arm, Patient Position: Sitting, Cuff Size: Adult)   Pulse 115   Temp 96.9 °F (36.1 °C) (Infrared)   Resp 18   Ht 177.8 cm (70\")   Wt 121 kg (266 lb 6.4 oz)   SpO2 95%   BMI 38.22 kg/m²     Prior Visit Notes/Records, Lab, Imaging, and Diagnostic Results Reviewed:  CBC:  Lab Results - Last 18 Months   Lab Units 23  0140 23  0153 23  0923 23  2300 23  1241 03/10/23  1429 23  0811   WBC K/uL 11.1* 9.2 9.7 8.65 11.02* 10.08 11.79*   HEMOGLOBIN g/dL 17.8 17.3 19.6* 18.6* 18.5* 17.9* 17.9*   HEMATOCRIT % 56.4* 53.0* 61.1* 36.5* 56.4* 53.7* 53.7*   PLATELETS K/uL 227 201 229 226 193 206 244      Chemistry:  Lab Results - Last 18 Months   Lab Units 23  2300 03/10/23  1429 23  0811 22  1127 02/10/22  1415 22  0834   SODIUM mmol/L 140 139 142  --  142 141   POTASSIUM mmol/L 3.9 3.5 4.0  --  3.6 3.7   CHLORIDE " mmol/L 102 97* 104  --  106 100   CO2 mmol/L 27.8 28.5 26.4  --  28.0 21.4*   GLUCOSE mg/dL 101* 153* 117*  --  125* 103*   BUN mg/dL 12 13 15  --  13 10   CREATININE mg/dL 1.23 1.28* 1.22 1.20 1.07 1.23   EGFR IF NONAFRICN AM mL/min/1.73  --   --   --   --  76 65   EGFR IF AFRICN AM mL/min/1.73  --   --   --   --   --  78   EGFR RESULT mL/min/1.73 74.7 71.2 75.4  --   --   --    CALCIUM mg/dL 10.4 10.2 9.5  --  9.2 9.4     BMI Trend:  BMI Readings from Last 10 Encounters:   05/25/23 38.22 kg/m²   05/09/23 38.83 kg/m²   03/29/23 41.90 kg/m²   03/10/23 40.81 kg/m²   02/08/23 41.38 kg/m²   01/13/23 40.35 kg/m²   08/03/22 38.48 kg/m²   05/23/22 39.49 kg/m²   03/15/22 39.23 kg/m²   02/10/22 38.02 kg/m²     Assessment & Plan   Diagnoses and all orders for this visit:    1. Cerebrovascular accident (CVA), unspecified mechanism (Primary)  -     Ambulatory Referral to Speech Therapy  -     Ambulatory Referral to Physical Therapy Evaluate and treat, Neuro; (as needed); (as needed); (as needed); (as needed); Full weight bearing    2. Amnestic MCI (mild cognitive impairment with memory loss)  -     Ambulatory Referral to Speech Therapy    3. Weakness  -     Ambulatory Referral to Physical Therapy Evaluate and treat, Neuro; (as needed); (as needed); (as needed); (as needed); Full weight bearing    4. Polycythemia  -     CBC & Differential; Future    Discussion:  Advised and educated plan of care.  He is agreeable to come back in 1 month for a CBC check to see if he might need another therapeutic phlebotomy. Advised PT referral and speech. Advised him to keep follow-ups with a specialist and see follow up with me in about 4 months.    Follow-up:  Return for 3 mo- f/u Santy;   1 mo-lab appt CBC.    Transcribed from ambient dictation for HI Bird by Lisset Deluca.  05/25/23   10:59 CDT    I have personally performed the services described in this document as transcribed by the above individual, and it is both accurate  and complete.    Electronically signed by Santy Zeng, 05/25/23, 10:59 AM CDT.

## 2023-05-26 LAB — TESTOST SERPL-MCNC: 765 NG/DL (ref 264–916)

## 2023-05-27 NOTE — PROGRESS NOTES
Physician Progress Note      PATIENT:               Rama Mark  [facility] #:                  [id number]  :                       1980  ADMIT DATE:       2023 9:09 AM  DISCH DATE:        2023 7:52 PM  RESPONDING  PROVIDER #:        Nuzhat Ovalle MD          QUERY TEXT:    Patient admitted with expressive aphasia, thought of possible overdose. Narcan given by EMS with improvement in respiratory status, but remained   confused. . Noted documentation of CVA by the ER provider at the time of   admission, with DC summary stating \"stroke: like symptoms\". Pt. did leave AMA. If possible, please document in progress notes and discharge summary if you   are evaluating and /or treating any of the following: The medical record reflects the following:  Risk Factors: Intracranial HTN, CVA  Clinical Indicators: Documentation as noted above, CTA head: unremarkable for   acute changes, MRI: no acute intracranial abnormality  Treatment: CTA Head, MRI, neuro consult    Thank you in advance. Glenda Broussard [id number]  Clinical Documentation Nixon [address] [id number], [email or web address]  Options provided:  -- Stoke confirmed  -- Stroke was ruled out  -- Other - I will add my own diagnosis  -- Disagree - Not applicable / Not valid  -- Disagree - Clinically unable to determine / Unknown  -- Refer to Clinical Documentation Reviewer    PROVIDER RESPONSE TEXT:    After study, stroke was ruled out.     Query created by: Walter Dyer on 2023 10:12 AM      Electronically signed by:  Nuzhat Ovalle MD 2023 12:45 PM

## 2023-05-30 RX ORDER — ROSUVASTATIN CALCIUM 40 MG/1
40 TABLET, COATED ORAL NIGHTLY
Qty: 30 TABLET | Refills: 5 | Status: SHIPPED | OUTPATIENT
Start: 2023-05-30

## 2023-05-30 ASSESSMENT — ENCOUNTER SYMPTOMS
SHORTNESS OF BREATH: 0
VOMITING: 0
NAUSEA: 0
ABDOMINAL PAIN: 0

## 2023-06-04 DIAGNOSIS — I10 HYPERTENSION, UNSPECIFIED TYPE: ICD-10-CM

## 2023-06-04 DIAGNOSIS — I10 ESSENTIAL HYPERTENSION: ICD-10-CM

## 2023-06-05 RX ORDER — LISINOPRIL 10 MG/1
10 TABLET ORAL
Qty: 30 TABLET | Refills: 3 | Status: SHIPPED | OUTPATIENT
Start: 2023-06-05

## 2023-06-05 RX ORDER — LISINOPRIL AND HYDROCHLOROTHIAZIDE 25; 20 MG/1; MG/1
1 TABLET ORAL DAILY
Qty: 90 TABLET | Refills: 2 | OUTPATIENT
Start: 2023-06-05

## 2023-06-05 RX ORDER — HYDROCHLOROTHIAZIDE 25 MG/1
25 TABLET ORAL DAILY
Qty: 30 TABLET | Refills: 5 | Status: SHIPPED | OUTPATIENT
Start: 2023-06-05

## 2023-06-05 NOTE — TELEPHONE ENCOUNTER
Rx Refill Note  Requested Prescriptions     Pending Prescriptions Disp Refills    lisinopril-hydrochlorothiazide (PRINZIDE,ZESTORETIC) 20-25 MG per tablet [Pharmacy Med Name: LISINOPRIL-HCTZ 20/25MG TABLETS] 90 tablet 2     Sig: TAKE 1 TABLET BY MOUTH DAILY      Last office visit with prescribing clinician: 3/29/2023   Last telemedicine visit with prescribing clinician: 5/18/2023   Next office visit with prescribing clinician: Visit date not found                         Would you like a call back once the refill request has been completed: [] Yes [x] No    If the office needs to give you a call back, can they leave a voicemail: [] Yes [x] No    Vee Gabriel MA  06/05/23, 07:48 CDT

## 2023-06-05 NOTE — TELEPHONE ENCOUNTER
Decline RX refill - supposed to be on Lisinopril 10 mg daily and HCTZ 25 mg daily.    Electronically signed by HI Bird, 06/05/23, 8:11 AM CDT.

## 2023-06-08 ENCOUNTER — PATIENT MESSAGE (OUTPATIENT)
Dept: FAMILY MEDICINE CLINIC | Facility: CLINIC | Age: 43
End: 2023-06-08
Payer: COMMERCIAL

## 2023-06-08 DIAGNOSIS — R93.1 ABNORMAL ECHOCARDIOGRAM: Primary | ICD-10-CM

## 2023-06-08 NOTE — TELEPHONE ENCOUNTER
From: Lincoln Ornelas  To: Santy Zeng  Sent: 6/8/2023 7:12 AM CDT  Subject: Follow up -referral    When Joni was in the hospital he had an EKG. A possible small hole was found. I believe it was called a PFO. They said he would need to follow up with an cardiologist. Is this something I set up or does a referral need to come from your office. In the hospital they mentioned his primary doctor taking care of it, but to be honest there was a lot going on. Thank you for any help you can provide.

## 2023-06-15 ENCOUNTER — OFFICE VISIT (OUTPATIENT)
Dept: CARDIOLOGY | Facility: CLINIC | Age: 43
End: 2023-06-15
Payer: COMMERCIAL

## 2023-06-15 VITALS
SYSTOLIC BLOOD PRESSURE: 103 MMHG | BODY MASS INDEX: 36.65 KG/M2 | HEART RATE: 98 BPM | HEIGHT: 70 IN | WEIGHT: 256 LBS | DIASTOLIC BLOOD PRESSURE: 71 MMHG

## 2023-06-15 DIAGNOSIS — R41.82 ALTERED MENTAL STATUS, UNSPECIFIED ALTERED MENTAL STATUS TYPE: Primary | ICD-10-CM

## 2023-06-15 DIAGNOSIS — I10 PRIMARY HYPERTENSION: ICD-10-CM

## 2023-06-15 DIAGNOSIS — E78.2 MIXED HYPERLIPIDEMIA: ICD-10-CM

## 2023-06-15 DIAGNOSIS — D75.1 POLYCYTHEMIA: ICD-10-CM

## 2023-06-15 DIAGNOSIS — Q21.12 PFO (PATENT FORAMEN OVALE): ICD-10-CM

## 2023-06-15 DIAGNOSIS — Z72.0 TOBACCO USE: ICD-10-CM

## 2023-06-15 DIAGNOSIS — E66.01 SEVERE OBESITY (BMI 35.0-39.9) WITH COMORBIDITY: ICD-10-CM

## 2023-06-15 NOTE — PROGRESS NOTES
"Chief Complaint  Establish Care (Patient was recently in the ER on 05/22/2023 he was told he may have a possible PFO -  ABNORMAL ECHOCARDIOGRAM)    Subjective      Lincoln Ornelas presents to [id number]  History of Present Illness    This 42-year-old white male was referred for cardiac evaluation because of a \"possible small PFO\" detected at a recent echocardiogram in May of this year.  The patient has a number of medical issues.  He has PTSD and ADHD, obesity, hypertension, polycythemia (type not specified), sleep apnea and hypersomnolence, hypogonadism with long-term use of testosterone, remote drug abuse (methamphetamine and Xanax), questionable history is of empty sella and increased intracranial pressure.  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  Its size has not changed since 2016.  No mention of this was made in the patient's recent MRI report.    With the above in mind, the patient was seen in the [facility] emergency room on 5/18/2023 and again on 5/21/2023 (he had left AMA at the first visit).  Apparently the patient had been talking to his physician and the patient's speech was unintelligible.  I am told that the physician called EMS who brought the patient to the emergency room.  The patient was having paresthesias on the left side and memory issues.  He was given Narcan.  Its not clear whether this helped.  At any rate, a work-up ensued.    A CTA of the [facility] [place], carotid and vertebral arteries was all negative.  CT of the head was negative.  MRI of the brain showed no acute intracranial abnormality with a single white matter hyperintensity noted in the right frontal lobe which was nonspecific.  An echocardiogram was essentially normal with the exception of \"possible small PFO \".  It is for this reason that the patient was sent here for further evaluation.    The patient has no anginal " "symptoms.  Currently, he does not have palpitations.  There is no PND or orthopnea.  The patient is having trouble with his CPAP machine and apparently there are sleep disturbances.  There has been no definite syncope or presyncope.    The patient is a smoker.  He is  and his wife is accompanying him.  The patient is not employed and I believe that he is considered disabled.          Objective   Vital Signs:  /71   Pulse 98   Ht 177.8 cm (70\")   Wt 116 kg (256 lb)   BMI 36.73 kg/m²   Estimated body mass index is 36.73 kg/m² as calculated from the following:    Height as of this encounter: 177.8 cm (70\").    Weight as of this encounter: 116 kg (256 lb).            Physical Exam    This is a 42-year-old male who is obese and in no distress.  He is awake and alert and oriented at present.  HEENT reveals no scleral icterus.  He is normocephalic.  Neck reveals no bruits.  Carotid upstroke is normal.  There is no thyromegaly.   Lungs reveal diminished breath sounds but are clear.  Heart reveals a nonpalpable apex impulse with normal S1 and S2 and no murmurs or gallops sounds.  Abdomen is protuberant without tenderness or masses.  Extremities reveal slight venous stasis changes and no edema at present with intact pedal pulses.  Formal neurologic testing is not performed however the patient moves all 4 extremities without hesitation.      Result Review :                   Assessment and Plan   Diagnoses and all orders for this visit:    1. Altered mental status, unspecified altered mental status type (Primary)    2. PFO (patent foramen ovale)    3. Primary hypertension  Overview:  2014; incidential to pituitary tumor/issues      4. Severe obesity (BMI 35.0-39.9) with comorbidity    5. Polycythemia    6. Tobacco use    7. Mixed hyperlipidemia    The patient had a recent episode of apparent transient aphasia and paresthesias of the left side and mental status change with loss of memory and some amnesia.  The " "etiology of this is not known but it is important to note that there is no evidence of stroke on his recent MRI or head CT scan.  The patient was given Narcan, so it must have been suspected that there was narcotic involved in this episode.  I do not know if this is the case.  At any rate, the patient's memory issues have been present for some time but got significantly worse on the day he presented to the ER.  He has PTSD and has polycythemia.  Therefore, there are many potential etiologies for the patient's symptoms but it appears that stroke is not one of them.    A recent echocardiogram was normal except for the presence of a \"possible small PFO\".  I am asked to render an opinion about whether the PFO was somehow involved in the patient's recent symptoms.  I do not believe this to be the case, especially since we have no objective evidence of a stroke, either recently or remotely.  Therefore, nothing need be done about this small PFO at present, if there actually is a PFO, which is uncertain.  The echo report is unconvincing.  I have not seen the echo and it is not available to me at present.    The patient's blood pressure is well controlled on current medical regimen which includes lisinopril and HydroDIURIL.  This is not altered at today's visit.    The patient is on high intensity statin therapy with total cholesterol of 95 and LDL of 32 last month.  This is quite acceptable.  The patient's medication was not altered at today's visit.    The patient clearly has polycythemia but I do not find evidence of any type of work-up for its etiology.  I will continue chart review.  If no one has worked it up, it would probably be a good idea to do so.    The patient will see the neurology nurse practitioner tomorrow.  I asked the patient and his wife to make sure that neurology does not think that he has had an actual stroke.  This will be very important in determining what, if anything to do about the possible " PFO.    All questions have been answered.  The patient and his wife were advised to contact us for any further concerns.  Unless things change, I do not feel that the patient will require cardiology follow-up but I will be happy to see him in the future should the need arise.    As always, I appreciate the opportunity to participate in the care of your patients.    A total of 98 minutes was spent reviewing this patient's extensive chart materials, formulating a note and even more time was spent interviewing the patient and examining him.        There are no Patient Instructions on file for this visit.       Follow Up   Return if symptoms worsen or fail to improve.  Patient was given instructions and counseling regarding his condition or for health maintenance advice. Please see specific information pulled into the AVS if appropriate.

## 2023-06-16 ENCOUNTER — TELEPHONE (OUTPATIENT)
Dept: FAMILY MEDICINE CLINIC | Facility: CLINIC | Age: 43
End: 2023-06-16
Payer: COMMERCIAL

## 2023-06-16 ENCOUNTER — TELEPHONE (OUTPATIENT)
Dept: NEUROLOGY | Age: 43
End: 2023-06-16

## 2023-06-16 NOTE — TELEPHONE ENCOUNTER
Juancho Melchor called in to reschedule his hospital follow up for AMS and CVA,  rescheduled to first available and added to wait list  Please advise  Thank you

## 2023-08-25 ENCOUNTER — OFFICE VISIT (OUTPATIENT)
Dept: FAMILY MEDICINE CLINIC | Facility: CLINIC | Age: 43
End: 2023-08-25
Payer: COMMERCIAL

## 2023-08-25 VITALS
RESPIRATION RATE: 18 BRPM | WEIGHT: 256 LBS | DIASTOLIC BLOOD PRESSURE: 70 MMHG | BODY MASS INDEX: 36.65 KG/M2 | HEIGHT: 70 IN | TEMPERATURE: 96.9 F | OXYGEN SATURATION: 98 % | SYSTOLIC BLOOD PRESSURE: 120 MMHG | HEART RATE: 110 BPM

## 2023-08-25 DIAGNOSIS — I10 ESSENTIAL HYPERTENSION: ICD-10-CM

## 2023-08-25 DIAGNOSIS — D75.1 POLYCYTHEMIA: Primary | ICD-10-CM

## 2023-08-25 DIAGNOSIS — Z12.5 SCREENING FOR PROSTATE CANCER: ICD-10-CM

## 2023-08-25 DIAGNOSIS — I10 HYPERTENSION, UNSPECIFIED TYPE: ICD-10-CM

## 2023-08-25 DIAGNOSIS — E29.1 HYPOGONADISM IN MALE: ICD-10-CM

## 2023-08-25 DIAGNOSIS — R39.11 URINARY HESITANCY: ICD-10-CM

## 2023-08-25 PROCEDURE — 1159F MED LIST DOCD IN RCRD: CPT | Performed by: NURSE PRACTITIONER

## 2023-08-25 PROCEDURE — 3078F DIAST BP <80 MM HG: CPT | Performed by: NURSE PRACTITIONER

## 2023-08-25 PROCEDURE — 99214 OFFICE O/P EST MOD 30 MIN: CPT | Performed by: NURSE PRACTITIONER

## 2023-08-25 PROCEDURE — 1160F RVW MEDS BY RX/DR IN RCRD: CPT | Performed by: NURSE PRACTITIONER

## 2023-08-25 PROCEDURE — 3074F SYST BP LT 130 MM HG: CPT | Performed by: NURSE PRACTITIONER

## 2023-08-25 RX ORDER — ACETAZOLAMIDE 250 MG/1
250 TABLET ORAL 3 TIMES DAILY
Qty: 90 TABLET | Refills: 1 | Status: SHIPPED | OUTPATIENT
Start: 2023-08-25

## 2023-08-25 RX ORDER — HYDROCHLOROTHIAZIDE 25 MG/1
25 TABLET ORAL DAILY
Qty: 90 TABLET | Refills: 1 | Status: SHIPPED | OUTPATIENT
Start: 2023-08-25

## 2023-08-25 NOTE — PROGRESS NOTES
Subjective   Chief Complaint:  3-month medication checkup.    History of Present Illness:  This 42 y.o. male was seen in the office today.      He reports he needs a refill on the lisinopril, a diuretic, and acetazolamide today. The patient reports he is unsure how much weight he has lost since his last visit. He advises he is down to 250 pounds.    He reports he has difficulty urinating, for approximately 1 week. He reports he had a knot on his left testicle approximately the same time he began having trouble urinating. He states it was about the size of an eraser; however, the knot is gone. He denies taking any medication for prostate in the past. He denies any discharge from his genitals. He adds he is only urinating once daily.     He reports Dr. Oneil prescribes his testosterone. He would like his testosterone levels checked today.    No Known Allergies   Current Outpatient Medications on File Prior to Visit   Medication Sig    acetaZOLAMIDE (DIAMOX) 250 MG tablet Take 1 tablet by mouth 3 (Three) Times a Day.    albuterol sulfate  (90 Base) MCG/ACT inhaler Inhale 1 puff 4 (Four) Times a Day.    ALPRAZolam (XANAX) 2 MG tablet     amphetamine-dextroamphetamine (ADDERALL) 10 MG tablet     amphetamine-dextroamphetamine (ADDERALL) 20 MG tablet Take 1 tablet by mouth 3 (Three) Times a Day.    Brexpiprazole 4 MG tablet Take 1 tablet by mouth.    buprenorphine (SUBUTEX) 8 MG sublingual tablet SL tablet Place 1 tablet under the tongue Daily.    buPROPion (WELLBUTRIN) 75 MG tablet Take 2 tablets by mouth Every Morning. Dr Sandrine riosle-betamethasone (Lotrisone) 1-0.05 % cream Apply 1 application topically to the appropriate area as directed Daily As Needed (dry scalp).    hydroCHLOROthiazide (HYDRODIURIL) 25 MG tablet Take 1 tablet by mouth Daily.    hydrOXYzine pamoate (VISTARIL) 25 MG capsule Take 1 capsule by mouth Every 6 (Six) Hours As Needed for Itching.    ketoconazole (NIZORAL) 2 % shampoo Apply   "topically to the appropriate area as directed As Needed for Irritation or Dandruff.    lisinopril (PRINIVIL,ZESTRIL) 10 MG tablet Take 1 tablet by mouth every night at bedtime.    Lurasidone HCl 120 MG tablet Take 1 tablet by mouth Daily. Dr Deluca    metFORMIN ER (GLUCOPHAGE-XR) 750 MG 24 hr tablet Take 1 tablet by mouth Daily With Breakfast.    Needle, Disp, 18G X 1\" misc Use weekly as indicated    nicotine polacrilex (NICORETTE) 4 MG gum Chew 1 each As Needed for Smoking Cessation.    OXcarbazepine (TRILEPTAL) 300 MG tablet Take 2 tablets by mouth 3 (Three) Times a Day.    rosuvastatin (CRESTOR) 40 MG tablet TAKE 1 TABLET BY MOUTH EVERY NIGHT    Saphris 10 MG sublingual tablet sublingual tablet Place 1 tablet under the tongue Every Night.    sildenafil (VIAGRA) 100 MG tablet TAKE ONE TABLET BY MOUTH DAILY AS NEEDED FOR ERECTILE DYSFUNCTION.    Syringe/Needle, Disp, (B-D 3CC LUER-COBY SYR 21GX1\") 21G X 1\" 3 ML misc USE ONCE WEEKLY AS DIRECTED    testosterone cypionate (DEPO-TESTOSTERONE) 100 MG/ML solution injection     varenicline (CHANTIX) 1 MG tablet Take 1 tablet by mouth 2 (Two) Times a Day.     No current facility-administered medications on file prior to visit.      Past Medical, Surgical, Social, and Family History:  Past Medical History:   Diagnosis Date    Anxiety     Depression     Hypertension     Intracranial hypertension     Mood disorder     Pituitary mass     PTSD (post-traumatic stress disorder)     Spinal headache      Past Surgical History:   Procedure Laterality Date    NO PAST SURGERIES      VASECTOMY N/A 5/18/2018    Procedure: VASECTOMY;  Surgeon: Ion Yanez MD;  Location: [facility] OR;  Service: Urology     Social History     Socioeconomic History    Marital status:      Spouse name: Keely    Number of children: 2    Years of education: 12   Tobacco Use    Smoking status: Every Day     Packs/day: 1.00     Years: 25.00     Pack years: 25.00     Types: Cigarettes     Start date: " "11/30/1989     Passive exposure: Past    Smokeless tobacco: Former     Types: Chew     Quit date: 11/20/2022   Vaping Use    Vaping Use: Never used   Substance and Sexual Activity    Alcohol use: No    Drug use: Yes     Types: Marijuana    Sexual activity: Yes     Partners: Female     Birth control/protection: None     Comment: wife     Family History   Problem Relation Age of Onset    Gonadal disorder Neg Hx      Objective   Vital Signs  /70   Pulse 110   Temp 96.9 øF (36.1 øC) (Temporal)   Resp 18   Ht 177.8 cm (70\")   Wt 116 kg (256 lb)   SpO2 98%   BMI 36.73 kg/mý     Physical Exam  Constitutional:       General: He is not in acute distress.     Appearance: He is obese.   Cardiovascular:      Rate and Rhythm: Normal rate and regular rhythm.      Pulses: Normal pulses.      Heart sounds: No murmur heard.    No friction rub. No gallop.   Pulmonary:      Effort: Pulmonary effort is normal. No respiratory distress.      Breath sounds: Normal breath sounds. No wheezing or rhonchi.   Genitourinary:     Comments: Prostate nontender. 30 mL, no nodule. No erythema. No enlargement. Bilateral testicular exam normal.  Neurological:      Mental Status: He is alert.     Prior Visit Notes/Records, Lab, Imaging, and Diagnostic Results Reviewed:  CBC:  Lab Results - Last 18 Months   Lab Units 05/21/23  0140 05/19/23  0153 05/18/23  0923 05/08/23  2300 04/19/23  1241 03/10/23  1429 01/13/23  0811   WBC K/uL 11.1* 9.2 9.7 8.65 11.02* 10.08 11.79*   HEMOGLOBIN g/dL 17.8 17.3 19.6* 18.6* 18.5* 17.9* 17.9*   HEMATOCRIT % 56.4* 53.0* 61.1* 36.5* 56.4* 53.7* 53.7*   PLATELETS K/uL 227 201 229 226 193 206 244      Chemistry:  Lab Results - Last 18 Months   Lab Units 05/08/23  2300 03/10/23  1429 01/13/23  0811 03/14/22  1127   SODIUM mmol/L 140 139 142  --    POTASSIUM mmol/L 3.9 3.5 4.0  --    CHLORIDE mmol/L 102 97* 104  --    CO2 mmol/L 27.8 28.5 26.4  --    GLUCOSE mg/dL 101* 153* 117*  --    BUN mg/dL 12 13 15  --  "   CREATININE mg/dL 1.23 1.28* 1.22 1.20   EGFR RESULT mL/min/1.73 74.7 71.2 75.4  --    CALCIUM mg/dL 10.4 10.2 9.5  --      BMI Trend:  BMI Readings from Last 10 Encounters:   08/25/23 36.73 kg/mý   06/15/23 36.73 kg/mý   05/25/23 38.22 kg/mý   05/09/23 38.83 kg/mý   03/29/23 41.90 kg/mý   03/10/23 40.81 kg/mý   02/08/23 41.38 kg/mý   01/13/23 40.35 kg/mý   08/03/22 38.48 kg/mý   05/23/22 39.49 kg/mý     Assessment & Plan   Diagnoses and all orders for this visit:    1. Polycythemia (Primary)    2. Hypertension, unspecified type  -     CBC & Differential  -     Comprehensive Metabolic Panel  -     Lipid Panel  -     TSH    3. Hypogonadism in male  -     Testosterone    4. Urinary hesitancy  -     UA / M With / Rflx Culture([facility] ONLY) - Urine, Clean Catch    5. Screening for prostate cancer    Discussion:  Advised and educated plan of care.  Advised updated labs today. We will check renal function due to decreased urine flow and have him bring in the urine sample. Follow up in 4 months.    Follow-up:  Return in about 4 months (around 12/25/2023) for Follow-Up.    Transcribed from ambient dictation for HI Bird by Sky Tate.  08/25/23   09:33 CDT    Patient or patient representative verbalized consent to the visit recording.  I have personally performed the services described in this document as transcribed by the above individual, and it is both accurate and complete.    Electronically signed by Sky Tate, 08/25/23, 9:33 AM CDT.

## 2023-08-26 LAB
ALBUMIN SERPL-MCNC: 4.6 G/DL (ref 3.5–5.2)
ALBUMIN/GLOB SERPL: 2.3 G/DL
ALP SERPL-CCNC: 49 U/L (ref 39–117)
ALT SERPL-CCNC: 22 U/L (ref 1–41)
AST SERPL-CCNC: 11 U/L (ref 1–40)
BASOPHILS # BLD AUTO: 0.07 10*3/MM3 (ref 0–0.2)
BASOPHILS NFR BLD AUTO: 0.8 % (ref 0–1.5)
BILIRUB SERPL-MCNC: 0.3 MG/DL (ref 0–1.2)
BUN SERPL-MCNC: 15 MG/DL (ref 6–20)
BUN/CREAT SERPL: 11.3 (ref 7–25)
CALCIUM SERPL-MCNC: 10 MG/DL (ref 8.6–10.5)
CHLORIDE SERPL-SCNC: 100 MMOL/L (ref 98–107)
CHOLEST SERPL-MCNC: 107 MG/DL (ref 0–200)
CO2 SERPL-SCNC: 27.3 MMOL/L (ref 22–29)
CREAT SERPL-MCNC: 1.33 MG/DL (ref 0.76–1.27)
EGFRCR SERPLBLD CKD-EPI 2021: 68 ML/MIN/1.73
EOSINOPHIL # BLD AUTO: 0.29 10*3/MM3 (ref 0–0.4)
EOSINOPHIL NFR BLD AUTO: 3.2 % (ref 0.3–6.2)
ERYTHROCYTE [DISTWIDTH] IN BLOOD BY AUTOMATED COUNT: 13.6 % (ref 12.3–15.4)
GLOBULIN SER CALC-MCNC: 2 GM/DL
GLUCOSE SERPL-MCNC: 116 MG/DL (ref 65–99)
HCT VFR BLD AUTO: 54.9 % (ref 37.5–51)
HDLC SERPL-MCNC: 29 MG/DL (ref 40–60)
HGB BLD-MCNC: 18.4 G/DL (ref 13–17.7)
IMM GRANULOCYTES # BLD AUTO: 0.03 10*3/MM3 (ref 0–0.05)
IMM GRANULOCYTES NFR BLD AUTO: 0.3 % (ref 0–0.5)
LDLC SERPL CALC-MCNC: 28 MG/DL (ref 0–100)
LYMPHOCYTES # BLD AUTO: 3.34 10*3/MM3 (ref 0.7–3.1)
LYMPHOCYTES NFR BLD AUTO: 36.5 % (ref 19.6–45.3)
MCH RBC QN AUTO: 29.6 PG (ref 26.6–33)
MCHC RBC AUTO-ENTMCNC: 33.5 G/DL (ref 31.5–35.7)
MCV RBC AUTO: 88.4 FL (ref 79–97)
MONOCYTES # BLD AUTO: 0.75 10*3/MM3 (ref 0.1–0.9)
MONOCYTES NFR BLD AUTO: 8.2 % (ref 5–12)
NEUTROPHILS # BLD AUTO: 4.68 10*3/MM3 (ref 1.7–7)
NEUTROPHILS NFR BLD AUTO: 51 % (ref 42.7–76)
NRBC BLD AUTO-RTO: 0 /100 WBC (ref 0–0.2)
PLATELET # BLD AUTO: 224 10*3/MM3 (ref 140–450)
POTASSIUM SERPL-SCNC: 3.2 MMOL/L (ref 3.5–5.2)
PROT SERPL-MCNC: 6.6 G/DL (ref 6–8.5)
RBC # BLD AUTO: 6.21 10*6/MM3 (ref 4.14–5.8)
SODIUM SERPL-SCNC: 141 MMOL/L (ref 136–145)
TESTOST SERPL-MCNC: 276 NG/DL (ref 264–916)
TRIGL SERPL-MCNC: 347 MG/DL (ref 0–150)
TSH SERPL DL<=0.005 MIU/L-ACNC: 2.78 UIU/ML (ref 0.27–4.2)
VLDLC SERPL CALC-MCNC: 50 MG/DL (ref 5–40)
WBC # BLD AUTO: 9.16 10*3/MM3 (ref 3.4–10.8)

## 2023-08-27 DIAGNOSIS — E87.6 LOW POTASSIUM SYNDROME: ICD-10-CM

## 2023-08-27 DIAGNOSIS — R71.8 ELEVATED HEMATOCRIT: Primary | ICD-10-CM

## 2023-08-27 RX ORDER — POTASSIUM CHLORIDE 750 MG/1
10 CAPSULE, EXTENDED RELEASE ORAL DAILY
Qty: 30 CAPSULE | Refills: 5 | Status: SHIPPED | OUTPATIENT
Start: 2023-08-27

## 2023-08-27 RX ORDER — FENOFIBRATE 48 MG/1
48 TABLET, COATED ORAL DAILY
Qty: 30 TABLET | Refills: 5 | Status: SHIPPED | OUTPATIENT
Start: 2023-08-27

## 2023-08-27 NOTE — PROGRESS NOTES
Please call his else    Elevated H&H-should still have standing order for therapeutic phlebotomies at [place]-since testosterone has came down and this remains elevated-lets do a referral to hematology.    Low potassium-I have sent in potassium replacement and I need him to recheck labs in 2 weeks.    Kidney function: Make sure you are staying hydrated-creatinine running slightly elevated-this may be a consequence chronically from chronic meds but we need to keep patient engaged with keeping hydration.    Elevated triglycerides-lets add a very low-dose fibrate to the medication.  Lets make sure you are still on Crestor.    Electronically signed by HI Bird, 08/27/23, 11:43 AM CDT.

## 2023-09-18 DIAGNOSIS — D35.2 PITUITARY MICROADENOMA: Primary | ICD-10-CM

## 2023-10-20 RX ORDER — LISINOPRIL 10 MG/1
10 TABLET ORAL
Qty: 30 TABLET | Refills: 3 | Status: SHIPPED | OUTPATIENT
Start: 2023-10-20

## 2023-11-29 ENCOUNTER — TELEPHONE (OUTPATIENT)
Dept: FAMILY MEDICINE CLINIC | Facility: CLINIC | Age: 43
End: 2023-11-29

## 2023-11-29 NOTE — TELEPHONE ENCOUNTER
Caller: ARDEN    Relationship: Other    Best call back number: [phone]     What form or medical record are you requesting: LATEST LAB RESULTS    Who is requesting this form or medical record from you: DR YAZMIN BURGESS OFFICE    How would you like to receive the form or medical records (pick-up, mail, fax): FAX  If fax, what is the fax number: [phone]      Timeframe paperwork needed: ASAP    Additional notes:

## 2023-12-01 DIAGNOSIS — R94.6 THYROID FUNCTION TEST ABNORMAL: ICD-10-CM

## 2023-12-01 DIAGNOSIS — E23.7 PITUITARY DISEASE: ICD-10-CM

## 2023-12-01 DIAGNOSIS — Z12.5 SPECIAL SCREENING FOR MALIGNANT NEOPLASM OF PROSTATE: ICD-10-CM

## 2023-12-01 DIAGNOSIS — E55.9 VITAMIN D DEFICIENCY: ICD-10-CM

## 2023-12-01 DIAGNOSIS — E23.0 HYPOGONADOTROPIC HYPOGONADISM: Primary | ICD-10-CM

## 2023-12-05 LAB
ALBUMIN SERPL-MCNC: 5 G/DL (ref 3.5–5.2)
ALBUMIN/GLOB SERPL: 2.5 G/DL
ALP SERPL-CCNC: 47 U/L (ref 39–117)
ALT SERPL-CCNC: 14 U/L (ref 1–41)
AST SERPL-CCNC: 11 U/L (ref 1–40)
BASOPHILS # BLD AUTO: 0.05 10*3/MM3 (ref 0–0.2)
BASOPHILS NFR BLD AUTO: 0.6 % (ref 0–1.5)
BILIRUB SERPL-MCNC: 0.3 MG/DL (ref 0–1.2)
BUN SERPL-MCNC: 23 MG/DL (ref 6–20)
BUN/CREAT SERPL: 19 (ref 7–25)
CALCIUM SERPL-MCNC: 10.4 MG/DL (ref 8.6–10.5)
CHLORIDE SERPL-SCNC: 104 MMOL/L (ref 98–107)
CHOLEST SERPL-MCNC: 117 MG/DL (ref 0–200)
CHOLEST/HDLC SERPL: 3.66 {RATIO}
CO2 SERPL-SCNC: 26.3 MMOL/L (ref 22–29)
CORTIS SERPL-MCNC: 12.9 UG/DL (ref 6.2–19.4)
CREAT SERPL-MCNC: 1.21 MG/DL (ref 0.76–1.27)
EGFRCR SERPLBLD CKD-EPI 2021: 75.7 ML/MIN/1.73
EOSINOPHIL # BLD AUTO: 0.14 10*3/MM3 (ref 0–0.4)
EOSINOPHIL NFR BLD AUTO: 1.6 % (ref 0.3–6.2)
ERYTHROCYTE [DISTWIDTH] IN BLOOD BY AUTOMATED COUNT: 12.7 % (ref 12.3–15.4)
GLOBULIN SER CALC-MCNC: 2 GM/DL
GLUCOSE SERPL-MCNC: 111 MG/DL (ref 65–99)
HBA1C MFR BLD: 5.5 % (ref 4.8–5.6)
HCT VFR BLD AUTO: 52.2 % (ref 37.5–51)
HDLC SERPL-MCNC: 32 MG/DL (ref 40–60)
HGB BLD-MCNC: 16.9 G/DL (ref 13–17.7)
IMM GRANULOCYTES # BLD AUTO: 0.05 10*3/MM3 (ref 0–0.05)
IMM GRANULOCYTES NFR BLD AUTO: 0.6 % (ref 0–0.5)
LDLC SERPL CALC-MCNC: 59 MG/DL (ref 0–100)
LYMPHOCYTES # BLD AUTO: 2.19 10*3/MM3 (ref 0.7–3.1)
LYMPHOCYTES NFR BLD AUTO: 25.3 % (ref 19.6–45.3)
MCH RBC QN AUTO: 29.3 PG (ref 26.6–33)
MCHC RBC AUTO-ENTMCNC: 32.4 G/DL (ref 31.5–35.7)
MCV RBC AUTO: 90.6 FL (ref 79–97)
MONOCYTES # BLD AUTO: 0.58 10*3/MM3 (ref 0.1–0.9)
MONOCYTES NFR BLD AUTO: 6.7 % (ref 5–12)
NEUTROPHILS # BLD AUTO: 5.66 10*3/MM3 (ref 1.7–7)
NEUTROPHILS NFR BLD AUTO: 65.2 % (ref 42.7–76)
NRBC BLD AUTO-RTO: 0 /100 WBC (ref 0–0.2)
PLATELET # BLD AUTO: 267 10*3/MM3 (ref 140–450)
POTASSIUM SERPL-SCNC: 4 MMOL/L (ref 3.5–5.2)
PROLACTIN SERPL-MCNC: 10 NG/ML (ref 4–15.2)
PROT SERPL-MCNC: 7 G/DL (ref 6–8.5)
PSA SERPL-MCNC: 0.7 NG/ML (ref 0–4)
RBC # BLD AUTO: 5.76 10*6/MM3 (ref 4.14–5.8)
SODIUM SERPL-SCNC: 142 MMOL/L (ref 136–145)
T4 FREE SERPL-MCNC: 1.09 NG/DL (ref 0.93–1.7)
TESTOST FREE SERPL-MCNC: 18.6 PG/ML (ref 6.8–21.5)
TESTOST SERPL-MCNC: 494 NG/DL (ref 264–916)
TRIGL SERPL-MCNC: 146 MG/DL (ref 0–150)
TSH SERPL DL<=0.005 MIU/L-ACNC: 1.17 UIU/ML (ref 0.27–4.2)
VLDLC SERPL CALC-MCNC: 26 MG/DL (ref 5–40)
WBC # BLD AUTO: 8.67 10*3/MM3 (ref 3.4–10.8)

## 2023-12-12 ENCOUNTER — OFFICE VISIT (OUTPATIENT)
Dept: FAMILY MEDICINE CLINIC | Facility: CLINIC | Age: 43
End: 2023-12-12
Payer: MEDICARE

## 2023-12-12 ENCOUNTER — TELEPHONE (OUTPATIENT)
Dept: FAMILY MEDICINE CLINIC | Facility: CLINIC | Age: 43
End: 2023-12-12

## 2023-12-12 VITALS
OXYGEN SATURATION: 99 % | WEIGHT: 247 LBS | SYSTOLIC BLOOD PRESSURE: 122 MMHG | BODY MASS INDEX: 35.36 KG/M2 | DIASTOLIC BLOOD PRESSURE: 84 MMHG | TEMPERATURE: 97.3 F | HEART RATE: 115 BPM | HEIGHT: 70 IN | RESPIRATION RATE: 18 BRPM

## 2023-12-12 DIAGNOSIS — N53.19 EJACULATORY DISORDER: Primary | ICD-10-CM

## 2023-12-12 DIAGNOSIS — B37.0 THRUSH: ICD-10-CM

## 2023-12-12 PROCEDURE — 3079F DIAST BP 80-89 MM HG: CPT | Performed by: NURSE PRACTITIONER

## 2023-12-12 PROCEDURE — 1160F RVW MEDS BY RX/DR IN RCRD: CPT | Performed by: NURSE PRACTITIONER

## 2023-12-12 PROCEDURE — 3074F SYST BP LT 130 MM HG: CPT | Performed by: NURSE PRACTITIONER

## 2023-12-12 PROCEDURE — 1159F MED LIST DOCD IN RCRD: CPT | Performed by: NURSE PRACTITIONER

## 2023-12-12 PROCEDURE — 99213 OFFICE O/P EST LOW 20 MIN: CPT | Performed by: NURSE PRACTITIONER

## 2023-12-12 RX ORDER — CLOTRIMAZOLE 10 MG/1
10 LOZENGE ORAL; TOPICAL
Qty: 35 TABLET | Refills: 0 | Status: SHIPPED | OUTPATIENT
Start: 2023-12-12

## 2023-12-12 RX ORDER — LISINOPRIL 10 MG/1
10 TABLET ORAL
Qty: 30 TABLET | Refills: 3 | Status: SHIPPED | OUTPATIENT
Start: 2023-12-12

## 2023-12-12 RX ORDER — ACETAZOLAMIDE 250 MG/1
250 TABLET ORAL 3 TIMES DAILY
Qty: 90 TABLET | Refills: 1 | Status: SHIPPED | OUTPATIENT
Start: 2023-12-12

## 2023-12-12 NOTE — TELEPHONE ENCOUNTER
Appears Dr Mena/srinivas is wanting copy of a lab    Not sure whether someone is wanting a refill?  (Says has been off); if off and staying off [facility] list off

## 2023-12-12 NOTE — PROGRESS NOTES
"Subjective   Chief Complaint:  White spot in mouth, swelling and soreness of penis.    History of Present Illness:  This 43 y.o. male was seen in the office today.    The patient reports soreness to penis. He reports difficulty with ejaculation and having to go hours. He reports he got a sore spot that's actually better today, some swelling. He reports his wife has a yeast infection and he now has white patches in his mouth.    No Known Allergies   Current Outpatient Medications on File Prior to Visit   Medication Sig    albuterol sulfate  (90 Base) MCG/ACT inhaler Inhale 1 puff 4 (Four) Times a Day.    ALPRAZolam (XANAX) 2 MG tablet     amphetamine-dextroamphetamine (ADDERALL) 10 MG tablet     amphetamine-dextroamphetamine (ADDERALL) 20 MG tablet Take 1 tablet by mouth 3 (Three) Times a Day.    Brexpiprazole 4 MG tablet Take 1 tablet by mouth.    buprenorphine (SUBUTEX) 8 MG sublingual tablet SL tablet Place 1 tablet under the tongue Daily.    buPROPion (WELLBUTRIN) 75 MG tablet Take 2 tablets by mouth Every Morning. Dr Deluca    clotrimazole-betamethasone (Lotrisone) 1-0.05 % cream Apply 1 application topically to the appropriate area as directed Daily As Needed (dry scalp).    fenofibrate (Tricor) 48 MG tablet Take 1 tablet by mouth Daily.    hydroCHLOROthiazide (HYDRODIURIL) 25 MG tablet Take 1 tablet by mouth Daily.    hydrOXYzine pamoate (VISTARIL) 25 MG capsule Take 1 capsule by mouth Every 6 (Six) Hours As Needed for Itching.    ketoconazole (NIZORAL) 2 % shampoo Apply  topically to the appropriate area as directed As Needed for Irritation or Dandruff.    Lurasidone HCl 120 MG tablet Take 1 tablet by mouth Daily. Dr Deluca    metFORMIN ER (GLUCOPHAGE-XR) 750 MG 24 hr tablet Take 1 tablet by mouth Daily With Breakfast.    Needle, Disp, 18G X 1\" misc Use weekly as indicated    nicotine polacrilex (NICORETTE) 4 MG gum Chew 1 each As Needed for Smoking Cessation.    OXcarbazepine (TRILEPTAL) 300 MG tablet " "Take 2 tablets by mouth 3 (Three) Times a Day.    potassium chloride (MICRO-K) 10 MEQ CR capsule Take 1 capsule by mouth Daily.    rosuvastatin (CRESTOR) 40 MG tablet TAKE 1 TABLET BY MOUTH EVERY NIGHT    Saphris 10 MG sublingual tablet sublingual tablet Place 1 tablet under the tongue Every Night.    sildenafil (VIAGRA) 100 MG tablet TAKE ONE TABLET BY MOUTH DAILY AS NEEDED FOR ERECTILE DYSFUNCTION.    Syringe/Needle, Disp, (B-D 3CC LUER-COBY SYR 21GX1\") 21G X 1\" 3 ML misc USE ONCE WEEKLY AS DIRECTED    testosterone cypionate (DEPO-TESTOSTERONE) 100 MG/ML solution injection     varenicline (CHANTIX) 1 MG tablet Take 1 tablet by mouth 2 (Two) Times a Day.     No current facility-administered medications on file prior to visit.      Past Medical, Surgical, Social, and Family History:  Past Medical History:   Diagnosis Date    Anxiety     Cardiac arrhythmia: palpitations 2022    Depression     Hypertension     Intracranial hypertension     Mood disorder     Pituitary mass     PTSD (post-traumatic stress disorder)     Spinal headache      Past Surgical History:   Procedure Laterality Date    NO PAST SURGERIES      VASECTOMY N/A 2018    Procedure: VASECTOMY;  Surgeon: Ion Yanez MD;  Location: [facility] OR;  Service: Urology     Social History     Socioeconomic History    Marital status:      Spouse name: Keely    Number of children: 2    Years of education: 12   Tobacco Use    Smoking status: Former     Packs/day: 1.00     Years: 25.00     Additional pack years: 0.00     Total pack years: 25.00     Types: Cigarettes     Start date: 1989     Quit date: 2023     Years since quittin.2     Passive exposure: Past    Smokeless tobacco: Former     Types: Chew     Quit date: 2022   Vaping Use    Vaping Use: Never used   Substance and Sexual Activity    Alcohol use: No    Drug use: Yes     Types: Marijuana    Sexual activity: Yes     Partners: Female     Birth control/protection: " None     Comment: wife     Family History   Problem Relation Age of Onset    Gonadal disorder Neg Hx        Prior Visit Notes/Records, Lab, Imaging, and Diagnostic Results Reviewed:  A1C:  Lab Results - Last 18 Months   Lab Units 12/01/23  1251 05/18/23  1348 03/10/23  1429   HEMOGLOBIN A1C % 5.50 6.1* 5.90*     GLUCOSE:  Lab Results - Last 18 Months   Lab Units 12/01/23  1251 08/25/23  0800 05/08/23  2300 03/10/23  1429 01/13/23  0811   GLUCOSE mg/dL 111* 116* 101* 153* 117*     LIPID:  Lab Results - Last 18 Months   Lab Units 12/01/23  1251 08/25/23  0800 05/19/23  0153 05/08/23  2300 01/13/23  0811   CHOLESTEROL mg/dL 117 107 95* 165 240*   LDL CHOL mg/dL 59 28 32 92 128*   HDL CHOL mg/dL 32* 29* 28* 30* 31*   TRIGLYCERIDES mg/dL 146 347* 173* 251* 449*     PSA:  Lab Results - Last 18 Months   Lab Units 12/01/23  1251   PSA ng/mL 0.704     CBC:  Lab Results - Last 18 Months   Lab Units 12/01/23  1251 08/25/23  0800 05/21/23  0140 05/19/23  0153 05/18/23  0923 05/08/23  2300 04/19/23  1241   WBC 10*3/mm3 8.67 9.16 11.1* 9.2 9.7 8.65 11.02*   HEMOGLOBIN g/dL 16.9 18.4* 17.8 17.3 19.6* 18.6* 18.5*   HEMATOCRIT % 52.2* 54.9* 56.4* 53.0* 61.1* 36.5* 56.4*   PLATELETS 10*3/mm3 267 224 227 201 229 226 193      BMP/CMP:  Lab Results - Last 18 Months   Lab Units 12/01/23  1251 08/25/23  0800 05/08/23  2300 03/10/23  1429 01/13/23  0811   SODIUM mmol/L 142 141 140 139 142   POTASSIUM mmol/L 4.0 3.2* 3.9 3.5 4.0   CHLORIDE mmol/L 104 100 102 97* 104   CO2 mmol/L 26.3 27.3 27.8 28.5 26.4   GLUCOSE mg/dL 111* 116* 101* 153* 117*   BUN mg/dL 23* 15 12 13 15   CREATININE mg/dL 1.21 1.33* 1.23 1.28* 1.22   EGFR RESULT mL/min/1.73 75.7 68.0 74.7 71.2 75.4   CALCIUM mg/dL 10.4 10.0 10.4 10.2 9.5     HEPATIC:  Lab Results - Last 18 Months   Lab Units 12/01/23  1251 08/25/23  0800 05/08/23  2300 03/10/23  1429 01/13/23  0811   ALT (SGPT) U/L 14 22 29 22 38   AST (SGOT) U/L 11 11 17 16 18   ALK PHOS U/L 47 49 47 49 49     Vit D:No  "results for input(s): \"IPFL57YQ\" in the last 07114 hours.  THYROID:  Lab Results - Last 18 Months   Lab Units 12/01/23  1251 08/25/23  0800 05/08/23  2300 01/13/23  0811   TSH uIU/mL 1.170 2.780 3.060 3.100   FREE T4 ng/dL 1.09  --   --   --      BMI Trend:  BMI Readings from Last 10 Encounters:   12/12/23 35.44 kg/m²   08/25/23 36.73 kg/m²   06/15/23 36.73 kg/m²   05/25/23 38.22 kg/m²   05/09/23 38.83 kg/m²   03/29/23 41.90 kg/m²   03/10/23 40.81 kg/m²   02/08/23 41.38 kg/m²   01/13/23 40.35 kg/m²   08/03/22 38.48 kg/m²     Objective   Vital Signs  /84   Pulse 115   Temp 97.3 °F (36.3 °C) (Temporal)   Resp 18   Ht 177.8 cm (70\")   Wt 112 kg (247 lb)   SpO2 99%   BMI 35.44 kg/m²   Physical Exam  Exam conducted with a chaperone present.   Constitutional:       General: He is not in acute distress.  HENT:      Mouth/Throat:      Comments: Oral mucosa- white patches.  Cardiovascular:      Rate and Rhythm: Normal rate and regular rhythm.      Pulses: Normal pulses.      Heart sounds: No murmur heard.     No friction rub. No gallop.   Pulmonary:      Effort: Pulmonary effort is normal. No respiratory distress.      Breath sounds: Normal breath sounds. No wheezing or rhonchi.   Genitourinary:     Pubic Area: No rash.       Penis: No swelling.       Comments: No palpable nodule in the area of concern.  Neurological:      Mental Status: He is alert.     Assessment & Plan   Diagnoses and all orders for this visit:    1. Ejaculatory disorder (Primary)  -     Ambulatory Referral to Urology  -     Chlamydia trachomatis, Neisseria gonorrhoeae, Trichomonas vaginalis, PCR - Urine, Urine, Clean Catch  -     UA / M With / Rflx Culture([facility] ONLY) - Urine, Clean Catch    2. Thrush    Other orders  -     clotrimazole (MYCELEX) 10 MG luis fernando; Take 1 tablet by mouth 5 (Five) Times a Day.  Dispense: 35 tablet; Refill: 0  -     acetaZOLAMIDE (DIAMOX) 250 MG tablet; Take 1 tablet by mouth 3 (Three) Times a Day.  Dispense: 90 " tablet; Refill: 1  -     lisinopril (PRINIVIL,ZESTRIL) 10 MG tablet; Take 1 tablet by mouth every night at bedtime.  Dispense: 30 tablet; Refill: 3    Discussions & Anticipatory Guidance:  Advised and educated plan of care.    The patient will Return for follow-up as needed pending results - we will call.  Advised we will obtain a UA and refer to urology for the ejaculatory problems. I did mention to talk to his psychiatric provider about those because some of the psychiatric medications could be at play here- he reports he has been on the same medication for over 13 years and has never had these problems before and he would rather talk to the urologist first.    Addendum/clarification-sending to urology to talk about the ejaculatory delay.  I will be treating the acute genital tract infection symptoms.    I have personally performed the services described in this document as transcribed by the above individual, and it is both accurate and complete.    Transcribed from ambient dictation for HI Bird by Mira Sam.  12/12/23   17:13 CST    Electronically signed by HI Thomas 12/12/23, 5:13 PM CST.

## 2023-12-12 NOTE — TELEPHONE ENCOUNTER
Caller: Lincoln Ornelas    Relationship: Self    Best call back number: [phone]    What form or medical record are you requesting: LAB RESULTS     Who is requesting this forR  or medical record from you: DR YAZMIN BURGESS, PH# [phone]    PLEASE FAX RESULTS TO DR BURGESS    Timeframe paperwork needed: AS SOON AS POSSIBLE        PATIENT HAS BEEN WITHOUT TESTOSTERONE FOR OVER A WEEK

## 2023-12-12 NOTE — TELEPHONE ENCOUNTER
Caller: Lincoln Ornelas    Relationship: Self    Best call back number: [phone]    Caller requesting test results: SELF    What test was performed: BLOOD WORK     When was the test performed: 11.29.23    Where was the test performed: IN OFFICE     Additional notes: CALL BACK TO ADD [phone] TO HAVE RESULT SENT TO

## 2023-12-20 RX ORDER — ROSUVASTATIN CALCIUM 40 MG/1
40 TABLET, COATED ORAL NIGHTLY
Qty: 30 TABLET | Refills: 5 | Status: SHIPPED | OUTPATIENT
Start: 2023-12-20

## 2024-02-06 ENCOUNTER — TELEPHONE (OUTPATIENT)
Dept: FAMILY MEDICINE CLINIC | Facility: CLINIC | Age: 44
End: 2024-02-06

## 2024-02-06 NOTE — TELEPHONE ENCOUNTER
Caller: Lincoln Ornelas    Relationship to patient: Self    Best call back number: [phone]     Chief complaint: TROUBLE BREATHING, PAIN IN UPPER RIGHT SIDE AND BETWEEN SHOULDER BLADES.     Patient directed to call 911 or go to their nearest emergency room.     Patient verbalized understanding: [x] Yes  [] No  If no, why?    Additional notes:PATIENT WAS ADVISED TO GO TO THE ER, HE SAID HE WOULD AND HE WILL FOLLOW UP WITH US AFTER.

## 2024-02-08 ENCOUNTER — OFFICE VISIT (OUTPATIENT)
Dept: FAMILY MEDICINE CLINIC | Facility: CLINIC | Age: 44
End: 2024-02-08
Payer: MEDICARE

## 2024-02-08 VITALS
BODY MASS INDEX: 37.85 KG/M2 | HEART RATE: 137 BPM | TEMPERATURE: 97.3 F | RESPIRATION RATE: 18 BRPM | WEIGHT: 264.4 LBS | OXYGEN SATURATION: 94 % | DIASTOLIC BLOOD PRESSURE: 88 MMHG | HEIGHT: 70 IN | SYSTOLIC BLOOD PRESSURE: 120 MMHG

## 2024-02-08 DIAGNOSIS — R10.11 RIGHT UPPER QUADRANT PAIN: Primary | ICD-10-CM

## 2024-02-08 PROCEDURE — 99213 OFFICE O/P EST LOW 20 MIN: CPT | Performed by: NURSE PRACTITIONER

## 2024-02-08 PROCEDURE — 3079F DIAST BP 80-89 MM HG: CPT | Performed by: NURSE PRACTITIONER

## 2024-02-08 PROCEDURE — 3074F SYST BP LT 130 MM HG: CPT | Performed by: NURSE PRACTITIONER

## 2024-02-08 NOTE — PROGRESS NOTES
"Subjective   Chief Complaint:  Right upper quadrant pain.    History of Present Illness:  This 43 y.o. male was seen in the office today.    Patient presents today with right upper quadrant pain, gradual onset over the last couple of months. Reports pain radiates through his abdomen to his right shoulder blade worse after eating.    No Known Allergies   Current Outpatient Medications on File Prior to Visit   Medication Sig    acetaZOLAMIDE (DIAMOX) 250 MG tablet Take 1 tablet by mouth 3 (Three) Times a Day.    ALPRAZolam (XANAX) 2 MG tablet     amphetamine-dextroamphetamine (ADDERALL) 10 MG tablet     amphetamine-dextroamphetamine (ADDERALL) 20 MG tablet Take 1 tablet by mouth 3 (Three) Times a Day.    buprenorphine (SUBUTEX) 8 MG sublingual tablet SL tablet Place 1 tablet under the tongue Daily.    clotrimazole-betamethasone (Lotrisone) 1-0.05 % cream Apply 1 application topically to the appropriate area as directed Daily As Needed (dry scalp).    fenofibrate (Tricor) 48 MG tablet Take 1 tablet by mouth Daily.    hydroCHLOROthiazide (HYDRODIURIL) 25 MG tablet Take 1 tablet by mouth Daily.    ketoconazole (NIZORAL) 2 % shampoo Apply  topically to the appropriate area as directed As Needed for Irritation or Dandruff.    lisinopril (PRINIVIL,ZESTRIL) 10 MG tablet Take 1 tablet by mouth every night at bedtime.    Lurasidone HCl 120 MG tablet Take 1 tablet by mouth Daily. Dr Sandrine Bass, Disp, 18G X 1\" misc Use weekly as indicated    potassium chloride (MICRO-K) 10 MEQ CR capsule Take 1 capsule by mouth Daily.    Saphris 10 MG sublingual tablet sublingual tablet Place 1 tablet under the tongue Every Night.    sildenafil (VIAGRA) 100 MG tablet TAKE ONE TABLET BY MOUTH DAILY AS NEEDED FOR ERECTILE DYSFUNCTION.    Syringe/Needle, Disp, (B-D 3CC LUER-COBY SYR 21GX1\") 21G X 1\" 3 ML misc USE ONCE WEEKLY AS DIRECTED    testosterone cypionate (DEPO-TESTOSTERONE) 100 MG/ML solution injection     albuterol sulfate  (90 " Base) MCG/ACT inhaler Inhale 1 puff 4 (Four) Times a Day. (Patient not taking: Reported on 2/8/2024)    Brexpiprazole 4 MG tablet Take 1 tablet by mouth. (Patient not taking: Reported on 2/8/2024)    buPROPion (WELLBUTRIN) 75 MG tablet Take 2 tablets by mouth Every Morning. Dr Deluca (Patient not taking: Reported on 2/8/2024)    clotrimazole (MYCELEX) 10 MG luis fernando Take 1 tablet by mouth 5 (Five) Times a Day. (Patient not taking: Reported on 2/8/2024)    hydrOXYzine pamoate (VISTARIL) 25 MG capsule Take 1 capsule by mouth Every 6 (Six) Hours As Needed for Itching. (Patient not taking: Reported on 2/8/2024)    metFORMIN ER (GLUCOPHAGE-XR) 750 MG 24 hr tablet Take 1 tablet by mouth Daily With Breakfast. (Patient not taking: Reported on 2/8/2024)    nicotine polacrilex (NICORETTE) 4 MG gum Chew 1 each As Needed for Smoking Cessation. (Patient not taking: Reported on 2/8/2024)    OXcarbazepine (TRILEPTAL) 300 MG tablet Take 2 tablets by mouth 3 (Three) Times a Day.    rosuvastatin (CRESTOR) 40 MG tablet TAKE 1 TABLET BY MOUTH EVERY NIGHT (Patient not taking: Reported on 2/8/2024)    varenicline (CHANTIX) 1 MG tablet Take 1 tablet by mouth 2 (Two) Times a Day. (Patient not taking: Reported on 2/8/2024)     No current facility-administered medications on file prior to visit.      Past Medical, Surgical, Social, and Family History:  Past Medical History:   Diagnosis Date    Anxiety     Cardiac arrhythmia: palpitations 02/08/2022    Depression     Hypertension     Intracranial hypertension     Mood disorder     Pituitary mass     PTSD (post-traumatic stress disorder)     Spinal headache      Past Surgical History:   Procedure Laterality Date    NO PAST SURGERIES      VASECTOMY N/A 5/18/2018    Procedure: VASECTOMY;  Surgeon: Ion Yanez MD;  Location: [facility];  Service: Urology     Social History     Socioeconomic History    Marital status:      Spouse name: Keely    Number of children: 2    Years of  education: 12   Tobacco Use    Smoking status: Former     Packs/day: 1.00     Years: 25.00     Additional pack years: 0.00     Total pack years: 25.00     Types: Cigarettes     Start date: 1989     Quit date: 2023     Years since quittin.3     Passive exposure: Past    Smokeless tobacco: Former     Types: Chew     Quit date: 2022   Vaping Use    Vaping Use: Never used   Substance and Sexual Activity    Alcohol use: No    Drug use: Yes     Types: Marijuana    Sexual activity: Yes     Partners: Female     Birth control/protection: None     Comment: wife     Family History   Problem Relation Age of Onset    Gonadal disorder Neg Hx        Prior Visit Notes/Records, Lab, Imaging, and Diagnostic Results Reviewed:  A1C:  Lab Results - Last 18 Months   Lab Units 23  1251 23  1348 03/10/23  1429   HEMOGLOBIN A1C % 5.50 6.1* 5.90*     GLUCOSE:  Lab Results - Last 18 Months   Lab Units 24  1022 23  1251 23  0800 23  2300 03/10/23  1429 23  0811   GLUCOSE mg/dL 84 111* 116* 101* 153* 117*     LIPID:  Lab Results - Last 18 Months   Lab Units 23  1251 23  0800 23  0153 23  2300 23  0811   CHOLESTEROL mg/dL 117 107 95* 165 240*   LDL CHOL mg/dL 59 28 32 92 128*   HDL CHOL mg/dL 32* 29* 28* 30* 31*   TRIGLYCERIDES mg/dL 146 347* 173* 251* 449*     PSA:  Lab Results - Last 18 Months   Lab Units 23  1251   PSA ng/mL 0.704     CBC:  Lab Results - Last 18 Months   Lab Units 24  1022 23  1251 23  0800 23  0140 23  0153 23  0923 23  2300   WBC 10*3/mm3 8.74 8.67 9.16 11.1* 9.2 9.7 8.65   HEMOGLOBIN g/dL 16.7 16.9 18.4* 17.8 17.3 19.6* 18.6*   HEMATOCRIT % 51.6* 52.2* 54.9* 56.4* 53.0* 61.1* 36.5*   PLATELETS 10*3/mm3 231 267 224 227 201 229 226      BMP/CMP:  Lab Results - Last 18 Months   Lab Units 24  1022 23  1251 23  0800 23  2300 03/10/23  1429 23  0811   SODIUM  "mmol/L 141 142 141 140 139 142   POTASSIUM mmol/L 3.8 4.0 3.2* 3.9 3.5 4.0   CHLORIDE mmol/L 103 104 100 102 97* 104   CO2 mmol/L 26.0 26.3 27.3 27.8 28.5 26.4   GLUCOSE mg/dL 84 111* 116* 101* 153* 117*   BUN mg/dL 10 23* 15 12 13 15   CREATININE mg/dL 1.01 1.21 1.33* 1.23 1.28* 1.22   EGFR RESULT mL/min/1.73 94.0 75.7 68.0 74.7 71.2 75.4   CALCIUM mg/dL 9.1 10.4 10.0 10.4 10.2 9.5     HEPATIC:  Lab Results - Last 18 Months   Lab Units 02/08/24  1022 12/01/23  1251 08/25/23  0800 05/08/23  2300 03/10/23  1429 01/13/23  0811   ALT (SGPT) U/L 35 14 22 29 22 38   AST (SGOT) U/L 25 11 11 17 16 18   ALK PHOS U/L 45 47 49 47 49 49     Vit D:No results for input(s): \"[id number]\" in the last 97026 hours.  THYROID:  Lab Results - Last 18 Months   Lab Units 12/01/23  1251 08/25/23  0800 05/08/23  2300 01/13/23  0811   TSH uIU/mL 1.170 2.780 3.060 3.100   FREE T4 ng/dL 1.09  --   --   --      BMI Trend:  BMI Readings from Last 10 Encounters:   02/08/24 37.94 kg/m²   12/12/23 35.44 kg/m²   08/25/23 36.73 kg/m²   06/15/23 36.73 kg/m²   05/25/23 38.22 kg/m²   05/09/23 38.83 kg/m²   03/29/23 41.90 kg/m²   03/10/23 40.81 kg/m²   02/08/23 41.38 kg/m²   01/13/23 40.35 kg/m²     Objective   Vital Signs  /88 (BP Location: Right arm, Patient Position: Sitting, Cuff Size: Large Adult)   Pulse (!) 137   Temp 97.3 °F (36.3 °C) (Infrared)   Resp 18   Ht 177.8 cm (70\")   Wt 120 kg (264 lb 6.4 oz)   SpO2 94%   BMI 37.94 kg/m²   Physical Exam  Constitutional:       General: He is not in acute distress.     Appearance: He is obese.   Cardiovascular:      Rate and Rhythm: Normal rate and regular rhythm.      Pulses: Normal pulses.      Heart sounds: No murmur heard.     No friction rub. No gallop.   Pulmonary:      Effort: Pulmonary effort is normal. No respiratory distress.      Breath sounds: Normal breath sounds. No wheezing or rhonchi.   Neurological:      Mental Status: He is alert.     Assessment & Plan   Diagnoses and all " orders for this visit:    1. Right upper quadrant pain (Primary)  -     NM HIDA SCAN WITHOUT PHARMACOLOGICAL INTERVENTION; Future  -     US Gallbladder; Future  -     CBC & Differential  -     Comprehensive Metabolic Panel  -     Amylase  -     Lipase  -     Sedimentation Rate  -     C-reactive Protein    Discussions & Anticipatory Guidance:  Advised and educated plan of care.    The patient will Return for follow-up as needed.  Advised ultrasound and to follow. We will also get some updated labs.     I have personally performed the services described in this document as transcribed by the above individual, and it is both accurate and complete.    Transcribed from ambient dictation for HI Bird by Mira Sam.  02/08/24   13:01 CST    Electronically signed by HI Thomas 02/08/24, 1:01 PM CST.

## 2024-02-09 DIAGNOSIS — R74.8 ELEVATED LIPASE: Primary | ICD-10-CM

## 2024-02-09 LAB
ALBUMIN SERPL-MCNC: 4.8 G/DL (ref 3.5–5.2)
ALBUMIN/GLOB SERPL: 2.5 G/DL
ALP SERPL-CCNC: 45 U/L (ref 39–117)
ALT SERPL-CCNC: 35 U/L (ref 1–41)
AMYLASE SERPL-CCNC: 43 U/L (ref 28–100)
AST SERPL-CCNC: 25 U/L (ref 1–40)
BASOPHILS # BLD AUTO: 0.08 10*3/MM3 (ref 0–0.2)
BASOPHILS NFR BLD AUTO: 0.9 % (ref 0–1.5)
BILIRUB SERPL-MCNC: <0.2 MG/DL (ref 0–1.2)
BUN SERPL-MCNC: 10 MG/DL (ref 6–20)
BUN/CREAT SERPL: 9.9 (ref 7–25)
CALCIUM SERPL-MCNC: 9.1 MG/DL (ref 8.6–10.5)
CHLORIDE SERPL-SCNC: 103 MMOL/L (ref 98–107)
CO2 SERPL-SCNC: 26 MMOL/L (ref 22–29)
CREAT SERPL-MCNC: 1.01 MG/DL (ref 0.76–1.27)
CRP SERPL-MCNC: <0.3 MG/DL (ref 0–0.5)
EGFRCR SERPLBLD CKD-EPI 2021: 94 ML/MIN/1.73
EOSINOPHIL # BLD AUTO: 0.25 10*3/MM3 (ref 0–0.4)
EOSINOPHIL NFR BLD AUTO: 2.9 % (ref 0.3–6.2)
ERYTHROCYTE [DISTWIDTH] IN BLOOD BY AUTOMATED COUNT: 12.5 % (ref 12.3–15.4)
ERYTHROCYTE [SEDIMENTATION RATE] IN BLOOD BY WESTERGREN METHOD: 3 MM/HR (ref 0–15)
GLOBULIN SER CALC-MCNC: 1.9 GM/DL
GLUCOSE SERPL-MCNC: 84 MG/DL (ref 65–99)
HCT VFR BLD AUTO: 51.6 % (ref 37.5–51)
HGB BLD-MCNC: 16.7 G/DL (ref 13–17.7)
IMM GRANULOCYTES # BLD AUTO: 0.09 10*3/MM3 (ref 0–0.05)
IMM GRANULOCYTES NFR BLD AUTO: 1 % (ref 0–0.5)
LIPASE SERPL-CCNC: 185 U/L (ref 13–60)
LYMPHOCYTES # BLD AUTO: 2.55 10*3/MM3 (ref 0.7–3.1)
LYMPHOCYTES NFR BLD AUTO: 29.2 % (ref 19.6–45.3)
MCH RBC QN AUTO: 29.2 PG (ref 26.6–33)
MCHC RBC AUTO-ENTMCNC: 32.4 G/DL (ref 31.5–35.7)
MCV RBC AUTO: 90.4 FL (ref 79–97)
MONOCYTES # BLD AUTO: 0.78 10*3/MM3 (ref 0.1–0.9)
MONOCYTES NFR BLD AUTO: 8.9 % (ref 5–12)
NEUTROPHILS # BLD AUTO: 4.99 10*3/MM3 (ref 1.7–7)
NEUTROPHILS NFR BLD AUTO: 57.1 % (ref 42.7–76)
NRBC BLD AUTO-RTO: 0 /100 WBC (ref 0–0.2)
PLATELET # BLD AUTO: 231 10*3/MM3 (ref 140–450)
POTASSIUM SERPL-SCNC: 3.8 MMOL/L (ref 3.5–5.2)
PROT SERPL-MCNC: 6.7 G/DL (ref 6–8.5)
RBC # BLD AUTO: 5.71 10*6/MM3 (ref 4.14–5.8)
SODIUM SERPL-SCNC: 141 MMOL/L (ref 136–145)
WBC # BLD AUTO: 8.74 10*3/MM3 (ref 3.4–10.8)

## 2024-02-09 NOTE — PROGRESS NOTES
Please call result -lipase is elevated threefold-will need a stat CT of the ABD today.    Electronically signed by HI Bird, 02/09/24, 7:52 AM CST.

## 2024-02-12 ENCOUNTER — HOSPITAL ENCOUNTER (OUTPATIENT)
Dept: ULTRASOUND IMAGING | Facility: HOSPITAL | Age: 44
End: 2024-02-12
Payer: COMMERCIAL

## 2024-02-12 ENCOUNTER — HOSPITAL ENCOUNTER (OUTPATIENT)
Dept: CT IMAGING | Facility: HOSPITAL | Age: 44
Discharge: HOME | End: 2024-02-12
Payer: COMMERCIAL

## 2024-02-12 ENCOUNTER — HOSPITAL ENCOUNTER (OUTPATIENT)
Dept: ULTRASOUND IMAGING | Facility: HOSPITAL | Age: 44
Discharge: HOME | End: 2024-02-12
Payer: COMMERCIAL

## 2024-02-12 DIAGNOSIS — R74.8 ELEVATED LIPASE: ICD-10-CM

## 2024-02-12 DIAGNOSIS — R10.11 RIGHT UPPER QUADRANT PAIN: ICD-10-CM

## 2024-02-12 PROCEDURE — 74176 CT ABD & PELVIS W/O CONTRAST: CPT

## 2024-02-12 PROCEDURE — 76705 ECHO EXAM OF ABDOMEN: CPT

## 2024-02-16 ENCOUNTER — HOSPITAL ENCOUNTER (OUTPATIENT)
Dept: NUCLEAR MEDICINE | Facility: HOSPITAL | Age: 44
Discharge: HOME | End: 2024-02-16
Payer: COMMERCIAL

## 2024-02-16 DIAGNOSIS — R10.11 RIGHT UPPER QUADRANT PAIN: ICD-10-CM

## 2024-02-16 PROCEDURE — 78226 HEPATOBILIARY SYSTEM IMAGING: CPT

## 2024-02-16 PROCEDURE — 0 TECHNETIUM TC 99M MEBROFENIN KIT: Performed by: NURSE PRACTITIONER

## 2024-02-16 PROCEDURE — A9537 TC99M MEBROFENIN: HCPCS | Performed by: NURSE PRACTITIONER

## 2024-02-16 RX ORDER — KIT FOR THE PREPARATION OF TECHNETIUM TC 99M MEBROFENIN 45 MG/10ML
1 INJECTION, POWDER, LYOPHILIZED, FOR SOLUTION INTRAVENOUS
Status: COMPLETED | OUTPATIENT
Start: 2024-02-16 | End: 2024-02-16

## 2024-02-16 RX ADMIN — MEBROFENIN 1 DOSE: 45 INJECTION, POWDER, LYOPHILIZED, FOR SOLUTION INTRAVENOUS at 14:55

## 2024-02-19 ENCOUNTER — PATIENT MESSAGE (OUTPATIENT)
Dept: FAMILY MEDICINE CLINIC | Facility: CLINIC | Age: 44
End: 2024-02-19
Payer: COMMERCIAL

## 2024-02-19 DIAGNOSIS — K82.8 BILIARY DYSKINESIA: Primary | ICD-10-CM

## 2024-02-19 NOTE — TELEPHONE ENCOUNTER
From: Lincoln Ornelas  To: Sanyt Zeng  Sent: 2/19/2024 8:58 AM CST  Subject: Results    I received your message about my test results. Yes, I would be willing to speak to a surgeon. Do you set that up or do I call?

## 2024-02-22 ENCOUNTER — OFFICE VISIT (OUTPATIENT)
Dept: BARIATRICS/WEIGHT MGMT | Facility: CLINIC | Age: 44
End: 2024-02-22
Payer: COMMERCIAL

## 2024-02-22 VITALS
OXYGEN SATURATION: 96 % | BODY MASS INDEX: 37.77 KG/M2 | TEMPERATURE: 98.7 F | DIASTOLIC BLOOD PRESSURE: 85 MMHG | HEIGHT: 70 IN | WEIGHT: 263.8 LBS | HEART RATE: 106 BPM | SYSTOLIC BLOOD PRESSURE: 128 MMHG

## 2024-02-22 DIAGNOSIS — E66.9 OBESITY, CLASS II, BMI 35-39.9: Primary | ICD-10-CM

## 2024-02-22 DIAGNOSIS — I10 PRIMARY HYPERTENSION: ICD-10-CM

## 2024-02-22 DIAGNOSIS — K82.8 BILIARY DYSKINESIA: ICD-10-CM

## 2024-02-22 DIAGNOSIS — G47.33 OSA (OBSTRUCTIVE SLEEP APNEA): ICD-10-CM

## 2024-02-22 PROCEDURE — 99203 OFFICE O/P NEW LOW 30 MIN: CPT | Performed by: SURGERY

## 2024-02-22 NOTE — PROGRESS NOTES
Patient Care Team:  Josafat Ascencio MD as PCP - General (Family Medicine)  Zhen Miller MD as Referring Physician (Ophthalmology)  Marie Oneil MD as Consulting Physician (Endocrinology)  Shon Lugo MD as Consulting Physician (Urology)    Reason for Visit: Symptomatic cholelithiasis    Subjective      Lincoln Ornelas is a pleasant 43 y.o. male and presents with complaints of abdominal pain also his Body mass index is 37.85 kg/m².    He is here due to having right upper quadrant pain since a month ago.  Has more bloating and gas.  He stated he has had the symptoms for month(s).  He stated that avoiding fatty foods helps alleviate these symptoms.   He stated that he has tried eating healthier.  He has obtained an ultrasound which disclosed no gallstones however had a HIDA scan which showed an EF of 4%.  Today he would like to discuss treatment options for these findings and symptoms.      Review of Systems  General ROS: negative  Respiratory ROS: no cough, shortness of breath, or wheezing  Cardiovascular ROS: no chest pain or dyspnea on exertion  Gastrointestinal ROS: positive for - abdominal pain and gas/bloating  negative for - constipation or nausea/vomiting    History  Past Medical History:   Diagnosis Date    Anxiety     Cardiac arrhythmia: palpitations 02/08/2022    Depression     Hypertension     Intracranial hypertension     Mood disorder     Pituitary mass     PTSD (post-traumatic stress disorder)     Spinal headache      Past Surgical History:   Procedure Laterality Date    NO PAST SURGERIES      VASECTOMY N/A 5/18/2018    Procedure: VASECTOMY;  Surgeon: Ion Yanez MD;  Location: [facility] OR;  Service: Urology     Family History   Problem Relation Age of Onset    Gonadal disorder Neg Hx      Social History     Tobacco Use    Smoking status: Former     Packs/day: 1.00     Years: 25.00     Additional pack years: 0.00     Total pack years: 25.00     Types: Cigarettes  "    Start date: 1989     Quit date: 2023     Years since quittin.4     Passive exposure: Past    Smokeless tobacco: Former     Types: Chew     Quit date: 2022   Vaping Use    Vaping Use: Never used   Substance Use Topics    Alcohol use: No    Drug use: Yes     Types: Marijuana     E-cigarette/Vaping    E-cigarette/Vaping Use Never User      E-cigarette/Vaping Substances    Nicotine Yes     THC No     CBD No     Flavoring No      (Not in a hospital admission)    Allergies:  Patient has no known allergies.      Current Outpatient Medications:     acetaZOLAMIDE (DIAMOX) 250 MG tablet, Take 1 tablet by mouth 3 (Three) Times a Day., Disp: 90 tablet, Rfl: 1    ALPRAZolam (XANAX) 2 MG tablet, , Disp: , Rfl:     amphetamine-dextroamphetamine (ADDERALL) 10 MG tablet, , Disp: , Rfl:     amphetamine-dextroamphetamine (ADDERALL) 20 MG tablet, Take 1 tablet by mouth 3 (Three) Times a Day., Disp: , Rfl:     buprenorphine (SUBUTEX) 8 MG sublingual tablet SL tablet, Place 1 tablet under the tongue Daily., Disp: , Rfl:     buPROPion (WELLBUTRIN) 75 MG tablet, Take 2 tablets by mouth Every Morning. Dr Deluca, Disp: 60 tablet, Rfl: 3    hydroCHLOROthiazide (HYDRODIURIL) 25 MG tablet, Take 1 tablet by mouth Daily., Disp: 90 tablet, Rfl: 1    ketoconazole (NIZORAL) 2 % shampoo, Apply  topically to the appropriate area as directed As Needed for Irritation or Dandruff., Disp: 120 mL, Rfl: 0    lisinopril (PRINIVIL,ZESTRIL) 10 MG tablet, Take 1 tablet by mouth every night at bedtime., Disp: 30 tablet, Rfl: 3    Lurasidone HCl 120 MG tablet, Take 1 tablet by mouth Daily. Dr Deluca, Disp: , Rfl:     Needle, Disp, 18G X 1\" misc, Use weekly as indicated, Disp: 4 each, Rfl: 11    OXcarbazepine (TRILEPTAL) 300 MG tablet, Take 2 tablets by mouth 3 (Three) Times a Day., Disp: 180 tablet, Rfl: 3    potassium chloride (MICRO-K) 10 MEQ CR capsule, Take 1 capsule by mouth Daily., Disp: 30 capsule, Rfl: 5    rosuvastatin (CRESTOR) 40 " "MG tablet, TAKE 1 TABLET BY MOUTH EVERY NIGHT, Disp: 30 tablet, Rfl: 5    Saphris 10 MG sublingual tablet sublingual tablet, Place 1 tablet under the tongue Every Night., Disp: , Rfl:     sildenafil (VIAGRA) 100 MG tablet, TAKE ONE TABLET BY MOUTH DAILY AS NEEDED FOR ERECTILE DYSFUNCTION., Disp: 15 tablet, Rfl: 1    testosterone cypionate (DEPO-TESTOSTERONE) 100 MG/ML solution injection, , Disp: , Rfl:     albuterol sulfate  (90 Base) MCG/ACT inhaler, Inhale 1 puff 4 (Four) Times a Day. (Patient not taking: Reported on 2/8/2024), Disp: 8 g, Rfl: 1    Brexpiprazole 4 MG tablet, Take 1 tablet by mouth. (Patient not taking: Reported on 2/8/2024), Disp: , Rfl:     clotrimazole (MYCELEX) 10 MG luis fernando, Take 1 tablet by mouth 5 (Five) Times a Day. (Patient not taking: Reported on 2/8/2024), Disp: 35 tablet, Rfl: 0    clotrimazole-betamethasone (Lotrisone) 1-0.05 % cream, Apply 1 application topically to the appropriate area as directed Daily As Needed (dry scalp). (Patient not taking: Reported on 2/22/2024), Disp: 45 g, Rfl: 0    fenofibrate (Tricor) 48 MG tablet, Take 1 tablet by mouth Daily. (Patient not taking: Reported on 2/22/2024), Disp: 30 tablet, Rfl: 5    hydrOXYzine pamoate (VISTARIL) 25 MG capsule, Take 1 capsule by mouth Every 6 (Six) Hours As Needed for Itching. (Patient not taking: Reported on 2/8/2024), Disp: 120 capsule, Rfl: 3    metFORMIN ER (GLUCOPHAGE-XR) 750 MG 24 hr tablet, Take 1 tablet by mouth Daily With Breakfast. (Patient not taking: Reported on 2/8/2024), Disp: 30 tablet, Rfl: 5    nicotine polacrilex (NICORETTE) 4 MG gum, Chew 1 each As Needed for Smoking Cessation. (Patient not taking: Reported on 2/8/2024), Disp: 40 each, Rfl: 1    Syringe/Needle, Disp, (B-D 3CC LUER-COBY SYR 21GX1\") 21G X 1\" 3 ML misc, USE ONCE WEEKLY AS DIRECTED (Patient not taking: Reported on 2/22/2024), Disp: 4 each, Rfl: 6    varenicline (CHANTIX) 1 MG tablet, Take 1 tablet by mouth 2 (Two) Times a Day. (Patient " not taking: Reported on 2/8/2024), Disp: 60 tablet, Rfl: 2    Objective     Vital Signs  Temp:  [98.7 °F (37.1 °C)] 98.7 °F (37.1 °C)  Heart Rate:  [106] 106  BP: (128)/(85) 128/85  Body mass index is 37.85 kg/m².      02/22/24  0958   Weight: 120 kg (263 lb 12.8 oz)       General Appearance:  awake, alert, oriented, in no acute distress  Lungs:  Normal expansion.  Clear to auscultation.  No rales, rhonchi, or wheezing.  Heart:  Heart regular rate and rhythm  Abdomen:  Soft, non-tender, normal bowel sounds; no bruits, organomegaly or masses.  Abnormal shape: obese    Results Review:   I reviewed the patient's new clinical results.  NM HIDA SCAN WITHOUT PHARMACOLOGICAL INTERVENTION    Result Date: 2/16/2024   1. Normal gallbladder filling. No evidence of acute cholecystitis.  2. Markedly decreased gallbladder ejection fraction of 4%. Correlate for biliary dyskinesia.   This report was signed and finalized on 2/16/2024 4:46 PM by Dr. Cory Diggs MD.      US Gallbladder    Result Date: 2/12/2024  1. Normal appearance of the gallbladder, no biliary ductal dilatation is identified. Normal appearance of the liver, and right kidney. There is mildly increased echogenicity of the pancreas, which is nonspecific.  This report was signed and finalized on 2/12/2024 10:04 AM by Dr. Faisal Alvarado MD.      CT Abdomen Pelvis Without Contrast    Result Date: 2/12/2024  1. No acute abnormality of the abdomen or pelvis to account for patient's symptoms. No finding to suggest acute pancreatitis. 2. Nonacute findings of the abdomen as detailed above.               This report was signed and finalized on 2/12/2024 9:50 AM by Dr. Jacob Moon MD.          Assessment & Plan   Encounter Diagnoses   Name Primary?    Obesity, Class II, BMI 35-39.9 Yes    TARAH (obstructive sleep apnea)     Primary hypertension     Biliary dyskinesia        I have explained the alternatives, benefits, complications and risks of a laparoscopic possible  open cholecystectomy with possible robotic assist and/or intraoperative cholangiogram.  With the patient.  I provided written documentation and reviewed this documentation provided by the [employer] with expectations and risks outlined including risk of infection, mortality risk, injury to the biliary tree specifically the common bile duct and it structures, risk of bleeding and postoperative pain management.  Documentation also provided pictures to help identify the structures described above as well as the procedure and dietary recommendations.    The patient would like to try conservative methods at this time.  Avoiding fatty greasy foods more diligently.  Making better food choices and paying attention to the symptoms associated with certain foods that he consumes.  He was informed to avoid dairy products and focus on leaner meats and avoid fat or greasy meals.  I have also recommended if his symptoms continue or he is unable to change his diet surgery should be considered.  He will follow-up with us as needed.  I have also offered him the opportunity to have his morbid obesity treated through our obesity program however at this time he would like to pursue dietary changes and health recommendations on his own.    Dr. Lincoln Triana MD [facility]    02/22/24  11:22 CST  Patient Care Team:  Josafat Ascencio MD as PCP - General (Family Medicine)  Zhen Miller MD as Referring Physician (Ophthalmology)  Marie Oneil MD as Consulting Physician (Endocrinology)  Shon Lugo MD as Consulting Physician (Urology)

## 2024-02-28 RX ORDER — ALBUTEROL SULFATE 90 UG/1
1 AEROSOL, METERED RESPIRATORY (INHALATION)
Qty: 8 G | Refills: 1 | Status: SHIPPED | OUTPATIENT
Start: 2024-02-28

## 2024-02-29 ENCOUNTER — PATIENT MESSAGE (OUTPATIENT)
Dept: FAMILY MEDICINE CLINIC | Facility: CLINIC | Age: 44
End: 2024-02-29
Payer: COMMERCIAL

## 2024-02-29 NOTE — TELEPHONE ENCOUNTER
From: Lincoln Ornelas  To: Santy Zeng  Sent: 2/28/2024 11:18 PM CST  Subject: Reagan Bladder    That doctor over at [facility] talked me into eating better and exercising to help fix my problems. What do you think at 4% ? Could this be self induced? I don’t hurt as often anymore…

## 2024-03-18 RX ORDER — LISINOPRIL 10 MG/1
10 TABLET ORAL
Qty: 30 TABLET | Refills: 0 | Status: SHIPPED | OUTPATIENT
Start: 2024-03-18

## 2024-05-21 RX ORDER — ACETAZOLAMIDE 250 MG/1
TABLET ORAL 3 TIMES DAILY
Qty: 90 TABLET | Refills: 1 | Status: SHIPPED | OUTPATIENT
Start: 2024-05-21

## 2024-05-21 RX ORDER — ROSUVASTATIN CALCIUM 40 MG/1
40 TABLET, COATED ORAL
Qty: 90 TABLET | Refills: 0 | Status: SHIPPED | OUTPATIENT
Start: 2024-05-21

## 2024-06-19 RX ORDER — LISINOPRIL 10 MG/1
10 TABLET ORAL
Qty: 30 TABLET | Refills: 0 | Status: SHIPPED | OUTPATIENT
Start: 2024-06-19

## 2024-07-16 RX ORDER — LISINOPRIL 10 MG/1
10 TABLET ORAL
Qty: 30 TABLET | Refills: 0 | Status: SHIPPED | OUTPATIENT
Start: 2024-07-16

## 2024-08-13 RX ORDER — LISINOPRIL 10 MG/1
10 TABLET ORAL
Qty: 30 TABLET | Refills: 0 | Status: SHIPPED | OUTPATIENT
Start: 2024-08-13

## 2024-08-13 RX ORDER — ACETAZOLAMIDE 250 MG/1
250 TABLET ORAL 3 TIMES DAILY
Qty: 90 TABLET | Refills: 1 | Status: SHIPPED | OUTPATIENT
Start: 2024-08-13

## 2024-09-11 RX ORDER — ROSUVASTATIN CALCIUM 40 MG/1
40 TABLET, COATED ORAL
Qty: 90 TABLET | Refills: 2 | Status: SHIPPED | OUTPATIENT
Start: 2024-09-11

## 2024-09-11 RX ORDER — LISINOPRIL 10 MG/1
10 TABLET ORAL
Qty: 30 TABLET | Refills: 0 | Status: SHIPPED | OUTPATIENT
Start: 2024-09-11

## 2024-09-11 NOTE — TELEPHONE ENCOUNTER
Rx Refill Note  Requested Prescriptions     Pending Prescriptions Disp Refills    lisinopril (PRINIVIL,ZESTRIL) 10 MG tablet [Pharmacy Med Name: LISINOPRIL 10MG TABLET] 30 tablet 0     Sig: TAKE ONE TABLET AT BEDTIME      Last office visit with office: 02/08/2024  Next office visit with office: NONE    UDS:     DATE OF LAST REFILL: 08/13/2024    Controlled Substance Agreement:     HILARY OR CAROLE:          {TIP  Is Refill Pharmacy correct?:  Irene De Leon MA  09/11/24, 07:38 CDT

## 2024-10-08 RX ORDER — LISINOPRIL 10 MG/1
10 TABLET ORAL
Qty: 30 TABLET | Refills: 0 | Status: SHIPPED | OUTPATIENT
Start: 2024-10-08

## 2024-10-09 ENCOUNTER — NURSE TRIAGE (OUTPATIENT)
Dept: CALL CENTER | Facility: HOSPITAL | Age: 44
End: 2024-10-09
Payer: COMMERCIAL

## 2024-10-09 RX ORDER — ACETAZOLAMIDE 250 MG/1
250 TABLET ORAL 3 TIMES DAILY
Qty: 90 TABLET | Refills: 1 | Status: SHIPPED | OUTPATIENT
Start: 2024-10-09

## 2024-10-09 NOTE — TELEPHONE ENCOUNTER
"HUB- Brain tumor, he struggles with remembering . He has missed office visits and did not know he had them. His  Bp is 200/105-   He has ran out of his BP medication, and has no ttaken the medication in 5 days. He needs to get scheduled office visit.  He needs to go to the ER.     He has not been seen since 02/24 of this year.  Explained multiple times by the Nurse and the office. That he needs to go to the ER due to his BP being high an  office visit will be made for the patient at the office.   He says he will go to the ER, declined triage. Unsure of understanding. He is trying very hard.   Reason for Disposition   [1] Caller requests to speak ONLY to PCP AND [2] URGENT question    Additional Information   Negative: Lab calling with strep throat test results and triager can call in prescription   Negative: Lab calling with urinalysis test results and triager can call in prescription   Negative: Medication questions   Negative: Medication renewal and refill questions   Negative: Pre-operative or pre-procedural questions   Negative: ED call to PCP (i.e., primary care provider; doctor, NP, or PA)   Negative: Doctor (or NP/PA) call to PCP   Negative: Call about patient who is currently hospitalized   Negative: Lab or radiology calling with CRITICAL test results   Negative: [1] Follow-up call from patient regarding patient's clinical status AND [2] information urgent   Negative: [1] Caller requests to speak to PCP now AND [2] won't tell us reason for call  (Exception: If 10 pm to 6 am, caller must first discuss reason for the call.)   Negative: Notification of hospital admission   Negative: Notification of death   Negative: Caller requesting lab results  (Exception: Routine or non-urgent lab result.)   Negative: Lab or radiology calling with test results    Answer Assessment - Initial Assessment Questions  1. REASON FOR CALL or QUESTION: \"What is your reason for calling today?\" or \"How can I best  help you?\" or \"What " "question do you have that I can help answer?\"      Office visit and medication.  2. CALLER: Document the source of call. (e.g., laboratory, patient).      Patient.    Protocols used: PCP Call - No Triage-ADULT-    "

## 2024-10-09 NOTE — TELEPHONE ENCOUNTER
Rx Refill Note  Requested Prescriptions     Pending Prescriptions Disp Refills    acetaZOLAMIDE (DIAMOX) 250 MG tablet [Pharmacy Med Name: ACETAZOLAMIDE 250MG TABLET] 90 tablet 1     Sig: TAKE ONE TABLET THREE TIMES DAILY      Last office visit with office: 02/08/24  Next office visit with office: 10/22/24    UDS:     DATE OF LAST REFILL: 08/13/24    Controlled Substance Agreement:     HILARY OR FRANCISCO JP:          {TIP  Is Refill Pharmacy correct?:  Neris Olsen MA  10/09/24, 15:13 CDT    [Follow-Up] : a follow-up visit [FreeTextEntry1] : sleep apnea

## 2024-10-09 NOTE — TELEPHONE ENCOUNTER
I called pt and his bp has came down to 124/80 he took his bp meds/I did tell him he has a script at [facility] and to take his meds like he is supposed to and keep watch on bp and keep his appt here at our office

## 2024-10-30 ENCOUNTER — APPOINTMENT (OUTPATIENT)
Dept: ULTRASOUND IMAGING | Age: 44
End: 2024-10-30
Payer: COMMERCIAL

## 2024-10-30 ENCOUNTER — HOSPITAL ENCOUNTER (EMERGENCY)
Age: 44
Discharge: HOME | End: 2024-10-30
Attending: STUDENT IN AN ORGANIZED HEALTH CARE EDUCATION/TRAINING PROGRAM
Payer: COMMERCIAL

## 2024-10-30 VITALS
BODY MASS INDEX: 35.87 KG/M2 | OXYGEN SATURATION: 92 % | TEMPERATURE: 98.2 F | SYSTOLIC BLOOD PRESSURE: 126 MMHG | RESPIRATION RATE: 18 BRPM | WEIGHT: 250 LBS | HEART RATE: 97 BPM | DIASTOLIC BLOOD PRESSURE: 76 MMHG

## 2024-10-30 DIAGNOSIS — S39.94XA TESTICULAR INJURY, INITIAL ENCOUNTER: Primary | ICD-10-CM

## 2024-10-30 LAB
ANION GAP SERPL CALCULATED.3IONS-SCNC: 12 MMOL/L (ref 7–19)
BASOPHILS # BLD: 0.1 K/UL (ref 0–0.2)
BASOPHILS NFR BLD: 0.8 % (ref 0–1)
BUN SERPL-MCNC: 22 MG/DL (ref 6–20)
CALCIUM SERPL-MCNC: 9.4 MG/DL (ref 8.6–10)
CHLORIDE SERPL-SCNC: 104 MMOL/L (ref 98–111)
CO2 SERPL-SCNC: 24 MMOL/L (ref 22–29)
CREAT SERPL-MCNC: 1 MG/DL (ref 0.7–1.2)
EOSINOPHIL # BLD: 0.2 K/UL (ref 0–0.6)
EOSINOPHIL NFR BLD: 1.8 % (ref 0–5)
ERYTHROCYTE [DISTWIDTH] IN BLOOD BY AUTOMATED COUNT: 12.2 % (ref 11.5–14.5)
GLUCOSE SERPL-MCNC: 102 MG/DL (ref 70–99)
HCT VFR BLD AUTO: 52 % (ref 42–52)
HGB BLD-MCNC: 16.7 G/DL (ref 14–18)
IMM GRANULOCYTES # BLD: 0 K/UL
INR PPP: 0.99 (ref 0.88–1.18)
LYMPHOCYTES # BLD: 2 K/UL (ref 1.1–4.5)
LYMPHOCYTES NFR BLD: 23.9 % (ref 20–40)
MCH RBC QN AUTO: 28.6 PG (ref 27–31)
MCHC RBC AUTO-ENTMCNC: 32.1 G/DL (ref 33–37)
MCV RBC AUTO: 89.2 FL (ref 80–94)
MONOCYTES # BLD: 0.9 K/UL (ref 0–0.9)
MONOCYTES NFR BLD: 10 % (ref 0–10)
NEUTROPHILS # BLD: 5.4 K/UL (ref 1.5–7.5)
NEUTS SEG NFR BLD: 63.1 % (ref 50–65)
PLATELET # BLD AUTO: 215 K/UL (ref 130–400)
PMV BLD AUTO: 10.9 FL (ref 9.4–12.4)
POTASSIUM SERPL-SCNC: 3.6 MMOL/L (ref 3.5–5)
PROTHROMBIN TIME: 12.8 SEC (ref 12–14.6)
RBC # BLD AUTO: 5.83 M/UL (ref 4.7–6.1)
SODIUM SERPL-SCNC: 140 MMOL/L (ref 136–145)
WBC # BLD AUTO: 8.5 K/UL (ref 4.8–10.8)

## 2024-10-30 PROCEDURE — 96375 TX/PRO/DX INJ NEW DRUG ADDON: CPT

## 2024-10-30 PROCEDURE — 6360000002 HC RX W HCPCS: Performed by: UROLOGY

## 2024-10-30 PROCEDURE — 36415 COLL VENOUS BLD VENIPUNCTURE: CPT

## 2024-10-30 PROCEDURE — 80048 BASIC METABOLIC PNL TOTAL CA: CPT

## 2024-10-30 PROCEDURE — 99284 EMERGENCY DEPT VISIT MOD MDM: CPT

## 2024-10-30 PROCEDURE — 85025 COMPLETE CBC W/AUTO DIFF WBC: CPT

## 2024-10-30 PROCEDURE — 85610 PROTHROMBIN TIME: CPT

## 2024-10-30 PROCEDURE — 6360000002 HC RX W HCPCS: Performed by: STUDENT IN AN ORGANIZED HEALTH CARE EDUCATION/TRAINING PROGRAM

## 2024-10-30 PROCEDURE — 99284 EMERGENCY DEPT VISIT MOD MDM: CPT | Performed by: UROLOGY

## 2024-10-30 PROCEDURE — 76870 US EXAM SCROTUM: CPT

## 2024-10-30 PROCEDURE — 96374 THER/PROPH/DIAG INJ IV PUSH: CPT

## 2024-10-30 RX ORDER — HYDROCODONE BITARTRATE AND ACETAMINOPHEN 7.5; 325 MG/1; MG/1
1 TABLET ORAL 2 TIMES DAILY PRN
Qty: 4 TABLET | Refills: 0 | Status: SHIPPED | OUTPATIENT
Start: 2024-10-30 | End: 2024-11-01

## 2024-10-30 RX ORDER — FENTANYL CITRATE 50 UG/ML
50 INJECTION, SOLUTION INTRAMUSCULAR; INTRAVENOUS ONCE
Status: COMPLETED | OUTPATIENT
Start: 2024-10-30 | End: 2024-10-30

## 2024-10-30 RX ORDER — KETOROLAC TROMETHAMINE 30 MG/ML
30 INJECTION, SOLUTION INTRAMUSCULAR; INTRAVENOUS ONCE
Status: COMPLETED | OUTPATIENT
Start: 2024-10-30 | End: 2024-10-30

## 2024-10-30 RX ADMIN — FENTANYL CITRATE 50 MCG: 50 INJECTION INTRAMUSCULAR; INTRAVENOUS at 19:35

## 2024-10-30 RX ADMIN — KETOROLAC TROMETHAMINE 30 MG: 30 INJECTION, SOLUTION INTRAMUSCULAR at 21:56

## 2024-10-30 ASSESSMENT — ENCOUNTER SYMPTOMS
SHORTNESS OF BREATH: 0
GASTROINTESTINAL NEGATIVE: 1
NAUSEA: 0
VOMITING: 0
RESPIRATORY NEGATIVE: 1

## 2024-10-31 NOTE — ED NOTES
[facility] called to be sure attending was aware that patient has a standing prescription for buprenorphine before filling pain meds prescribed.  Passed on to Dr. Melvin for review.

## 2024-10-31 NOTE — CONSULTS
scrotum bilaterally.  The left testicle is a little bit soft and maybe little atrophic compared to the right the testicles is normal orientation epididymis is also probably normal without induration or swelling.  There is no cord I can feel the structures and cortical of the vas deferens and the cord structures and there is no swelling or induration or abnormality involving the spermatic cord at all on both sides.  Right testis and epididymis are also palpably normal with normal orientation and normal palpable spermatic cord the right testis is may be slightly more firm but is palpable testicle.  Musculoskeletal:         General: No tenderness or deformity. Normal range of motion.      Cervical back: Normal range of motion and neck supple.   Lymphadenopathy:      Cervical: No cervical adenopathy.   Skin:     General: Skin is warm and dry.      Findings: No erythema.   Neurological:      General: No focal deficit present.      Mental Status: He is alert and oriented to person, place, and time.   Psychiatric:         Behavior: Behavior normal.         Judgment: Judgment normal.         Labs    CBC:  Recent Labs     10/30/24  1924   WBC 8.5   RBC 5.83   HGB 16.7   HCT 52.0   MCV 89.2   RDW 12.2        CHEMISTRIES:  Recent Labs     10/30/24  1924      K 3.6      CO2 24   BUN 22*   CREATININE 1.0   GLUCOSE 102*     PT/INR:  Recent Labs     10/30/24  1924   PROTIME 12.8   INR 0.99     APTT:No results for input(s): \"APTT\" in the last 72 hours.  LIVER PROFILE:No results for input(s): \"AST\", \"ALT\", \"BILIDIR\", \"BILITOT\", \"ALKPHOS\" in the last 72 hours.    Imaging/Diagnostics   US SCROTUM AND TESTICLES    Result Date: 10/30/2024    Redemonstrated is decreased vascularity of the right and left testicle, concerning for torsion.  Small bilateral hydroceles.    ______________________________________ Electronically signed by: MARY DOS SANTOS M.D. Date:     10/30/2024 Time:    20:56     I reviewed the scrotal

## 2024-10-31 NOTE — DISCHARGE INSTRUCTIONS
Please plan to follow-up with Dr. Cooley for repeat exam.  You need to come back to the emergency department for any worsening: Swelling of the testicle, persistent or worsening pain, any discoloration, if the testicle feels like it is sitting up higher than normal, if the testicle feels like it is not sitting in the right way.  Do not hesitate to return to the emergency department for emergency care because this would be a time sensitive issue.  Use warm compresses as needed for the pain.    You can take 1000mg tylenol (two extra strength pills) every 6 hours as needed for pain.  You can take 600mg ibuprofen (three regular strength pills) every 6 hours as needed for pain.  These medicines can be alternated every 3 hours.    Take the pain medicine if necessary.  If you take pain medicine please take a stool softener with it.
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
## 2024-11-27 RX ORDER — LISINOPRIL 10 MG/1
10 TABLET ORAL
Qty: 30 TABLET | Refills: 0 | Status: SHIPPED | OUTPATIENT
Start: 2024-11-27

## 2024-11-27 NOTE — TELEPHONE ENCOUNTER
I can refill this for 1 month because I do not want him to run out but he is really behind on visits and I would need to check his blood pressure and do an evaluation to be able to continue any further.

## 2025-01-08 ENCOUNTER — TELEPHONE (OUTPATIENT)
Dept: FAMILY MEDICINE CLINIC | Facility: CLINIC | Age: 45
End: 2025-01-08
Payer: COMMERCIAL

## 2025-01-08 RX ORDER — ACETAZOLAMIDE 250 MG/1
250 TABLET ORAL 3 TIMES DAILY
Qty: 90 TABLET | Refills: 0 | Status: SHIPPED | OUTPATIENT
Start: 2025-01-08

## 2025-01-08 RX ORDER — LISINOPRIL 10 MG/1
10 TABLET ORAL
Qty: 30 TABLET | Refills: 0 | Status: SHIPPED | OUTPATIENT
Start: 2025-01-08

## 2025-01-08 NOTE — TELEPHONE ENCOUNTER
Pt came into office stating he is having leg swelling issues, pt states he has been out of his blood pressure medication. Pt was able to make an appt for next week. Declined sooner. Please advise

## 2025-01-08 NOTE — TELEPHONE ENCOUNTER
I can give a 30-day of Diamox and lisinopril.  Make sure patient knows that since he has been out of her care for a while, he needs to bring in pill bottles so we can fully understand what he is taking from everybody.    He must come in for any refills beyond these.    Electronically signed by HI Bird, 01/08/25, 9:16 AM GEOFF.     Vaccine Information Statement    Hepatitis B Vaccine: What You Need to Know    Many vaccine information statements are available in [place] and other languages. See www.immunize.org/vis. Hojas de información sobre vacunas están disponibles en español y en muchos otros idiomas. Visite www.immunize.org/vis. 1. Why get vaccinated? Hepatitis B vaccine can prevent hepatitis B. Hepatitis B is a liver disease that can cause mild illness lasting a few weeks, or it can lead to a serious, lifelong illness.  Acute hepatitis B infection is a short-term illness that can lead to fever, fatigue, loss of appetite, nausea, vomiting, jaundice (yellow skin or eyes, dark urine, neo-colored bowel movements), and pain in the muscles, joints, and stomach.  Chronic hepatitis B infection is a long-term illness that occurs when the hepatitis B virus remains in a persons body. Most people who go on to develop chronic hepatitis B do not have symptoms, but it is still very serious and can lead to liver damage (cirrhosis), liver cancer, and death. Chronically infected people can spread hepatitis B virus to others, even if they do not feel or look sick themselves. Hepatitis B is spread when blood, semen, or other body fluid infected with the hepatitis B virus enters the body of a person who is not infected. People can become infected through:  [employer] (if a pregnant person has hepatitis B, their baby can become infected)   Sharing items such as razors or toothbrushes with an infected person   Contact with the blood or open sores of an infected person   Sex with an infected partner   Sharing needles, syringes, or other drug-injection equipment   Exposure to blood from needlesticks or other sharp instruments    Most people who are vaccinated with hepatitis B vaccine are immune for life. 2. Hepatitis B vaccine    Hepatitis B vaccine is usually given as 2, 3, or 4 shots.     Infants should get their first dose of hepatitis B vaccine at birth and will usually complete the series at 7-21 months of age. The birth dose of hepatitis B vaccine is an important part of preventing long-term illness in infants and the spread of hepatitis B in the [place]. Children and adolescents younger than 23years of age who have not yet gotten the vaccine should be vaccinated. Adults who were not vaccinated previously and want to be protected against hepatitis B can also get the vaccine. Hepatitis B vaccine is also recommended for the following people:     People whose sex partners have hepatitis B   Sexually active persons who are not in a long-term, monogamous relationship   People seeking evaluation or treatment for a sexually transmitted disease   Victims of sexual assault or abuse   Men who have sexual contact with other men   People who share needles, syringes, or other drug-injection equipment   People who live with someone infected with the hepatitis B virus  [address] care and public safety workers at risk for exposure to blood or body fluids   Residents and staff of facilities for developmentally disabled people  [address] living in assisted or longterm   Travelers to regions with increased rates of hepatitis B   People with chronic liver disease, kidney disease on dialysis, HIV infection, infection with hepatitis C, or diabetes    Hepatitis B vaccine may be given as a stand-alone vaccine, or as part of a combination vaccine (a type of vaccine that combines more than one vaccine together into one shot). Hepatitis B vaccine may be given at the same time as other vaccines.     3. Talk with your health care provider    Tell your vaccination provider if the person getting the vaccine:   Has had an allergic reaction after a previous dose of hepatitis B vaccine, or has any severe, life-threatening allergies     In some cases, your health care provider may decide to postpone hepatitis B vaccination until a future visit.    Pregnant or breastfeeding people should be vaccinated if they are at risk for getting hepatitis B. Pregnancy or breastfeeding are not reasons to avoid hepatitis B vaccination. People with minor illnesses, such as a cold, may be vaccinated. People who are moderately or severely ill should usually wait until they recover before getting hepatitis B vaccine. Your health care provider can give you more information. 4. Risks of a vaccine reaction     Soreness where the shot is given or fever can happen after hepatitis B vaccination. People sometimes faint after medical procedures, including vaccination. Tell your provider if you feel dizzy or have vision changes or ringing in the ears. As with any medicine, there is a very remote chance of a vaccine causing a severe allergic reaction, other serious injury, or death. 5. What if there is a serious problem? An allergic reaction could occur after the vaccinated person leaves the clinic. If you see signs of a severe allergic reaction (hives, swelling of the face and throat, difficulty breathing, a fast heartbeat, dizziness, or weakness), call 9-1-1 and get the person to the nearest hospital.    For other signs that concern you, call your health care provider. Adverse reactions should be reported to the Vaccine Adverse Event Reporting System (VAERS). Your health care provider will usually file this report, or you can do it yourself. Visit the VAERS website at www.vaers. hhs.gov or call 9-760.700.4010. VAERS is only for reporting reactions, and VAERS staff members do not give medical advice. 6. The National Vaccine Injury Compensation Program    The [facility] Vaccine Injury Compensation Program (VICP) is a federal program that was created to compensate people who may have been injured by certain vaccines. Claims regarding alleged injury or death due to vaccination have a time limit for filing, which may be as short as two years.  Visit the VICP website at www.hrsa.gov/vaccinecompensation or call 8-465.359.7127 to learn about the program and about filing a claim. 7. How can I learn more?  Ask your health care provider.  Call your local or state health department.  Visit the website of the Food and Drug Administration (FDA) for vaccine package inserts and additional information at https://www.reyes.com/.  Contact the Centers for Disease Control and Prevention (CDC):  - Call 5-787.158.6979 (1-800-CDC-INFO) or  - Visit CDCs website at www.cdc.gov/vaccines. Vaccine Information Statement   Hepatitis B Vaccine   10/15/2021  [address] [id number]-67   Department of Health and Human Services  Centers for Disease Control and Prevention    Office Use Only         Child's Well Visit, Birth to 1 Month: Care Instructions  Your Care Instructions     Your baby is already watching and listening to you. Talking, cuddling, hugs, and kisses are all ways that you can help your baby grow and develop. At this age, your baby may look at faces and follow an object with his or her eyes. He or she may respond to sounds by blinking, crying, or appearing to be startled. Your baby may lift his or her head briefly while on the tummy. Your baby will likely have periods where he or she is awake for 2 or 3 hours straight. Although  sleeping and eating patterns vary, your baby will probably sleep for a total of 18 hours each day. Follow-up care is a key part of your child's treatment and safety. Be sure to make and go to all appointments, and call your doctor if your child is having problems. It's also a good idea to know your child's test results and keep a list of the medicines your child takes. How can you care for your child at home? Feeding  · If you breastfeed, let your baby decide when and how long to nurse. · If you don't breastfeed, use a formula with iron.  Your baby may take 2 to 3 ounces of formula every 3 to 4 hours.  · Always check the temperature of the formula by putting a few drops on your wrist.  · Do not warm bottles in the microwave. The milk can get too hot and burn your baby's mouth. Sleep  · Put your baby to sleep on their back, not on the side or tummy. This reduces the risk of SIDS. Use a firm, flat mattress. Do not put pillows in the crib. Do not use sleep positioners or crib bumpers. · Do not hang toys across the crib. · Make sure that the crib slats are less than 2 3/8 inches apart. Your baby's head can get trapped if the openings are too wide. · Remove the knobs on the corners of the crib so that they don't fall off into the crib. · Tighten all nuts, bolts, and screws on the crib every few months. Check the mattress support hangers and hooks regularly. · Do not use older or used cribs. They may not meet current safety standards. · For more information on crib safety, call the U.S. Consumer Product Safety Commission (4-728.981.6553). Crying  · Your baby may cry for 1 to 3 hours a day. Babies usually cry for a reason, such as being hungry, hot, cold, or in pain, or having dirty diapers. Sometimes babies cry but you do not know why. When your baby cries:  ? Change your baby's clothes or blankets if you think your baby may be too cold or warm. Change your baby's diaper if it is dirty or wet. ? Feed your baby if you think they're hungry. Try burping your baby, especially after feeding. ? Look for a problem, such as an open diaper pin, that may be causing pain. ? Hold your baby close to your body to comfort your baby. ? Rock in a rocking chair. ? Sing or play soft music, go for a walk in a stroller, or take a ride in the car.  ? Wrap your baby snugly in a blanket, give your baby a warm bath, or take a bath together. ? If your baby still cries, put your baby in the crib and close the door. Go to another room and wait to see if your baby falls asleep.  If your baby is still crying after 15 minutes, pick your baby up and try all of the above tips again. First shot to prevent hepatitis B  · Most babies have had the first dose of hepatitis B vaccine by now. Make sure that your baby gets the recommended childhood vaccines over the next few months. These vaccines will help keep your baby healthy and prevent the spread of disease. When should you call for help? Watch closely for changes in your baby's health, and be sure to contact your doctor if:    · You are concerned that your baby is not getting enough to eat or is not developing normally.     · Your baby seems sick.     · Your baby has a fever.     · You need more information about how to care for your baby, or you have questions or concerns. Where can you learn more? Go to http://www.gray.com/  Enter Z497 in the search box to learn more about \"Child's Well Visit, Birth to 1 Month: Care Instructions. \"  Current as of: September 20, 2021               Content Version: 13.2  © 3661-1544 Healthwise, Incorporated. Care instructions adapted under license by [email or web address] (which disclaims liability or warranty for this information). If you have questions about a medical condition or this instruction, always ask your healthcare professional. [address] any warranty or liability for your use of this information.

## 2025-01-24 RX ORDER — ACETAZOLAMIDE 250 MG/1
250 TABLET ORAL 3 TIMES DAILY
Qty: 90 TABLET | Refills: 1 | Status: SHIPPED | OUTPATIENT
Start: 2025-01-24

## 2025-02-06 ENCOUNTER — TELEPHONE (OUTPATIENT)
Dept: FAMILY MEDICINE CLINIC | Facility: CLINIC | Age: 45
End: 2025-02-06
Payer: COMMERCIAL

## 2025-02-06 RX ORDER — LISINOPRIL 10 MG/1
10 TABLET ORAL
Qty: 30 TABLET | Refills: 0 | Status: SHIPPED | OUTPATIENT
Start: 2025-02-06 | End: 2025-02-07

## 2025-02-07 ENCOUNTER — OFFICE VISIT (OUTPATIENT)
Dept: FAMILY MEDICINE CLINIC | Facility: CLINIC | Age: 45
End: 2025-02-07
Payer: MEDICARE

## 2025-02-07 VITALS
HEIGHT: 70 IN | DIASTOLIC BLOOD PRESSURE: 78 MMHG | OXYGEN SATURATION: 98 % | HEART RATE: 102 BPM | TEMPERATURE: 97.7 F | SYSTOLIC BLOOD PRESSURE: 114 MMHG | BODY MASS INDEX: 37.48 KG/M2 | WEIGHT: 261.8 LBS

## 2025-02-07 DIAGNOSIS — Z12.11 SCREENING FOR COLON CANCER: ICD-10-CM

## 2025-02-07 DIAGNOSIS — E78.2 MIXED HYPERLIPIDEMIA: Primary | ICD-10-CM

## 2025-02-07 DIAGNOSIS — I10 ESSENTIAL HYPERTENSION: ICD-10-CM

## 2025-02-07 PROCEDURE — 1126F AMNT PAIN NOTED NONE PRSNT: CPT | Performed by: NURSE PRACTITIONER

## 2025-02-07 PROCEDURE — 3074F SYST BP LT 130 MM HG: CPT | Performed by: NURSE PRACTITIONER

## 2025-02-07 PROCEDURE — 99214 OFFICE O/P EST MOD 30 MIN: CPT | Performed by: NURSE PRACTITIONER

## 2025-02-07 PROCEDURE — 3078F DIAST BP <80 MM HG: CPT | Performed by: NURSE PRACTITIONER

## 2025-02-07 PROCEDURE — 99406 BEHAV CHNG SMOKING 3-10 MIN: CPT | Performed by: NURSE PRACTITIONER

## 2025-02-07 RX ORDER — ROSUVASTATIN CALCIUM 40 MG/1
40 TABLET, COATED ORAL
Qty: 90 TABLET | Refills: 2 | Status: SHIPPED | OUTPATIENT
Start: 2025-02-07

## 2025-02-07 RX ORDER — ERGOCALCIFEROL 1.25 MG/1
CAPSULE, LIQUID FILLED ORAL
COMMUNITY
Start: 2025-01-27

## 2025-02-10 NOTE — PROGRESS NOTES
"Subjective   Chief Complaint:  Medication management    History of Present Illness  The patient is a 44-year-old male presenting for medication management.    He has a history of high blood pressure and is currently off his medications. He had been out of his meds for a while.    He is also on Crestor for hypercholesterolemia. He reports his endocrinologist recently did labs, and they are going to bring them in or have them sent over.    MEDICATIONS  Crestor    Past Medical, Surgical, Social, and Family History:  No Known Allergies   Past Medical History:   Diagnosis Date    Anxiety     Cardiac arrhythmia: palpitations 2022    Depression     Hypertension     Intracranial hypertension     Mood disorder     Pituitary mass     PTSD (post-traumatic stress disorder)     Spinal headache       Past Surgical History:   Procedure Laterality Date    NO PAST SURGERIES      VASECTOMY N/A 2018    Procedure: VASECTOMY;  Surgeon: Ion Yanez MD;  Location: [facility] OR;  Service: Urology      Social History     Socioeconomic History    Marital status:      Spouse name: Keely    Number of children: 2    Years of education: 12   Tobacco Use    Smoking status: Every Day     Current packs/day: 0.00     Average packs/day: 1 pack/day for 33.8 years (33.8 ttl pk-yrs)     Types: Cigarettes     Start date: 1989     Last attempt to quit: 2023     Years since quittin.3     Passive exposure: Past    Smokeless tobacco: Former     Types: Chew     Quit date: 2022   Vaping Use    Vaping status: Never Used   Substance and Sexual Activity    Alcohol use: No    Drug use: Yes     Types: Marijuana    Sexual activity: Yes     Partners: Female     Birth control/protection: None     Comment: wife      Family History   Problem Relation Age of Onset    Gonadal disorder Neg Hx        Objective   Vital Signs  /78   Pulse 102   Temp 97.7 °F (36.5 °C) (Infrared)   Ht 177.8 cm (70\")   Wt 119 kg (261 lb 12.8 " oz)   SpO2 98%   BMI 37.56 kg/m²    Physical Exam  Constitutional:       General: He is not in acute distress.     Appearance: He is obese.   Cardiovascular:      Rate and Rhythm: Normal rate and regular rhythm.      Pulses: Normal pulses.      Heart sounds: No murmur heard.     No friction rub. No gallop.   Pulmonary:      Effort: Pulmonary effort is normal. No respiratory distress.      Breath sounds: Normal breath sounds. No wheezing or rhonchi.   Neurological:      Mental Status: He is alert.       Assessment & Plan   Assessment & Plan  1. Mixed hyperlipidemia, chronic stable.  Continue Crestor. Offered labs, he is going to contact his other provider.    2. Essential hypertension, chronic.  Currently seems to be in remission. Advised to continue off the lisinopril for now.    3. Health maintenance.  Cologuard test ordered.    4.  Nicotine abuse  -Nicotine gum ordered    Lincoln Ornelas  reports that he has been smoking cigarettes. He started smoking about 35 years ago. He has a 33.8 pack-year smoking history. He has been exposed to tobacco smoke. He quit smokeless tobacco use about 2 years ago.  His smokeless tobacco use included chew. I have educated him on the risk of diseases from using tobacco products such as cancer, COPD, and heart disease.     I advised him to quit and he is willing to quit. We have discussed the following method/s for tobacco cessation:  Prescription Medication.  Together we have set a quit date for  today .  He will follow up with me in 6 months or sooner to check on his progress.    I spent 5 minutes counseling the patient.           Follow-up:  The patient will Return for 6 month medication management.    Records and Results Reviewed:  I reviewed current medications as given by patient and allergy list    : [employer]- and Dragon Speech Recognition - No recording technology was used in the exam room during encounter.    Electronically signed by Santy Zeng  HI, 02/10/25, 8:05 AM CST.

## 2025-02-11 RX ORDER — LISINOPRIL 10 MG/1
10 TABLET ORAL
Qty: 30 TABLET | Refills: 0 | OUTPATIENT
Start: 2025-02-11

## 2025-03-18 NOTE — TELEPHONE ENCOUNTER
Rx Refill Note  Requested Prescriptions     Pending Prescriptions Disp Refills    lisinopril (PRINIVIL,ZESTRIL) 10 MG tablet [Pharmacy Med Name: LISINOPRIL 10MG TABLET] 30 tablet 0     Sig: TAKE ONE TABLET AT BEDTIME      Last office visit with office: 02/07/25  Next office visit with office: 08/08/25    UDS:     DATE OF LAST REFILL: 02-06-25    Controlled Substance Agreement:     HILARY OR CAROLE:          {TIP  Is Refill Pharmacy correct?:  Neris Olsen MA  03/18/25, 15:17 CDT

## 2025-03-20 RX ORDER — LISINOPRIL 10 MG/1
10 TABLET ORAL
Qty: 30 TABLET | Refills: 0 | Status: SHIPPED | OUTPATIENT
Start: 2025-03-20

## 2025-04-07 RX ORDER — ACETAZOLAMIDE 250 MG/1
250 TABLET ORAL 3 TIMES DAILY
Qty: 90 TABLET | Refills: 1 | Status: SHIPPED | OUTPATIENT
Start: 2025-04-07

## 2025-05-27 RX ORDER — LISINOPRIL 10 MG/1
10 TABLET ORAL
Qty: 30 TABLET | Refills: 0 | Status: SHIPPED | OUTPATIENT
Start: 2025-05-27

## 2025-06-27 RX ORDER — LISINOPRIL 10 MG/1
10 TABLET ORAL
Qty: 30 TABLET | Refills: 0 | Status: SHIPPED | OUTPATIENT
Start: 2025-06-27

## 2025-07-17 ENCOUNTER — HOSPITAL ENCOUNTER (EMERGENCY)
Age: 45
Discharge: HOME | End: 2025-07-18
Attending: STUDENT IN AN ORGANIZED HEALTH CARE EDUCATION/TRAINING PROGRAM
Payer: MEDICARE

## 2025-07-17 DIAGNOSIS — R46.89 THREATENING BEHAVIOR: Primary | ICD-10-CM

## 2025-07-17 LAB
ALBUMIN SERPL-MCNC: 4.8 G/DL (ref 3.5–5.2)
ALP SERPL-CCNC: 49 U/L (ref 40–129)
ALT SERPL-CCNC: 21 U/L (ref 10–50)
AMPHET UR QL SCN: POSITIVE
ANION GAP SERPL CALCULATED.3IONS-SCNC: 10 MMOL/L (ref 8–16)
ANION GAP SERPL CALCULATED.3IONS-SCNC: 28 MMOL/L (ref 8–16)
APAP SERPL-MCNC: <5 UG/ML (ref 10–30)
AST SERPL-CCNC: 22 U/L (ref 10–50)
BARBITURATES UR QL SCN: NEGATIVE
BASOPHILS # BLD: 0.1 K/UL (ref 0–0.2)
BASOPHILS NFR BLD: 0.6 % (ref 0–1)
BENZODIAZ UR QL SCN: NEGATIVE
BILIRUB SERPL-MCNC: 0.2 MG/DL (ref 0.2–1.2)
BILIRUB UR QL STRIP: NEGATIVE
BUN SERPL-MCNC: 16 MG/DL (ref 6–20)
BUN SERPL-MCNC: 17 MG/DL (ref 6–20)
BUPRENORPHINE URINE: POSITIVE
CALCIUM SERPL-MCNC: 9.2 MG/DL (ref 8.6–10)
CALCIUM SERPL-MCNC: 9.2 MG/DL (ref 8.6–10)
CANNABINOIDS UR QL SCN: NEGATIVE
CHLORIDE SERPL-SCNC: 102 MMOL/L (ref 98–107)
CHLORIDE SERPL-SCNC: 106 MMOL/L (ref 98–107)
CK SERPL-CCNC: 197 U/L (ref 39–308)
CLARITY UR: CLEAR
CO2 SERPL-SCNC: 13 MMOL/L (ref 22–29)
CO2 SERPL-SCNC: 26 MMOL/L (ref 22–29)
COCAINE UR QL SCN: NEGATIVE
COLOR UR: YELLOW
CREAT SERPL-MCNC: 1.3 MG/DL (ref 0.7–1.2)
CREAT SERPL-MCNC: 1.3 MG/DL (ref 0.7–1.2)
DRUG SCREEN COMMENT UR-IMP: ABNORMAL
EOSINOPHIL # BLD: 0.2 K/UL (ref 0–0.6)
EOSINOPHIL NFR BLD: 1.9 % (ref 0–5)
ERYTHROCYTE [DISTWIDTH] IN BLOOD BY AUTOMATED COUNT: 12 % (ref 11.5–14.5)
ETHANOLAMINE SERPL-MCNC: <11 MG/DL (ref 0–11)
FENTANYL SCREEN, URINE: NEGATIVE
GLUCOSE SERPL-MCNC: 103 MG/DL (ref 70–99)
GLUCOSE SERPL-MCNC: 111 MG/DL (ref 70–99)
GLUCOSE UR STRIP.AUTO-MCNC: NEGATIVE MG/DL
HCT VFR BLD AUTO: 52.9 % (ref 42–52)
HGB BLD-MCNC: 16.9 G/DL (ref 14–18)
HGB UR STRIP.AUTO-MCNC: NEGATIVE MG/L
IMM GRANULOCYTES # BLD: 0.1 K/UL
KETONES UR STRIP.AUTO-MCNC: NEGATIVE MG/DL
LEUKOCYTE ESTERASE UR QL STRIP.AUTO: NEGATIVE
LYMPHOCYTES # BLD: 3.3 K/UL (ref 1.1–4.5)
LYMPHOCYTES NFR BLD: 30.8 % (ref 20–40)
MCH RBC QN AUTO: 29.2 PG (ref 27–31)
MCHC RBC AUTO-ENTMCNC: 31.9 G/DL (ref 33–37)
MCV RBC AUTO: 91.4 FL (ref 80–94)
METHADONE UR QL SCN: NEGATIVE
METHAMPHETAMINE, URINE: NEGATIVE
MONOCYTES # BLD: 1 K/UL (ref 0–0.9)
MONOCYTES NFR BLD: 9.5 % (ref 0–10)
NEUTROPHILS # BLD: 6 K/UL (ref 1.5–7.5)
NEUTS SEG NFR BLD: 56.7 % (ref 50–65)
NITRITE UR QL STRIP.AUTO: NEGATIVE
OPIATES UR QL SCN: NEGATIVE
OXYCODONE UR QL SCN: NEGATIVE
PCP UR QL SCN: NEGATIVE
PH UR STRIP.AUTO: 5.5 [PH] (ref 5–8)
PLATELET # BLD AUTO: 248 K/UL (ref 130–400)
PMV BLD AUTO: 11 FL (ref 9.4–12.4)
POTASSIUM SERPL-SCNC: 3.7 MMOL/L (ref 3.5–5)
POTASSIUM SERPL-SCNC: 4.1 MMOL/L (ref 3.5–5)
PROT SERPL-MCNC: 7.4 G/DL (ref 6.4–8.3)
PROT UR STRIP.AUTO-MCNC: NEGATIVE MG/DL
RBC # BLD AUTO: 5.79 M/UL (ref 4.7–6.1)
SALICYLATES SERPL-MCNC: 0.5 MG/DL (ref 3–10)
SARS-COV-2 RDRP RESP QL NAA+PROBE: NOT DETECTED
SODIUM SERPL-SCNC: 142 MMOL/L (ref 136–145)
SODIUM SERPL-SCNC: 143 MMOL/L (ref 136–145)
SP GR UR STRIP.AUTO: 1.02 (ref 1–1.03)
TRICYCLIC ANTIDEPRESSANTS, UR: NEGATIVE
UROBILINOGEN UR STRIP.AUTO-MCNC: 1 E.U./DL
WBC # BLD AUTO: 10.6 K/UL (ref 4.8–10.8)

## 2025-07-17 PROCEDURE — 81003 URINALYSIS AUTO W/O SCOPE: CPT

## 2025-07-17 PROCEDURE — 99284 EMERGENCY DEPT VISIT MOD MDM: CPT

## 2025-07-17 PROCEDURE — 2580000003 HC RX 258: Performed by: EMERGENCY MEDICINE

## 2025-07-17 PROCEDURE — 85025 COMPLETE CBC W/AUTO DIFF WBC: CPT

## 2025-07-17 PROCEDURE — G0480 DRUG TEST DEF 1-7 CLASSES: HCPCS

## 2025-07-17 PROCEDURE — 80143 DRUG ASSAY ACETAMINOPHEN: CPT

## 2025-07-17 PROCEDURE — 82077 ASSAY SPEC XCP UR&BREATH IA: CPT

## 2025-07-17 PROCEDURE — 82550 ASSAY OF CK (CPK): CPT

## 2025-07-17 PROCEDURE — 6360000002 HC RX W HCPCS: Performed by: STUDENT IN AN ORGANIZED HEALTH CARE EDUCATION/TRAINING PROGRAM

## 2025-07-17 PROCEDURE — 80307 DRUG TEST PRSMV CHEM ANLYZR: CPT

## 2025-07-17 PROCEDURE — 2580000003 HC RX 258: Performed by: STUDENT IN AN ORGANIZED HEALTH CARE EDUCATION/TRAINING PROGRAM

## 2025-07-17 PROCEDURE — 80053 COMPREHEN METABOLIC PANEL: CPT

## 2025-07-17 PROCEDURE — 36415 COLL VENOUS BLD VENIPUNCTURE: CPT

## 2025-07-17 PROCEDURE — 6370000000 HC RX 637 (ALT 250 FOR IP): Performed by: STUDENT IN AN ORGANIZED HEALTH CARE EDUCATION/TRAINING PROGRAM

## 2025-07-17 PROCEDURE — 87635 SARS-COV-2 COVID-19 AMP PRB: CPT

## 2025-07-17 PROCEDURE — 80179 DRUG ASSAY SALICYLATE: CPT

## 2025-07-17 PROCEDURE — 96372 THER/PROPH/DIAG INJ SC/IM: CPT

## 2025-07-17 RX ORDER — ACETAZOLAMIDE 250 MG/1
500 TABLET ORAL DAILY
COMMUNITY

## 2025-07-17 RX ORDER — DIPHENHYDRAMINE HYDROCHLORIDE 50 MG/ML
50 INJECTION, SOLUTION INTRAMUSCULAR; INTRAVENOUS ONCE
Status: COMPLETED | OUTPATIENT
Start: 2025-07-17 | End: 2025-07-17

## 2025-07-17 RX ORDER — OXCARBAZEPINE 300 MG/1
300 TABLET, FILM COATED ORAL ONCE
Status: COMPLETED | OUTPATIENT
Start: 2025-07-17 | End: 2025-07-17

## 2025-07-17 RX ORDER — DROPERIDOL 2.5 MG/ML
5 INJECTION, SOLUTION INTRAMUSCULAR; INTRAVENOUS ONCE
Status: COMPLETED | OUTPATIENT
Start: 2025-07-17 | End: 2025-07-17

## 2025-07-17 RX ORDER — LORAZEPAM 2 MG/ML
2 INJECTION INTRAMUSCULAR ONCE
Status: COMPLETED | OUTPATIENT
Start: 2025-07-17 | End: 2025-07-17

## 2025-07-17 RX ORDER — SODIUM CHLORIDE, SODIUM LACTATE, POTASSIUM CHLORIDE, AND CALCIUM CHLORIDE .6; .31; .03; .02 G/100ML; G/100ML; G/100ML; G/100ML
1000 INJECTION, SOLUTION INTRAVENOUS ONCE
Status: COMPLETED | OUTPATIENT
Start: 2025-07-17 | End: 2025-07-17

## 2025-07-17 RX ORDER — ALPRAZOLAM 0.5 MG
0.5 TABLET ORAL ONCE
Status: COMPLETED | OUTPATIENT
Start: 2025-07-17 | End: 2025-07-17

## 2025-07-17 RX ORDER — BUPRENORPHINE 8 MG/1
8 TABLET SUBLINGUAL ONCE
Status: COMPLETED | OUTPATIENT
Start: 2025-07-17 | End: 2025-07-17

## 2025-07-17 RX ORDER — LISINOPRIL AND HYDROCHLOROTHIAZIDE 20; 25 MG/1; MG/1
1 TABLET ORAL DAILY
COMMUNITY

## 2025-07-17 RX ORDER — 0.9 % SODIUM CHLORIDE 0.9 %
1000 INTRAVENOUS SOLUTION INTRAVENOUS ONCE
Status: COMPLETED | OUTPATIENT
Start: 2025-07-17 | End: 2025-07-17

## 2025-07-17 RX ADMIN — BUPRENORPHINE 8 MG: 8 TABLET SUBLINGUAL at 22:13

## 2025-07-17 RX ADMIN — DIPHENHYDRAMINE HYDROCHLORIDE 50 MG: 50 INJECTION, SOLUTION INTRAMUSCULAR; INTRAVENOUS at 18:41

## 2025-07-17 RX ADMIN — OXCARBAZEPINE 300 MG: 300 TABLET, FILM COATED ORAL at 19:15

## 2025-07-17 RX ADMIN — SODIUM CHLORIDE 1000 ML: 9 INJECTION, SOLUTION INTRAVENOUS at 22:12

## 2025-07-17 RX ADMIN — DROPERIDOL 5 MG: 2.5 INJECTION, SOLUTION INTRAMUSCULAR; INTRAVENOUS at 18:40

## 2025-07-17 RX ADMIN — SODIUM CHLORIDE, SODIUM LACTATE, POTASSIUM CHLORIDE, AND CALCIUM CHLORIDE 1000 ML: .6; .31; .03; .02 INJECTION, SOLUTION INTRAVENOUS at 22:12

## 2025-07-17 RX ADMIN — LORAZEPAM 2 MG: 2 INJECTION INTRAMUSCULAR; INTRAVENOUS at 18:40

## 2025-07-17 RX ADMIN — ALPRAZOLAM 0.5 MG: 0.5 TABLET ORAL at 19:16

## 2025-07-17 ASSESSMENT — ENCOUNTER SYMPTOMS
VOMITING: 0
SHORTNESS OF BREATH: 0
NAUSEA: 0
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
## 2025-07-18 VITALS
TEMPERATURE: 98.4 F | SYSTOLIC BLOOD PRESSURE: 105 MMHG | OXYGEN SATURATION: 95 % | HEART RATE: 92 BPM | DIASTOLIC BLOOD PRESSURE: 76 MMHG | RESPIRATION RATE: 18 BRPM
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
## 2025-07-18 NOTE — CARE COORDINATION
SW called [employer] for PT transport a couple miles down [address] [place], [place] to pickup his car at his mother's home - self pay.     Per request of cab company - provide voucher and if PT pays, they will return voucher.      Department: [phone] [facility]      Transportation Provider:   OZIEL Martinez - [phone]        Pick-Up Location:   [facility]     Destination: transport a couple miles down [address] [place], [place] past [address] [place], [place]   **The destination address cannot be altered. [facility] will not pay for a ride to any location other than what is provided on this form.     Mileage: 3     Fare Amount: 10.00   *Patient transportation is provided from [facility] to patient’s end destination only. We DO NOT authorize additional stops.           How does this request support our mission & encourage others to donate? Service
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
## 2025-08-19 RX ORDER — LISINOPRIL 10 MG/1
10 TABLET ORAL
Qty: 30 TABLET | Refills: 0 | Status: SHIPPED | OUTPATIENT
Start: 2025-08-19 | End: 2025-08-19 | Stop reason: SDUPTHER

## 2025-08-19 RX ORDER — LISINOPRIL 10 MG/1
10 TABLET ORAL
Qty: 30 TABLET | Refills: 1 | Status: SHIPPED | OUTPATIENT
Start: 2025-08-19

## (undated) DEVICE — PAD,ABDOMINAL,8"X10",ST,LF: Brand: MEDLINE

## (undated) DEVICE — SYR CONTRL LUERLOK 10CC

## (undated) DEVICE — TRY PREP SCRB VAG PVP

## (undated) DEVICE — LARYNGOSCOPE BLDE MAC HNDL M SZ 35 ST CURAPLEX CURAVIEW LED

## (undated) DEVICE — SUTURE VCRL SZ 3-0 L27IN ABSRB UD L26MM SH 1/2 CIR J416H

## (undated) DEVICE — C-ARMOR C-ARM EQUIPMENT COVERS CLEAR STERILE UNIVERSAL FIT 12 PER CASE: Brand: C-ARMOR

## (undated) DEVICE — SUT GUT CHRM 3/0 SH 27IN G122H

## (undated) DEVICE — SOLUTION IV IRRIG POUR BRL 0.9% SODIUM CHL 2F7124

## (undated) DEVICE — INTENDED FOR TISSUE SEPARATION, AND OTHER PROCEDURES THAT REQUIRE A SHARP SURGICAL BLADE TO PUNCTURE OR CUT.: Brand: BARD-PARKER ® STAINLESS STEEL BLADES

## (undated) DEVICE — Device

## (undated) DEVICE — TUBE ET 7.5MM NSL ORAL BASIC CUF INTMED MURPHY EYE RADPQ

## (undated) DEVICE — SUTURE VCRL SZ 0 L36IN ABSRB UD L36MM CT-1 1/2 CIR J946H

## (undated) DEVICE — E-Z CLEAN, NON-STICK, PTFE COATED, ELECTROSURGICAL NEEDLE ELECTRODE, 2.75 INCH (7 CM): Brand: MEGADYNE

## (undated) DEVICE — SYSTEM SKIN CLSR 22CM DERMBND PRINEO

## (undated) DEVICE — GUIDEWIRE ORTHOPEDIC 0.8X100 MM TROCAR PT 1 END

## (undated) DEVICE — GLV SURG BIOGEL M LTX PF 7 1/2

## (undated) DEVICE — PK TURNOVER RM ADV

## (undated) DEVICE — UNDERGLOVE SURG SZ 8 FNGR THK0.21MIL GRN LTX BEAD CUF

## (undated) DEVICE — NDL HYPO PRECISIONGLIDE/REG 25G 5/8 BLU

## (undated) DEVICE — C-ARM: Brand: UNBRANDED

## (undated) DEVICE — BANDAGE COMPR W6INXL10YD ST M E WHITE/BEIGE

## (undated) DEVICE — GLOVE SURG SZ 8 CRM LTX FREE POLYISOPRENE POLYMER BEAD ANTI

## (undated) DEVICE — PAD MINOR UNIVERSAL: Brand: MEDLINE INDUSTRIES, INC.

## (undated) DEVICE — SUTURE ETHLN SZ 3-0 L18IN NONABSORBABLE BLK FS-1 L24MM 3/8 663H

## (undated) DEVICE — CHLORAPREP 26ML ORANGE

## (undated) DEVICE — GAUZE,SPONGE,FLUFF,6"X6.75",STRL,10/TRAY: Brand: MEDLINE

## (undated) DEVICE — ZIMMER® STERILE DISPOSABLE TOURNIQUET CUFF WITH PLC, DUAL PORT, SINGLE BLADDER, 34 IN. (86 CM)

## (undated) DEVICE — PK CYSTO 30

## (undated) DEVICE — SURGICAL PROCEDURE PACK LOWER EXTREMITY LOURDES HOSP

## (undated) DEVICE — GLOVE SURG SZ 7 L12IN FNGR THK79MIL GRN LTX FREE

## (undated) DEVICE — GLOVE SURG SZ 65 CRM LTX FREE POLYISOPRENE POLYMER BEAD ANTI